# Patient Record
Sex: FEMALE | Race: WHITE | NOT HISPANIC OR LATINO | Employment: PART TIME | URBAN - METROPOLITAN AREA
[De-identification: names, ages, dates, MRNs, and addresses within clinical notes are randomized per-mention and may not be internally consistent; named-entity substitution may affect disease eponyms.]

---

## 2017-07-24 ENCOUNTER — APPOINTMENT (OUTPATIENT)
Dept: PREADMISSION TESTING | Facility: HOSPITAL | Age: 27
End: 2017-07-24

## 2017-07-24 VITALS
RESPIRATION RATE: 20 BRPM | OXYGEN SATURATION: 100 % | HEIGHT: 63 IN | WEIGHT: 148 LBS | TEMPERATURE: 98.1 F | SYSTOLIC BLOOD PRESSURE: 109 MMHG | DIASTOLIC BLOOD PRESSURE: 71 MMHG | HEART RATE: 79 BPM | BODY MASS INDEX: 26.22 KG/M2

## 2017-07-24 LAB
DEPRECATED RDW RBC AUTO: 44.8 FL (ref 37–54)
ERYTHROCYTE [DISTWIDTH] IN BLOOD BY AUTOMATED COUNT: 13.4 % (ref 11.7–13)
HCT VFR BLD AUTO: 37.5 % (ref 35.6–45.5)
HGB BLD-MCNC: 12.3 G/DL (ref 11.9–15.5)
MCH RBC QN AUTO: 29.8 PG (ref 26.9–32)
MCHC RBC AUTO-ENTMCNC: 32.8 G/DL (ref 32.4–36.3)
MCV RBC AUTO: 90.8 FL (ref 80.5–98.2)
PLATELET # BLD AUTO: 204 10*3/MM3 (ref 140–500)
PMV BLD AUTO: 9.3 FL (ref 6–12)
RBC # BLD AUTO: 4.13 10*6/MM3 (ref 3.9–5.2)
WBC NRBC COR # BLD: 7.84 10*3/MM3 (ref 4.5–10.7)

## 2017-07-24 PROCEDURE — 36415 COLL VENOUS BLD VENIPUNCTURE: CPT

## 2017-07-24 PROCEDURE — 85027 COMPLETE CBC AUTOMATED: CPT | Performed by: PLASTIC SURGERY

## 2017-07-24 NOTE — DISCHARGE INSTRUCTIONS
Take the following medications the morning of surgery with a small sip of water:    NONE    General Instructions:  • Do not eat or drink anything after midnight the night before surgery.  • Patients who avoid smoking, chewing tobacco and alcohol for 4 weeks prior to surgery have a reduced risk of post-operative complications.  Quit smoking as many days before surgery as you can.  • Do not smoke, use chewing tobacco or drink alcohol the day of surgery.   • Bring any papers given to you in the doctor’s office.  • Wear clean comfortable clothes and socks.  • Do not wear contact lenses or make-up.  Bring a case for your glasses.   • Leave all other valuables and jewelry at home.  • The Pre-Admission Testing nurse will instruct you to bring medications if unable to obtain an accurate list in Pre-Admission Testing.   • REPORT TO MAIN SURGERY ON 7-   AT 0530       Preventing a Surgical Site Infection:  • For 2 to 3 days before surgery, avoid shaving with a razor because the razor can irritate skin and make it easier to develop an infection.  • The night prior to surgery sleep in a clean bed with clean clothing.  Do not allow pets to sleep with you.  • Shower on the morning of surgery using a fresh bar of anti-bacterial soap (such as Dial) and clean washcloth.  Dry with a clean towel and dress in clean clothing.  • Ask your surgeon if you will be receiving antibiotics prior to surgery.  • Make sure you, your family, and all healthcare providers clean their hands with soap and water or an alcohol based hand  before caring for you or your wound.    Day of surgery:  Upon arrival, a Pre-op nurse and Anesthesiologist will review your health history, obtain vital signs, and answer questions you may have.  The only belongings needed at this time will be your home medications and if applicable your C-PAP/BI-PAP machine.  If you are staying overnight your family can leave the rest of your belongings in the car and  bring them to your room later.  A Pre-op nurse will start an IV and you may receive medication in preparation for surgery, including something to help you relax.  Your family will be able to see you in the Pre-op area.  While you are in surgery your family should notify the waiting room  if they leave the waiting room area and provide a contact phone number.    Please be aware that surgery does come with discomfort.  We want to make every effort to control your discomfort so please discuss any uncontrolled symptoms with your nurse.   Your doctor will most likely have prescribed pain medications.      If you are going home after surgery you will receive individualized written care instructions before being discharged.  A responsible adult must drive you to and from the hospital on the day of your surgery and stay with you for 24 hours.    If you have any questions please call Pre-Admission Testing at [phone].    Deductibles and co-payments are collected on the day of service. Please be prepared to pay the required co-pay, deductible or deposit on the day of service as defined by your plan.

## 2017-07-31 ENCOUNTER — ANESTHESIA (OUTPATIENT)
Dept: PERIOP | Facility: HOSPITAL | Age: 27
End: 2017-07-31

## 2017-07-31 ENCOUNTER — ANESTHESIA EVENT (OUTPATIENT)
Dept: PERIOP | Facility: HOSPITAL | Age: 27
End: 2017-07-31

## 2017-07-31 ENCOUNTER — HOSPITAL ENCOUNTER (OUTPATIENT)
Facility: HOSPITAL | Age: 27
Setting detail: HOSPITAL OUTPATIENT SURGERY
Discharge: HOME | End: 2017-07-31
Attending: PLASTIC SURGERY | Admitting: PLASTIC SURGERY

## 2017-07-31 VITALS
BODY MASS INDEX: 26.05 KG/M2 | HEART RATE: 90 BPM | DIASTOLIC BLOOD PRESSURE: 77 MMHG | SYSTOLIC BLOOD PRESSURE: 130 MMHG | HEIGHT: 63 IN | OXYGEN SATURATION: 98 % | WEIGHT: 147 LBS | RESPIRATION RATE: 16 BRPM | TEMPERATURE: 97.6 F

## 2017-07-31 LAB
B-HCG UR QL: NEGATIVE
INTERNAL NEGATIVE CONTROL: NEGATIVE
INTERNAL POSITIVE CONTROL: POSITIVE
Lab: NORMAL

## 2017-07-31 PROCEDURE — 25010000002 ONDANSETRON PER 1 MG: Performed by: NURSE ANESTHETIST, CERTIFIED REGISTERED

## 2017-07-31 PROCEDURE — 25010000002 MIDAZOLAM PER 1 MG: Performed by: NURSE ANESTHETIST, CERTIFIED REGISTERED

## 2017-07-31 PROCEDURE — 25010000002 FENTANYL CITRATE (PF) 100 MCG/2ML SOLUTION: Performed by: NURSE ANESTHETIST, CERTIFIED REGISTERED

## 2017-07-31 PROCEDURE — 25010000002 PROPOFOL 10 MG/ML EMULSION: Performed by: NURSE ANESTHETIST, CERTIFIED REGISTERED

## 2017-07-31 PROCEDURE — 63710000001 PROMETHAZINE PER 25 MG: Performed by: NURSE ANESTHETIST, CERTIFIED REGISTERED

## 2017-07-31 PROCEDURE — 25010000002 NEOSTIGMINE PER 0.5 MG: Performed by: NURSE ANESTHETIST, CERTIFIED REGISTERED

## 2017-07-31 PROCEDURE — 25010000002 DEXAMETHASONE PER 1 MG: Performed by: NURSE ANESTHETIST, CERTIFIED REGISTERED

## 2017-07-31 PROCEDURE — 25010000003 CEFAZOLIN IN D5W 1 GM/50ML SOLUTION: Performed by: NURSE ANESTHETIST, CERTIFIED REGISTERED

## 2017-07-31 PROCEDURE — 25010000002 MIDAZOLAM PER 1 MG: Performed by: ANESTHESIOLOGY

## 2017-07-31 RX ORDER — HYDROMORPHONE HYDROCHLORIDE 1 MG/ML
0.5 INJECTION, SOLUTION INTRAMUSCULAR; INTRAVENOUS; SUBCUTANEOUS
Status: DISCONTINUED | OUTPATIENT
Start: 2017-07-31 | End: 2017-07-31 | Stop reason: HOSPADM

## 2017-07-31 RX ORDER — NALOXONE HCL 0.4 MG/ML
0.2 VIAL (ML) INJECTION AS NEEDED
Status: DISCONTINUED | OUTPATIENT
Start: 2017-07-31 | End: 2017-07-31 | Stop reason: HOSPADM

## 2017-07-31 RX ORDER — SODIUM CHLORIDE, SODIUM LACTATE, POTASSIUM CHLORIDE, CALCIUM CHLORIDE 600; 310; 30; 20 MG/100ML; MG/100ML; MG/100ML; MG/100ML
9 INJECTION, SOLUTION INTRAVENOUS CONTINUOUS
Status: DISCONTINUED | OUTPATIENT
Start: 2017-07-31 | End: 2017-07-31 | Stop reason: HOSPADM

## 2017-07-31 RX ORDER — DIPHENHYDRAMINE HYDROCHLORIDE 50 MG/ML
12.5 INJECTION INTRAMUSCULAR; INTRAVENOUS
Status: DISCONTINUED | OUTPATIENT
Start: 2017-07-31 | End: 2017-07-31 | Stop reason: HOSPADM

## 2017-07-31 RX ORDER — MIDAZOLAM HYDROCHLORIDE 1 MG/ML
1 INJECTION INTRAMUSCULAR; INTRAVENOUS
Status: DISCONTINUED | OUTPATIENT
Start: 2017-07-31 | End: 2017-07-31 | Stop reason: HOSPADM

## 2017-07-31 RX ORDER — OXYCODONE AND ACETAMINOPHEN 7.5; 325 MG/1; MG/1
1 TABLET ORAL ONCE AS NEEDED
Status: DISCONTINUED | OUTPATIENT
Start: 2017-07-31 | End: 2017-07-31 | Stop reason: HOSPADM

## 2017-07-31 RX ORDER — HYDRALAZINE HYDROCHLORIDE 20 MG/ML
5 INJECTION INTRAMUSCULAR; INTRAVENOUS
Status: DISCONTINUED | OUTPATIENT
Start: 2017-07-31 | End: 2017-07-31 | Stop reason: HOSPADM

## 2017-07-31 RX ORDER — HYDROCODONE BITARTRATE AND ACETAMINOPHEN 7.5; 325 MG/1; MG/1
1 TABLET ORAL ONCE AS NEEDED
Status: COMPLETED | OUTPATIENT
Start: 2017-07-31 | End: 2017-07-31

## 2017-07-31 RX ORDER — SODIUM CHLORIDE 0.9 % (FLUSH) 0.9 %
1-10 SYRINGE (ML) INJECTION AS NEEDED
Status: DISCONTINUED | OUTPATIENT
Start: 2017-07-31 | End: 2017-07-31 | Stop reason: HOSPADM

## 2017-07-31 RX ORDER — ONDANSETRON 2 MG/ML
4 INJECTION INTRAMUSCULAR; INTRAVENOUS ONCE AS NEEDED
Status: COMPLETED | OUTPATIENT
Start: 2017-07-31 | End: 2017-07-31

## 2017-07-31 RX ORDER — MIDAZOLAM HYDROCHLORIDE 1 MG/ML
2 INJECTION INTRAMUSCULAR; INTRAVENOUS
Status: DISCONTINUED | OUTPATIENT
Start: 2017-07-31 | End: 2017-07-31 | Stop reason: HOSPADM

## 2017-07-31 RX ORDER — MIDAZOLAM HYDROCHLORIDE 1 MG/ML
INJECTION INTRAMUSCULAR; INTRAVENOUS AS NEEDED
Status: DISCONTINUED | OUTPATIENT
Start: 2017-07-31 | End: 2017-07-31 | Stop reason: SURG

## 2017-07-31 RX ORDER — PROMETHAZINE HYDROCHLORIDE 25 MG/1
12.5 TABLET ORAL ONCE AS NEEDED
Status: DISCONTINUED | OUTPATIENT
Start: 2017-07-31 | End: 2017-07-31 | Stop reason: HOSPADM

## 2017-07-31 RX ORDER — ROCURONIUM BROMIDE 10 MG/ML
INJECTION, SOLUTION INTRAVENOUS AS NEEDED
Status: DISCONTINUED | OUTPATIENT
Start: 2017-07-31 | End: 2017-07-31 | Stop reason: SURG

## 2017-07-31 RX ORDER — FENTANYL CITRATE 50 UG/ML
50 INJECTION, SOLUTION INTRAMUSCULAR; INTRAVENOUS
Status: DISCONTINUED | OUTPATIENT
Start: 2017-07-31 | End: 2017-07-31 | Stop reason: HOSPADM

## 2017-07-31 RX ORDER — ONDANSETRON 2 MG/ML
INJECTION INTRAMUSCULAR; INTRAVENOUS AS NEEDED
Status: DISCONTINUED | OUTPATIENT
Start: 2017-07-31 | End: 2017-07-31 | Stop reason: SURG

## 2017-07-31 RX ORDER — PROMETHAZINE HYDROCHLORIDE 25 MG/1
25 SUPPOSITORY RECTAL ONCE AS NEEDED
Status: COMPLETED | OUTPATIENT
Start: 2017-07-31 | End: 2017-07-31

## 2017-07-31 RX ORDER — LABETALOL HYDROCHLORIDE 5 MG/ML
5 INJECTION, SOLUTION INTRAVENOUS
Status: DISCONTINUED | OUTPATIENT
Start: 2017-07-31 | End: 2017-07-31 | Stop reason: HOSPADM

## 2017-07-31 RX ORDER — GLYCOPYRROLATE 0.2 MG/ML
INJECTION INTRAMUSCULAR; INTRAVENOUS AS NEEDED
Status: DISCONTINUED | OUTPATIENT
Start: 2017-07-31 | End: 2017-07-31 | Stop reason: SURG

## 2017-07-31 RX ORDER — SCOLOPAMINE TRANSDERMAL SYSTEM 1 MG/1
PATCH, EXTENDED RELEASE TRANSDERMAL AS NEEDED
Status: DISCONTINUED | OUTPATIENT
Start: 2017-07-31 | End: 2017-07-31 | Stop reason: SURG

## 2017-07-31 RX ORDER — EPHEDRINE SULFATE 50 MG/ML
5 INJECTION, SOLUTION INTRAVENOUS ONCE AS NEEDED
Status: DISCONTINUED | OUTPATIENT
Start: 2017-07-31 | End: 2017-07-31 | Stop reason: HOSPADM

## 2017-07-31 RX ORDER — SCOLOPAMINE TRANSDERMAL SYSTEM 1 MG/1
PATCH, EXTENDED RELEASE TRANSDERMAL
Status: DISCONTINUED
Start: 2017-07-31 | End: 2017-07-31 | Stop reason: HOSPADM

## 2017-07-31 RX ORDER — PROMETHAZINE HYDROCHLORIDE 25 MG/ML
12.5 INJECTION, SOLUTION INTRAMUSCULAR; INTRAVENOUS ONCE AS NEEDED
Status: COMPLETED | OUTPATIENT
Start: 2017-07-31 | End: 2017-07-31

## 2017-07-31 RX ORDER — LIDOCAINE HYDROCHLORIDE 20 MG/ML
INJECTION, SOLUTION INFILTRATION; PERINEURAL AS NEEDED
Status: DISCONTINUED | OUTPATIENT
Start: 2017-07-31 | End: 2017-07-31 | Stop reason: SURG

## 2017-07-31 RX ORDER — SODIUM CHLORIDE 9 MG/ML
INJECTION, SOLUTION INTRAVENOUS AS NEEDED
Status: DISCONTINUED | OUTPATIENT
Start: 2017-07-31 | End: 2017-07-31 | Stop reason: HOSPADM

## 2017-07-31 RX ORDER — ACETAMINOPHEN 325 MG/1
650 TABLET ORAL EVERY 6 HOURS PRN
COMMUNITY

## 2017-07-31 RX ORDER — CEFAZOLIN SODIUM 1 G/3ML
1 INJECTION, POWDER, FOR SOLUTION INTRAMUSCULAR; INTRAVENOUS ONCE
Status: CANCELLED | OUTPATIENT
Start: 2017-07-31

## 2017-07-31 RX ORDER — FAMOTIDINE 10 MG/ML
20 INJECTION, SOLUTION INTRAVENOUS ONCE
Status: COMPLETED | OUTPATIENT
Start: 2017-07-31 | End: 2017-07-31

## 2017-07-31 RX ORDER — MAGNESIUM HYDROXIDE 1200 MG/15ML
LIQUID ORAL AS NEEDED
Status: DISCONTINUED | OUTPATIENT
Start: 2017-07-31 | End: 2017-07-31 | Stop reason: HOSPADM

## 2017-07-31 RX ORDER — PROPOFOL 10 MG/ML
VIAL (ML) INTRAVENOUS AS NEEDED
Status: DISCONTINUED | OUTPATIENT
Start: 2017-07-31 | End: 2017-07-31 | Stop reason: SURG

## 2017-07-31 RX ORDER — DEXAMETHASONE SODIUM PHOSPHATE 10 MG/ML
INJECTION INTRAMUSCULAR; INTRAVENOUS AS NEEDED
Status: DISCONTINUED | OUTPATIENT
Start: 2017-07-31 | End: 2017-07-31 | Stop reason: SURG

## 2017-07-31 RX ORDER — FLUMAZENIL 0.1 MG/ML
0.2 INJECTION INTRAVENOUS AS NEEDED
Status: DISCONTINUED | OUTPATIENT
Start: 2017-07-31 | End: 2017-07-31 | Stop reason: HOSPADM

## 2017-07-31 RX ORDER — PROMETHAZINE HYDROCHLORIDE 25 MG/1
25 TABLET ORAL ONCE AS NEEDED
Status: COMPLETED | OUTPATIENT
Start: 2017-07-31 | End: 2017-07-31

## 2017-07-31 RX ORDER — FENTANYL CITRATE 50 UG/ML
INJECTION, SOLUTION INTRAMUSCULAR; INTRAVENOUS AS NEEDED
Status: DISCONTINUED | OUTPATIENT
Start: 2017-07-31 | End: 2017-07-31 | Stop reason: SURG

## 2017-07-31 RX ADMIN — SODIUM CHLORIDE, POTASSIUM CHLORIDE, SODIUM LACTATE AND CALCIUM CHLORIDE: 600; 310; 30; 20 INJECTION, SOLUTION INTRAVENOUS at 08:28

## 2017-07-31 RX ADMIN — MIDAZOLAM HYDROCHLORIDE 2 MG: 1 INJECTION, SOLUTION INTRAMUSCULAR; INTRAVENOUS at 10:00

## 2017-07-31 RX ADMIN — FENTANYL CITRATE 100 MCG: 50 INJECTION INTRAMUSCULAR; INTRAVENOUS at 07:32

## 2017-07-31 RX ADMIN — SCOPALAMINE 1 PATCH: 1 PATCH, EXTENDED RELEASE TRANSDERMAL at 07:15

## 2017-07-31 RX ADMIN — ONDANSETRON 4 MG: 2 INJECTION INTRAMUSCULAR; INTRAVENOUS at 09:56

## 2017-07-31 RX ADMIN — FENTANYL CITRATE 50 MCG: 50 INJECTION INTRAMUSCULAR; INTRAVENOUS at 11:27

## 2017-07-31 RX ADMIN — PROPOFOL 150 MG: 10 INJECTION, EMULSION INTRAVENOUS at 07:37

## 2017-07-31 RX ADMIN — PROMETHAZINE HYDROCHLORIDE 25 MG: 25 TABLET ORAL at 12:04

## 2017-07-31 RX ADMIN — FENTANYL CITRATE 50 MCG: 50 INJECTION INTRAMUSCULAR; INTRAVENOUS at 07:57

## 2017-07-31 RX ADMIN — LIDOCAINE HYDROCHLORIDE 60 MG: 20 INJECTION, SOLUTION INFILTRATION; PERINEURAL at 07:37

## 2017-07-31 RX ADMIN — GLYCOPYRROLATE 0.4 MG: 0.2 INJECTION INTRAMUSCULAR; INTRAVENOUS at 09:58

## 2017-07-31 RX ADMIN — MIDAZOLAM 2 MG: 1 INJECTION INTRAMUSCULAR; INTRAVENOUS at 07:16

## 2017-07-31 RX ADMIN — ONDANSETRON 4 MG: 2 INJECTION INTRAMUSCULAR; INTRAVENOUS at 12:06

## 2017-07-31 RX ADMIN — CEFAZOLIN SODIUM 1 G: 1 INJECTION, SOLUTION INTRAVENOUS at 07:26

## 2017-07-31 RX ADMIN — FAMOTIDINE 20 MG: 10 INJECTION INTRAVENOUS at 06:24

## 2017-07-31 RX ADMIN — SODIUM CHLORIDE, POTASSIUM CHLORIDE, SODIUM LACTATE AND CALCIUM CHLORIDE 9 ML/HR: 600; 310; 30; 20 INJECTION, SOLUTION INTRAVENOUS at 05:46

## 2017-07-31 RX ADMIN — FENTANYL CITRATE 50 MCG: 50 INJECTION INTRAMUSCULAR; INTRAVENOUS at 12:08

## 2017-07-31 RX ADMIN — ROCURONIUM BROMIDE 35 MG: 10 INJECTION INTRAVENOUS at 07:37

## 2017-07-31 RX ADMIN — NEOSTIGMINE METHYLSULFATE 2 MG: 1 INJECTION INTRAMUSCULAR; INTRAVENOUS; SUBCUTANEOUS at 09:58

## 2017-07-31 RX ADMIN — DEXAMETHASONE SODIUM PHOSPHATE 8 MG: 10 INJECTION INTRAMUSCULAR; INTRAVENOUS at 09:42

## 2017-07-31 RX ADMIN — HYDROCODONE BITARTRATE AND ACETAMINOPHEN 1 TABLET: 7.5; 325 TABLET ORAL at 12:02

## 2017-07-31 NOTE — ANESTHESIA POSTPROCEDURE EVALUATION
"Patient: Yuki Arnett    Procedure Summary     Date Anesthesia Start Anesthesia Stop Room / Location    07/31/17 0726 1027  [facility] / [facility]       Procedure Diagnosis Surgeon Provider    BREAST AUGMENTATION, MASTOPEXY W/ IDEAL IMPLANTS  NEW IMAGE (Bilateral Breast) No diagnosis on file. MD Gaby Jaquez MD          Anesthesia Type: general  Last vitals  BP   120/80 (07/31/17 1345)    Temp        Pulse   86 (07/31/17 1345)   Resp   16 (07/31/17 1345)    SpO2   98 % (07/31/17 1345)      Post Anesthesia Care and Evaluation    Patient location during evaluation: PACU  Patient participation: complete - patient participated  Level of consciousness: sleepy but conscious  Pain score: 0  Pain management: adequate  Airway patency: patent  Anesthetic complications: No anesthetic complications    Cardiovascular status: acceptable  Respiratory status: acceptable  Hydration status: acceptable    Comments: /80  Pulse 86  Temp 36.7 °C (98 °F) (Oral)   Resp 16  Ht 62.5\" (158.8 cm)  Wt 147 lb (66.7 kg)  [facility] 07/10/2017  SpO2 98%  BMI 26.46 kg/m2        "

## 2017-07-31 NOTE — ANESTHESIA PROCEDURE NOTES
Airway  Urgency: elective    Date/Time: 7/31/2017 7:39 AM  Airway not difficult    General Information and Staff    Patient location during procedure: OR  Anesthesiologist: CAMRYN LUIS  CRNA: KEVIN CHANEL    Indications and Patient Condition  Indications for airway management: airway protection    Preoxygenated: yes  Mask difficulty assessment: 1 - vent by mask    Final Airway Details  Final airway type: endotracheal airway      Successful airway: ETT  Cuffed: yes   Successful intubation technique: direct laryngoscopy  Facilitating devices/methods: cricoid pressure  Endotracheal tube insertion site: oral  Blade: Mota  Blade size: #2  ETT size: 6.5 mm  Cormack-Lehane Classification: grade I - full view of glottis  Placement verified by: chest auscultation and capnometry   Cuff volume (mL): 2  Measured from: lips  ETT to lips (cm): 20  Number of attempts at approach: 1    Additional Comments  EBBS: ETCO2+: Atraumatic intubation: Lips and teeth same as pre op

## 2017-07-31 NOTE — ANESTHESIA PREPROCEDURE EVALUATION
Anesthesia Evaluation     history of anesthetic complications (combative):         Airway   Mallampati: II  TM distance: >3 FB  Neck ROM: full  no difficulty expected  Dental - normal exam     Pulmonary    (+) asthma (as a child),   Cardiovascular     (-) hypertension, valvular problems/murmurs, hyperlipidemia      Neuro/Psych  GI/Hepatic/Renal/Endo      Musculoskeletal     Abdominal    Substance History      OB/GYN          Other                                        Anesthesia Plan    ASA 1     general     intravenous induction   Anesthetic plan and risks discussed with patient.

## 2023-03-23 ENCOUNTER — HOSPITAL ENCOUNTER (EMERGENCY)
Facility: HOSPITAL | Age: 33
Discharge: HOME | End: 2023-03-23
Attending: EMERGENCY MEDICINE | Admitting: EMERGENCY MEDICINE
Payer: MEDICAID

## 2023-03-23 ENCOUNTER — APPOINTMENT (OUTPATIENT)
Dept: ULTRASOUND IMAGING | Facility: HOSPITAL | Age: 33
End: 2023-03-23
Payer: MEDICAID

## 2023-03-23 VITALS
WEIGHT: 145 LBS | BODY MASS INDEX: 26.68 KG/M2 | TEMPERATURE: 99.5 F | RESPIRATION RATE: 16 BRPM | SYSTOLIC BLOOD PRESSURE: 121 MMHG | HEIGHT: 62 IN | DIASTOLIC BLOOD PRESSURE: 78 MMHG | OXYGEN SATURATION: 95 % | HEART RATE: 94 BPM

## 2023-03-23 DIAGNOSIS — O20.9 BLEEDING IN EARLY PREGNANCY: Primary | ICD-10-CM

## 2023-03-23 LAB
ALBUMIN SERPL-MCNC: 4.5 G/DL (ref 3.5–5.2)
ALBUMIN/GLOB SERPL: 1.5 G/DL
ALP SERPL-CCNC: 97 U/L (ref 39–117)
ALT SERPL W P-5'-P-CCNC: 11 U/L (ref 1–33)
ANION GAP SERPL CALCULATED.3IONS-SCNC: 11 MMOL/L (ref 5–15)
AST SERPL-CCNC: 16 U/L (ref 1–32)
BASOPHILS # BLD AUTO: 0.07 10*3/MM3 (ref 0–0.2)
BASOPHILS NFR BLD AUTO: 0.6 % (ref 0–1.5)
BILIRUB SERPL-MCNC: 0.3 MG/DL (ref 0–1.2)
BILIRUB UR QL STRIP: NEGATIVE
BUN SERPL-MCNC: 8 MG/DL (ref 6–20)
BUN/CREAT SERPL: 11.4 (ref 7–25)
CALCIUM SPEC-SCNC: 9.5 MG/DL (ref 8.6–10.5)
CHLORIDE SERPL-SCNC: 105 MMOL/L (ref 98–107)
CLARITY UR: CLEAR
CO2 SERPL-SCNC: 24 MMOL/L (ref 22–29)
COLOR UR: YELLOW
CREAT SERPL-MCNC: 0.7 MG/DL (ref 0.57–1)
DEPRECATED RDW RBC AUTO: 40.3 FL (ref 37–54)
EGFRCR SERPLBLD CKD-EPI 2021: 117.3 ML/MIN/1.73
EOSINOPHIL # BLD AUTO: 0.06 10*3/MM3 (ref 0–0.4)
EOSINOPHIL NFR BLD AUTO: 0.5 % (ref 0.3–6.2)
ERYTHROCYTE [DISTWIDTH] IN BLOOD BY AUTOMATED COUNT: 12.6 % (ref 12.3–15.4)
GLOBULIN UR ELPH-MCNC: 3.1 GM/DL
GLUCOSE SERPL-MCNC: 96 MG/DL (ref 65–99)
GLUCOSE UR STRIP-MCNC: NEGATIVE MG/DL
HCG INTACT+B SERPL-ACNC: 680 MIU/ML
HCT VFR BLD AUTO: 39.5 % (ref 34–46.6)
HGB BLD-MCNC: 13.2 G/DL (ref 12–15.9)
HGB UR QL STRIP.AUTO: NEGATIVE
HOLD SPECIMEN: NORMAL
HOLD SPECIMEN: NORMAL
IMM GRANULOCYTES # BLD AUTO: 0.06 10*3/MM3 (ref 0–0.05)
IMM GRANULOCYTES NFR BLD AUTO: 0.5 % (ref 0–0.5)
KETONES UR QL STRIP: ABNORMAL
LEUKOCYTE ESTERASE UR QL STRIP.AUTO: NEGATIVE
LIPASE SERPL-CCNC: 22 U/L (ref 13–60)
LYMPHOCYTES # BLD AUTO: 2.52 10*3/MM3 (ref 0.7–3.1)
LYMPHOCYTES NFR BLD AUTO: 22.7 % (ref 19.6–45.3)
MCH RBC QN AUTO: 29.3 PG (ref 26.6–33)
MCHC RBC AUTO-ENTMCNC: 33.4 G/DL (ref 31.5–35.7)
MCV RBC AUTO: 87.8 FL (ref 79–97)
MONOCYTES # BLD AUTO: 0.5 10*3/MM3 (ref 0.1–0.9)
MONOCYTES NFR BLD AUTO: 4.5 % (ref 5–12)
NEUTROPHILS NFR BLD AUTO: 7.87 10*3/MM3 (ref 1.7–7)
NEUTROPHILS NFR BLD AUTO: 71.2 % (ref 42.7–76)
NITRITE UR QL STRIP: NEGATIVE
NRBC BLD AUTO-RTO: 0.1 /100 WBC (ref 0–0.2)
PH UR STRIP.AUTO: 6.5 [PH] (ref 5–8)
PLATELET # BLD AUTO: 345 10*3/MM3 (ref 140–450)
PMV BLD AUTO: 8.9 FL (ref 6–12)
POTASSIUM SERPL-SCNC: 4.2 MMOL/L (ref 3.5–5.2)
PROT SERPL-MCNC: 7.6 G/DL (ref 6–8.5)
PROT UR QL STRIP: NEGATIVE
RBC # BLD AUTO: 4.5 10*6/MM3 (ref 3.77–5.28)
SODIUM SERPL-SCNC: 140 MMOL/L (ref 136–145)
SP GR UR STRIP: 1.01 (ref 1–1.03)
UROBILINOGEN UR QL STRIP: ABNORMAL
WBC NRBC COR # BLD: 11.08 10*3/MM3 (ref 3.4–10.8)
WHOLE BLOOD HOLD COAG: NORMAL
WHOLE BLOOD HOLD SPECIMEN: NORMAL

## 2023-03-23 PROCEDURE — 84702 CHORIONIC GONADOTROPIN TEST: CPT | Performed by: EMERGENCY MEDICINE

## 2023-03-23 PROCEDURE — 36415 COLL VENOUS BLD VENIPUNCTURE: CPT | Performed by: EMERGENCY MEDICINE

## 2023-03-23 PROCEDURE — 76815 OB US LIMITED FETUS(S): CPT

## 2023-03-23 PROCEDURE — 93976 VASCULAR STUDY: CPT

## 2023-03-23 PROCEDURE — 83690 ASSAY OF LIPASE: CPT | Performed by: EMERGENCY MEDICINE

## 2023-03-23 PROCEDURE — 80053 COMPREHEN METABOLIC PANEL: CPT | Performed by: EMERGENCY MEDICINE

## 2023-03-23 PROCEDURE — 99283 EMERGENCY DEPT VISIT LOW MDM: CPT

## 2023-03-23 PROCEDURE — 76817 TRANSVAGINAL US OBSTETRIC: CPT

## 2023-03-23 PROCEDURE — 81003 URINALYSIS AUTO W/O SCOPE: CPT | Performed by: EMERGENCY MEDICINE

## 2023-03-23 PROCEDURE — 85025 COMPLETE CBC W/AUTO DIFF WBC: CPT | Performed by: EMERGENCY MEDICINE

## 2023-03-23 RX ORDER — SODIUM CHLORIDE 0.9 % (FLUSH) 0.9 %
10 SYRINGE (ML) INJECTION AS NEEDED
Status: DISCONTINUED | OUTPATIENT
Start: 2023-03-23 | End: 2023-03-24 | Stop reason: HOSPADM

## 2023-03-23 NOTE — ED TRIAGE NOTES
C/O Abd cramping and back pain. Approx 5-6 weeks pregnant     Mask placed on patient in triage. Triage staff wore appropriate PPE during interaction with patient.

## 2023-03-24 ENCOUNTER — TELEPHONE (OUTPATIENT)
Dept: OBSTETRICS AND GYNECOLOGY | Facility: CLINIC | Age: 33
End: 2023-03-24
Payer: MEDICAID

## 2023-03-24 NOTE — ED PROVIDER NOTES
MD ATTESTATION NOTE    The GIANLUCA and I have discussed this patient's history, physical exam, and treatment plan.    I provided a substantive portion of the care of this patient. I personally performed the physical exam, in its entirety. The attached note describes my personal findings.      Yuki Shields is a 32 y.o. female who presents to the ED c/o lower abdominal cramping that is more prominent than the left.  She states that she is about 5 to 6 weeks pregnant.  She is .  She has had a small amount of vaginal spotting and 1 clot passed.  She has historically been on Lovenox for her pregnancies.  She has not yet seen her OB who is normally at [facility].      On exam:  GENERAL: not distressed  HENT: nares patent  EYES: no scleral icterus  CV: regular rhythm, regular rate  RESPIRATORY: normal effort  ABDOMEN: soft, minimal lower abdominal tenderness without rebound or guarding  MUSCULOSKELETAL: no deformity  NEURO: alert, moves all extremities, follows commands  SKIN: warm, dry    Labs  Recent Results (from the past 24 hour(s))   Comprehensive Metabolic Panel    Collection Time: 23  6:13 PM    Specimen: Blood   Result Value Ref Range    Glucose 96 65 - 99 mg/dL    BUN 8 6 - 20 mg/dL    Creatinine 0.70 0.57 - 1.00 mg/dL    Sodium 140 136 - 145 mmol/L    Potassium 4.2 3.5 - 5.2 mmol/L    Chloride 105 98 - 107 mmol/L    CO2 24.0 22.0 - 29.0 mmol/L    Calcium 9.5 8.6 - 10.5 mg/dL    Total Protein 7.6 6.0 - 8.5 g/dL    Albumin 4.5 3.5 - 5.2 g/dL    ALT (SGPT) 11 1 - 33 U/L    AST (SGOT) 16 1 - 32 U/L    Alkaline Phosphatase 97 39 - 117 U/L    Total Bilirubin 0.3 0.0 - 1.2 mg/dL    Globulin 3.1 gm/dL    A/G Ratio 1.5 g/dL    BUN/Creatinine Ratio 11.4 7.0 - 25.0    Anion Gap 11.0 5.0 - 15.0 mmol/L    eGFR 117.3 >60.0 mL/min/1.73   Lipase    Collection Time: 23  6:13 PM    Specimen: Blood   Result Value Ref Range    Lipase 22 13 - 60 U/L   Urinalysis With Microscopic If Indicated (No  Culture) - Urine, Clean Catch    Collection Time: 03/23/23  6:13 PM    Specimen: Urine, Clean Catch   Result Value Ref Range    Color, UA Yellow Yellow, Straw    Appearance, UA Clear Clear    pH, UA 6.5 5.0 - 8.0    Specific Gravity, UA 1.013 1.005 - 1.030    Glucose, UA Negative Negative    Ketones, UA Trace (A) Negative    Bilirubin, UA Negative Negative    Blood, UA Negative Negative    Protein, UA Negative Negative    Leuk Esterase, UA Negative Negative    Nitrite, UA Negative Negative    Urobilinogen, UA 0.2 E.U./dL 0.2 - 1.0 E.U./dL   hCG, Quantitative, Pregnancy    Collection Time: 03/23/23  6:13 PM    Specimen: Blood   Result Value Ref Range    HCG Quantitative 680.00 mIU/mL   Green Top (Gel)    Collection Time: 03/23/23  6:13 PM   Result Value Ref Range    Extra Tube Hold for add-ons.    Lavender Top    Collection Time: 03/23/23  6:13 PM   Result Value Ref Range    Extra Tube hold for add-on    Gold Top - SST    Collection Time: 03/23/23  6:13 PM   Result Value Ref Range    Extra Tube Hold for add-ons.    Light Blue Top    Collection Time: 03/23/23  6:13 PM   Result Value Ref Range    Extra Tube Hold for add-ons.    CBC Auto Differential    Collection Time: 03/23/23  6:13 PM    Specimen: Blood   Result Value Ref Range    WBC 11.08 (H) 3.40 - 10.80 10*3/mm3    RBC 4.50 3.77 - 5.28 10*6/mm3    Hemoglobin 13.2 12.0 - 15.9 g/dL    Hematocrit 39.5 34.0 - 46.6 %    MCV 87.8 79.0 - 97.0 fL    MCH 29.3 26.6 - 33.0 pg    MCHC 33.4 31.5 - 35.7 g/dL    RDW 12.6 12.3 - 15.4 %    RDW-SD 40.3 37.0 - 54.0 fl    MPV 8.9 6.0 - 12.0 fL    Platelets 345 140 - 450 10*3/mm3    Neutrophil % 71.2 42.7 - 76.0 %    Lymphocyte % 22.7 19.6 - 45.3 %    Monocyte % 4.5 (L) 5.0 - 12.0 %    Eosinophil % 0.5 0.3 - 6.2 %    Basophil % 0.6 0.0 - 1.5 %    Immature Grans % 0.5 0.0 - 0.5 %    Neutrophils, Absolute 7.87 (H) 1.70 - 7.00 10*3/mm3    Lymphocytes, Absolute 2.52 0.70 - 3.10 10*3/mm3    Monocytes, Absolute 0.50 0.10 - 0.90 10*3/mm3     Eosinophils, Absolute 0.06 0.00 - 0.40 10*3/mm3    Basophils, Absolute 0.07 0.00 - 0.20 10*3/mm3    Immature Grans, Absolute 0.06 (H) 0.00 - 0.05 10*3/mm3    nRBC 0.1 0.0 - 0.2 /100 WBC       Radiology  No Radiology Exams Resulted Within Past 24 Hours    Medications given in the ED:  Medications   sodium chloride 0.9 % flush 10 mL (has no administration in time range)       Orders placed during this visit:  Orders Placed This Encounter   Procedures   • US Ob Limited 1 + Fetuses   • US Ob Transvaginal   • US Testicular or Ovarian Vascular Limited   • [place] Draw   • Comprehensive Metabolic Panel   • Lipase   • Urinalysis With Microscopic If Indicated (No Culture) - Urine, Clean Catch   • hCG, Quantitative, Pregnancy   • CBC Auto Differential   • NPO Diet NPO Type: Strict NPO   • Undress & Gown   • Insert Peripheral IV   • CBC & Differential   • Green Top (Gel)   • Lavender Top   • Gold Top - SST   • Light Blue Top       Medical Decision Making:  ED Course as of 23 0248   Thu Mar 23, 2023   190 O+ blood  type from labs in 2019. [DC]   190 WBC(!): 11.08 [DC]   1904 Hemoglobin: 13.2 [DC]   1904 Platelets: 345 [DC]   1904 Glucose: 96 [DC]   1904 Creatinine: 0.70 [DC]   1904 HCG Quantitative: 680.00 [DC]    Attempted to call OB hospitalist but no answer.  Contacted L&D floor who states OB hospitalist currently assisting in a . [DC]    Discussed case with Dr. Rodriguez, OB hospitalist, who states she would not initiate Lovenox at this time and patient is appropriate to follow-up with her primary OB regarding this.  She does recommend repeat hCG levels in 48 hours and close follow-up for repeat ultrasound since there is no identifiable IUP on ultrasound today. [DC]    Discussed lab and imaging results with patient.  She will follow-up with her OB for repeat hCG levels in 48 hours and close monitoring of symptoms.  Discussed strict ER return precautions with patient and plan to hold off on  initiating Lovenox until she follows up with her OB.  Patient verbalizes understanding and is agreeable. [DC]      ED Course User Index  [DC] Julianna Arteaga PA             PPE: Both the patient and I wore a surgical mask throughout the entire patient encounter.     Diagnosis  Final diagnoses:   Bleeding in early pregnancy        Narciso Smith II, MD  03/25/23 0248

## 2023-03-24 NOTE — ED PROVIDER NOTES
EMERGENCY DEPARTMENT ENCOUNTER    Room Number:  26/26  Date of encounter:  3/23/2023  PCP: Provider, No Known  Patient Care Team:  Provider, No Known as PCP - Sol Plascencia MD (Family Medicine)   Independent Historians: Patient    HPI:  Chief Complaint: Nominal pain, pregnancy  A complete HPI/ROS/PMH/PSH/SH/FH are unobtainable due to: None    Chronic or social conditions impacting patient care (social determinants of health): History of clotting disorder    Context: Yuki Shields is a 32 y.o. female who presents to the ED c/o some lower abdominal cramping that began over the past several days.  She estimates she is about 5 to 6 weeks pregnant based on LMP on 2/15/2023.  She has had 3 previous pregnancies with no complications.  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  She reports small amount of vaginal spotting and 1 small clot she passed.  Denies current active bleeding.  She has previously been seen by OB at [facility] but has an upcoming appointment with a Dr. Taylor on 3/30/2023.    Review of prior external notes (non-ED): Office visit with orthopedic surgery on 5/3/2016 with Dr. Giraldo for right hip pain.    Review of prior external test results outside of this encounter: Blood type from 12/17/thousand 19 labs show positive.    PAST MEDICAL HISTORY  Active Ambulatory Problems     Diagnosis Date Noted   • No Active Ambulatory Problems     Resolved Ambulatory Problems     Diagnosis Date Noted   • No Resolved Ambulatory Problems     Past Medical History:   Diagnosis Date   • Asthma    • DDD (degenerative disc disease), lumbar    • Factor II deficiency (HCC)    • Family hx-blood disorder    • Labral tear of hip, degenerative    • Lumbar herniated disc 06/2017       The patient qualifies to receive the vaccine, but they have not yet received it.    PAST SURGICAL HISTORY  Past Surgical History:   Procedure Laterality Date   • BLADDER REPAIR   "2016    WITH MESH   • BREAST AUGMENTATION BILATERAL MASTOPEXY Bilateral 7/31/2017    Procedure: BREAST AUGMENTATION, MASTOPEXY W/ IDEAL IMPLANTS  NEW IMAGE;  Surgeon: Nirmal Mehta MD;  Location: [facility];  Service:    • CHOLECYSTECTOMY  2012   • TONSILLECTOMY AND ADENOIDECTOMY           FAMILY HISTORY  Family History   Problem Relation Age of Onset   • Malig Hyperthermia Neg Hx          SOCIAL HISTORY  Social History     Socioeconomic History   • Marital status:    Tobacco Use   • Smoking status: Never   • Smokeless tobacco: Never   Substance and Sexual Activity   • Alcohol use: Yes     Comment: OCCAS   • Drug use: No   • Sexual activity: Defer         ALLERGIES  Dilaudid [hydromorphone hcl]        REVIEW OF SYSTEMS  Review of Systems   Constitutional: Negative for fever.   Respiratory: Negative for shortness of breath.    Cardiovascular: Negative for chest pain.   Gastrointestinal: Negative for nausea and vomiting.   Genitourinary: Positive for pelvic pain (\"Discomfort\") and vaginal bleeding (Small, none currently). Negative for dysuria.        All systems reviewed and negative except for those discussed in HPI.       PHYSICAL EXAM    I have reviewed the triage vital signs and nursing notes.    ED Triage Vitals   Temp Heart Rate Resp BP SpO2   03/23/23 1724 03/23/23 1724 03/23/23 1724 03/23/23 1759 03/23/23 1724   99.5 °F (37.5 °C) (!) 123 16 133/93 99 %      Temp src Heart Rate Source Patient Position BP Location FiO2 (%)   03/23/23 1724 -- -- -- --   Tympanic           Physical Exam  GENERAL: alert, no acute distress  SKIN: Warm, dry  HENT: Normocephalic, atraumatic  EYES: no scleral icterus  CV: regular rhythm, regular rate  RESPIRATORY: normal effort, lungs clear  ABDOMEN: soft, no significant tenderness, very mild discomfort on the left, nondistended  MUSCULOSKELETAL: no deformity  NEURO: alert, moves all extremities, follows commands          LAB RESULTS  No results found for this or any " previous visit (from the past 24 hour(s)).    Ordered the above labs and independently reviewed and interpreted the results.        RADIOLOGY  US Ob Limited 1 + Fetuses    Result Date: 3/23/2023  PELVIC ULTRASOUND  HISTORY: Early pregnancy pain  COMPARISON: None available.  TECHNIQUE: Grayscale, color Doppler, spectral Doppler waveform analysis was performed through the pelvis transabdominally and transvaginally.  FINDINGS: Uterus measures 7.2 x 3.8 x 4.3 cm. No intrauterine pregnancy seen. Endometrium measures 6 mm. The right ovary measures 2.4 x 2.2 x 1.9 cm. Left ovary measures 2.1 x 1.8 x 2.0 cm. A hypoechoic area within the right ovary measures 8 x 7 x 6 mm, and may represent a corpus luteum. The patient's left ovary cannot be seen on transvaginal imaging, but no obvious adnexal masses are seen on either side. No free fluid is identified. Color Doppler flow is noted within both ovaries.      No intrauterine pregnancy is identified. This may be related to very early pregnancy. No suspicious adnexal masses are seen at this time to suggest ectopic pregnancy, but short-term obstetric and sonographic follow-up is recommended.  This report was finalized on 3/23/2023 9:16 PM by Dr. Ariella See M.D.        I ordered the above noted radiological studies. Independently reviewed and interpreted by me.  See dictation for official radiology interpretation.      PROCEDURES    Procedures      MEDICATIONS GIVEN IN ER    Medications - No data to display      PROGRESS, DATA ANALYSIS, CONSULTS, AND MEDICAL DECISION MAKING    All labs have been independently reviewed and interpreted by me.  All radiology studies have been independently reviewed and interpreted by me and discussed with radiologist dictating the report.   EKG's independently reviewed and interpreted by me.  Discussion below represents my analysis of pertinent findings related to patient's condition, differential diagnosis, treatment plan and final  disposition.    Differential diagnosis: Early pregnancy, implantation bleeding, ectopic pregnancy, spontaneous     ED Course as of 23 1841   Thu Mar 23, 2023   190 O+ blood  type from labs in 2019. [DC]   1904 WBC(!): 11.08 [DC]   1904 Hemoglobin: 13.2 [DC]   1904 Platelets: 345 [DC]   1904 Glucose: 96 [DC]   1904 Creatinine: 0.70 [DC]   1904 HCG Quantitative: 680.00 [DC]    Attempted to call OB hospitalist but no answer.  Contacted L&D floor who states OB hospitalist currently assisting in a . [DC]    Discussed case with Dr. Rodriguez, OB hospitalist, who states she would not initiate Lovenox at this time and patient is appropriate to follow-up with her primary OB regarding this.  She does recommend repeat hCG levels in 48 hours and close follow-up for repeat ultrasound since there is no identifiable IUP on ultrasound today. [DC]    Discussed lab and imaging results with patient.  She will follow-up with her OB for repeat hCG levels in 48 hours and close monitoring of symptoms.  Discussed strict ER return precautions with patient and plan to hold off on initiating Lovenox until she follows up with her OB.  Patient verbalizes understanding and is agreeable. [DC]      ED Course User Index  [DC] Julianna Arteaga PA           PPE: Patient was placed in face mask in first look. Patient was wearing facemask when I entered the room and throughout our encounter. I wore full protective equipment throughout this patient encounter including a face mask, and gloves. Hand hygiene was performed before donning protective equipment and after removal when leaving the room.        AS OF 18:41 EDT VITALS:    BP - 121/78  HR - 94  TEMP - 99.5 °F (37.5 °C) (Tympanic)  O2 SATS - 95%        DIAGNOSIS  Final diagnoses:   Bleeding in early pregnancy         DISPOSITION  ED Disposition     ED Disposition   Discharge    Condition   Stable    Comment   --                Note Disclaimer: At [facility], we  believe that sharing information builds trust and better relationships. You are receiving this note because you recently visited [facility]. It is possible you will see health information before a provider has talked with you about it. This kind of information can be easy to misunderstand. To help you fully understand what it means for your health, we urge you to discuss this note with your provider.       Julianna Arteaga PA  03/24/23 8588

## 2023-03-24 NOTE — TELEPHONE ENCOUNTER
Pt says she thought she miscarried and went to the Er and the Er wants her to come in to repeat an US and repeat her HCG blood work for a f/u, please advise.

## 2023-03-28 ENCOUNTER — OFFICE VISIT (OUTPATIENT)
Dept: OBSTETRICS AND GYNECOLOGY | Facility: CLINIC | Age: 33
End: 2023-03-28
Payer: MEDICAID

## 2023-03-28 VITALS
SYSTOLIC BLOOD PRESSURE: 128 MMHG | WEIGHT: 144 LBS | HEIGHT: 62 IN | DIASTOLIC BLOOD PRESSURE: 76 MMHG | BODY MASS INDEX: 26.5 KG/M2

## 2023-03-28 DIAGNOSIS — O99.111 THROMBOPHILIA AFFECTING PREGNANCY IN FIRST TRIMESTER, ANTEPARTUM: ICD-10-CM

## 2023-03-28 DIAGNOSIS — D68.59 THROMBOPHILIA AFFECTING PREGNANCY IN FIRST TRIMESTER, ANTEPARTUM: ICD-10-CM

## 2023-03-28 DIAGNOSIS — Z34.90 PREGNANCY, UNSPECIFIED GESTATIONAL AGE: ICD-10-CM

## 2023-03-28 DIAGNOSIS — O20.9 BLEEDING IN EARLY PREGNANCY: Primary | ICD-10-CM

## 2023-03-28 DIAGNOSIS — D68.52 PROTHROMBIN GENE MUTATION: ICD-10-CM

## 2023-03-28 LAB
B-HCG UR QL: POSITIVE
EXPIRATION DATE: ABNORMAL
INTERNAL NEGATIVE CONTROL: NEGATIVE
INTERNAL POSITIVE CONTROL: POSITIVE
Lab: ABNORMAL

## 2023-03-28 PROCEDURE — 1159F MED LIST DOCD IN RCRD: CPT | Performed by: STUDENT IN AN ORGANIZED HEALTH CARE EDUCATION/TRAINING PROGRAM

## 2023-03-28 PROCEDURE — 1160F RVW MEDS BY RX/DR IN RCRD: CPT | Performed by: STUDENT IN AN ORGANIZED HEALTH CARE EDUCATION/TRAINING PROGRAM

## 2023-03-28 PROCEDURE — 81025 URINE PREGNANCY TEST: CPT | Performed by: STUDENT IN AN ORGANIZED HEALTH CARE EDUCATION/TRAINING PROGRAM

## 2023-03-28 PROCEDURE — 99203 OFFICE O/P NEW LOW 30 MIN: CPT | Performed by: STUDENT IN AN ORGANIZED HEALTH CARE EDUCATION/TRAINING PROGRAM

## 2023-03-28 RX ORDER — BUSPIRONE HYDROCHLORIDE 7.5 MG/1
TABLET ORAL EVERY 12 HOURS SCHEDULED
COMMUNITY
End: 2023-06-12 | Stop reason: SDDI

## 2023-03-28 RX ORDER — ENOXAPARIN SODIUM 100 MG/ML
40 INJECTION SUBCUTANEOUS
Qty: 12 ML | Refills: 3 | Status: SHIPPED | OUTPATIENT
Start: 2023-03-28 | End: 2023-04-27

## 2023-03-28 NOTE — PROGRESS NOTES
Chief Complaint   Patient presents with   • ngyn     Spotting in early pregnancy        SUBJECTIVE:     Yuki Shields is a 32 y.o.  who presents with spotting in early pregnancy. She reports that her last menstrual period started on 2/15/23 and she bleed for one day and then started bleeding again on 22 with heavy flow. She was seen in the ED on 3/23/23 for vaginal spotting and passage of blood clot and evaluation with ultrasound in the ED noted no intrauterine pregnancy and no suspicious adnexal mass. Her HCG level that day was 680. She reports that she has had intermittent vaginal spotting since that time but no heavy vaginal bleeding. She is having abdominal cramping that is mild currently. She has nausea but no vomiting.      She has 3 previous pregnancy and this is her fourth pregnancy.   She has Factor II deficiency - prothrombin deficiency and has been on Lovenox with all her pregnancies. She has never experienced VTE disease.     OB Hx:   G1- - TSVD - low apgar scores. Postpartum hemorrhage.    G2- - TSVD   G3- 2015- TSVD  G4- Current    She has history of abdominoplasty that was complicated by significant blood loss requiring blood transfusion and history of bladder repair with mesh     Denies rashes or fevers since last menstrual period.   There is a family history of multiple thrombophilia including Factor 2, Factor 5, Factor 8.   Asthma- albuterol in pregnancy   She had the chicken pox as a child.   Denies abnormal pap smears. Last pap- in last 2-3 years.   Denies history of STD.       Past Medical History:   Diagnosis Date   • Asthma     As child   • DDD (degenerative disc disease), lumbar    • 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 GARZA AWARE OF THIS   • Family hx-blood disorder     MOTHER HAS FACTOR 2 AND FACTOR 5   • Labral tear of hip, degenerative     RIGHT   • Lumbar herniated disc 06/2017    X 2  DISC   HISTORY OF EPIDURALS      Past Surgical History:  "  Procedure Laterality Date   • BLADDER REPAIR      WITH MESH   • BREAST AUGMENTATION BILATERAL MASTOPEXY Bilateral 2017    Procedure: BREAST AUGMENTATION, MASTOPEXY W/ IDEAL IMPLANTS  NEW IMAGE;  Surgeon: Nirmal Mehta MD;  Location: [facility] OR;  Service:    • CHOLECYSTECTOMY     • TONSILLECTOMY AND ADENOIDECTOMY        Social History     Tobacco Use   • Smoking status: Never   • Smokeless tobacco: Never   Vaping Use   • Vaping Use: Never used   Substance Use Topics   • Alcohol use: Not Currently     Comment: OCCAS   • Drug use: No     OB History    Para Term  AB Living   4 3 3     3   SAB IAB Ectopic Molar Multiple Live Births             3      # Outcome Date GA Lbr Miguel/2nd Weight Sex Delivery Anes PTL Lv   4 Current            3 Term 04/29/15   3317 g (7 lb 5 oz) M Vag-Spont   MELANIA   2 Term 11   3147 g (6 lb 15 oz) F Vag-Spont      1 Term 07   3204 g (7 lb 1 oz) M Vag-Spont           Review of Systems   Gastrointestinal: Positive for abdominal pain and nausea. Negative for vomiting.   Genitourinary: Positive for vaginal bleeding.       OBJECTIVE:   Vitals:    23 0924   BP: 128/76   Weight: 65.3 kg (144 lb)   Height: 157.5 cm (62.01\")        Physical Exam  Vitals reviewed.   Constitutional:       General: She is not in acute distress.  HENT:      Head: Normocephalic and atraumatic.      Right Ear: External ear normal.      Left Ear: External ear normal.   Eyes:      Extraocular Movements: Extraocular movements intact.      Pupils: Pupils are equal, round, and reactive to light.   Pulmonary:      Effort: Pulmonary effort is normal. No respiratory distress.   Abdominal:      General: There is no distension.      Palpations: Abdomen is soft.      Tenderness: There is no abdominal tenderness. There is no guarding or rebound.   Musculoskeletal:         General: No deformity. Normal range of motion.      Cervical back: Normal range of motion and neck supple.   Skin:     " General: Skin is warm and dry.   Neurological:      General: No focal deficit present.      Mental Status: She is alert and oriented to person, place, and time.   Psychiatric:         Mood and Affect: Mood normal.         Behavior: Behavior normal.         ASSESSMENT:     ICD-10-CM ICD-9-CM   1. Bleeding in early pregnancy  O20.9 640.90   2. Pregnancy, unspecified gestational age  Z34.90 V22.2   3. Prothrombin gene mutation  D68.52 289.81   4. Thrombophilia affecting pregnancy in first trimester, antepartum  O99.111 649.33    D68.59 289.81       PLAN:   - Ultrasound performed today following visit to evaluate fetal viability and vaginal bleeding in early pregnancy. Ultrasound results demonstrated intrauterine pregnancy with gestational sac and yolk sac visualized. No fetal pole visualized at this this time. No subchorionic hematoma noted. Will have the patient return in 2 weeks for fetal viability ultrasound and new OB visit.  - Ordered initial prenatal labs including HCG level, OB Panel with HIV, Varicella Zoster Antibody, urine drug screen and urine culture. Will obtain pap smear and GC/CT/Trich probe at new OB visit.   - Reviewed [facility] and found where the patient previously has seen hematology and is heterozygous for prothrombin gene mutation. This means she has a low risk for VTE disease during pregnancy. Reviewed guidelines per ACOG and the patient has option of surveillance without anticoagulation therapy vs low dose prophylactic anticoagulation therapy. Patient has previously used Lovenox with all her pregnancies and will prescribe Lovenox 40 mg SQ daily. Will refer to hematology for further recommendations and follow up.   - Patient is already taking prenatal vitamin.   - Reviewed miscarriage and bleeding precautions.    See below for orders    Orders Placed This Encounter   Procedures   • Urine Culture - Urine, Urine, Clean Catch   • US Ob Transvaginal     Order Specific Question:   Reason for  Exam:     Answer:   vaginal bleeding in early pregnancy     Order Specific Question:   Release to patient     Answer:   Routine Release   • HCG, B-subunit, Quantitative     Order Specific Question:   Release to patient     Answer:   Routine Release   • OB Panel With HIV     Order Specific Question:   Release to patient     Answer:   Routine Release   • Varicella Zoster Antibody, IgG     Order Specific Question:   Release to patient     Answer:   Routine Release   • Urine Drug Screen - Urine, Clean Catch     Order Specific Question:   Release to patient     Answer:   Routine Release   • Ambulatory Referral to Hematology     Referral Priority:   Routine     Referral Type:   Consultation     Referral Reason:   Specialty Services Required     Requested Specialty:   Hematology     Number of Visits Requested:   1   • POC Pregnancy, Urine     Order Specific Question:   Release to patient     Answer:   Routine Release      Return in about 13 days (around 4/10/2023) for new OB visit .    Valerie Taylor MD

## 2023-03-29 ENCOUNTER — TELEPHONE (OUTPATIENT)
Dept: OBSTETRICS AND GYNECOLOGY | Facility: CLINIC | Age: 33
End: 2023-03-29

## 2023-03-29 LAB
ABO GROUP BLD: NORMAL
BASOPHILS # BLD AUTO: 0.1 X10E3/UL (ref 0–0.2)
BASOPHILS NFR BLD AUTO: 1 %
BLD GP AB SCN SERPL QL: NEGATIVE
EOSINOPHIL # BLD AUTO: 0.1 X10E3/UL (ref 0–0.4)
EOSINOPHIL NFR BLD AUTO: 1 %
ERYTHROCYTE [DISTWIDTH] IN BLOOD BY AUTOMATED COUNT: 12.5 % (ref 11.7–15.4)
HBV SURFACE AG SERPL QL IA: NEGATIVE
HCG INTACT+B SERPL-ACNC: 4977 MIU/ML
HCT VFR BLD AUTO: 37.8 % (ref 34–46.6)
HCV IGG SERPL QL IA: NON REACTIVE
HGB BLD-MCNC: 12.8 G/DL (ref 11.1–15.9)
HIV 1+2 AB+HIV1 P24 AG SERPL QL IA: NON REACTIVE
IMM GRANULOCYTES # BLD AUTO: 0 X10E3/UL (ref 0–0.1)
IMM GRANULOCYTES NFR BLD AUTO: 0 %
LYMPHOCYTES # BLD AUTO: 2 X10E3/UL (ref 0.7–3.1)
LYMPHOCYTES NFR BLD AUTO: 22 %
MCH RBC QN AUTO: 29.4 PG (ref 26.6–33)
MCHC RBC AUTO-ENTMCNC: 33.9 G/DL (ref 31.5–35.7)
MCV RBC AUTO: 87 FL (ref 79–97)
MONOCYTES # BLD AUTO: 0.6 X10E3/UL (ref 0.1–0.9)
MONOCYTES NFR BLD AUTO: 6 %
NEUTROPHILS # BLD AUTO: 6.6 X10E3/UL (ref 1.4–7)
NEUTROPHILS NFR BLD AUTO: 70 %
PLATELET # BLD AUTO: 354 X10E3/UL (ref 150–450)
RBC # BLD AUTO: 4.36 X10E6/UL (ref 3.77–5.28)
RH BLD: POSITIVE
RPR SER QL: NON REACTIVE
RUBV IGG SERPL IA-ACNC: 1.46 INDEX
VZV IGG SER IA-ACNC: 887 INDEX
WBC # BLD AUTO: 9.4 X10E3/UL (ref 3.4–10.8)

## 2023-03-29 NOTE — TELEPHONE ENCOUNTER
Pt called to check on prescription because the pharmacy wouldn't fill it. I called pharmacy and they state they need a prior auth done on the medication (Lovenox). Please advise.    Thanks,  Negro

## 2023-03-30 LAB
AMPHETAMINES UR QL SCN: NEGATIVE NG/ML
BACTERIA UR CULT: NO GROWTH
BACTERIA UR CULT: NORMAL
BARBITURATES UR QL SCN: NEGATIVE NG/ML
BENZODIAZ UR QL SCN: NEGATIVE NG/ML
BZE UR QL SCN: NEGATIVE NG/ML
CANNABINOIDS UR QL SCN: NEGATIVE NG/ML
CREAT UR-MCNC: 142.2 MG/DL (ref 20–300)
LABORATORY COMMENT REPORT: NORMAL
METHADONE UR QL SCN: NEGATIVE NG/ML
OPIATES UR QL SCN: NEGATIVE NG/ML
OXYCODONE+OXYMORPHONE UR QL SCN: NEGATIVE NG/ML
PCP UR QL: NEGATIVE NG/ML
PH UR: 6.7 [PH] (ref 4.5–8.9)
PROPOXYPH UR QL SCN: NEGATIVE NG/ML

## 2023-03-30 NOTE — TELEPHONE ENCOUNTER
PA for lovenox was submitted as urgent on 03/30/2023 at 10:17am. [place] will have a response within 24 hours.

## 2023-04-04 RX ORDER — ONDANSETRON 4 MG/1
4 TABLET, FILM COATED ORAL DAILY PRN
Qty: 30 TABLET | Refills: 1 | Status: SHIPPED | OUTPATIENT
Start: 2023-04-04 | End: 2023-04-10

## 2023-04-10 ENCOUNTER — INITIAL PRENATAL (OUTPATIENT)
Dept: OBSTETRICS AND GYNECOLOGY | Facility: CLINIC | Age: 33
End: 2023-04-10
Payer: MEDICAID

## 2023-04-10 VITALS — SYSTOLIC BLOOD PRESSURE: 108 MMHG | BODY MASS INDEX: 26.33 KG/M2 | DIASTOLIC BLOOD PRESSURE: 72 MMHG | WEIGHT: 144 LBS

## 2023-04-10 DIAGNOSIS — Z87.59 HISTORY OF POSTPARTUM HEMORRHAGE: ICD-10-CM

## 2023-04-10 DIAGNOSIS — D68.59 THROMBOPHILIA AFFECTING PREGNANCY, ANTEPARTUM: ICD-10-CM

## 2023-04-10 DIAGNOSIS — J45.909 ASTHMA AFFECTING PREGNANCY, ANTEPARTUM: ICD-10-CM

## 2023-04-10 DIAGNOSIS — O21.9 NAUSEA AND VOMITING DURING PREGNANCY: ICD-10-CM

## 2023-04-10 DIAGNOSIS — O99.519 ASTHMA AFFECTING PREGNANCY, ANTEPARTUM: ICD-10-CM

## 2023-04-10 DIAGNOSIS — Z34.91 PRENATAL CARE IN FIRST TRIMESTER: ICD-10-CM

## 2023-04-10 DIAGNOSIS — D68.52 PROTHROMBIN GENE MUTATION: ICD-10-CM

## 2023-04-10 DIAGNOSIS — Z11.3 SCREENING EXAMINATION FOR STD (SEXUALLY TRANSMITTED DISEASE): ICD-10-CM

## 2023-04-10 DIAGNOSIS — Z12.4 CERVICAL CANCER SCREENING: ICD-10-CM

## 2023-04-10 DIAGNOSIS — Z3A.01 7 WEEKS GESTATION OF PREGNANCY: Primary | ICD-10-CM

## 2023-04-10 DIAGNOSIS — O99.119 THROMBOPHILIA AFFECTING PREGNANCY, ANTEPARTUM: ICD-10-CM

## 2023-04-10 LAB
GLUCOSE UR STRIP-MCNC: NEGATIVE MG/DL
PROT UR STRIP-MCNC: ABNORMAL MG/DL

## 2023-04-10 PROCEDURE — 99214 OFFICE O/P EST MOD 30 MIN: CPT | Performed by: STUDENT IN AN ORGANIZED HEALTH CARE EDUCATION/TRAINING PROGRAM

## 2023-04-10 RX ORDER — ONDANSETRON 4 MG/1
4 TABLET, ORALLY DISINTEGRATING ORAL EVERY 8 HOURS PRN
Qty: 30 TABLET | Refills: 1 | Status: SHIPPED | OUTPATIENT
Start: 2023-04-10

## 2023-04-10 NOTE — PROGRESS NOTES
Initial OB Visit    Chief Complaint   Patient presents with   • Initial Prenatal Visit        Yuki Shields is being seen today for her first obstetrical visit.  She is a 32 y.o.  at 7w0d gestation by LMP 23 consistent with 7 week ultrasound.      This is not a planned pregnancy.   OB History    Para Term  AB Living   4 3 3     3   SAB IAB Ectopic Molar Multiple Live Births             3      # Outcome Date GA Lbr Miguel/2nd Weight Sex Delivery Anes PTL Lv   4 Current            3 Term 04/29/15   3317 g (7 lb 5 oz) M Vag-Spont   MELANIA   2 Term 11   3147 g (6 lb 15 oz) F Vag-Spont      1 Term 07   3204 g (7 lb 1 oz) M Vag-Spont          Current obstetric complaints: She reports that she is having nausea throughout the day and some episodes of vomiting. Denies further vaginal bleeding. She has mild intermittent abdominal cramping.     Prior obstetric issues, potential pregnancy concerns: She has Factor II deficiency (Prothrombin heterozygote) and has been on Lovenox with all her pregnancies. She has never experienced VTE disease.   G1- - TSVD - low apgar scores. Postpartum hemorrhage.    G2- - TSVD   G3- - TSVD- complicated by cholestasis of pregnancy  G4- Current  Family history of genetic issues (includes FOB): There is a maternal family history of multiple thrombophilia including Factor 2, Factor 5, Factor 8.   Prior infections concerning in pregnancy (Rash, fever since LMP): denies   Varicella Hx: She has had the chicken pox.   Prior genetic testing: denies   History of abnormal pap smears: denies; last pap smear:   History of STIs: denies History of HSV in self or partner? Denies   Prepregnancy weight: 144 lb     Past Medical History:   Diagnosis Date   • Asthma     As child   • DDD (degenerative disc disease), lumbar    • 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 GARZA AWARE OF THIS   • Family hx-blood disorder     MOTHER HAS FACTOR 2 AND FACTOR 5   •  Labral tear of hip, degenerative     RIGHT   • Lumbar herniated disc 06/2017    X 2  DISC   HISTORY OF EPIDURALS       Past Surgical History:   Procedure Laterality Date   • BLADDER REPAIR  2016    WITH MESH   • BREAST AUGMENTATION BILATERAL MASTOPEXY Bilateral 7/31/2017    Procedure: BREAST AUGMENTATION, MASTOPEXY W/ IDEAL IMPLANTS  NEW IMAGE;  Surgeon: Nirmal Mehta MD;  Location: [facility] OR;  Service:    • CHOLECYSTECTOMY  2012   • TONSILLECTOMY AND ADENOIDECTOMY           Current Outpatient Medications:   •  acetaminophen (TYLENOL) 325 MG tablet, Take 2 tablets by mouth Every 6 (Six) Hours As Needed for Mild Pain., Disp: , Rfl:   •  busPIRone (BUSPAR) 7.5 MG tablet, Every 12 (Twelve) Hours., Disp: , Rfl:   •  Enoxaparin Sodium (LOVENOX) 40 MG/0.4ML solution prefilled syringe syringe, Inject 0.4 mL under the skin into the appropriate area as directed Daily for 30 days., Disp: 12 mL, Rfl: 3  •  albuterol sulfate  (90 Base) MCG/ACT inhaler, Inhale 2 puffs Every 4 (Four) Hours As Needed for Wheezing., Disp: 6.7 g, Rfl: 1  •  ondansetron ODT (ZOFRAN-ODT) 4 MG disintegrating tablet, Place 1 tablet on the tongue Every 8 (Eight) Hours As Needed for Nausea or Vomiting., Disp: 30 tablet, Rfl: 1    Allergies   Allergen Reactions   • Dilaudid [Hydromorphone Hcl] Nausea And Vomiting       Social History     Socioeconomic History   • Marital status: Significant Other   Tobacco Use   • Smoking status: Never   • Smokeless tobacco: Never   Vaping Use   • Vaping Use: Never used   Substance and Sexual Activity   • Alcohol use: Not Currently     Comment: OCCAS   • Drug use: No   • Sexual activity: Yes       Family History   Problem Relation Age of Onset   • Malig Hyperthermia Neg Hx        Review of systems     Constitutional: negative for chills, fevers and for fatigue  Eyes: negative  Ears, nose, mouth, throat, and face: negative for hearing loss and nasal congestion  Respiratory: negative for asthma and  wheezing  Cardiovascular: negative for chest pain and dyspnea  Gastrointestinal: negative for dyspepsia, dysphagia abdominal pain  Genitourinary:negative for urinary incontinence  Integument/breast: negative for breast lump  Hematologic/lymphatic: negative for bleeding  Musculoskeletal:negative for aches  Neurological: negative for numbness/tingling  Behavioral/Psych: negative for anhedonia  Allergic/Immunologic: negative for rash, allergy    Objective    /72   Wt 65.3 kg (144 lb)   LMP 02/20/2023   BMI 26.33 kg/m²       General Appearance:    Alert, cooperative, in no acute distress   Head:    Normocephalic, without obvious abnormality, atraumatic   Eyes:            Lids and lashes normal, conjunctivae and sclerae normal, no   icterus, no pallor, corneas clear   Ears:    Ears appear intact with no abnormalities noted   Neck:   No adenopathy, supple, trachea midline, no thyromegaly   Back:     No kyphosis present, no scoliosis present,                       Lungs:     No increased work of breathing, regular respirations     Heart:    Normal rate    Breast Exam:    No masses, No nipple discharge, bilateral breast implants    Abdomen:     No masses, no organomegaly, soft, non-tender, non-distended, no guarding, no rebound tenderness   Genitalia:    Vulva - BUS-WNL, NEFG    Vagina - No discharge, No bleeding    Cervix - No Lesions, closed     Uterus - Consistent with 8 weeks    Adnexa - No masses, NT    Pelvimetry - clinically adequate, gynecoid pelvis     Extremities:   Moves all extremities well, no edema, no cyanosis, no              redness   Pulses:   Pulses palpable and equal bilaterally   Skin:   No bleeding, bruising or rash   Lymph nodes:   No palpable adenopathy   Neurologic:   Sensation intact, A&O times 3      Assessment  1. Pregnancy at 7w0d by LMP consistent with 7 week ultrasound   2. Prenatal care in first trimester  3. Thrombophilia affecting pregnancy- Prothrombin gene mutation heterozygous -  on Lovenox 40 mg daily   4. History of postpartum hemorrhage   5. Asthma affecting pregnancy   6. Nausea and vomiting of pregnancy   7. Cervical cancer screening   8. History of vaginal bleeding in early pregnancy      Plan  - Initial labs previously drawn and patient has no questions regarding results. Collected pap smear with HPV testing and GC/CT/Trich probe today to complete prenatal labs.   - Discussed ultrasound results for fetal viability today that demonstrated fetus measuring 7w3d by CRL with fetal cardiac activity which is consistent with LMP dating of 7w0d with AWILDA 11/27/23.   - Patient on prophylactic Lovenox 40 mg SQ daily given prothrombin gene mutation. Will refer to Hematology for any further recommendations.    - Patient request refill for albuterol inhaler for management of asthma during pregnancy which is what she had ot use in previous pregnancies.   - Prescribed Zofran ODT 4 mg Q 8 hours PRN nausea and vomiting. Reviewed that there is insufficient data on fetal safety with ondansetron use and further studies are warranted. A possible association of ondansetron use in the first trimester and cleft palate was reported, but the data are limited. Another study with presumed first-trimester exposure found an association between ondansetron use in early pregnancy and cardiac anomalies. Conversely, two studies - a prospective cohort study and a retrospective study of pregnant women with presumed first-trimester ondansetron exposure found no increase in congenital abnormalities. Thus, although some studies have shown an increased risk of birth defects with early ondansetron use, other studies have not and the absolute risk to any fetus is low. As with all medications, the potential risks and benefits should be weighed in each case. Patient wishes to proceed and prescription sent.  - I discussed that I don't feel that her previous abdominoplasty or bladder mesh will interfere with delivery but I do feel  that it will ruin the cosmetic nature of the abdominoplasty and may make the bladder mesh less effective. Patient indicated understanding.   - Patient is on Prenatal vitamins  - Problem list reviewed and updated.  - Reviewed routine prenatal care with the office to include but not limited to:  not to changing cat litter, food restrictions, avoidance of alcohol, tobacco and drugs and saunas/hot tubs, anticipated weight gain/nutrition requirements.  Reviewed nature of practice and hospital.  - Reviewed recommended follow up, importance of compliance with care. We reviewed testing in pregnancy including HIV testing and urine drug screen.    - Reviewed aneuploidy screening and CF/SMA screening.  We reviewed limitations of testing, possibility of false positive/negative results, possible need for other tests as indicated.  Will plan to offer cell free DNA testing for aneuploidy screening after 10 weeks.   - Counseled on limitations of ultrasound in pregnancy in detecting aneuploidy/fetal anomalies.   - We reviewed that at this time, her BMI is classified as BMI 25.0-29.9        Classification: overweight.  We reviewed that in pregnancy, her recommended weight gain is 15-25 lb.  In the first trimester, caloric demand is typically not increased.  In the second and third trimester, the increased demand is approximately 350 and 450 calories respectively.    All questions answered.   Return in about 4 weeks (around 5/8/2023) for prenatal visit.      Valerie Taylor MD

## 2023-04-17 LAB
C TRACH RRNA CVX QL NAA+PROBE: NEGATIVE
CYTOLOGIST CVX/VAG CYTO: NORMAL
CYTOLOGY CVX/VAG DOC CYTO: NORMAL
CYTOLOGY CVX/VAG DOC THIN PREP: NORMAL
DX ICD CODE: NORMAL
HIV 1 & 2 AB SER-IMP: NORMAL
HPV GENOTYPE REFLEX: NORMAL
HPV I/H RISK 4 DNA CVX QL PROBE+SIG AMP: NEGATIVE
N GONORRHOEA RRNA CVX QL NAA+PROBE: NEGATIVE
OTHER STN SPEC: NORMAL
STAT OF ADQ CVX/VAG CYTO-IMP: NORMAL
T VAGINALIS RRNA SPEC QL NAA+PROBE: NEGATIVE

## 2023-04-19 ENCOUNTER — TELEPHONE (OUTPATIENT)
Dept: OBSTETRICS AND GYNECOLOGY | Facility: CLINIC | Age: 33
End: 2023-04-19
Payer: MEDICAID

## 2023-04-19 RX ORDER — ALBUTEROL SULFATE 90 UG/1
2 AEROSOL, METERED RESPIRATORY (INHALATION) EVERY 4 HOURS PRN
Qty: 6.7 G | Refills: 1 | Status: SHIPPED | OUTPATIENT
Start: 2023-04-19

## 2023-04-19 NOTE — TELEPHONE ENCOUNTER
Last OB visit it was mentioned that an inhaler for allergies would be sent to pharmacy. They have not received it.

## 2023-04-28 ENCOUNTER — HOSPITAL ENCOUNTER (EMERGENCY)
Facility: HOSPITAL | Age: 33
Discharge: HOME | End: 2023-04-28
Attending: EMERGENCY MEDICINE
Payer: MEDICAID

## 2023-04-28 ENCOUNTER — TELEPHONE (OUTPATIENT)
Dept: OBSTETRICS AND GYNECOLOGY | Facility: CLINIC | Age: 33
End: 2023-04-28
Payer: MEDICAID

## 2023-04-28 ENCOUNTER — APPOINTMENT (OUTPATIENT)
Dept: ULTRASOUND IMAGING | Facility: HOSPITAL | Age: 33
End: 2023-04-28
Payer: MEDICAID

## 2023-04-28 VITALS
DIASTOLIC BLOOD PRESSURE: 74 MMHG | TEMPERATURE: 98.1 F | OXYGEN SATURATION: 100 % | SYSTOLIC BLOOD PRESSURE: 118 MMHG | RESPIRATION RATE: 16 BRPM | HEART RATE: 74 BPM

## 2023-04-28 DIAGNOSIS — O46.90 VAGINAL BLEEDING DURING PREGNANCY: Primary | ICD-10-CM

## 2023-04-28 LAB
BASOPHILS # BLD AUTO: 0.08 10*3/MM3 (ref 0–0.2)
BASOPHILS NFR BLD AUTO: 0.7 % (ref 0–1.5)
DEPRECATED RDW RBC AUTO: 43.2 FL (ref 37–54)
EOSINOPHIL # BLD AUTO: 0.12 10*3/MM3 (ref 0–0.4)
EOSINOPHIL NFR BLD AUTO: 1 % (ref 0.3–6.2)
ERYTHROCYTE [DISTWIDTH] IN BLOOD BY AUTOMATED COUNT: 13.3 % (ref 12.3–15.4)
HCG INTACT+B SERPL-ACNC: NORMAL MIU/ML
HCT VFR BLD AUTO: 38.3 % (ref 34–46.6)
HGB BLD-MCNC: 12.8 G/DL (ref 12–15.9)
HOLD SPECIMEN: NORMAL
HOLD SPECIMEN: NORMAL
IMM GRANULOCYTES # BLD AUTO: 0.05 10*3/MM3 (ref 0–0.05)
IMM GRANULOCYTES NFR BLD AUTO: 0.4 % (ref 0–0.5)
LYMPHOCYTES # BLD AUTO: 2.05 10*3/MM3 (ref 0.7–3.1)
LYMPHOCYTES NFR BLD AUTO: 17.5 % (ref 19.6–45.3)
MCH RBC QN AUTO: 29.6 PG (ref 26.6–33)
MCHC RBC AUTO-ENTMCNC: 33.4 G/DL (ref 31.5–35.7)
MCV RBC AUTO: 88.7 FL (ref 79–97)
MONOCYTES # BLD AUTO: 0.63 10*3/MM3 (ref 0.1–0.9)
MONOCYTES NFR BLD AUTO: 5.4 % (ref 5–12)
NEUTROPHILS NFR BLD AUTO: 75 % (ref 42.7–76)
NEUTROPHILS NFR BLD AUTO: 8.78 10*3/MM3 (ref 1.7–7)
NRBC BLD AUTO-RTO: 0 /100 WBC (ref 0–0.2)
PLATELET # BLD AUTO: 279 10*3/MM3 (ref 140–450)
PMV BLD AUTO: 9.3 FL (ref 6–12)
RBC # BLD AUTO: 4.32 10*6/MM3 (ref 3.77–5.28)
WBC NRBC COR # BLD: 11.71 10*3/MM3 (ref 3.4–10.8)
WHOLE BLOOD HOLD COAG: NORMAL
WHOLE BLOOD HOLD SPECIMEN: NORMAL

## 2023-04-28 PROCEDURE — 76815 OB US LIMITED FETUS(S): CPT

## 2023-04-28 PROCEDURE — 99283 EMERGENCY DEPT VISIT LOW MDM: CPT

## 2023-04-28 PROCEDURE — 93976 VASCULAR STUDY: CPT

## 2023-04-28 PROCEDURE — 99284 EMERGENCY DEPT VISIT MOD MDM: CPT

## 2023-04-28 PROCEDURE — 85025 COMPLETE CBC W/AUTO DIFF WBC: CPT

## 2023-04-28 PROCEDURE — 36415 COLL VENOUS BLD VENIPUNCTURE: CPT

## 2023-04-28 PROCEDURE — 84702 CHORIONIC GONADOTROPIN TEST: CPT

## 2023-04-28 PROCEDURE — 76817 TRANSVAGINAL US OBSTETRIC: CPT

## 2023-04-28 RX ORDER — SODIUM CHLORIDE 0.9 % (FLUSH) 0.9 %
10 SYRINGE (ML) INJECTION AS NEEDED
Status: DISCONTINUED | OUTPATIENT
Start: 2023-04-28 | End: 2023-04-28 | Stop reason: HOSPADM

## 2023-04-28 NOTE — ED PROVIDER NOTES
EMERGENCY DEPARTMENT ENCOUNTER    Room Number:  03/03  Date of encounter:  4/28/2023  PCP: Provider, No Known  Historian: Patient  Chronic or social conditions impacting care (social determinants of health): Nothing      I used full protective equipment while examining this patient.  This includes face mask, gloves and protective eyewear.  I washed my hands before entering the room and immediately upon leaving the room      HPI:  Chief Complaint: Pregnant, vaginal bleeding  A complete HPI/ROS/PMH/PSH/SH/FH are unobtainable due to: Nothing    Context: Yuki Shields is a 32 y.o. female who presents to the ED c/o 1 day history of vaginal bleeding, lower abdominal cramping.  Patient states this has been an issue 2 times throughout her pregnancy.  She is 9.5 weeks pregnant.  She follows Dr. Taylor in obstetrics.  As of late the bleeding seems to have improved.  She states she still lower abdominal cramping is mild and seems to radiate through to the back.  She denies any lightheadedness or dizziness.  Denies any chest pain or shortness of breath.  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  She is on Lovenox.    Patient's blood type is O+.    Review of prior external notes (non-ED):   Reviewed prenatal visit from 4/10/2023.  Patient being followed for a 7-week pregnancy at that time.    Review of prior external test results outside of this encounter:  I reviewed his CMP from 3/23/2023.  Creatinine 0.70.    PAST MEDICAL HISTORY  Active Ambulatory Problems     Diagnosis Date Noted   • No Active Ambulatory Problems     Resolved Ambulatory Problems     Diagnosis Date Noted   • No Resolved Ambulatory Problems     Past Medical History:   Diagnosis Date   • Asthma    • DDD (degenerative disc disease), lumbar    • Factor II deficiency    • Family hx-blood disorder    • Labral tear of hip, degenerative    • Lumbar herniated disc 06/2017         PAST SURGICAL HISTORY  Past Surgical History:   Procedure Laterality Date    • BLADDER REPAIR  2016    WITH MESH   • BREAST AUGMENTATION BILATERAL MASTOPEXY Bilateral 7/31/2017    Procedure: BREAST AUGMENTATION, MASTOPEXY W/ IDEAL IMPLANTS  NEW IMAGE;  Surgeon: Nirmal Mehta MD;  Location: [facility] OR;  Service:    • CHOLECYSTECTOMY  2012   • TONSILLECTOMY AND ADENOIDECTOMY           FAMILY HISTORY  Family History   Problem Relation Age of Onset   • Malig Hyperthermia Neg Hx          SOCIAL HISTORY  Social History     Socioeconomic History   • Marital status: Significant Other   Tobacco Use   • Smoking status: Never   • Smokeless tobacco: Never   Vaping Use   • Vaping Use: Never used   Substance and Sexual Activity   • Alcohol use: Not Currently     Comment: OCCAS   • Drug use: No   • Sexual activity: Yes         ALLERGIES  Dilaudid [hydromorphone hcl]        REVIEW OF SYSTEMS  All systems reviewed and negative except for those discussed in HPI.       PHYSICAL EXAM    I have reviewed the triage vital signs and nursing notes.    ED Triage Vitals   Temp Heart Rate Resp BP SpO2   04/28/23 1416 04/28/23 1416 04/28/23 1416 04/28/23 1502 04/28/23 1416   98.1 °F (36.7 °C) 91 16 125/74 98 %      Temp src Heart Rate Source Patient Position BP Location FiO2 (%)   -- -- -- -- --              Physical Exam  GENERAL: Alert, oriented, nontoxic-appearing, not distressed  HENT: head atraumatic, no nuchal rigidity  EYES: no scleral icterus, EOMI  CV: regular rhythm, regular rate, no murmur  RESPIRATORY: normal effort, CTA  ABDOMEN: soft, nontender  MUSCULOSKELETAL: no deformity, FROM, no calf swelling or tenderness  NEURO: alert, moves all extremities, follows commands  SKIN: warm, dry        LAB RESULTS  Recent Results (from the past 24 hour(s))   hCG, Quantitative, Pregnancy    Collection Time: 04/28/23  3:08 PM    Specimen: Blood   Result Value Ref Range    HCG Quantitative 88,172.00 mIU/mL   Green Top (Gel)    Collection Time: 04/28/23  3:08 PM   Result Value Ref Range    Extra Tube Hold for  add-ons.    Lavender Top    Collection Time: 04/28/23  3:08 PM   Result Value Ref Range    Extra Tube hold for add-on    Gold Top - SST    Collection Time: 04/28/23  3:08 PM   Result Value Ref Range    Extra Tube Hold for add-ons.    Light Blue Top    Collection Time: 04/28/23  3:08 PM   Result Value Ref Range    Extra Tube Hold for add-ons.    CBC Auto Differential    Collection Time: 04/28/23  3:08 PM    Specimen: Blood   Result Value Ref Range    WBC 11.71 (H) 3.40 - 10.80 10*3/mm3    RBC 4.32 3.77 - 5.28 10*6/mm3    Hemoglobin 12.8 12.0 - 15.9 g/dL    Hematocrit 38.3 34.0 - 46.6 %    MCV 88.7 79.0 - 97.0 fL    MCH 29.6 26.6 - 33.0 pg    MCHC 33.4 31.5 - 35.7 g/dL    RDW 13.3 12.3 - 15.4 %    RDW-SD 43.2 37.0 - 54.0 fl    MPV 9.3 6.0 - 12.0 fL    Platelets 279 140 - 450 10*3/mm3    Neutrophil % 75.0 42.7 - 76.0 %    Lymphocyte % 17.5 (L) 19.6 - 45.3 %    Monocyte % 5.4 5.0 - 12.0 %    Eosinophil % 1.0 0.3 - 6.2 %    Basophil % 0.7 0.0 - 1.5 %    Immature Grans % 0.4 0.0 - 0.5 %    Neutrophils, Absolute 8.78 (H) 1.70 - 7.00 10*3/mm3    Lymphocytes, Absolute 2.05 0.70 - 3.10 10*3/mm3    Monocytes, Absolute 0.63 0.10 - 0.90 10*3/mm3    Eosinophils, Absolute 0.12 0.00 - 0.40 10*3/mm3    Basophils, Absolute 0.08 0.00 - 0.20 10*3/mm3    Immature Grans, Absolute 0.05 0.00 - 0.05 10*3/mm3    nRBC 0.0 0.0 - 0.2 /100 WBC       Ordered the above labs and independently reviewed the results.        RADIOLOGY  US Ob Limited 1 + Fetuses    Result Date: 4/28/2023  US OB LIMITED 1 + FETUSES-  Clinical: Early pregnancy, vaginal bleeding  COMPARISON examination 04/10/2023  FINDINGS: The uterus measures 11 cm in length, 6 cm AP and 8.3 cm transverse. There is a single live intrauterine gestation with an estimated fetal age by ultrasound of 9 weeks 6 days. Yolk sac demonstrated. Active cardiac motion 154 bpm. Satisfactory decidual reaction. Amniotic fluid volume is within normal limits. No subchorionic hemorrhage.  The right ovary  measures 2.4 x 2.3 x 2.0 cm and is normal in appearance. The left ovary measures 2.1 x 1.6 x 1.7 cm and is normal in appearance. No free fluid is demonstrated within the pelvis.  CONCLUSION: Single live intrauterine gestation with an estimated fetal age by ultrasound of 9 weeks 6 days. Active cardiac motion 1 54 bpm. No subchorionic hemorrhage identified.  FINDINGS called to emergency room at time of completion 6:05 PM.  This report was finalized on 4/28/2023 6:05 PM by Dr. Rashad Kraft M.D.        I ordered the above noted radiological studies. Reviewed by me and discussed with radiologist.  See dictation for official radiology interpretation.      MEDICATIONS GIVEN IN ER    Medications   sodium chloride 0.9 % flush 10 mL (has no administration in time range)         ADDITIONAL ORDERS CONSIDERED BUT NOT ORDERED:  Nothing      PROGRESS, DATA ANALYSIS, CONSULTS, AND MEDICAL DECISION MAKING    All labs have been independently interpreted by myself.  All radiology studies have been independently interpreted by myself and discussed with radiologist dictating the report.   EKG's independently interpreted by myself.  Discussion below represents my analysis of pertinent findings related to patient's condition, differential diagnosis, treatment plan and final disposition.    I have discussed case with Dr. Hutchinson, emergency room physician.  He has performed his own bedside examination and agrees with treatment plan.    ED Course as of 04/28/23 1828 Fri Apr 28, 2023   1558 Patient presents with 1 day history of moderate vaginal bleeding.  Patient is 9.5 weeks pregnant.  She does complain of some mild cramping lower abdominal pain.  Plan for basic labs, ultrasound.  Patient is hemodynamically stable. [EE]   1600 Hemoglobin: 12.8  Stable versus 1 month ago. [EE]   1601 HCG Quantitative: 88,172.00 [EE]   1803 I discussed ultrasound findings with Janette.  Patient measured at 9 weeks, 6 days.  Heart rate 154. [EE]   1826 Updated  patient on work-up.  She is hemodynamically stable.  We will discharge. [EE]      ED Course User Index  [EE] Miki Rodriguez PA       AS OF 18:28 EDT VITALS:    BP - 113/74  HR - 86  TEMP - 98.1 °F (36.7 °C)  O2 SATS - 98%        DIAGNOSIS  Final diagnoses:   Vaginal bleeding during pregnancy         DISPOSITION  Discharged      Dictated utilizing Dragon dictation     Miki Rodriguez PA  04/28/23 2631

## 2023-04-28 NOTE — TELEPHONE ENCOUNTER
I am thinking she needs to go to [facility] ED. What is your advice? Thank you.

## 2023-04-28 NOTE — TELEPHONE ENCOUNTER
Spoke to Yuki to let her know that Dr Taylor is no longer in the office today and recommends you go to [facility] ED if you are still bleeding and cramping. Thank you.

## 2023-04-28 NOTE — TELEPHONE ENCOUNTER
"Hub staff attempted to follow warm transfer process and was unsuccessful     Caller: Yuki Shields \"Ali\"    Relationship to patient: Self    Best call back number: [phone]    Patient is needing: PATIENT IS 9 1/2 WEEKS PREGNANT AND THIS MORNING BEGAN HAVING BACK CRAMPS AND LIGHT BLEEDING - PATIENT IS ALSO ON BLOOD THINNERS - PLEASE ADVISE PATIENT      "

## 2023-04-28 NOTE — ED TRIAGE NOTES
Pt to ED with c/o vaginal bleeding today, has gone through 2 pads, sees dr salinas, + abd cramping, + nausea.     9.5 weeks. States she has factor 2, on lovenox.

## 2023-05-02 ENCOUNTER — LAB (OUTPATIENT)
Dept: OBSTETRICS AND GYNECOLOGY | Facility: CLINIC | Age: 33
End: 2023-05-02
Payer: MEDICAID

## 2023-05-02 ENCOUNTER — TELEPHONE (OUTPATIENT)
Dept: OBSTETRICS AND GYNECOLOGY | Facility: CLINIC | Age: 33
End: 2023-05-02
Payer: MEDICAID

## 2023-05-02 DIAGNOSIS — Z34.91 PRENATAL CARE IN FIRST TRIMESTER: Primary | ICD-10-CM

## 2023-05-02 DIAGNOSIS — Z34.91 PRENATAL CARE IN FIRST TRIMESTER: ICD-10-CM

## 2023-05-02 NOTE — TELEPHONE ENCOUNTER
Pt wanting to do [id number] test today. Can you put in orders for this? She's coming at 1.      Thanks,  Negro

## 2023-05-06 LAB
CFDNA.FET/CFDNA.TOTAL SFR FETUS: NORMAL %
CITATION REF LAB TEST: NORMAL
FET 13+18+21+X+Y ANEUP PLAS.CFDNA: NEGATIVE
FET CHR 21 TS PLAS.CFDNA QL: NEGATIVE
FET SEX PLAS.CFDNA DOSAGE CFDNA: NORMAL
FET TS 13 RISK PLAS.CFDNA QL: NEGATIVE
FET TS 18 RISK WBC.DNA+CFDNA QL: NEGATIVE
GA EST FROM CONCEPTION DATE: NORMAL D
GESTATIONAL AGE > 9:: YES
LAB DIRECTOR NAME PROVIDER: NORMAL
LAB DIRECTOR NAME PROVIDER: NORMAL
LABORATORY COMMENT REPORT: NORMAL
LIMITATIONS OF THE TEST: NORMAL
NEGATIVE PREDICTIVE VALUE: NORMAL
NOTE: NORMAL
PERFORMANCE CHARACTERISTICS: NORMAL
POSITIVE PREDICTIVE VALUE: NORMAL
REF LAB TEST METHOD: NORMAL
TEST PERFORMANCE INFO SPEC: NORMAL

## 2023-05-07 ENCOUNTER — APPOINTMENT (OUTPATIENT)
Dept: GENERAL RADIOLOGY | Facility: HOSPITAL | Age: 33
End: 2023-05-07
Payer: MEDICAID

## 2023-05-07 ENCOUNTER — HOSPITAL ENCOUNTER (EMERGENCY)
Facility: HOSPITAL | Age: 33
Discharge: HOME | End: 2023-05-07
Attending: EMERGENCY MEDICINE | Admitting: EMERGENCY MEDICINE
Payer: MEDICAID

## 2023-05-07 VITALS
SYSTOLIC BLOOD PRESSURE: 106 MMHG | RESPIRATION RATE: 22 BRPM | BODY MASS INDEX: 26.68 KG/M2 | TEMPERATURE: 98.2 F | HEIGHT: 62 IN | WEIGHT: 145 LBS | HEART RATE: 84 BPM | OXYGEN SATURATION: 98 % | DIASTOLIC BLOOD PRESSURE: 70 MMHG

## 2023-05-07 DIAGNOSIS — R07.81 PLEURITIC CHEST PAIN: Primary | ICD-10-CM

## 2023-05-07 DIAGNOSIS — J06.9 UPPER RESPIRATORY TRACT INFECTION, UNSPECIFIED TYPE: ICD-10-CM

## 2023-05-07 LAB
ALBUMIN SERPL-MCNC: 3.9 G/DL (ref 3.5–5.2)
ALBUMIN/GLOB SERPL: 1.4 G/DL
ALP SERPL-CCNC: 118 U/L (ref 39–117)
ALT SERPL W P-5'-P-CCNC: 31 U/L (ref 1–33)
ANION GAP SERPL CALCULATED.3IONS-SCNC: 8.5 MMOL/L (ref 5–15)
APTT PPP: 29.6 SECONDS (ref 22.7–35.4)
AST SERPL-CCNC: 21 U/L (ref 1–32)
B PARAPERT DNA SPEC QL NAA+PROBE: NOT DETECTED
B PERT DNA SPEC QL NAA+PROBE: NOT DETECTED
BASOPHILS # BLD AUTO: 0.05 10*3/MM3 (ref 0–0.2)
BASOPHILS NFR BLD AUTO: 0.5 % (ref 0–1.5)
BILIRUB SERPL-MCNC: <0.2 MG/DL (ref 0–1.2)
BUN SERPL-MCNC: 7 MG/DL (ref 6–20)
BUN/CREAT SERPL: 11.9 (ref 7–25)
C PNEUM DNA NPH QL NAA+NON-PROBE: NOT DETECTED
CALCIUM SPEC-SCNC: 9.7 MG/DL (ref 8.6–10.5)
CHLORIDE SERPL-SCNC: 106 MMOL/L (ref 98–107)
CO2 SERPL-SCNC: 22.5 MMOL/L (ref 22–29)
CREAT SERPL-MCNC: 0.59 MG/DL (ref 0.57–1)
D DIMER PPP FEU-MCNC: 0.43 MCGFEU/ML (ref 0–0.5)
DEPRECATED RDW RBC AUTO: 43.3 FL (ref 37–54)
EGFRCR SERPLBLD CKD-EPI 2021: 122.2 ML/MIN/1.73
EOSINOPHIL # BLD AUTO: 0.16 10*3/MM3 (ref 0–0.4)
EOSINOPHIL NFR BLD AUTO: 1.6 % (ref 0.3–6.2)
ERYTHROCYTE [DISTWIDTH] IN BLOOD BY AUTOMATED COUNT: 13.4 % (ref 12.3–15.4)
FLUAV SUBTYP SPEC NAA+PROBE: NOT DETECTED
FLUBV RNA ISLT QL NAA+PROBE: NOT DETECTED
GLOBULIN UR ELPH-MCNC: 2.7 GM/DL
GLUCOSE SERPL-MCNC: 104 MG/DL (ref 65–99)
HADV DNA SPEC NAA+PROBE: NOT DETECTED
HCOV 229E RNA SPEC QL NAA+PROBE: NOT DETECTED
HCOV HKU1 RNA SPEC QL NAA+PROBE: NOT DETECTED
HCOV NL63 RNA SPEC QL NAA+PROBE: NOT DETECTED
HCOV OC43 RNA SPEC QL NAA+PROBE: NOT DETECTED
HCT VFR BLD AUTO: 36.8 % (ref 34–46.6)
HGB BLD-MCNC: 12.6 G/DL (ref 12–15.9)
HMPV RNA NPH QL NAA+NON-PROBE: NOT DETECTED
HOLD SPECIMEN: NORMAL
HOLD SPECIMEN: NORMAL
HPIV1 RNA ISLT QL NAA+PROBE: NOT DETECTED
HPIV2 RNA SPEC QL NAA+PROBE: NOT DETECTED
HPIV3 RNA NPH QL NAA+PROBE: NOT DETECTED
HPIV4 P GENE NPH QL NAA+PROBE: NOT DETECTED
IMM GRANULOCYTES # BLD AUTO: 0.06 10*3/MM3 (ref 0–0.05)
IMM GRANULOCYTES NFR BLD AUTO: 0.6 % (ref 0–0.5)
INR PPP: 0.98 (ref 0.9–1.1)
LYMPHOCYTES # BLD AUTO: 1.9 10*3/MM3 (ref 0.7–3.1)
LYMPHOCYTES NFR BLD AUTO: 19.2 % (ref 19.6–45.3)
M PNEUMO IGG SER IA-ACNC: NOT DETECTED
MCH RBC QN AUTO: 30.1 PG (ref 26.6–33)
MCHC RBC AUTO-ENTMCNC: 34.2 G/DL (ref 31.5–35.7)
MCV RBC AUTO: 88 FL (ref 79–97)
MONOCYTES # BLD AUTO: 0.53 10*3/MM3 (ref 0.1–0.9)
MONOCYTES NFR BLD AUTO: 5.4 % (ref 5–12)
NEUTROPHILS NFR BLD AUTO: 7.19 10*3/MM3 (ref 1.7–7)
NEUTROPHILS NFR BLD AUTO: 72.7 % (ref 42.7–76)
NRBC BLD AUTO-RTO: 0 /100 WBC (ref 0–0.2)
NT-PROBNP SERPL-MCNC: 156 PG/ML (ref 0–450)
PLATELET # BLD AUTO: 272 10*3/MM3 (ref 140–450)
PMV BLD AUTO: 9.2 FL (ref 6–12)
POTASSIUM SERPL-SCNC: 4.4 MMOL/L (ref 3.5–5.2)
PROT SERPL-MCNC: 6.6 G/DL (ref 6–8.5)
PROTHROMBIN TIME: 13 SECONDS (ref 11.7–14.2)
QT INTERVAL: 344 MS
RBC # BLD AUTO: 4.18 10*6/MM3 (ref 3.77–5.28)
RHINOVIRUS RNA SPEC NAA+PROBE: NOT DETECTED
RSV RNA NPH QL NAA+NON-PROBE: NOT DETECTED
SARS-COV-2 RNA NPH QL NAA+NON-PROBE: NOT DETECTED
SODIUM SERPL-SCNC: 137 MMOL/L (ref 136–145)
TROPONIN T SERPL HS-MCNC: <6 NG/L
WBC NRBC COR # BLD: 9.89 10*3/MM3 (ref 3.4–10.8)
WHOLE BLOOD HOLD COAG: NORMAL
WHOLE BLOOD HOLD SPECIMEN: NORMAL

## 2023-05-07 PROCEDURE — 80053 COMPREHEN METABOLIC PANEL: CPT | Performed by: EMERGENCY MEDICINE

## 2023-05-07 PROCEDURE — 85730 THROMBOPLASTIN TIME PARTIAL: CPT | Performed by: EMERGENCY MEDICINE

## 2023-05-07 PROCEDURE — 85025 COMPLETE CBC W/AUTO DIFF WBC: CPT | Performed by: EMERGENCY MEDICINE

## 2023-05-07 PROCEDURE — 93005 ELECTROCARDIOGRAM TRACING: CPT

## 2023-05-07 PROCEDURE — 85379 FIBRIN DEGRADATION QUANT: CPT | Performed by: EMERGENCY MEDICINE

## 2023-05-07 PROCEDURE — 84484 ASSAY OF TROPONIN QUANT: CPT | Performed by: EMERGENCY MEDICINE

## 2023-05-07 PROCEDURE — 99284 EMERGENCY DEPT VISIT MOD MDM: CPT

## 2023-05-07 PROCEDURE — 93005 ELECTROCARDIOGRAM TRACING: CPT | Performed by: EMERGENCY MEDICINE

## 2023-05-07 PROCEDURE — 85610 PROTHROMBIN TIME: CPT | Performed by: EMERGENCY MEDICINE

## 2023-05-07 PROCEDURE — 83880 ASSAY OF NATRIURETIC PEPTIDE: CPT | Performed by: EMERGENCY MEDICINE

## 2023-05-07 PROCEDURE — 0202U NFCT DS 22 TRGT SARS-COV-2: CPT | Performed by: EMERGENCY MEDICINE

## 2023-05-07 RX ORDER — SODIUM CHLORIDE 0.9 % (FLUSH) 0.9 %
10 SYRINGE (ML) INJECTION AS NEEDED
Status: DISCONTINUED | OUTPATIENT
Start: 2023-05-07 | End: 2023-05-07 | Stop reason: HOSPADM

## 2023-05-07 RX ORDER — AMOXICILLIN 500 MG/1
500 CAPSULE ORAL 3 TIMES DAILY
Qty: 21 CAPSULE | Refills: 0 | Status: SHIPPED | OUTPATIENT
Start: 2023-05-08 | End: 2023-05-15

## 2023-05-07 RX ORDER — FOLIC ACID 1 MG/1
TABLET ORAL EVERY 24 HOURS
COMMUNITY

## 2023-05-07 RX ORDER — ENOXAPARIN SODIUM 100 MG/ML
INJECTION SUBCUTANEOUS DAILY
COMMUNITY

## 2023-05-07 NOTE — DISCHARGE INSTRUCTIONS
May take Tylenol every 8-12 hours as needed for pain.  Rest when needed.  May start taking antibiotic tomorrow if your symptoms have not improved as we discussed.  Must follow-up with your OB doctor tomorrow as scheduled.  Please return to the emergency room for any worsening pain, fevers, weakness, dizziness, difficulties breathing or any other concerns.

## 2023-05-07 NOTE — ED NOTES
Patient to ER via car from home for SOA and chest pain x Friday  Patient reports hx of blood clots is on blood thinners  Patient had 4 hours car ride one friday

## 2023-05-07 NOTE — ED PROVIDER NOTES
EMERGENCY DEPARTMENT ENCOUNTER    Room Number:  35/35  Date seen:  5/7/2023  PCP: Provider, No Known  Historian(s): Patient and her mother      HPI:  Chief Complaint: Cough, chest pain, dizziness, fatigue, poor appetite  A complete HPI/ROS/PMH/PSH/SH/FH are unobtainable / limited due to: N/A  Context: Yuki Shields is a 32 y.o. female who presents to the ED for evaluation of multiple symptoms including cough with chest pain and shortness of breath as well as fatigue and poor appetite and dizziness.  Patient is currently 11 weeks pregnant.  She is taking Lovenox injections once a day for history of factor II deficiency.  She apparently went to the [facility] a few days ago and noticed that whenever she was walking around she became very fatigued and short of breath.  Her mother says that she does not have a good appetite recent days.  The patient says she has had a persistent nonproductive cough today as well.        PAST MEDICAL HISTORY  Active Ambulatory Problems     Diagnosis Date Noted   • No Active Ambulatory Problems     Resolved Ambulatory Problems     Diagnosis Date Noted   • No Resolved Ambulatory Problems     Past Medical History:   Diagnosis Date   • Asthma    • DDD (degenerative disc disease), lumbar    • Factor II deficiency    • Family hx-blood disorder    • Labral tear of hip, degenerative    • Lumbar herniated disc 06/2017         PAST SURGICAL HISTORY  Past Surgical History:   Procedure Laterality Date   • BLADDER REPAIR  2016    WITH MESH   • BREAST AUGMENTATION BILATERAL MASTOPEXY Bilateral 7/31/2017    Procedure: BREAST AUGMENTATION, MASTOPEXY W/ IDEAL IMPLANTS  NEW IMAGE;  Surgeon: Nirmal Mehta MD;  Location: [facility] OR;  Service:    • CHOLECYSTECTOMY  2012   • TONSILLECTOMY AND ADENOIDECTOMY           FAMILY HISTORY  Family History   Problem Relation Age of Onset   • Malig Hyperthermia Neg Hx          SOCIAL HISTORY  Social History     Socioeconomic History   • Marital status:  Significant Other   Tobacco Use   • Smoking status: Never   • Smokeless tobacco: Never   Vaping Use   • Vaping Use: Never used   Substance and Sexual Activity   • Alcohol use: Not Currently     Comment: OCCAS   • Drug use: No   • Sexual activity: Yes         ALLERGIES  Dilaudid [hydromorphone hcl]      REVIEW OF SYSTEMS  Review of Systems   Constitutional: Positive for fatigue. Negative for activity change and fever.   HENT: Negative.    Eyes: Negative for pain and visual disturbance.   Respiratory: Positive for cough and shortness of breath.    Cardiovascular: Positive for chest pain.   Gastrointestinal: Negative for abdominal pain and vomiting.   Genitourinary: Negative for dysuria and vaginal bleeding.   Skin: Negative for color change.   Neurological: Positive for weakness and light-headedness. Negative for syncope and headaches.   All other systems reviewed and are negative.         PHYSICAL EXAM  ED Triage Vitals   Temp Heart Rate Resp BP SpO2   05/07/23 1344 05/07/23 1344 05/07/23 1344 05/07/23 1346 05/07/23 1344   98.2 °F (36.8 °C) (!) 134 22 112/82 99 %      Temp src Heart Rate Source Patient Position BP Location FiO2 (%)   -- 05/07/23 1352 -- -- --    Monitor          Physical Exam      GENERAL: Pleasant lady, calm, no acute distress  HENT: nares patent, normocephalic and atraumatic  EYES: no scleral icterus, EOMI, normal conjunctiva  CV: regular rhythm, normal rate, normal distal pulses heart rate is normal range during my evaluation, no murmurs  RESPIRATORY: normal effort, no stridor, lungs are clear to auscultation bilaterally.  Patient does have several mild, nonproductive cough during examination  ABDOMEN: soft, nontender in all quadrants  MUSCULOSKELETAL: no deformity, no asymmetry, no edema to the bilateral lower extremities  NEURO: alert, moves all extremities, follows commands  PSYCH:  calm, cooperative  SKIN: warm, dry    Vital signs and nursing notes reviewed.        LAB RESULTS  Recent Results  (from the past 24 hour(s))   ECG 12 Lead ED Triage Standing Order; SOA    Collection Time: 05/07/23  1:49 PM   Result Value Ref Range    QT Interval 344 ms   BNP    Collection Time: 05/07/23  2:00 PM    Specimen: Blood   Result Value Ref Range    proBNP 156.0 0.0 - 450.0 pg/mL   Green Top (Gel)    Collection Time: 05/07/23  2:00 PM   Result Value Ref Range    Extra Tube Hold for add-ons.    Lavender Top    Collection Time: 05/07/23  2:00 PM   Result Value Ref Range    Extra Tube hold for add-on    Gold Top - SST    Collection Time: 05/07/23  2:00 PM   Result Value Ref Range    Extra Tube Hold for add-ons.    Light Blue Top    Collection Time: 05/07/23  2:00 PM   Result Value Ref Range    Extra Tube Hold for add-ons.    Comprehensive Metabolic Panel    Collection Time: 05/07/23  2:00 PM    Specimen: Blood   Result Value Ref Range    Glucose 104 (H) 65 - 99 mg/dL    BUN 7 6 - 20 mg/dL    Creatinine 0.59 0.57 - 1.00 mg/dL    Sodium 137 136 - 145 mmol/L    Potassium 4.4 3.5 - 5.2 mmol/L    Chloride 106 98 - 107 mmol/L    CO2 22.5 22.0 - 29.0 mmol/L    Calcium 9.7 8.6 - 10.5 mg/dL    Total Protein 6.6 6.0 - 8.5 g/dL    Albumin 3.9 3.5 - 5.2 g/dL    ALT (SGPT) 31 1 - 33 U/L    AST (SGOT) 21 1 - 32 U/L    Alkaline Phosphatase 118 (H) 39 - 117 U/L    Total Bilirubin <0.2 0.0 - 1.2 mg/dL    Globulin 2.7 gm/dL    A/G Ratio 1.4 g/dL    BUN/Creatinine Ratio 11.9 7.0 - 25.0    Anion Gap 8.5 5.0 - 15.0 mmol/L    eGFR 122.2 >60.0 mL/min/1.73   Protime-INR    Collection Time: 05/07/23  2:00 PM    Specimen: Blood   Result Value Ref Range    Protime 13.0 11.7 - 14.2 Seconds    INR 0.98 0.90 - 1.10   aPTT    Collection Time: 05/07/23  2:00 PM    Specimen: Blood   Result Value Ref Range    PTT 29.6 22.7 - 35.4 seconds   Single High Sensitivity Troponin T    Collection Time: 05/07/23  2:00 PM    Specimen: Blood   Result Value Ref Range    HS Troponin T <6 <10 ng/L   CBC Auto Differential    Collection Time: 05/07/23  2:00 PM     Specimen: Blood   Result Value Ref Range    WBC 9.89 3.40 - 10.80 10*3/mm3    RBC 4.18 3.77 - 5.28 10*6/mm3    Hemoglobin 12.6 12.0 - 15.9 g/dL    Hematocrit 36.8 34.0 - 46.6 %    MCV 88.0 79.0 - 97.0 fL    MCH 30.1 26.6 - 33.0 pg    MCHC 34.2 31.5 - 35.7 g/dL    RDW 13.4 12.3 - 15.4 %    RDW-SD 43.3 37.0 - 54.0 fl    MPV 9.2 6.0 - 12.0 fL    Platelets 272 140 - 450 10*3/mm3    Neutrophil % 72.7 42.7 - 76.0 %    Lymphocyte % 19.2 (L) 19.6 - 45.3 %    Monocyte % 5.4 5.0 - 12.0 %    Eosinophil % 1.6 0.3 - 6.2 %    Basophil % 0.5 0.0 - 1.5 %    Immature Grans % 0.6 (H) 0.0 - 0.5 %    Neutrophils, Absolute 7.19 (H) 1.70 - 7.00 10*3/mm3    Lymphocytes, Absolute 1.90 0.70 - 3.10 10*3/mm3    Monocytes, Absolute 0.53 0.10 - 0.90 10*3/mm3    Eosinophils, Absolute 0.16 0.00 - 0.40 10*3/mm3    Basophils, Absolute 0.05 0.00 - 0.20 10*3/mm3    Immature Grans, Absolute 0.06 (H) 0.00 - 0.05 10*3/mm3    nRBC 0.0 0.0 - 0.2 /100 WBC   D-dimer, Quantitative    Collection Time: 05/07/23  2:00 PM    Specimen: Blood   Result Value Ref Range    D-Dimer, Quantitative 0.43 0.00 - 0.50 MCGFEU/mL   Respiratory Panel PCR w/COVID-19(SARS-CoV-2) JUAN/FRANCO/YEYO/PAD/COR/MAD/VADIM In-House, NP Swab in [facility], 3-4 HR TAT - Swab, Nasopharynx    Collection Time: 05/07/23  2:21 PM    Specimen: Nasopharynx; Swab   Result Value Ref Range    ADENOVIRUS, PCR Not Detected Not Detected    Coronavirus 229E Not Detected Not Detected    Coronavirus HKU1 Not Detected Not Detected    Coronavirus NL63 Not Detected Not Detected    Coronavirus OC43 Not Detected Not Detected    COVID19 Not Detected Not Detected - Ref. Range    Human Metapneumovirus Not Detected Not Detected    Human Rhinovirus/Enterovirus Not Detected Not Detected    Influenza A PCR Not Detected Not Detected    Influenza B PCR Not Detected Not Detected    Parainfluenza Virus 1 Not Detected Not Detected    Parainfluenza Virus 2 Not Detected Not Detected    Parainfluenza Virus 3 Not Detected Not Detected     Parainfluenza Virus 4 Not Detected Not Detected    RSV, PCR Not Detected Not Detected    Bordetella pertussis pcr Not Detected Not Detected    Bordetella parapertussis PCR Not Detected Not Detected    Chlamydophila pneumoniae PCR Not Detected Not Detected    Mycoplasma pneumo by PCR Not Detected Not Detected       Ordered the above labs and reviewed the results.      RADIOLOGY  No Radiology Exams Resulted Within Past 24 Hours    Ordered the above noted radiological studies. Reviewed by me in PACS.        PROCEDURES  Procedures    EKG           EKG time/Interp time: 1349/1351  Rhythm/Rate: Sinus rhythm, 86 bpm  P waves and GA: Present, 119 ms  QRS, axis: 86 ms, normal axis  ST and T waves: No ST segment elevations are notable.    Independently interpreted by me contemporaneously with treatment      MEDICATIONS GIVEN IN ER  Medications   sodium chloride 0.9 % flush 10 mL (has no administration in time range)   sodium chloride 0.9 % flush 10 mL (has no administration in time range)         MEDICAL DECISION MAKING, PROGRESS, and CONSULTS    All labs have been independently reviewed by me.  All radiology studies have been reviewed by me and I have also reviewed the radiology report.   EKG's independently viewed and interpreted by me.  Discussion below represents my analysis of pertinent findings related to patient's condition, differential diagnosis, treatment plan and final disposition.      Additional sources:  - Discussed/ obtained information from independent historians: I discussed with patient's mother who also provided some of the history at the bedside.    - External (non-ED) record review: I reviewed the previous hospital admission from December 16, 2019 when patient was admitted for abdominal hematoma with coagulopathy and factor II deficiency at that time.    - Chronic or social conditions impacting care: Currently pregnant.  History of coagulopathy.  Awaiting follow-up consultation with hematology which is  scheduled in the month of June.    Additional orders considered but not ordered:  I considered ordering a chest x-ray for further evaluation of her shortness of breath and chest pain complaints.  However because she is currently pregnant, I opted not to do that test right now so as to limit ionizing radiation exposure.    Orders placed during this visit:  Orders Placed This Encounter   Procedures   • Respiratory Panel PCR w/COVID-19(SARS-CoV-2) JUAN/FRANCO/YEYO/PAD/COR/MAD/VADIM In-House, NP Swab in UTM/VTM, 3-4 HR TAT - Swab, Nasopharynx   • [place] Draw   • BNP   • Comprehensive Metabolic Panel   • Protime-INR   • aPTT   • Single High Sensitivity Troponin T   • CBC Auto Differential   • D-dimer, Quantitative   • NPO Diet NPO Type: Strict NPO   • Undress & Gown   • Vital Signs   • Cardiac Monitoring   • Monitor Blood Pressure   • Pulse Oximetry, Continuous   • Oxygen Therapy- Nasal Cannula; 2 LPM; Titrate for SPO2: equal to or greater than, 92%   • ECG 12 Lead ED Triage Standing Order; SOA   • Insert Peripheral IV   • Insert Peripheral IV   • Green Top (Gel)   • Lavender Top   • Gold Top - SST   • Light Blue Top   • CBC & Differential       Differential diagnosis includes but is not limited to:    Viral illness, pneumonia, pleuritic chest pain, GERD, acute MI, pulmonary bolus      Independent interpretation of labs, radiology studies, and discussions with consultants:  ED Course as of 05/07/23 1631   Sun May 07, 2023   1440 proBNP: 156.0 [JERE]   1440 HS Troponin T: <6 [JERE]   1629 D-Dimer, Quant: 0.43 [JERE]   1629 The respiratory viral panel is negative. [JERE]   1629 I have now reviewed the EKG and all the labs from today's visit.  We have observed the patient on telemetry for quite some time.  She has remained in a normal sinus rhythm with normal oxygenation on room air and completely normal work of breathing.  She does have an occasional cough but it is nonproductive and not resulting in any kind of respiratory distress  "problems. [JERE]   1629 Since the D-dimer is normal range, I feel pretty confident that there is no concern for pulmonary embolus.  I really do not have any clinical suspicion for acute coronary syndrome or dissection or any other acute emergent cardiac or pulmonary vascular condition right now.  I think this is more likely an upper respiratory infection causing some pleuritic chest pain for her.  I explained that she can discharge home safely at this point in time and continue to take Tylenol every 12 hours as needed for pain.  I agreed to write a \"wait-and-see prescription\" for amoxicillin to treat her URI if her symptoms are not improving in the next 24 to 48 hours.  Fortunately, she also has an appointment with her GYN tomorrow where she can be seen for follow-up evaluation.  I discussed with the patient and her mother the usual \"return to ER\" instructions prior to discharge. [JERE]      ED Course User Index  [JERE] Guerrero Lara MD         DIAGNOSIS  Final diagnoses:   Pleuritic chest pain   Upper respiratory tract infection, unspecified type         DISPOSITION  Discharge home        Latest Documented Vital Signs:  As of 16:31 EDT  BP- 106/70 HR- 84 Temp- 98.2 °F (36.8 °C) O2 sat- 98%              --    Please note that portions of this were completed with a voice recognition program.       Note Disclaimer: At [facility], we believe that sharing information builds trust and better relationships. You are receiving this note because you are receiving care at [facility] or recently visited. It is possible you will see health information before a provider has talked with you about it. This kind of information can be easy to misunderstand. To help you fully understand what it means for your health, we urge you to discuss this note with your provider.           Guerrero Lara MD  05/07/23 1631    "

## 2023-05-08 ENCOUNTER — ROUTINE PRENATAL (OUTPATIENT)
Dept: OBSTETRICS AND GYNECOLOGY | Facility: CLINIC | Age: 33
End: 2023-05-08
Payer: MEDICAID

## 2023-05-08 VITALS — SYSTOLIC BLOOD PRESSURE: 117 MMHG | BODY MASS INDEX: 26.7 KG/M2 | DIASTOLIC BLOOD PRESSURE: 71 MMHG | WEIGHT: 146 LBS

## 2023-05-08 DIAGNOSIS — O26.891 SHORTNESS OF BREATH DUE TO PREGNANCY IN FIRST TRIMESTER: ICD-10-CM

## 2023-05-08 DIAGNOSIS — Z3A.11 11 WEEKS GESTATION OF PREGNANCY: Primary | ICD-10-CM

## 2023-05-08 DIAGNOSIS — D68.59 THROMBOPHILIA AFFECTING PREGNANCY, ANTEPARTUM: ICD-10-CM

## 2023-05-08 DIAGNOSIS — O09.299 HISTORY OF POSTPARTUM HEMORRHAGE, CURRENTLY PREGNANT: ICD-10-CM

## 2023-05-08 DIAGNOSIS — Z34.91 PRENATAL CARE IN FIRST TRIMESTER: ICD-10-CM

## 2023-05-08 DIAGNOSIS — O99.511 ASTHMA AFFECTING PREGNANCY IN FIRST TRIMESTER: ICD-10-CM

## 2023-05-08 DIAGNOSIS — J45.909 ASTHMA AFFECTING PREGNANCY IN FIRST TRIMESTER: ICD-10-CM

## 2023-05-08 DIAGNOSIS — R06.02 SHORTNESS OF BREATH DUE TO PREGNANCY IN FIRST TRIMESTER: ICD-10-CM

## 2023-05-08 DIAGNOSIS — O99.119 THROMBOPHILIA AFFECTING PREGNANCY, ANTEPARTUM: ICD-10-CM

## 2023-05-08 NOTE — PROGRESS NOTES
Chief Complaint   Patient presents with   • Routine Prenatal Visit      Yuki Shields is a 32 y.o.  at 11w0d who presents for routine prenatal visit. She reports that she been experiencing increased fatigue and shortness of breath and is concerned regarding a PE. She was evaluated in the ED yesterday and discharged home following unremarkable work up. She reports that she initially noted her shortness of breath while at the [facility] and that she became short of breath walking around. She states that it is also difficult for her to breath at night. She has history of asthma and has had to increase use of albuterol recently. She has also started Claritin yesterday. She did use an inhaled steroid in one of her pregnancies and reports that her asthma worsens in pregnancy. Denies chest pain or cough currently. Denies vaginal bleeding or abdominal cramping. Too early for fetal movement.  Planning on travelling to [place] next week.     /71   Wt 66.2 kg (146 lb)   LMP 2023   BMI 26.70 kg/m²    Gen: well appearing, NAD    Abd: gravid, nontender  See OB Flowsheet    ASSESSMENT:   1. IUP at 11w0d   2. Prenatal care in first trimester  3. Thrombophilia affecting pregnancy- Prothrombin gene mutation heterozygous- on Lovenox 40 mg daily   4. History of postpartum hemorrhage  5. Asthma affecting pregnancy   6. Dyspnea of pregnancy     PLAN:  See updated Problem List   Reviewed expectations of this stage of pregnancy.   First trimester bleeding/pain precautions reviewed.  Discussed [id number] results that returned low risk and placed gender results in envelope today for the patient.   I reviewed ED visit yesterday and discussed labs results that returned reassuring that she does not have a pulmonary embolism. I do suspect that her symptoms are likely a combination of worsening asthma and dyspnea of pregnancy. Advised the patient to continue Claritin 10 mg daily and prescribed Pulmicort 90 mcg/act  inhaler to be used 1-2 puffs twice a day for management of asthma. Also prescribed a peak flow meter to evaluate asthmatic symptoms. She is to continue to use albuterol inhaler as needed. She is to notify the clinic of worsening symptoms.   We reviewed travel guidelines and that she is okay to travel by plane at this time. Encouraged increased hydration and ambulation during flight.   All questions and concerns answered today.   Return in about 4 weeks (around 6/5/2023) for prenatal visit .    Valerie Taylor MD

## 2023-05-09 DIAGNOSIS — J45.909 ASTHMA AFFECTING PREGNANCY IN FIRST TRIMESTER: Primary | ICD-10-CM

## 2023-05-09 DIAGNOSIS — O99.511 ASTHMA AFFECTING PREGNANCY IN FIRST TRIMESTER: Primary | ICD-10-CM

## 2023-05-09 RX ORDER — MOMETASONE FUROATE 220 UG/1
1 INHALANT RESPIRATORY (INHALATION) NIGHTLY
Qty: 1 EACH | Refills: 11 | Status: SHIPPED | OUTPATIENT
Start: 2023-05-09

## 2023-05-30 ENCOUNTER — TELEPHONE (OUTPATIENT)
Dept: OBSTETRICS AND GYNECOLOGY | Facility: CLINIC | Age: 33
End: 2023-05-30

## 2023-05-30 NOTE — TELEPHONE ENCOUNTER
Pt cannot make 06/07 appointment. She is wondering if she can get in sooner than your next available of 06/12. Please advise.    ThanksNegro

## 2023-06-02 ENCOUNTER — ROUTINE PRENATAL (OUTPATIENT)
Dept: OBSTETRICS AND GYNECOLOGY | Facility: CLINIC | Age: 33
End: 2023-06-02
Payer: COMMERCIAL

## 2023-06-02 VITALS — BODY MASS INDEX: 26.52 KG/M2 | DIASTOLIC BLOOD PRESSURE: 73 MMHG | SYSTOLIC BLOOD PRESSURE: 117 MMHG | WEIGHT: 145 LBS

## 2023-06-02 DIAGNOSIS — D68.59 THROMBOPHILIA AFFECTING PREGNANCY, ANTEPARTUM: ICD-10-CM

## 2023-06-02 DIAGNOSIS — O09.299 HISTORY OF POSTPARTUM HEMORRHAGE, CURRENTLY PREGNANT: ICD-10-CM

## 2023-06-02 DIAGNOSIS — O99.519 ASTHMA AFFECTING PREGNANCY, ANTEPARTUM: ICD-10-CM

## 2023-06-02 DIAGNOSIS — J45.909 ASTHMA AFFECTING PREGNANCY, ANTEPARTUM: ICD-10-CM

## 2023-06-02 DIAGNOSIS — R45.86 MOOD CHANGES: ICD-10-CM

## 2023-06-02 DIAGNOSIS — Z3A.14 14 WEEKS GESTATION OF PREGNANCY: Primary | ICD-10-CM

## 2023-06-02 DIAGNOSIS — Z34.92 PRENATAL CARE, SECOND TRIMESTER: ICD-10-CM

## 2023-06-02 DIAGNOSIS — Z36.89 ENCOUNTER FOR FETAL ANATOMIC SURVEY: ICD-10-CM

## 2023-06-02 DIAGNOSIS — Z36.86 ENCOUNTER FOR ANTENATAL SCREENING FOR CERVICAL LENGTH: ICD-10-CM

## 2023-06-02 DIAGNOSIS — O99.119 THROMBOPHILIA AFFECTING PREGNANCY, ANTEPARTUM: ICD-10-CM

## 2023-06-02 LAB
GLUCOSE UR STRIP-MCNC: NEGATIVE MG/DL
PROT UR STRIP-MCNC: ABNORMAL MG/DL

## 2023-06-02 NOTE — PROGRESS NOTES
Chief Complaint   Patient presents with    Routine Prenatal Visit      Yuki Shields is a 32 y.o.  at 14w4d who presents for routine prenatal visit. She reports that she has been having difficult sleeping- both falling and staying asleep. She has been having headaches that improve with Tylenol and Claritin. She states that the Asmanex has significantly helped her breathing at night. She is complaint with her Lovenox. She reports that she has been struggling with mood changes and was previously taking Bupropion and Buspar.   Denies vaginal bleeding, cramping, contractions, LOF. She has not yet felt fetal movement.   She is going on a [place] vacation on July 10-.     /73   Wt 65.8 kg (145 lb)   LMP 2023   BMI 26.52 kg/m²    Gen: well appearing, NAD   Abd: gravid, nontender  See OB Flowsheet    ASSESSMENT:   IUP at 14w4d  Prenatal care in second trimester   Thrombophilia affecting pregnancy- Prothrombin gene mutation heterozygous- on Lovenox 40 mg daily   History of postpartum hemorrhage  Asthma affecting pregnancy- on Asmanex and albuterol inhaler PRN  Mood changes- History of taking Buspar and Bupropion      PLAN:  Problem list reviewed and updated.   Reviewed expectations of this stage of pregnancy.   Second trimester precautions reviewed including  labor precautions, anticipated fetal movements.   We discussed her mood changes and discussed that pharmacotherapy and psychotherapy are options. I recommended the patient start by seeing a psychiatrist or counselor and provided her to numbers for the [facility] and [facility]. Following evaluation, we can then discuss possibly restarting her medications or awaiting to she delivers as long as her mood stabilizes. All questions and concerns answered.   Will obtain anatomy survey and cervical length screening at next visit.   Return in about 31 days (around 7/3/2023) for prenatal visit and anatomy survey  .    There are no problems to display for this patient.      Orders Placed This Encounter   Procedures    US Ob 14 + Weeks Single or First Gestation     Standing Status:   Future     Standing Expiration Date:   6/18/2024     Order Specific Question:   Reason for Exam:     Answer:   fetal anatomy survey     Order Specific Question:   Release to patient     Answer:   Routine Release    US Ob Transvaginal     Standing Status:   Future     Standing Expiration Date:   6/18/2024     Order Specific Question:   Reason for Exam:     Answer:   cervical length screening    POC Urinalysis Dipstick     Order Specific Question:   Release to patient     Answer:   Routine Release     Valerie Taylor MD

## 2023-06-12 ENCOUNTER — APPOINTMENT (OUTPATIENT)
Dept: LAB | Facility: HOSPITAL | Age: 33
End: 2023-06-12
Payer: MEDICAID

## 2023-06-12 ENCOUNTER — CONSULT (OUTPATIENT)
Dept: ONCOLOGY | Facility: CLINIC | Age: 33
End: 2023-06-12
Payer: COMMERCIAL

## 2023-06-12 VITALS
TEMPERATURE: 98 F | RESPIRATION RATE: 16 BRPM | HEIGHT: 64 IN | BODY MASS INDEX: 25.2 KG/M2 | OXYGEN SATURATION: 98 % | WEIGHT: 147.6 LBS | SYSTOLIC BLOOD PRESSURE: 113 MMHG | DIASTOLIC BLOOD PRESSURE: 77 MMHG | HEART RATE: 87 BPM

## 2023-06-12 DIAGNOSIS — D50.0 IRON DEFICIENCY ANEMIA DUE TO CHRONIC BLOOD LOSS: ICD-10-CM

## 2023-06-12 DIAGNOSIS — D68.52 PROTHROMBIN GENE MUTATION: Primary | ICD-10-CM

## 2023-06-12 LAB
ALBUMIN SERPL-MCNC: 3.6 G/DL (ref 3.5–5.2)
ALBUMIN/GLOB SERPL: 1.2 G/DL (ref 1.1–2.4)
ALP SERPL-CCNC: 164 U/L (ref 38–116)
ALT SERPL W P-5'-P-CCNC: 19 U/L (ref 0–33)
ANION GAP SERPL CALCULATED.3IONS-SCNC: 9.7 MMOL/L (ref 5–15)
AST SERPL-CCNC: 19 U/L (ref 0–32)
BASOPHILS # BLD AUTO: 0.06 10*3/MM3 (ref 0–0.2)
BASOPHILS NFR BLD AUTO: 0.6 % (ref 0–1.5)
BILIRUB SERPL-MCNC: 0.3 MG/DL (ref 0.2–1.2)
BUN SERPL-MCNC: 7 MG/DL (ref 6–20)
BUN/CREAT SERPL: 11.5 (ref 7.3–30)
CALCIUM SPEC-SCNC: 9.7 MG/DL (ref 8.5–10.2)
CHLORIDE SERPL-SCNC: 102 MMOL/L (ref 98–107)
CO2 SERPL-SCNC: 24.3 MMOL/L (ref 22–29)
CREAT SERPL-MCNC: 0.61 MG/DL (ref 0.6–1.1)
DEPRECATED RDW RBC AUTO: 43.9 FL (ref 37–54)
EGFRCR SERPLBLD CKD-EPI 2021: 121.2 ML/MIN/1.73
EOSINOPHIL # BLD AUTO: 0.05 10*3/MM3 (ref 0–0.4)
EOSINOPHIL NFR BLD AUTO: 0.5 % (ref 0.3–6.2)
ERYTHROCYTE [DISTWIDTH] IN BLOOD BY AUTOMATED COUNT: 13.2 % (ref 12.3–15.4)
FERRITIN SERPL-MCNC: 59.6 NG/ML (ref 11–207)
GLOBULIN UR ELPH-MCNC: 3.1 GM/DL (ref 1.8–3.5)
GLUCOSE SERPL-MCNC: 81 MG/DL (ref 74–124)
HCT VFR BLD AUTO: 36.1 % (ref 34–46.6)
HGB BLD-MCNC: 12.1 G/DL (ref 12–15.9)
IMM GRANULOCYTES # BLD AUTO: 0.08 10*3/MM3 (ref 0–0.05)
IMM GRANULOCYTES NFR BLD AUTO: 0.7 % (ref 0–0.5)
IRON 24H UR-MRATE: 156 MCG/DL (ref 37–145)
IRON SATN MFR SERPL: 37 % (ref 14–48)
LYMPHOCYTES # BLD AUTO: 1.73 10*3/MM3 (ref 0.7–3.1)
LYMPHOCYTES NFR BLD AUTO: 16.1 % (ref 19.6–45.3)
MCH RBC QN AUTO: 30.3 PG (ref 26.6–33)
MCHC RBC AUTO-ENTMCNC: 33.5 G/DL (ref 31.5–35.7)
MCV RBC AUTO: 90.5 FL (ref 79–97)
MONOCYTES # BLD AUTO: 0.55 10*3/MM3 (ref 0.1–0.9)
MONOCYTES NFR BLD AUTO: 5.1 % (ref 5–12)
NEUTROPHILS NFR BLD AUTO: 77 % (ref 42.7–76)
NEUTROPHILS NFR BLD AUTO: 8.25 10*3/MM3 (ref 1.7–7)
NRBC BLD AUTO-RTO: 0 /100 WBC (ref 0–0.2)
PLATELET # BLD AUTO: 287 10*3/MM3 (ref 140–450)
PMV BLD AUTO: 9 FL (ref 6–12)
POTASSIUM SERPL-SCNC: 4.9 MMOL/L (ref 3.5–4.7)
PROT SERPL-MCNC: 6.7 G/DL (ref 6.3–8)
RBC # BLD AUTO: 3.99 10*6/MM3 (ref 3.77–5.28)
SODIUM SERPL-SCNC: 136 MMOL/L (ref 134–145)
TIBC SERPL-MCNC: 419 MCG/DL (ref 249–505)
TRANSFERRIN SERPL-MCNC: 299 MG/DL (ref 200–360)
WBC NRBC COR # BLD: 10.72 10*3/MM3 (ref 3.4–10.8)

## 2023-06-12 PROCEDURE — 80053 COMPREHEN METABOLIC PANEL: CPT | Performed by: INTERNAL MEDICINE

## 2023-06-12 PROCEDURE — 83540 ASSAY OF IRON: CPT | Performed by: INTERNAL MEDICINE

## 2023-06-12 PROCEDURE — 82728 ASSAY OF FERRITIN: CPT | Performed by: INTERNAL MEDICINE

## 2023-06-12 PROCEDURE — 36415 COLL VENOUS BLD VENIPUNCTURE: CPT | Performed by: INTERNAL MEDICINE

## 2023-06-12 PROCEDURE — 84466 ASSAY OF TRANSFERRIN: CPT | Performed by: INTERNAL MEDICINE

## 2023-06-12 PROCEDURE — 85025 COMPLETE CBC W/AUTO DIFF WBC: CPT | Performed by: INTERNAL MEDICINE

## 2023-06-12 PROCEDURE — 99204 OFFICE O/P NEW MOD 45 MIN: CPT | Performed by: INTERNAL MEDICINE

## 2023-06-12 NOTE — PROGRESS NOTES
Subjective     REASON FOR CONSULTATION:  Provide an opinion on any further workup or treatment on:    Prothrombin gene mutation                       REQUESTING PHYSICIAN: Valerie Taylor MD    RECORDS OBTAINED: Records of the patients history including those obtained from the referring provider were reviewed and summarized in detail.    HISTORY OF PRESENT ILLNESS:    Yuki Shields is a 32 y.o. patient who was referred for evaluation of prothrombin gene mutation.  She was diagnosed with the mutation when she was a teenager.  Her aunt developed blood clots.  Patient's mother was found to have prothrombin gene and factor V Leiden mutation.  Her mother developed pulmonary embolism and she is on chronic coagulation, most likely Coumadin.  Patient herself has no prior history of venous thromboembolism.  She received prophylactic anticoagulation with Lovenox during her previous pregnancies dating back to 2007.  She tolerated Lovenox well.    Patient received Lovenox at 80 mg SQ daily in 2019 when she had abdominoplasty.  She had bleeding and was hospitalized.  She stayed in the ICU for about a week.  She reports that her hemoglobin decreased to 3 and she was transfused.    Patient is currently pregnant. AWILDA 11/27/2023.  Her GA 16 weeks +0 days.  Her OB started her on Lovenox 40 mg SQ daily when she was 6 weeks pregnant.  She is tolerating it without problem with bruising or bleeding.    Patient reports feeling of internal heat in the left leg.  She did not notice any redness or external warmth.    REVIEW OF SYSTEMS:  Review of Systems   Constitutional:  Positive for fatigue. Negative for fever and unexpected weight change.   HENT:  Negative for nosebleeds and voice change.    Eyes:  Negative for visual disturbance.   Respiratory:  Positive for shortness of breath. Negative for cough.    Cardiovascular:  Positive for chest pain. Negative for leg swelling.   Gastrointestinal:  Negative for abdominal pain, blood in stool,  constipation, diarrhea, nausea and vomiting.   Genitourinary:  Negative for frequency and hematuria.   Musculoskeletal:  Positive for arthralgias. Negative for back pain and joint swelling.   Skin:  Negative for rash.   Neurological:  Negative for dizziness and headaches.   Hematological:  Negative for adenopathy. Bruises/bleeds easily.   Psychiatric/Behavioral:  Positive for dysphoric mood. The patient is nervous/anxious.       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 MEHTA AWARE OF THIS    Family hx-blood disorder     MOTHER HAS FACTOR 2 AND FACTOR 5    Labral tear of hip, degenerative     RIGHT    Lumbar herniated disc 06/2017    X 2  DISC   HISTORY OF EPIDURALS     Past Surgical History:   Procedure Laterality Date    BLADDER REPAIR  2016    WITH MESH    BREAST AUGMENTATION BILATERAL MASTOPEXY Bilateral 7/31/2017    Procedure: BREAST AUGMENTATION, MASTOPEXY W/ IDEAL IMPLANTS  NEW IMAGE;  Surgeon: Nirmal Mehta MD;  Location: [facility];  Service:     CHOLECYSTECTOMY  2012    TONSILLECTOMY AND ADENOIDECTOMY       Social History     Socioeconomic History    Marital status: Significant Other   Tobacco Use    Smoking status: Never    Smokeless tobacco: Never   Vaping Use    Vaping Use: Never used   Substance and Sexual Activity    Alcohol use: Not Currently     Comment: OCCAS    Drug use: No    Sexual activity: Yes     Family History   Problem Relation Age of Onset    Malig Hyperthermia Neg Hx       MEDICATIONS:    Current Outpatient Medications:     acetaminophen (TYLENOL) 325 MG tablet, Take 2 tablets by mouth Every 6 (Six) Hours As Needed for Mild Pain., Disp: , Rfl:     albuterol sulfate  (90 Base) MCG/ACT inhaler, Inhale 2 puffs Every 4 (Four) Hours As Needed for Wheezing., Disp: 6.7 g, Rfl: 1    BUPROPION HCL PO, Take 150 mg by mouth., Disp: , Rfl:     BUSPIRONE HCL PO, Take 5 mg  "by mouth., Disp: , Rfl:     Enoxaparin Sodium (LOVENOX) 40 MG/0.4ML solution prefilled syringe syringe, Inject  under the skin into the appropriate area as directed Daily., Disp: , Rfl:     Mometasone Furoate (Asmanex, 30 Metered Doses,) 220 MCG/ACT inhaler, Inhale 1 puff Every Night., Disp: 1 each, Rfl: 11    Peak Flow Meter device, Daily., Disp: 1 each, Rfl: 0    cholecalciferol (VITAMIN D3) 1.25 MG (91210 UT) capsule, cholecalciferol (vitamin D3) 1,250 mcg (50,000 unit) capsule  Take 1 capsule every week by oral route for 30 days. (Patient not taking: Reported on 6/12/2023), Disp: , Rfl:     ondansetron ODT (ZOFRAN-ODT) 4 MG disintegrating tablet, Place 1 tablet on the tongue Every 8 (Eight) Hours As Needed for Nausea or Vomiting. (Patient not taking: Reported on 6/12/2023), Disp: 30 tablet, Rfl: 1     ALLERGIES:  Allergies   Allergen Reactions    Dilaudid [Hydromorphone Hcl] Nausea And Vomiting      Objective   VITAL SIGNS:  Vitals:    06/12/23 1356   BP: 113/77   Pulse: 87   Resp: 16   Temp: 98 °F (36.7 °C)   TempSrc: Temporal   SpO2: 98%   Weight: 67 kg (147 lb 9.6 oz)   Height: 162 cm (63.78\")  Comment: new ht   PainSc: 0-No pain     Wt Readings from Last 3 Encounters:   06/12/23 67 kg (147 lb 9.6 oz)   06/02/23 65.8 kg (145 lb)   05/08/23 66.2 kg (146 lb)     PHYSICAL EXAMINATION  GENERAL:  The patient appears in good general condition, not in acute distress.  SKIN: No skin rashes. No ecchymosis.  HEAD:  Normocephalic.  EYES:  No Jaundice. No Pallor. Pupils equal. EOMI.  NECK:  Supple with Good ROM.No Masses.  CHEST: Normal respiratory effort. Lungs clear to auscultation.   CARDIAC:  Normal S1 & S2. No murmur.   ABDOMEN: Nondistended..  EXTREMITIES: No edema.  No erythema or warmth.  No Calf tenderness.  NEUROLOGICAL:  No Focal neurological deficits.     RESULT REVIEW:   Results from last 7 days   Lab Units 06/12/23  1341   WBC 10*3/mm3 10.72   NEUTROS ABS 10*3/mm3 8.25*   HEMOGLOBIN g/dL 12.1   HEMATOCRIT % " 36.1   PLATELETS 10*3/mm3 287     Results from last 7 days   Lab Units 06/12/23  1341   SODIUM mmol/L 136   POTASSIUM mmol/L 4.9*   CHLORIDE mmol/L 102   CO2 mmol/L 24.3   BUN mg/dL 7   CREATININE mg/dL 0.61   CALCIUM mg/dL 9.7   ALBUMIN g/dL 3.6   BILIRUBIN mg/dL 0.3   ALK PHOS U/L 164*   ALT (SGPT) U/L 19   AST (SGOT) U/L 19         Lab 06/12/23  1341   IRON 156*   IRON SATURATION (TSAT) 37   TIBC 419   TRANSFERRIN 299   FERRITIN 59.60      Assessment & Plan   *Prothrombin gene mutation.  Patient was diagnosed when she was a teenager after her aunt and mother were both diagnosed with VTE.  The patient's mother has prothrombin gene and factor V Leiden mutations.  Patient's sister has factor V Leiden mutation and had miscarriages.  The patient herself has no prior history of venous thromboembolism.  She was previously placed on prophylactic anticoagulation with Lovenox 40 mg during her previous pregnancies.  She previously tolerated Lovenox well.  She is currently pregnant with gestational age of 16 weeks.  Expected due date is 11/27/2023.  Patient was started on Lovenox 40 mg SQ daily at 6 weeks.  She is tolerating it well.  She reports discomfort in the left leg distal aspect-feeling of internal heat.  However, she did not notice redness or swelling of the left leg.    *History of iron deficiency anemia.  Patient developed severe anemia after she had bleeding after abdominoplasty surgery in 2019.  Reports that her hemoglobin decreased to 3.0 and required PRBC transfusions.  She was in the ICU at that time.  Patient reports weakness in the legs.  We obtained iron studies today and she has adequate iron stores.  She is taking her prenatal multivitamin regularly.      PLAN:    1.  Continue Lovenox 40 mg SQ daily.  2.  Obtain vitamin B12 levels today.  3.  Repeat CBC CMP ferritin iron panel in 6 weeks with RN review.  4.  Follow-up in 3 months with repeat labs.   5.  Plan to switch to heparin as she approaches 36  weeks of gestation and plan to give Lovenox for 6 weeks after delivery.        Magan Guido MD  06/12/23

## 2023-07-25 ENCOUNTER — APPOINTMENT (OUTPATIENT)
Dept: ONCOLOGY | Facility: HOSPITAL | Age: 33
End: 2023-07-25
Payer: COMMERCIAL

## 2023-07-25 ENCOUNTER — LAB (OUTPATIENT)
Dept: LAB | Facility: HOSPITAL | Age: 33
End: 2023-07-25
Payer: COMMERCIAL

## 2023-07-25 DIAGNOSIS — D50.0 IRON DEFICIENCY ANEMIA DUE TO CHRONIC BLOOD LOSS: ICD-10-CM

## 2023-07-25 DIAGNOSIS — D68.52 PROTHROMBIN GENE MUTATION: ICD-10-CM

## 2023-07-25 LAB
ALBUMIN SERPL-MCNC: 3.3 G/DL (ref 3.5–5.2)
ALBUMIN/GLOB SERPL: 1.2 G/DL
ALP SERPL-CCNC: 211 U/L (ref 39–117)
ALT SERPL W P-5'-P-CCNC: 15 U/L (ref 1–33)
ANION GAP SERPL CALCULATED.3IONS-SCNC: 17 MMOL/L (ref 5–15)
AST SERPL-CCNC: 17 U/L (ref 1–32)
BASOPHILS # BLD AUTO: 0.07 10*3/MM3 (ref 0–0.2)
BASOPHILS NFR BLD AUTO: 0.6 % (ref 0–1.5)
BILIRUB SERPL-MCNC: 0.2 MG/DL (ref 0–1.2)
BUN SERPL-MCNC: 7 MG/DL (ref 6–20)
BUN/CREAT SERPL: 10 (ref 7–25)
CALCIUM SPEC-SCNC: 8.6 MG/DL (ref 8.6–10.5)
CHLORIDE SERPL-SCNC: 105 MMOL/L (ref 98–107)
CO2 SERPL-SCNC: 17 MMOL/L (ref 22–29)
CREAT SERPL-MCNC: 0.7 MG/DL (ref 0.6–1.1)
DEPRECATED RDW RBC AUTO: 43.7 FL (ref 37–54)
EGFRCR SERPLBLD CKD-EPI 2021: 116.6 ML/MIN/1.73
EOSINOPHIL # BLD AUTO: 0.09 10*3/MM3 (ref 0–0.4)
EOSINOPHIL NFR BLD AUTO: 0.8 % (ref 0.3–6.2)
ERYTHROCYTE [DISTWIDTH] IN BLOOD BY AUTOMATED COUNT: 12.9 % (ref 12.3–15.4)
FERRITIN SERPL-MCNC: 25.7 NG/ML (ref 13–150)
GLOBULIN UR ELPH-MCNC: 2.7 GM/DL
GLUCOSE SERPL-MCNC: 82 MG/DL (ref 65–99)
HCT VFR BLD AUTO: 34.8 % (ref 34–46.6)
HGB BLD-MCNC: 11.8 G/DL (ref 12–15.9)
IMM GRANULOCYTES # BLD AUTO: 0.18 10*3/MM3 (ref 0–0.05)
IMM GRANULOCYTES NFR BLD AUTO: 1.5 % (ref 0–0.5)
IRON 24H UR-MRATE: 123 MCG/DL (ref 37–145)
IRON SATN MFR SERPL: 26 % (ref 20–50)
LYMPHOCYTES # BLD AUTO: 1.8 10*3/MM3 (ref 0.7–3.1)
LYMPHOCYTES NFR BLD AUTO: 15.5 % (ref 19.6–45.3)
MCH RBC QN AUTO: 31.3 PG (ref 26.6–33)
MCHC RBC AUTO-ENTMCNC: 33.9 G/DL (ref 31.5–35.7)
MCV RBC AUTO: 92.3 FL (ref 79–97)
MONOCYTES # BLD AUTO: 0.72 10*3/MM3 (ref 0.1–0.9)
MONOCYTES NFR BLD AUTO: 6.2 % (ref 5–12)
NEUTROPHILS NFR BLD AUTO: 75.4 % (ref 42.7–76)
NEUTROPHILS NFR BLD AUTO: 8.77 10*3/MM3 (ref 1.7–7)
NRBC BLD AUTO-RTO: 0 /100 WBC (ref 0–0.2)
PLATELET # BLD AUTO: 297 10*3/MM3 (ref 140–450)
PMV BLD AUTO: 9.3 FL (ref 6–12)
POTASSIUM SERPL-SCNC: 4 MMOL/L (ref 3.5–5.2)
PROT SERPL-MCNC: 6 G/DL (ref 6–8.5)
RBC # BLD AUTO: 3.77 10*6/MM3 (ref 3.77–5.28)
SODIUM SERPL-SCNC: 139 MMOL/L (ref 136–145)
TIBC SERPL-MCNC: 475 MCG/DL (ref 298–536)
TRANSFERRIN SERPL-MCNC: 339 MG/DL (ref 200–360)
WBC NRBC COR # BLD: 11.63 10*3/MM3 (ref 3.4–10.8)

## 2023-07-25 PROCEDURE — 83540 ASSAY OF IRON: CPT

## 2023-07-25 PROCEDURE — 80053 COMPREHEN METABOLIC PANEL: CPT

## 2023-07-25 PROCEDURE — 85025 COMPLETE CBC W/AUTO DIFF WBC: CPT

## 2023-07-25 PROCEDURE — 84466 ASSAY OF TRANSFERRIN: CPT

## 2023-07-25 PROCEDURE — 36415 COLL VENOUS BLD VENIPUNCTURE: CPT

## 2023-07-25 PROCEDURE — 82728 ASSAY OF FERRITIN: CPT

## 2023-08-03 ENCOUNTER — TELEPHONE (OUTPATIENT)
Dept: ONCOLOGY | Facility: CLINIC | Age: 33
End: 2023-08-03
Payer: MEDICAID

## 2023-08-03 RX ORDER — FERROUS SULFATE 325(65) MG
325 TABLET ORAL
COMMUNITY

## 2023-08-07 ENCOUNTER — ROUTINE PRENATAL (OUTPATIENT)
Dept: OBSTETRICS AND GYNECOLOGY | Facility: CLINIC | Age: 33
End: 2023-08-07
Payer: COMMERCIAL

## 2023-08-07 VITALS — SYSTOLIC BLOOD PRESSURE: 116 MMHG | WEIGHT: 157 LBS | BODY MASS INDEX: 27.14 KG/M2 | DIASTOLIC BLOOD PRESSURE: 78 MMHG

## 2023-08-07 DIAGNOSIS — Z36.89 ENCOUNTER FOR ULTRASOUND TO ASSESS FETAL GROWTH: ICD-10-CM

## 2023-08-07 DIAGNOSIS — O99.019 MATERNAL ANEMIA IN PREGNANCY, ANTEPARTUM: ICD-10-CM

## 2023-08-07 DIAGNOSIS — Q27.0 SINGLE UMBILICAL ARTERY: ICD-10-CM

## 2023-08-07 DIAGNOSIS — Z34.92 PRENATAL CARE, SECOND TRIMESTER: ICD-10-CM

## 2023-08-07 DIAGNOSIS — Z30.09 UNWANTED FERTILITY: ICD-10-CM

## 2023-08-07 DIAGNOSIS — Z3A.24 24 WEEKS GESTATION OF PREGNANCY: Primary | ICD-10-CM

## 2023-08-07 DIAGNOSIS — O47.00 PRETERM UTERINE CONTRACTIONS, ANTEPARTUM: ICD-10-CM

## 2023-08-07 DIAGNOSIS — O09.299 HISTORY OF POSTPARTUM HEMORRHAGE, CURRENTLY PREGNANT: ICD-10-CM

## 2023-08-07 DIAGNOSIS — D68.59 THROMBOPHILIA AFFECTING PREGNANCY, ANTEPARTUM: ICD-10-CM

## 2023-08-07 DIAGNOSIS — O99.119 THROMBOPHILIA AFFECTING PREGNANCY, ANTEPARTUM: ICD-10-CM

## 2023-08-07 PROBLEM — J45.909 ASTHMA AFFECTING PREGNANCY, ANTEPARTUM: Status: ACTIVE | Noted: 2023-08-07

## 2023-08-07 PROBLEM — O99.519 ASTHMA AFFECTING PREGNANCY, ANTEPARTUM: Status: ACTIVE | Noted: 2023-08-07

## 2023-08-07 PROBLEM — Z34.90 PREGNANCY: Status: ACTIVE | Noted: 2023-08-07

## 2023-08-07 LAB
GLUCOSE UR STRIP-MCNC: NEGATIVE MG/DL
PROT UR STRIP-MCNC: ABNORMAL MG/DL

## 2023-08-07 PROCEDURE — 0502F SUBSEQUENT PRENATAL CARE: CPT | Performed by: STUDENT IN AN ORGANIZED HEALTH CARE EDUCATION/TRAINING PROGRAM

## 2023-08-07 NOTE — PROGRESS NOTES
Irregular contractions.   Friday night -bleeding after intercourse for days.   Throughout the day.   Heartburn protonix workign.   Hematologist started iron.

## 2023-08-07 NOTE — PROGRESS NOTES
Chief Complaint   Patient presents with    Routine Prenatal Visit      Yuki Shields is a 33 y.o.  at 24w0d who presents for routine prenatal visit. She reports feeling very tired as she is having to work more to make ends meet now that her and her partner are not currently together. She reports that she had intercourse last Friday with bright red vaginal bleeding afterwards that became brown discharge and improved after 2 days. She has been having irregular contractions all day yesterday. Denies leakage of fluid. She reports active fetal movement.     /78   Wt 71.2 kg (157 lb)   LMP 2023   BMI 27.14 kg/mý    Gen: well appearing, NAD   Abd: gravid, nontender  SVE: 0/0/-3   See OB Flowsheet    ASSESSMENT:   IUP at 24w0d   Prenatal care in second trimester   Thrombophilia affecting pregnancy- Prothrombin gene mutation heterozygous- on Lovenox 40 mg daily   History of postpartum hemorrhage  Asthma affecting pregnancy- on Asmanex and albuterol inhaler PRN  Single umbilical artery affecting pregnancy   Heartburn- significantly improved with Protonix   Maternal anemia- Hematology has started pt on iron supplementation    uterine contractions- no evidence of  labor   Unwanted fertility     PLAN:  Problem list reviewed and updated.   Reviewed expectations of this stage of pregnancy.   Second trimester precautions reviewed including  labor precautions, anticipated fetal movements  Reviewed ultrasound results from 23 that confirmed single umbilical artery. Will plan for serial growth scans every 4 weeks and ordered for next visit.   Recommended increasing hydration to help decrease contractions.   Will plan for interval tubal ligation in the postpartum period as the patient only has 4 weeks of paid maternity leave.   Return in about 4 weeks (around 2023) for Prenatal visit, growth ultrasound.    There are no problems to display for this patient.      Orders Placed This  Encounter   Procedures    US OB Follow Up Transabdominal Approach     Standing Status:   Future     Standing Expiration Date:   8/7/2024     Order Specific Question:   Reason for Exam:     Answer:   fetal growth assessment, single umbilical artery     Order Specific Question:   Release to patient     Answer:   Routine Release    POC Urinalysis Dipstick     Order Specific Question:   Release to patient     Answer:   Routine Release     Valerie Taylor MD

## 2023-08-14 RX ORDER — BUPROPION HYDROCHLORIDE 150 MG/1
150 TABLET ORAL EVERY MORNING
Qty: 90 TABLET | Refills: 1 | Status: SHIPPED | OUTPATIENT
Start: 2023-08-14 | End: 2024-08-13

## 2023-08-14 RX ORDER — ENOXAPARIN SODIUM 100 MG/ML
40 INJECTION SUBCUTANEOUS DAILY
Qty: 12 ML | Refills: 0 | Status: SHIPPED | OUTPATIENT
Start: 2023-08-14 | End: 2023-09-13

## 2023-09-02 DIAGNOSIS — R12 HEART BURN: ICD-10-CM

## 2023-09-03 ENCOUNTER — HOSPITAL ENCOUNTER (EMERGENCY)
Facility: HOSPITAL | Age: 33
Discharge: HOME | End: 2023-09-03
Attending: OBSTETRICS & GYNECOLOGY | Admitting: OBSTETRICS & GYNECOLOGY

## 2023-09-03 VITALS
TEMPERATURE: 98.5 F | HEIGHT: 62 IN | BODY MASS INDEX: 28.72 KG/M2 | HEART RATE: 92 BPM | RESPIRATION RATE: 16 BRPM | SYSTOLIC BLOOD PRESSURE: 112 MMHG | OXYGEN SATURATION: 98 % | DIASTOLIC BLOOD PRESSURE: 79 MMHG

## 2023-09-03 LAB
A1 MICROGLOB PLACENTAL VAG QL: NEGATIVE
BACTERIA UR QL AUTO: NORMAL /HPF
BILIRUB UR QL STRIP: NEGATIVE
CLARITY UR: CLEAR
COLOR UR: YELLOW
GLUCOSE UR STRIP-MCNC: NEGATIVE MG/DL
HGB UR QL STRIP.AUTO: NEGATIVE
HYALINE CASTS UR QL AUTO: NORMAL /LPF
KETONES UR QL STRIP: NEGATIVE
LEUKOCYTE ESTERASE UR QL STRIP.AUTO: ABNORMAL
NITRITE UR QL STRIP: NEGATIVE
PH UR STRIP.AUTO: 7.5 [PH] (ref 5–8)
PROT UR QL STRIP: NEGATIVE
RBC # UR STRIP: NORMAL /HPF
REF LAB TEST METHOD: NORMAL
SP GR UR STRIP: 1.01 (ref 1–1.03)
SQUAMOUS #/AREA URNS HPF: NORMAL /HPF
UROBILINOGEN UR QL STRIP: ABNORMAL
WBC # UR STRIP: NORMAL /HPF

## 2023-09-03 PROCEDURE — 81001 URINALYSIS AUTO W/SCOPE: CPT | Performed by: OBSTETRICS & GYNECOLOGY

## 2023-09-03 PROCEDURE — 87086 URINE CULTURE/COLONY COUNT: CPT | Performed by: OBSTETRICS & GYNECOLOGY

## 2023-09-03 PROCEDURE — 99284 EMERGENCY DEPT VISIT MOD MDM: CPT | Performed by: OBSTETRICS & GYNECOLOGY

## 2023-09-03 PROCEDURE — 84112 EVAL AMNIOTIC FLUID PROTEIN: CPT | Performed by: OBSTETRICS & GYNECOLOGY

## 2023-09-03 RX ORDER — PRENATAL VIT NO.126/IRON/FOLIC 28MG-0.8MG
TABLET ORAL DAILY
COMMUNITY

## 2023-09-04 LAB — BACTERIA SPEC AEROBE CULT: NO GROWTH

## 2023-09-04 NOTE — OBED NOTES
DAO Note OB    Patient Name: Yuki Shields  YOB: 1990  MRN: [id number]  Admission Date: 9/3/2023  9:10 PM  Date of Service: 9/3/2023    Chief Complaint: Rupture of Membranes (Patient came in through [facility] with complaints of a gush of clear fluid at 1900 followed by mild contractions. Patient denies vaginal bleeding and endorses +FM.)        Subjective     Yuki Shields is a 33 y.o. female  at 27w6d with Estimated Date of Delivery: 23 who presents with the chief complaint listed above. Pt reports not much since then     She sees Theresa Jauregui MD for her prenatal care. Her pregnancy has been complicated by:  pp hemorrhage, asthma, depression, thrombophilia     She describes fetal movement as normal.  She not sure about rupture of membranes.  She denies vaginal bleeding. She is feeling contractions.        Objective   Patient Active Problem List    Diagnosis     Pregnancy [Z34.90]     Maternal anemia in pregnancy, antepartum [O99.019]     Single umbilical artery [Q27.0]     Asthma affecting pregnancy, antepartum [O99.519, J45.909]     Thrombophilia affecting pregnancy, antepartum [O99.119, D68.59]         OB History    Para Term  AB Living   4 3 3 0 0 3   SAB IAB Ectopic Molar Multiple Live Births   0 0 0 0 0 3      # Outcome Date GA Lbr Miguel/2nd Weight Sex Delivery Anes PTL Lv   4 Current            3 Term 04/29/15   3317 g (7 lb 5 oz) M Vag-Spont   MELANIA   2 Term 11   3147 g (6 lb 15 oz) F Vag-Spont      1 Term 07   3204 g (7 lb 1 oz) M Vag-Spont           Past Medical History:   Diagnosis Date    Anxiety and depression     Asthma     As child    DDD (degenerative disc disease), lumbar     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 GARZA AWARE OF THIS    Family hx-blood disorder     MOTHER HAS FACTOR 2 AND FACTOR 5    History of blood transfusion     Labral tear of hip, degenerative     RIGHT    Lumbar herniated disc 06/2017    X 2  DISC   HISTORY  OF EPIDURALS       Past Surgical History:   Procedure Laterality Date    ABDOMINOPLASTY  2019    BLADDER REPAIR  2016    WITH MESH    BREAST AUGMENTATION BILATERAL MASTOPEXY Bilateral 07/31/2017    Procedure: BREAST AUGMENTATION, MASTOPEXY W/ IDEAL IMPLANTS  NEW IMAGE;  Surgeon: Nirmal Mehta MD;  Location: [facility] MAIN OR;  Service:     CHOLECYSTECTOMY  2012    TONSILLECTOMY AND ADENOIDECTOMY  1990       No current facility-administered medications on file prior to encounter.     Current Outpatient Medications on File Prior to Encounter   Medication Sig Dispense Refill    BUSPIRONE HCL PO Take 7.5 mg by mouth.      Mometasone Furoate (Asmanex, 30 Metered Doses,) 220 MCG/ACT inhaler Inhale 1 puff Every Night. 1 each 11    acetaminophen (TYLENOL) 325 MG tablet Take 2 tablets by mouth Every 6 (Six) Hours As Needed for Mild Pain.      albuterol sulfate  (90 Base) MCG/ACT inhaler Inhale 2 puffs Every 4 (Four) Hours As Needed for Wheezing. 6.7 g 1    ondansetron ODT (ZOFRAN-ODT) 4 MG disintegrating tablet Place 1 tablet on the tongue Every 8 (Eight) Hours As Needed for Nausea or Vomiting. 30 tablet 1    Peak Flow Meter device Daily. 1 each 0    prenatal vitamin (prenatal, CLASSIC, vitamin) tablet Take  by mouth Daily.      [DISCONTINUED] BUPROPION HCL PO Take 150 mg by mouth.      [DISCONTINUED] cholecalciferol (VITAMIN D3) 1.25 MG (10637 UT) capsule          Allergies   Allergen Reactions    Dilaudid [Hydromorphone Hcl] Nausea And Vomiting       Family History   Problem Relation Age of Onset    Malig Hyperthermia Neg Hx        Social History     Socioeconomic History    Marital status: Significant Other    Number of children: 3   Tobacco Use    Smoking status: Never    Smokeless tobacco: Never   Vaping Use    Vaping Use: Never used   Substance and Sexual Activity    Alcohol use: Not Currently     Comment: OCCAS    Drug use: No    Sexual activity: Yes           Review of Systems   Constitutional:  Negative for  "chills and fever.   HENT: Negative.     Eyes:  Negative for photophobia and visual disturbance.   Respiratory:  Negative for shortness of breath.    Cardiovascular:  Negative for chest pain.   Gastrointestinal:  Negative for nausea.   Genitourinary:  Positive for vaginal discharge.   Psychiatric/Behavioral:  The patient is not nervous/anxious.         PHYSICAL EXAM:      VITAL SIGNS:  Vitals:    09/03/23 2135 09/03/23 2140 09/03/23 2145 09/03/23 2146   BP:    127/74   Pulse: 97 91 91 92   Resp:       Temp:       TempSrc:       SpO2: 97% 96% 96%    Height: 157.5 cm (62\")               FHT'S:    140 with moderate variability and accels                                     PHYSICAL EXAM:      General: well developed; well nourished  no acute distress   Heart: Not performed.   Lungs   breathing is unlabored   Abdomen: Gravid and non tender     Extremities: trace edema, DTRs 1 plus, no clonus       Cervix: Per RN, no gross rupture  Cervical Dilation (cm): 0  Cervical Effacement: 0%  Fetal Station: -4  Cervical Consistency: firm  Cervical Position: posterior     Contractions:   Uterine irritability                    LABS AND TESTING ORDERED:  Uterine and fetal monitoring  Urinalysis  ROM plus    LAB RESULTS:    Recent Results (from the past 24 hour(s))   Urinalysis With Culture If Indicated - Urine, Clean Catch    Collection Time: 09/03/23  9:43 PM    Specimen: Urine, Clean Catch   Result Value Ref Range    Color, UA Yellow Yellow, Straw    Appearance, UA Clear Clear    pH, UA 7.5 5.0 - 8.0    Specific Gravity, UA 1.010 1.005 - 1.030    Glucose, UA Negative Negative    Ketones, UA Negative Negative    Bilirubin, UA Negative Negative    Blood, UA Negative Negative    Protein, UA Negative Negative    Leuk Esterase, UA Trace (A) Negative    Nitrite, UA Negative Negative    Urobilinogen, UA 0.2 E.U./dL 0.2 - 1.0 E.U./dL   Rapid Assay For ROM - Amniotic Fluid, Amniotic Sac    Collection Time: 09/03/23  9:43 PM    Specimen: " Amniotic Sac; Amniotic Fluid   Result Value Ref Range    Rupture of Membranes Negative Negative   Urinalysis, Microscopic Only - Urine, Clean Catch    Collection Time: 09/03/23  9:43 PM    Specimen: Urine, Clean Catch   Result Value Ref Range    RBC, UA 0-2 None Seen, 0-2 /HPF    WBC, UA 0-2 None Seen, 0-2 /HPF    Bacteria, UA None Seen None Seen /HPF    Squamous Epithelial Cells, UA 0-2 None Seen, 0-2 /HPF    Hyaline Casts, UA None Seen None Seen /LPF    Methodology Automated Microscopy        Lab Results   Component Value Date    ABO O 03/28/2023    RH Positive 03/28/2023       No results found for: STREPGPB              External Prenatal Results       Pregnancy Outside Results - Transcribed From Office Records - See Scanned Records For Details       Test Value Date Time    ABO  O  03/28/23 1011    Rh  Positive  03/28/23 1011    Antibody Screen  Negative  03/28/23 1011    Varicella IgG  887 index 03/28/23 1011    Rubella  1.46 index 03/28/23 1011    Hgb  11.6 g/dL 07/31/23 1201       11.8 g/dL 07/25/23 1432       12.1 g/dL 06/12/23 1341       12.6 g/dL 05/07/23 1400       12.8 g/dL 04/28/23 1508       12.8 g/dL 03/28/23 1011       13.2 g/dL 03/23/23 1813    Hct  34.8 % 07/31/23 1201       34.8 % 07/25/23 1432       36.1 % 06/12/23 1341       36.8 % 05/07/23 1400       38.3 % 04/28/23 1508       37.8 % 03/28/23 1011       39.5 % 03/23/23 1813    Glucose Fasting GTT       Glucose Tolerance Test 1 hour       Glucose Tolerance Test 3 hour       Gonorrhea (discrete)  Negative  04/10/23 1704    Chlamydia (discrete)  Negative  04/10/23 1704    RPR  Non Reactive  03/28/23 1011    VDRL       Syphilis Antibody       HBsAg  Negative  03/28/23 1011    Herpes Simplex Virus PCR       Herpes Simplex VIrus Culture       HIV  Non Reactive  03/28/23 1011    Hep C RNA Quant PCR       Hep C Antibody  Non Reactive  03/28/23 1011    AFP       Group B Strep       GBS Susceptibility to Clindamycin       GBS Susceptibility to Erythromycin        Fetal Fibronectin       Genetic Testing, Maternal Blood                 Drug Screening       Test Value Date Time    Urine Drug Screen       Amphetamine Screen  Negative ng/mL 23 1513    Barbiturate Screen  Negative ng/mL 23 1513    Benzodiazepine Screen  Negative ng/mL 23 1513    Methadone Screen  Negative ng/mL 23 1513    Phencyclidine Screen  Negative ng/mL 23 1513    Opiates Screen       THC Screen       Cocaine Screen       Propoxyphene Screen  Negative ng/mL 23 1513    Buprenorphine Screen       Methamphetamine Screen       Oxycodone Screen       Tricyclic Antidepressants Screen                 Legend    ^: Historical                              Impression:   @ 27w6d .  Final Diagnosis: RO ROM    Plan:  1. Discharge to home.    2. Plan of care has been reviewed with patient along with risks, benefits of treatment.   All questions have been answered. Call health care provider for any further concerns and keep office appointments.  3. Comfort measures, PTL precautions      I discussed the patients findings and my recommendations with patient, family, and nursing staff      Theresa Jauregui MD  9/3/2023  22:12 EDT

## 2023-09-05 ENCOUNTER — OFFICE VISIT (OUTPATIENT)
Dept: ONCOLOGY | Facility: CLINIC | Age: 33
End: 2023-09-05
Payer: MEDICAID

## 2023-09-05 ENCOUNTER — ROUTINE PRENATAL (OUTPATIENT)
Dept: OBSTETRICS AND GYNECOLOGY | Facility: CLINIC | Age: 33
End: 2023-09-05

## 2023-09-05 ENCOUNTER — LAB (OUTPATIENT)
Dept: LAB | Facility: HOSPITAL | Age: 33
End: 2023-09-05
Payer: MEDICAID

## 2023-09-05 VITALS
DIASTOLIC BLOOD PRESSURE: 80 MMHG | HEIGHT: 62 IN | RESPIRATION RATE: 16 BRPM | OXYGEN SATURATION: 99 % | HEART RATE: 102 BPM | BODY MASS INDEX: 28.8 KG/M2 | SYSTOLIC BLOOD PRESSURE: 119 MMHG | TEMPERATURE: 96.4 F | WEIGHT: 156.5 LBS

## 2023-09-05 VITALS — WEIGHT: 155 LBS | BODY MASS INDEX: 28.35 KG/M2 | SYSTOLIC BLOOD PRESSURE: 115 MMHG | DIASTOLIC BLOOD PRESSURE: 83 MMHG

## 2023-09-05 DIAGNOSIS — Z87.59 HISTORY OF POSTPARTUM HEMORRHAGE: ICD-10-CM

## 2023-09-05 DIAGNOSIS — Z34.83 PRENATAL CARE, SUBSEQUENT PREGNANCY, THIRD TRIMESTER: ICD-10-CM

## 2023-09-05 DIAGNOSIS — J45.909 ASTHMA DURING PREGNANCY: ICD-10-CM

## 2023-09-05 DIAGNOSIS — D50.0 IRON DEFICIENCY ANEMIA DUE TO CHRONIC BLOOD LOSS: ICD-10-CM

## 2023-09-05 DIAGNOSIS — D68.59 THROMBOPHILIA AFFECTING PREGNANCY, ANTEPARTUM: ICD-10-CM

## 2023-09-05 DIAGNOSIS — O99.019 MATERNAL ANEMIA IN PREGNANCY, ANTEPARTUM: ICD-10-CM

## 2023-09-05 DIAGNOSIS — O99.119 THROMBOPHILIA AFFECTING PREGNANCY, ANTEPARTUM: ICD-10-CM

## 2023-09-05 DIAGNOSIS — E53.8 VITAMIN B12 DEFICIENCY: ICD-10-CM

## 2023-09-05 DIAGNOSIS — O99.519 ASTHMA DURING PREGNANCY: ICD-10-CM

## 2023-09-05 DIAGNOSIS — D68.52 PROTHROMBIN GENE MUTATION: ICD-10-CM

## 2023-09-05 DIAGNOSIS — D68.52 PROTHROMBIN GENE MUTATION: Primary | ICD-10-CM

## 2023-09-05 DIAGNOSIS — Z3A.28 28 WEEKS GESTATION OF PREGNANCY: Primary | ICD-10-CM

## 2023-09-05 DIAGNOSIS — O09.899 SINGLE UMBILICAL ARTERY AFFECTING MANAGEMENT OF MOTHER IN SINGLETON PREGNANCY, ANTEPARTUM: ICD-10-CM

## 2023-09-05 DIAGNOSIS — Z30.09 UNWANTED FERTILITY: ICD-10-CM

## 2023-09-05 DIAGNOSIS — Z79.01 CHRONIC ANTICOAGULATION: ICD-10-CM

## 2023-09-05 LAB
ALBUMIN SERPL-MCNC: 3.6 G/DL (ref 3.5–5.2)
ALBUMIN/GLOB SERPL: 1.2 G/DL
ALP SERPL-CCNC: 274 U/L (ref 39–117)
ALT SERPL W P-5'-P-CCNC: 17 U/L (ref 1–33)
ANION GAP SERPL CALCULATED.3IONS-SCNC: 14 MMOL/L (ref 5–15)
AST SERPL-CCNC: 22 U/L (ref 1–32)
BASOPHILS # BLD AUTO: 0.06 10*3/MM3 (ref 0–0.2)
BASOPHILS NFR BLD AUTO: 0.5 % (ref 0–1.5)
BILIRUB SERPL-MCNC: 0.3 MG/DL (ref 0–1.2)
BUN SERPL-MCNC: 9 MG/DL (ref 6–20)
BUN/CREAT SERPL: 13 (ref 7–25)
CALCIUM SPEC-SCNC: 9.5 MG/DL (ref 8.6–10.5)
CHLORIDE SERPL-SCNC: 102 MMOL/L (ref 98–107)
CO2 SERPL-SCNC: 20 MMOL/L (ref 22–29)
CREAT SERPL-MCNC: 0.69 MG/DL (ref 0.6–1.1)
DEPRECATED RDW RBC AUTO: 43.8 FL (ref 37–54)
EGFRCR SERPLBLD CKD-EPI 2021: 117 ML/MIN/1.73
EOSINOPHIL # BLD AUTO: 0.05 10*3/MM3 (ref 0–0.4)
EOSINOPHIL NFR BLD AUTO: 0.4 % (ref 0.3–6.2)
ERYTHROCYTE [DISTWIDTH] IN BLOOD BY AUTOMATED COUNT: 12.7 % (ref 12.3–15.4)
FERRITIN SERPL-MCNC: 37.2 NG/ML (ref 13–150)
GLOBULIN UR ELPH-MCNC: 3.1 GM/DL
GLUCOSE SERPL-MCNC: 103 MG/DL (ref 65–99)
GLUCOSE UR STRIP-MCNC: NEGATIVE MG/DL
HCT VFR BLD AUTO: 36.5 % (ref 34–46.6)
HGB BLD-MCNC: 12.3 G/DL (ref 12–15.9)
IMM GRANULOCYTES # BLD AUTO: 0.3 10*3/MM3 (ref 0–0.05)
IMM GRANULOCYTES NFR BLD AUTO: 2.3 % (ref 0–0.5)
IRON 24H UR-MRATE: 67 MCG/DL (ref 37–145)
IRON SATN MFR SERPL: 13 % (ref 20–50)
LYMPHOCYTES # BLD AUTO: 1.71 10*3/MM3 (ref 0.7–3.1)
LYMPHOCYTES NFR BLD AUTO: 12.9 % (ref 19.6–45.3)
MCH RBC QN AUTO: 31.7 PG (ref 26.6–33)
MCHC RBC AUTO-ENTMCNC: 33.7 G/DL (ref 31.5–35.7)
MCV RBC AUTO: 94.1 FL (ref 79–97)
MONOCYTES # BLD AUTO: 0.7 10*3/MM3 (ref 0.1–0.9)
MONOCYTES NFR BLD AUTO: 5.3 % (ref 5–12)
NEUTROPHILS NFR BLD AUTO: 10.39 10*3/MM3 (ref 1.7–7)
NEUTROPHILS NFR BLD AUTO: 78.6 % (ref 42.7–76)
NRBC BLD AUTO-RTO: 0 /100 WBC (ref 0–0.2)
PLATELET # BLD AUTO: 267 10*3/MM3 (ref 140–450)
PMV BLD AUTO: 9.3 FL (ref 6–12)
POTASSIUM SERPL-SCNC: 4.9 MMOL/L (ref 3.5–5.2)
PROT SERPL-MCNC: 6.7 G/DL (ref 6–8.5)
PROT UR STRIP-MCNC: ABNORMAL MG/DL
RBC # BLD AUTO: 3.88 10*6/MM3 (ref 3.77–5.28)
SODIUM SERPL-SCNC: 136 MMOL/L (ref 136–145)
TIBC SERPL-MCNC: 512 MCG/DL (ref 298–536)
TRANSFERRIN SERPL-MCNC: 366 MG/DL (ref 200–360)
VIT B12 BLD-MCNC: 304 PG/ML (ref 211–946)
WBC NRBC COR # BLD: 13.21 10*3/MM3 (ref 3.4–10.8)

## 2023-09-05 PROCEDURE — 85025 COMPLETE CBC W/AUTO DIFF WBC: CPT

## 2023-09-05 PROCEDURE — 80053 COMPREHEN METABOLIC PANEL: CPT

## 2023-09-05 PROCEDURE — 82728 ASSAY OF FERRITIN: CPT

## 2023-09-05 PROCEDURE — 82607 VITAMIN B-12: CPT | Performed by: INTERNAL MEDICINE

## 2023-09-05 PROCEDURE — 83540 ASSAY OF IRON: CPT

## 2023-09-05 PROCEDURE — 84466 ASSAY OF TRANSFERRIN: CPT

## 2023-09-05 PROCEDURE — 36415 COLL VENOUS BLD VENIPUNCTURE: CPT

## 2023-09-05 RX ORDER — PANTOPRAZOLE SODIUM 20 MG/1
20 TABLET, DELAYED RELEASE ORAL DAILY
Qty: 30 TABLET | Refills: 1 | Status: SHIPPED | OUTPATIENT
Start: 2023-09-05 | End: 2024-09-04

## 2023-09-05 NOTE — PROGRESS NOTES
Chief Complaint   Patient presents with    Routine Prenatal Visit      Yuki Shields is a 33 y.o.  at 28w1d who presents for routine prenatal visit. She reports having vaginal spotting about a week ago that resolved spontaneously. Denies cramping, contractions, LOF. She reports active fetal movement. She has been compliant with Lovenox daily.     /83   Wt 70.3 kg (155 lb)   LMP 2023   BMI 28.35 kg/m²    Gen: well appearing, NAD   Abd: gravid, nontender  See OB Flowsheet    ASSESSMENT:   IUP at 28w1d   Prenatal care in third trimester    Thrombophilia affecting pregnancy- Prothrombin gene mutation heterozygous- on Lovenox 40 mg daily   History of postpartum hemorrhage  Asthma affecting pregnancy- on Asmanex and albuterol inhaler PRN  Single umbilical artery affecting pregnancy   Heartburn- significantly improved with Protonix   Maternal anemia- Hematology has started pt on iron supplementation   Unwanted fertility     PLAN:  Problem list reviewed and updated.   Reviewed expectations of this stage of pregnancy.   Third trimester precautions reviewed including labor signs, monitoring fetal movements.   Discussed ultrasound results that demonstrated normal fetal growth with fetus measuring 1212 g (55%ile, AC 35%ile), posterior placenta, cephalic presentation, CHAITANYA 11.1 cm. Will continue to monitor serial growth with ultrasounds every 4 weeks given single umbilical artery.   Following up with hematology today and appreciate further recommendations regarding anemia and prothrombin gene mutation.   Provided the patient with information on Tdap today via AVS.   Return in about 2 weeks (around 2023) for Prenatal visit.    Patient Active Problem List    Diagnosis Date Noted    Anemia 09/15/2023    Asthma 09/15/2023    Pregnancy 2023    Maternal anemia in pregnancy, antepartum 2023    Single umbilical artery 2023    Asthma affecting pregnancy, antepartum 2023    Thrombophilia  affecting pregnancy, antepartum 08/07/2023    Stress 05/17/2021    Folic acid deficiency 12/29/2020    Memory impairment 12/29/2020    Obesity 12/29/2020    Coagulopathy 12/16/2019    Factor II deficiency 07/25/2017    DDD (degenerative disc disease), lumbar 06/21/2017    Pain 11/01/2012       Orders Placed This Encounter   Procedures    POC Urinalysis Dipstick     Order Specific Question:   Release to patient     Answer:   Routine Release [[id number]]     Valerie Taylor MD

## 2023-09-06 ENCOUNTER — TELEPHONE (OUTPATIENT)
Dept: ONCOLOGY | Facility: CLINIC | Age: 33
End: 2023-09-06
Payer: MEDICAID

## 2023-09-06 RX ORDER — BUSPIRONE HYDROCHLORIDE 7.5 MG/1
7.5 TABLET ORAL DAILY PRN
Qty: 30 TABLET | Refills: 0 | Status: SHIPPED | OUTPATIENT
Start: 2023-09-06

## 2023-09-06 NOTE — TELEPHONE ENCOUNTER
Informed the patient that vitamin B12 is low normal.  I recommend starting vitamin B12 1000 mcg daily. Patient v/u.

## 2023-09-15 ENCOUNTER — OFFICE VISIT (OUTPATIENT)
Dept: FAMILY MEDICINE CLINIC | Facility: CLINIC | Age: 33
End: 2023-09-15
Payer: MEDICAID

## 2023-09-15 VITALS
DIASTOLIC BLOOD PRESSURE: 64 MMHG | HEART RATE: 115 BPM | HEIGHT: 62 IN | SYSTOLIC BLOOD PRESSURE: 102 MMHG | BODY MASS INDEX: 29.44 KG/M2 | OXYGEN SATURATION: 97 % | WEIGHT: 160 LBS

## 2023-09-15 DIAGNOSIS — Z00.00 ANNUAL PHYSICAL EXAM: Primary | ICD-10-CM

## 2023-09-15 DIAGNOSIS — O99.519 ASTHMA AFFECTING PREGNANCY, ANTEPARTUM: ICD-10-CM

## 2023-09-15 DIAGNOSIS — J45.909 ASTHMA AFFECTING PREGNANCY, ANTEPARTUM: ICD-10-CM

## 2023-09-15 DIAGNOSIS — F41.9 ANXIETY: ICD-10-CM

## 2023-09-15 PROBLEM — E66.9 OBESITY: Status: ACTIVE | Noted: 2020-12-29

## 2023-09-15 PROBLEM — F43.9 STRESS: Status: ACTIVE | Noted: 2021-05-17

## 2023-09-15 PROBLEM — M51.369 DDD (DEGENERATIVE DISC DISEASE), LUMBAR: Status: ACTIVE | Noted: 2017-06-21

## 2023-09-15 PROBLEM — M51.36 DDD (DEGENERATIVE DISC DISEASE), LUMBAR: Status: ACTIVE | Noted: 2017-06-21

## 2023-09-15 PROBLEM — D68.2 FACTOR II DEFICIENCY: Status: ACTIVE | Noted: 2017-07-25

## 2023-09-15 PROBLEM — D68.9 COAGULOPATHY: Status: ACTIVE | Noted: 2019-12-16

## 2023-09-15 PROBLEM — E53.8 FOLIC ACID DEFICIENCY: Status: ACTIVE | Noted: 2020-12-29

## 2023-09-15 PROBLEM — R41.3 MEMORY IMPAIRMENT: Status: ACTIVE | Noted: 2020-12-29

## 2023-09-15 PROBLEM — D64.9 ANEMIA: Status: ACTIVE | Noted: 2023-09-15

## 2023-09-15 PROCEDURE — 99395 PREV VISIT EST AGE 18-39: CPT

## 2023-09-15 PROCEDURE — 2014F MENTAL STATUS ASSESS: CPT

## 2023-09-15 PROCEDURE — 3008F BODY MASS INDEX DOCD: CPT

## 2023-09-15 PROCEDURE — 1159F MED LIST DOCD IN RCRD: CPT

## 2023-09-15 PROCEDURE — 1160F RVW MEDS BY RX/DR IN RCRD: CPT

## 2023-09-15 RX ORDER — ENOXAPARIN SODIUM 100 MG/ML
40 INJECTION SUBCUTANEOUS
COMMUNITY

## 2023-09-15 RX ORDER — BUSPIRONE HYDROCHLORIDE 7.5 MG/1
7.5 TABLET ORAL DAILY PRN
Qty: 30 TABLET | Refills: 0 | Status: SHIPPED | OUTPATIENT
Start: 2023-09-15

## 2023-09-15 RX ORDER — BUPROPION HYDROCHLORIDE 150 MG/1
150 TABLET ORAL EVERY MORNING
Qty: 90 TABLET | Refills: 1 | Status: SHIPPED | OUTPATIENT
Start: 2023-09-15 | End: 2024-09-14

## 2023-09-15 NOTE — PROGRESS NOTES
Subjective   Yuki Shields is a 33 y.o. female. Presents today for her annual physical. She is also needing to establish care with a new PCP to get her anxiety medications refilled. Pt is currently 30wks pregnant.  Chief Complaint   Patient presents with    Establish Care    Annual Exam    Med Refill    Anxiety         HPI     Past Medical History:   Diagnosis Date    Anemia     Anxiety and depression     Asthma     As child    DDD (degenerative disc disease), lumbar     Deep vein thrombosis     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 MEHTA AWARE OF THIS    Family hx-blood disorder     MOTHER HAS FACTOR 2 AND FACTOR 5    History of blood transfusion     Labral tear of hip, degenerative     RIGHT    Lumbar herniated disc 06/2017    X 2  DISC   HISTORY OF EPIDURALS    Urinary tract infection        Past Surgical History:   Procedure Laterality Date    ABDOMINOPLASTY  2019    BLADDER REPAIR  2016    WITH MESH    BREAST AUGMENTATION BILATERAL MASTOPEXY Bilateral 07/31/2017    Procedure: BREAST AUGMENTATION, MASTOPEXY W/ IDEAL IMPLANTS  NEW IMAGE;  Surgeon: Nirmal Mehta MD;  Location: [facility];  Service:     BREAST SURGERY      CHOLECYSTECTOMY  2012    COSMETIC SURGERY      TONSILLECTOMY AND ADENOIDECTOMY  1990        Allergies   Allergen Reactions    Dilaudid [Hydromorphone Hcl] Nausea And Vomiting    Hydromorphone Dizziness     Category: Allergy;       Current Outpatient Medications on File Prior to Visit   Medication Sig Dispense Refill    acetaminophen (TYLENOL) 325 MG tablet Take 2 tablets by mouth Every 6 (Six) Hours As Needed for Mild Pain.      albuterol sulfate  (90 Base) MCG/ACT inhaler Inhale 2 puffs Every 4 (Four) Hours As Needed for Wheezing. 6.7 g 1    ferrous sulfate 325 (65 FE) MG tablet Take 1 tablet by mouth Daily With Breakfast.      Loratadine (CLARITIN PO) Take  by mouth Daily.      Mometasone Furoate (Asmanex, 30 Metered Doses,) 220 MCG/ACT inhaler Inhale 1 puff Every  "Night. 1 each 11    ondansetron ODT (ZOFRAN-ODT) 4 MG disintegrating tablet Place 1 tablet on the tongue Every 8 (Eight) Hours As Needed for Nausea or Vomiting. 30 tablet 1    pantoprazole (PROTONIX) 20 MG EC tablet TAKE 1 TABLET BY MOUTH DAILY 30 tablet 1    Peak Flow Meter device Daily. 1 each 0    prenatal vitamin (prenatal, CLASSIC, vitamin) tablet Take  by mouth Daily.      [DISCONTINUED] buPROPion XL (Wellbutrin XL) 150 MG 24 hr tablet Take 1 tablet by mouth Every Morning. 90 tablet 1    [DISCONTINUED] busPIRone (BUSPAR) 7.5 MG tablet Take 1 tablet by mouth Daily As Needed (anxiety). 30 tablet 0    Enoxaparin Sodium (LOVENOX) 40 MG/0.4ML solution prefilled syringe syringe Inject 0.4 mL under the skin into the appropriate area as directed Daily.       No current facility-administered medications on file prior to visit.       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 Lives with her 3 kids.    Sleep: Gets 6-8 hours of sleep/night    Exercise: Pt walks often    Nutrtion: States she eats fairly healthy.     Caffeine: Drinks 1 12-oz can of Dr. Pepper.    Tobb/Vaping/Illicit Drugs/ETOH: Denies tobb, vaping, illicit drugs, alcohol.     Sexual hx (#partners, m/f, bc): Sexually active, male partners, no condom use.     Mood: Feeling okay, she is in 3rd trimester of pregnancy and hormones have \"doubled\"    Safety: Feels safe at home with the people he/she is around.    Health Maintenance/Labs: N/A. Labs done by hematology, OB    Specialists: Sees OB d/t pregnancy, hem/onc for Factor II " "deficiency  _____________________________________    Objective   Vitals:    09/15/23 1443   BP: 102/64   Pulse: 115   SpO2: 97%   Weight: 72.6 kg (160 lb)   Height: 157.5 cm (62.01\")     Body mass index is 29.25 kg/m².    Physical Exam  Constitutional:       General: She is not in acute distress.     Appearance: Normal appearance. She is not ill-appearing or toxic-appearing.   HENT:      Right Ear: External ear normal.      Left Ear: External ear normal.      Nose: No congestion or rhinorrhea.      Mouth/Throat:      Mouth: Mucous membranes are moist.      Pharynx: Oropharynx is clear.   Eyes:      Conjunctiva/sclera: Conjunctivae normal.   Cardiovascular:      Rate and Rhythm: Normal rate.      Pulses: Normal pulses.      Heart sounds: Normal heart sounds.   Pulmonary:      Effort: Pulmonary effort is normal. No respiratory distress.      Breath sounds: Normal breath sounds.   Abdominal:      General: Bowel sounds are normal.      Palpations: Abdomen is soft.      Tenderness: There is no abdominal tenderness.   Skin:     General: Skin is warm and dry.      Capillary Refill: Capillary refill takes less than 2 seconds.   Neurological:      General: No focal deficit present.      Mental Status: She is alert and oriented to person, place, and time.      Motor: No weakness.   Psychiatric:         Behavior: Behavior normal.       Procedures     Assessment & Plan   Diagnoses and all orders for this visit:    1. Annual physical exam (Primary)    2. Asthma affecting pregnancy, antepartum    3. Anxiety  -     buPROPion XL (Wellbutrin XL) 150 MG 24 hr tablet; Take 1 tablet by mouth Every Morning.  Dispense: 90 tablet; Refill: 1  -     busPIRone (BUSPAR) 7.5 MG tablet; Take 1 tablet by mouth Daily As Needed (anxiety).  Dispense: 30 tablet; Refill: 0       Plan:     Return in 1 year for annual, or sooner as needed    BMI is >= 25 and <30. (Overweight) The following options were offered after discussion;: Pt is currently " pregnant, weight loss not appropriate.      Return in about 1 year (around 9/15/2024) for Annual physical.     Counseled on a healthy diet. Use condoms 100% of time with sex as IUD does not protect against STIs. Eat a healthy diet and exercise routinely. Avoid smoking/alcohol and drugs. Use seatbelt 100% of time.     Discussed Care Gaps, ordered referrals and encouraged vaccination updates.       - Pt agrees with plan of care and denies further questions/concerns today  - This document is intended for medical expert use only. Persons  reading this document without medical staff guidance may result in misinterpretation and unintended morbidity     Go to the ER for any possible life-threatening symptoms such as chest pain or shortness of air.      Please allow 3-5 business days for recommendations based on new results      I personally spent time with this patient, preparing for the visit, reviewing tests, obtaining and/or reviewing a separately obtained history, performing a medically appropriate examination and/or evaluation, counseling and educating the patient/family/caregiver, ordering medications,  documenting information in the medical record and indepentently interpreting results.

## 2023-09-15 NOTE — PATIENT INSTRUCTIONS
"Healthy Eating    Following a healthy eating pattern may help you to achieve and maintain a healthy body weight, reduce the risk of chronic disease, and live a long and productive life. It is important to follow a healthy eating pattern at an appropriate calorie level for your body. Your nutritional needs should be met primarily through food by choosing a variety of nutrient-rich foods.  What are tips for following this plan?  Reading food labels  Read labels and choose the following:  Reduced or low sodium.  Juices with 100% fruit juice.  Foods with low saturated fats and high polyunsaturated and monounsaturated fats.  Foods with whole grains, such as whole wheat, cracked wheat, brown rice, and wild rice.  Whole grains that are fortified with folic acid. This is recommended for women who are pregnant or who want to become pregnant.  Read labels and avoid the following:  Foods with a lot of added sugars. These include foods that contain brown sugar, corn sweetener, corn syrup, dextrose, fructose, glucose, high-fructose corn syrup, honey, invert sugar, lactose, malt syrup, maltose, molasses, raw sugar, sucrose, trehalose, or turbinado sugar.  Do not eat more than the following amounts of added sugar per day:  6 teaspoons (25 g) for women.  9 teaspoons (38 g) for men.  Foods that contain processed or refined starches and grains.  Refined grain products, such as white flour, degermed cornmeal, white bread, and white rice.  Shopping  Choose nutrient-rich snacks, such as vegetables, whole fruits, and nuts. Avoid high-calorie and high-sugar snacks, such as potato chips, fruit snacks, and candy.  Use oil-based dressings and spreads on foods instead of solid fats such as butter, stick margarine, or cream cheese.  Limit pre-made sauces, mixes, and \"instant\" products such as flavored rice, instant noodles, and ready-made pasta.  Try more plant-protein sources, such as tofu, tempeh, black beans, edamame, lentils, nuts, and " seeds.  Explore eating plans such as the Mediterranean diet or vegetarian diet.  Cooking  Use oil to sauté or stir-larry foods instead of solid fats such as butter, stick margarine, or lard.  Try baking, boiling, grilling, or broiling instead of frying.  Remove the fatty part of meats before cooking.  Steam vegetables in water or broth.  Meal planning    At meals, imagine dividing your plate into fourths:  One-half of your plate is fruits and vegetables.  One-fourth of your plate is whole grains.  One-fourth of your plate is protein, especially lean meats, poultry, eggs, tofu, beans, or nuts.  Include low-fat dairy as part of your daily diet.  Lifestyle  Choose healthy options in all settings, including home, work, school, restaurants, or stores.  Prepare your food safely:  Wash your hands after handling raw meats.  Keep food preparation surfaces clean by regularly washing with hot, soapy water.  Keep raw meats separate from ready-to-eat foods, such as fruits and vegetables.  , meat, poultry, and eggs to the recommended internal temperature.  Store foods at safe temperatures. In general:  Keep cold foods at 40°F (4.4°C) or below.  Keep hot foods at 140°F (60°C) or above.  Keep your freezer at 0°F (-17.8°C) or below.  Foods are no longer safe to eat when they have been between the temperatures of 40°-140°F (4.4-60°C) for more than 2 hours.  What foods should I eat?  Fruits  Aim to eat 2 cup-equivalents of fresh, canned (in natural juice), or frozen fruits each day. Examples of 1 cup-equivalent of fruit include 1 small apple, 8 large strawberries, 1 cup canned fruit, ½ cup dried fruit, or 1 cup 100% juice.  Vegetables  Aim to eat 2½-3 cup-equivalents of fresh and frozen vegetables each day, including different varieties and colors. Examples of 1 cup-equivalent of vegetables include 2 medium carrots, 2 cups raw, leafy greens, 1 cup chopped vegetable (raw or cooked), or 1 medium baked potato.  Grains  Aim to  eat 6 ounce-equivalents of whole grains each day. Examples of 1 ounce-equivalent of grains include 1 slice of bread, 1 cup ready-to-eat cereal, 3 cups popcorn, or ½ cup cooked rice, pasta, or cereal.  Meats and other proteins  Aim to eat 5-6 ounce-equivalents of protein each day. Examples of 1 ounce-equivalent of protein include 1 egg, 1/2 cup nuts or seeds, or 1 tablespoon (16 g) peanut butter. A cut of meat or fish that is the size of a deck of cards is about 3-4 ounce-equivalents.  Of the protein you eat each week, try to have at least 8 ounces come from seafood. This includes salmon, trout, herring, and anchovies.  Dairy  Aim to eat 3 cup-equivalents of fat-free or low-fat dairy each day. Examples of 1 cup-equivalent of dairy include 1 cup (240 mL) milk, 8 ounces (250 g) yogurt, 1½ ounces (44 g) natural cheese, or 1 cup (240 mL) fortified soy milk.  Fats and oils  Aim for about 5 teaspoons (21 g) per day. Choose monounsaturated fats, such as canola and olive oils, avocados, peanut butter, and most nuts, or polyunsaturated fats, such as sunflower, corn, and soybean oils, walnuts, pine nuts, sesame seeds, sunflower seeds, and flaxseed.  Beverages  Aim for six 8-oz glasses of water per day. Limit coffee to three to five 8-oz cups per day.  Limit caffeinated beverages that have added calories, such as soda and energy drinks.  Limit alcohol intake to no more than 1 drink a day for nonpregnant women and 2 drinks a day for men. One drink equals 12 oz of beer (355 mL), 5 oz of wine (148 mL), or 1½ oz of hard liquor (44 mL).  Seasoning and other foods  Avoid adding excess amounts of salt to your foods. Try flavoring foods with herbs and spices instead of salt.  Avoid adding sugar to foods.  Try using oil-based dressings, sauces, and spreads instead of solid fats.  This information is based on general [place] nutrition guidelines. For more information, visit choosemyplate.gov. Exact amounts may vary based on your nutrition  needs.  Summary  A healthy eating plan may help you to maintain a healthy weight, reduce the risk of chronic diseases, and stay active throughout your life.  Plan your meals. Make sure you eat the right portions of a variety of nutrient-rich foods.  Try baking, boiling, grilling, or broiling instead of frying.  Choose healthy options in all settings, including home, work, school, restaurants, or stores.  This information is not intended to replace advice given to you by your health care provider. Make sure you discuss any questions you have with your health care provider.  Document Revised: 08/16/2022 Document Reviewed: 08/16/2022  [employer] Patient Education © 2023 [employer]      Exercising to Stay Healthy    To become healthy and stay healthy, it is recommended that you do moderate-intensity and vigorous-intensity exercise. You can tell that you are exercising at a moderate intensity if your heart starts beating faster and you start breathing faster but can still hold a conversation. You can tell that you are exercising at a vigorous intensity if you are breathing much harder and faster and cannot hold a conversation while exercising.  How can exercise benefit me?  Exercising regularly is important. It has many health benefits, such as:  Improving overall fitness, flexibility, and endurance.  Increasing bone density.  Helping with weight control.  Decreasing body fat.  Increasing muscle strength and endurance.  Reducing stress and tension, anxiety, depression, or anger.  Improving overall health.  What guidelines should I follow while exercising?  Before you start a new exercise program, talk with your health care provider.  Do not exercise so much that you hurt yourself, feel dizzy, or get very short of breath.  Wear comfortable clothes and wear shoes with good support.  Drink plenty of water while you exercise to prevent dehydration or heat stroke.  Work out until your breathing and your heartbeat get faster  (moderate intensity).  How often should I exercise?  Choose an activity that you enjoy, and set realistic goals. Your health care provider can help you make an activity plan that is individually designed and works best for you.  Exercise regularly as told by your health care provider. This may include:  Doing strength training two times a week, such as:  Lifting weights.  Using resistance bands.  Push-ups.  Sit-ups.  Yoga.  Doing a certain intensity of exercise for a given amount of time. Choose from these options:  A total of 150 minutes of moderate-intensity exercise every week.  A total of 75 minutes of vigorous-intensity exercise every week.  A mix of moderate-intensity and vigorous-intensity exercise every week.  Children, pregnant women, people who have not exercised regularly, people who are overweight, and older adults may need to talk with a health care provider about what activities are safe to perform. If you have a medical condition, be sure to talk with your health care provider before you start a new exercise program.  What are some exercise ideas?  Moderate-intensity exercise ideas include:  Walking 1 mile (1.6 km) in about 15 minutes.  Biking.  Hiking.  Golfing.  Dancing.  Water aerobics.  Vigorous-intensity exercise ideas include:  Walking 4.5 miles (7.2 km) or more in about 1 hour.  Jogging or running 5 miles (8 km) in about 1 hour.  Biking 10 miles (16.1 km) or more in about 1 hour.  Lap swimming.  Roller-skating or in-line skating.  Cross-country skiing.  Vigorous competitive sports, such as football, basketball, and soccer.  Jumping rope.  Aerobic dancing.  What are some everyday activities that can help me get exercise?  Yard work, such as:  Pushing a .  Raking and bagging leaves.  Washing your car.  Pushing a stroller.  Shoveling snow.  Gardening.  Washing windows or floors.  How can I be more active in my day-to-day activities?  Use stairs instead of an elevator.  Take a walk during  your lunch break.  If you drive, park your car farther away from your work or school.  If you take public transportation, get off one stop early and walk the rest of the way.  Stand up or walk around during all of your indoor phone calls.  Get up, stretch, and walk around every 30 minutes throughout the day.  Enjoy exercise with a friend. Support to continue exercising will help you keep a regular routine of activity.  Where to find more information  You can find more information about exercising to stay healthy from:  [place] Department of Health and Human Services: www.hhs.gov  Centers for Disease Control and Prevention (CDC): www.cdc.gov  Summary  Exercising regularly is important. It will improve your overall fitness, flexibility, and endurance.  Regular exercise will also improve your overall health. It can help you control your weight, reduce stress, and improve your bone density.  Do not exercise so much that you hurt yourself, feel dizzy, or get very short of breath.  Before you start a new exercise program, talk with your health care provider.  This information is not intended to replace advice given to you by your health care provider. Make sure you discuss any questions you have with your health care provider.  Document Revised: 04/15/2022 Document Reviewed: 04/15/2022  [employer] Patient Education © 2023 [employer]

## 2023-09-20 ENCOUNTER — ROUTINE PRENATAL (OUTPATIENT)
Dept: OBSTETRICS AND GYNECOLOGY | Facility: CLINIC | Age: 33
End: 2023-09-20
Payer: MEDICAID

## 2023-09-20 VITALS — DIASTOLIC BLOOD PRESSURE: 74 MMHG | WEIGHT: 159 LBS | SYSTOLIC BLOOD PRESSURE: 122 MMHG | BODY MASS INDEX: 29.07 KG/M2

## 2023-09-20 DIAGNOSIS — Z87.19 HISTORY OF CHOLESTASIS DURING PREGNANCY: ICD-10-CM

## 2023-09-20 DIAGNOSIS — J45.909 ASTHMA AFFECTING PREGNANCY, ANTEPARTUM: ICD-10-CM

## 2023-09-20 DIAGNOSIS — L29.9 PRURITUS OF PREGNANCY IN THIRD TRIMESTER: ICD-10-CM

## 2023-09-20 DIAGNOSIS — Z34.83 PRENATAL CARE, SUBSEQUENT PREGNANCY IN THIRD TRIMESTER: ICD-10-CM

## 2023-09-20 DIAGNOSIS — Z87.59 HISTORY OF CHOLESTASIS DURING PREGNANCY: ICD-10-CM

## 2023-09-20 DIAGNOSIS — O99.019 MATERNAL ANEMIA IN PREGNANCY, ANTEPARTUM: ICD-10-CM

## 2023-09-20 DIAGNOSIS — D68.59 THROMBOPHILIA AFFECTING PREGNANCY, ANTEPARTUM: ICD-10-CM

## 2023-09-20 DIAGNOSIS — O99.119 THROMBOPHILIA AFFECTING PREGNANCY, ANTEPARTUM: ICD-10-CM

## 2023-09-20 DIAGNOSIS — Z87.59 HISTORY OF POSTPARTUM HEMORRHAGE: ICD-10-CM

## 2023-09-20 DIAGNOSIS — O99.713 PRURITUS OF PREGNANCY IN THIRD TRIMESTER: ICD-10-CM

## 2023-09-20 DIAGNOSIS — O09.899 SINGLE UMBILICAL ARTERY AFFECTING MANAGEMENT OF MOTHER IN SINGLETON PREGNANCY, ANTEPARTUM: ICD-10-CM

## 2023-09-20 DIAGNOSIS — Z30.09 UNWANTED FERTILITY: ICD-10-CM

## 2023-09-20 DIAGNOSIS — O99.519 ASTHMA AFFECTING PREGNANCY, ANTEPARTUM: ICD-10-CM

## 2023-09-20 DIAGNOSIS — Z3A.30 30 WEEKS GESTATION OF PREGNANCY: Primary | ICD-10-CM

## 2023-09-20 LAB
GLUCOSE UR STRIP-MCNC: NEGATIVE MG/DL
PROT UR STRIP-MCNC: NEGATIVE MG/DL

## 2023-09-20 NOTE — PROGRESS NOTES
Chief Complaint   Patient presents with    Routine Prenatal Visit     Patient is here today for her routine prenatal visit. Patients heart rate is 82      Yuki Shields is a 33 y.o.  at 30w2d who presents for routine prenatal visit. She reports concern for developing cholestasis. She has had it in 2 of her previous pregnancies and is experiencing itching of her palms and feet, only at night. Denies vaginal bleeding, cramping, contractions, LOF. She reports active fetal movement.     /74   Wt 72.1 kg (159 lb)   LMP 2023   BMI 29.07 kg/m²    Gen: well appearing, NAD   Abd: gravid, nontender  See OB Flowsheet    ASSESSMENT:   IUP at 30w2d   Prenatal care in third trimester    Thrombophilia affecting pregnancy- Prothrombin gene mutation heterozygous- on Lovenox 40 mg daily   History of postpartum hemorrhage  Asthma affecting pregnancy- on Asmanex and albuterol inhaler PRN  Single umbilical artery affecting pregnancy   Heartburn- significantly improved with Protonix   Maternal anemia- Hematology has started pt on iron supplementation   Unwanted fertility   Pruritus of pregnancy   History of cholestasis x 2 previous pregnancies     PLAN:  Problem list reviewed and updated.   Reviewed expectations of this stage of pregnancy.   Third trimester precautions reviewed including labor signs, monitoring fetal movements.   Will obtain growth ultrasound at next visit given single umbilical artery and thrombophilia affecting pregnancy.   Will obtain CMP and bile acids to rule out cholestasis of pregnancy. Will notify the patient if she needs to start on Ursodiol if testing returns abnormal.   Return in about 2 weeks (around 10/4/2023) for Prenatal visit, growth ultrasound.    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

## 2023-09-21 LAB
ALBUMIN SERPL-MCNC: 3.8 G/DL (ref 3.9–4.9)
ALBUMIN/GLOB SERPL: 1.4 {RATIO} (ref 1.2–2.2)
ALP SERPL-CCNC: 304 IU/L (ref 44–121)
ALT SERPL-CCNC: 25 IU/L (ref 0–32)
AST SERPL-CCNC: 30 IU/L (ref 0–40)
BILIRUB SERPL-MCNC: 0.3 MG/DL (ref 0–1.2)
BUN SERPL-MCNC: 9 MG/DL (ref 6–20)
BUN/CREAT SERPL: 14 (ref 9–23)
CALCIUM SERPL-MCNC: 9.7 MG/DL (ref 8.7–10.2)
CHLORIDE SERPL-SCNC: 101 MMOL/L (ref 96–106)
CO2 SERPL-SCNC: 21 MMOL/L (ref 20–29)
CREAT SERPL-MCNC: 0.66 MG/DL (ref 0.57–1)
EGFRCR SERPLBLD CKD-EPI 2021: 118 ML/MIN/1.73
GLOBULIN SER CALC-MCNC: 2.7 G/DL (ref 1.5–4.5)
GLUCOSE SERPL-MCNC: 92 MG/DL (ref 70–99)
POTASSIUM SERPL-SCNC: 4.4 MMOL/L (ref 3.5–5.2)
PROT SERPL-MCNC: 6.5 G/DL (ref 6–8.5)
SODIUM SERPL-SCNC: 136 MMOL/L (ref 134–144)

## 2023-09-22 LAB — BILE AC SERPL-SCNC: 9.4 UMOL/L (ref 0–10)

## 2023-09-25 ENCOUNTER — PATIENT MESSAGE (OUTPATIENT)
Dept: ONCOLOGY | Facility: CLINIC | Age: 33
End: 2023-09-25

## 2023-09-25 DIAGNOSIS — L29.9 ITCHING: Primary | ICD-10-CM

## 2023-09-26 ENCOUNTER — LAB (OUTPATIENT)
Dept: LAB | Facility: HOSPITAL | Age: 33
End: 2023-09-26
Payer: MEDICAID

## 2023-09-26 DIAGNOSIS — D68.52 PROTHROMBIN GENE MUTATION: ICD-10-CM

## 2023-09-26 DIAGNOSIS — L29.9 ITCHING: ICD-10-CM

## 2023-09-26 DIAGNOSIS — E53.8 VITAMIN B12 DEFICIENCY: ICD-10-CM

## 2023-09-26 DIAGNOSIS — D50.0 IRON DEFICIENCY ANEMIA DUE TO CHRONIC BLOOD LOSS: ICD-10-CM

## 2023-09-26 DIAGNOSIS — Z79.01 CHRONIC ANTICOAGULATION: ICD-10-CM

## 2023-09-26 LAB
ALBUMIN SERPL-MCNC: 3.4 G/DL (ref 3.5–5.2)
ALBUMIN/GLOB SERPL: 1 G/DL
ALP SERPL-CCNC: 291 U/L (ref 39–117)
ALT SERPL W P-5'-P-CCNC: 29 U/L (ref 1–33)
ANION GAP SERPL CALCULATED.3IONS-SCNC: 13.8 MMOL/L (ref 5–15)
AST SERPL-CCNC: 28 U/L (ref 1–32)
BILIRUB SERPL-MCNC: 0.3 MG/DL (ref 0–1.2)
BUN SERPL-MCNC: 9 MG/DL (ref 6–20)
BUN/CREAT SERPL: 13.6 (ref 7–25)
CALCIUM SPEC-SCNC: 9.1 MG/DL (ref 8.6–10.5)
CHLORIDE SERPL-SCNC: 102 MMOL/L (ref 98–107)
CO2 SERPL-SCNC: 19.2 MMOL/L (ref 22–29)
CREAT SERPL-MCNC: 0.66 MG/DL (ref 0.6–1.1)
EGFRCR SERPLBLD CKD-EPI 2021: 118.2 ML/MIN/1.73
GLOBULIN UR ELPH-MCNC: 3.3 GM/DL
GLUCOSE SERPL-MCNC: 104 MG/DL (ref 65–99)
POTASSIUM SERPL-SCNC: 4.2 MMOL/L (ref 3.5–5.2)
PROT SERPL-MCNC: 6.7 G/DL (ref 6–8.5)
SODIUM SERPL-SCNC: 135 MMOL/L (ref 136–145)

## 2023-09-26 PROCEDURE — 80053 COMPREHEN METABOLIC PANEL: CPT

## 2023-09-26 PROCEDURE — 36415 COLL VENOUS BLD VENIPUNCTURE: CPT

## 2023-10-02 ENCOUNTER — TELEPHONE (OUTPATIENT)
Dept: ONCOLOGY | Facility: CLINIC | Age: 33
End: 2023-10-02
Payer: MEDICAID

## 2023-10-02 LAB
FERRITIN SERPL-MCNC: 43 NG/ML (ref 13–150)
IRON 24H UR-MRATE: 78 MCG/DL (ref 37–145)
IRON SATN MFR SERPL: 15 % (ref 20–50)
TIBC SERPL-MCNC: 512 MCG/DL (ref 298–536)
TRANSFERRIN SERPL-MCNC: 366 MG/DL (ref 200–360)

## 2023-10-02 NOTE — TELEPHONE ENCOUNTER
"  Caller: Yuki Shields \"Ali\"    Relationship: Self    Best call back number: [phone]    What is the best time to reach you: ASAP    Who are you requesting to speak with (clinical staff, provider,  specific staff member): CLINICAL    What was the call regarding: REQUESTING A CALL BACK TO GO OVER LABS FROM 9-26    Is it okay if the provider responds through MyChart:YES        "

## 2023-10-02 NOTE — TELEPHONE ENCOUNTER
Called the patient and she was seeing why the cbc, ferritin, and iron were not resulted. I let her know these were not drawn. I called lab to see if they could try to run these since she was in last Tuesday 6 days ago. Apple in lab stated she would check maria guadalupe this. Patient v/u.

## 2023-10-03 ENCOUNTER — TELEPHONE (OUTPATIENT)
Dept: ONCOLOGY | Facility: CLINIC | Age: 33
End: 2023-10-03
Payer: MEDICAID

## 2023-10-03 ENCOUNTER — ROUTINE PRENATAL (OUTPATIENT)
Dept: OBSTETRICS AND GYNECOLOGY | Facility: CLINIC | Age: 33
End: 2023-10-03
Payer: MEDICAID

## 2023-10-03 VITALS — SYSTOLIC BLOOD PRESSURE: 122 MMHG | DIASTOLIC BLOOD PRESSURE: 83 MMHG | BODY MASS INDEX: 28.89 KG/M2 | WEIGHT: 158 LBS

## 2023-10-03 DIAGNOSIS — D68.59 THROMBOPHILIA AFFECTING PREGNANCY, ANTEPARTUM: ICD-10-CM

## 2023-10-03 DIAGNOSIS — Z87.19 HISTORY OF CHOLESTASIS DURING PREGNANCY: ICD-10-CM

## 2023-10-03 DIAGNOSIS — Z87.59 HISTORY OF POSTPARTUM HEMORRHAGE: ICD-10-CM

## 2023-10-03 DIAGNOSIS — Z87.59 HISTORY OF CHOLESTASIS DURING PREGNANCY: ICD-10-CM

## 2023-10-03 DIAGNOSIS — O99.119 THROMBOPHILIA AFFECTING PREGNANCY, ANTEPARTUM: ICD-10-CM

## 2023-10-03 DIAGNOSIS — O99.019 MATERNAL ANEMIA IN PREGNANCY, ANTEPARTUM: ICD-10-CM

## 2023-10-03 DIAGNOSIS — Z30.09 UNWANTED FERTILITY: ICD-10-CM

## 2023-10-03 DIAGNOSIS — Z3A.32 32 WEEKS GESTATION OF PREGNANCY: Primary | ICD-10-CM

## 2023-10-03 DIAGNOSIS — O36.5930 IUGR (INTRAUTERINE GROWTH RETARDATION) AFFECTING MOTHER, THIRD TRIMESTER, NOT APPLICABLE OR UNSPECIFIED FETUS: ICD-10-CM

## 2023-10-03 DIAGNOSIS — Z34.83 PRENATAL CARE, SUBSEQUENT PREGNANCY IN THIRD TRIMESTER: ICD-10-CM

## 2023-10-03 DIAGNOSIS — L29.9 PRURITUS OF PREGNANCY IN THIRD TRIMESTER: ICD-10-CM

## 2023-10-03 DIAGNOSIS — Q27.0 SINGLE UMBILICAL ARTERY: ICD-10-CM

## 2023-10-03 DIAGNOSIS — O99.713 PRURITUS OF PREGNANCY IN THIRD TRIMESTER: ICD-10-CM

## 2023-10-03 LAB
GLUCOSE UR STRIP-MCNC: NEGATIVE MG/DL
PROT UR STRIP-MCNC: NEGATIVE MG/DL

## 2023-10-03 RX ORDER — URSODIOL 300 MG/1
300 CAPSULE ORAL 2 TIMES DAILY
Qty: 60 CAPSULE | Refills: 0 | Status: SHIPPED | OUTPATIENT
Start: 2023-10-03 | End: 2023-10-23 | Stop reason: SDUPTHER

## 2023-10-03 NOTE — TELEPHONE ENCOUNTER
Reviewed Dr. Guido's note and instructions with pt, she v/u.      ----- Message from Magan Guido MD sent at 10/2/2023  5:44 PM EDT -----  Please inform the patient that her iron levels are improving.  Bilirubin was normal.     Continue Lovenox daily.    Continue ferrous sulfate and vitamin B12.    Thank you

## 2023-10-09 ENCOUNTER — ROUTINE PRENATAL (OUTPATIENT)
Dept: OBSTETRICS AND GYNECOLOGY | Facility: CLINIC | Age: 33
End: 2023-10-09
Payer: MEDICAID

## 2023-10-09 VITALS — WEIGHT: 162 LBS | SYSTOLIC BLOOD PRESSURE: 120 MMHG | BODY MASS INDEX: 29.62 KG/M2 | DIASTOLIC BLOOD PRESSURE: 83 MMHG

## 2023-10-09 DIAGNOSIS — Z87.59 HISTORY OF POSTPARTUM HEMORRHAGE: ICD-10-CM

## 2023-10-09 DIAGNOSIS — Z87.19 HISTORY OF CHOLESTASIS DURING PREGNANCY: ICD-10-CM

## 2023-10-09 DIAGNOSIS — O99.119 THROMBOPHILIA AFFECTING PREGNANCY, ANTEPARTUM: ICD-10-CM

## 2023-10-09 DIAGNOSIS — Z34.83 PRENATAL CARE, SUBSEQUENT PREGNANCY IN THIRD TRIMESTER: ICD-10-CM

## 2023-10-09 DIAGNOSIS — O99.713 PRURITUS OF PREGNANCY IN THIRD TRIMESTER: ICD-10-CM

## 2023-10-09 DIAGNOSIS — O09.899 SINGLE UMBILICAL ARTERY AFFECTING MANAGEMENT OF MOTHER IN SINGLETON PREGNANCY, ANTEPARTUM: ICD-10-CM

## 2023-10-09 DIAGNOSIS — Z87.59 HISTORY OF CHOLESTASIS DURING PREGNANCY: ICD-10-CM

## 2023-10-09 DIAGNOSIS — Z3A.33 33 WEEKS GESTATION OF PREGNANCY: Primary | ICD-10-CM

## 2023-10-09 DIAGNOSIS — O99.519 ASTHMA AFFECTING PREGNANCY, ANTEPARTUM: ICD-10-CM

## 2023-10-09 DIAGNOSIS — J45.909 ASTHMA AFFECTING PREGNANCY, ANTEPARTUM: ICD-10-CM

## 2023-10-09 DIAGNOSIS — O36.5931 IUGR (INTRAUTERINE GROWTH RESTRICTION) AFFECTING CARE OF MOTHER, THIRD TRIMESTER, FETUS 1: ICD-10-CM

## 2023-10-09 DIAGNOSIS — L29.9 PRURITUS OF PREGNANCY IN THIRD TRIMESTER: ICD-10-CM

## 2023-10-09 DIAGNOSIS — Z30.09 UNWANTED FERTILITY: ICD-10-CM

## 2023-10-09 DIAGNOSIS — D68.59 THROMBOPHILIA AFFECTING PREGNANCY, ANTEPARTUM: ICD-10-CM

## 2023-10-09 DIAGNOSIS — O99.019 MATERNAL ANEMIA IN PREGNANCY, ANTEPARTUM: ICD-10-CM

## 2023-10-09 LAB
GLUCOSE UR STRIP-MCNC: NEGATIVE MG/DL
PROT UR STRIP-MCNC: ABNORMAL MG/DL

## 2023-10-09 RX ORDER — HYDROXYZINE HYDROCHLORIDE 10 MG/1
10 TABLET, FILM COATED ORAL 3 TIMES DAILY PRN
Qty: 60 TABLET | Refills: 1 | Status: ON HOLD | OUTPATIENT
Start: 2023-10-09

## 2023-10-09 NOTE — PROGRESS NOTES
Chief Complaint   Patient presents with    Routine Prenatal Visit      Yuki Shields is a 33 y.o.  at 33w0d who presents for routine prenatal visit. She reports ongoing itching of her hands and feet and strongly feels that she has cholestasis of pregnancy. Denies vaginal bleeding, cramping, contractions, LOF. She reports active fetal movement.     /83   Wt 73.5 kg (162 lb)   LMP 2023   BMI 29.62 kg/m²    Gen: well appearing, NAD   Abd: gravid, nontender  See OB Flowsheet    ASSESSMENT:   IUP at 33w0d   Prenatal care in third trimester    Thrombophilia affecting pregnancy- Prothrombin gene mutation heterozygous- on Lovenox 40 mg daily   History of postpartum hemorrhage  Asthma affecting pregnancy- on Asmanex and albuterol inhaler PRN  Single umbilical artery affecting pregnancy   Heartburn- significantly improved with Protonix   Maternal anemia- Hematology has started pt on iron supplementation   Unwanted fertility - signed tubal consents today   Pruritus of pregnancy - suspected cholestasis of pregnancy.  History of cholestasis x 2 previous pregnancies   IUGR with AC 2.7%ile with normal UA dopplers and BPP 8/8    PLAN:  Problem list reviewed and updated.   Reviewed expectations of this stage of pregnancy.   Third trimester precautions reviewed including labor signs, monitoring fetal movements.   Discussed ultrasound results that demonstrated BPP 8/8, mild polyhydramnios with CHAITANYA 22.9 cm and MVP 8.1 cm, cephalic presentation, fundal placenta, and normal UA dopplers with S/D ratio 2.17. Will continue weekly UA dopplers and  screening given IUGR with normal UA dopplers and reassuring  testing. Repeat growth every 3-4 weeks.   Patient had bile acids and CMP drawn today that were ordered at her last visit to rule out cholestasis of pregnancy. She has been started on Ursodiol 300 mg BID and will continue until bile acids have returned. Prescribed hydroxyzine 10 mg TID Prn pruritus.  If she does have cholestasis of pregnancy, will plan for delivery at 36-37 weeks based on bile acids and continue  testing with weekly BPPs.   Will plan to change over the heparin soon for anticoagulation from Lovenox since she does not need to delivery early.   Follow up in 1 week for prenatal visit, BPP ultrasound, and UA dopplers.     Patient Active Problem List    Diagnosis Date Noted    Anemia 09/15/2023    Asthma 09/15/2023    Pregnancy 2023    Maternal anemia in pregnancy, antepartum 2023    Single umbilical artery 2023    Asthma affecting pregnancy, antepartum 2023    Thrombophilia affecting pregnancy, antepartum 2023    Stress 2021    Folic acid deficiency 2020    Memory impairment 2020    Obesity 2020    Coagulopathy 2019    Factor II deficiency 2017    DDD (degenerative disc disease), lumbar 2017    Pain 2012       Orders Placed This Encounter   Procedures    POC Urinalysis Dipstick     Order Specific Question:   Release to patient     Answer:   Routine Release [[id number]]     Valerie Taylor MD

## 2023-10-11 ENCOUNTER — HOSPITAL ENCOUNTER (EMERGENCY)
Facility: HOSPITAL | Age: 33
Discharge: HOME | End: 2023-10-11
Attending: OBSTETRICS & GYNECOLOGY | Admitting: OBSTETRICS & GYNECOLOGY
Payer: MEDICAID

## 2023-10-11 VITALS
HEIGHT: 62 IN | OXYGEN SATURATION: 98 % | SYSTOLIC BLOOD PRESSURE: 117 MMHG | DIASTOLIC BLOOD PRESSURE: 82 MMHG | WEIGHT: 162 LBS | HEART RATE: 86 BPM | TEMPERATURE: 97.7 F | RESPIRATION RATE: 18 BRPM | BODY MASS INDEX: 29.81 KG/M2

## 2023-10-11 PROCEDURE — 59025 FETAL NON-STRESS TEST: CPT

## 2023-10-11 PROCEDURE — 99283 EMERGENCY DEPT VISIT LOW MDM: CPT | Performed by: OBSTETRICS & GYNECOLOGY

## 2023-10-11 NOTE — OBED NOTES
[facility] Note OB        Patient Name: Yuki Shields  YOB: 1990  MRN: [id number]  Admission Date: 10/11/2023  8:02 AM  Date of Service: 10/11/2023    Chief Complaint: Decreased Fetal Movement (Patient presents to [facility] at 33 weeks gestation with complaints of decreased fetal movement this morning )      Subjective     Yuki Shields is a 33 y.o. female  at 33w2d with Estimated Date of Delivery: 23 who presents due to decreased fetal movement.  She sees Rere Jose MD for her prenatal care. Her pregnancy has been complicated by:  Anemia, Asthma, thrombophilia, Factor II deficiency, and single umbilical artery.      She describes fetal movement as decreased this morning.  She denies rupture of membranes.  She denies vaginal bleeding  She is not feeling contractions.          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]     DDD (degenerative disc disease), lumbar [M51.36]     Pain [R52]         OB History    Para Term  AB Living   4 3 3 0 0 3   SAB IAB Ectopic Molar Multiple Live Births   0 0 0 0 0 3      # Outcome Date GA Lbr Miguel/2nd Weight Sex Delivery Anes PTL Lv   4 Current            3 Term 04/29/15   3317 g (7 lb 5 oz) M Vag-Spont   MELANIA   2 Term 11   3147 g (6 lb 15 oz) F Vag-Spont      1 Term 07   3204 g (7 lb 1 oz) M Vag-Spont           Past Medical History:   Diagnosis Date    Anemia     Anxiety and depression     Asthma     As child    DDD (degenerative disc disease), lumbar     Deep vein thrombosis      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 MEHTA AWARE OF THIS    Family hx-blood disorder     MOTHER HAS FACTOR 2 AND FACTOR 5    History of blood transfusion     Labral tear of hip, degenerative     RIGHT    Lumbar herniated disc 06/2017    X 2  DISC   HISTORY OF EPIDURALS    Urinary tract infection        Past Surgical History:   Procedure Laterality Date    ABDOMINOPLASTY  2019    BLADDER REPAIR  2016    WITH MESH    BREAST AUGMENTATION BILATERAL MASTOPEXY Bilateral 07/31/2017    Procedure: BREAST AUGMENTATION, MASTOPEXY W/ IDEAL IMPLANTS  NEW IMAGE;  Surgeon: Nirmal Mehta MD;  Location: [facility] OR;  Service:     BREAST SURGERY      CHOLECYSTECTOMY  2012    COSMETIC SURGERY      TONSILLECTOMY AND ADENOIDECTOMY  1990       No current facility-administered medications on file prior to encounter.     Current Outpatient Medications on File Prior to Encounter   Medication Sig Dispense Refill    acetaminophen (TYLENOL) 325 MG tablet Take 2 tablets by mouth Every 6 (Six) Hours As Needed for Mild Pain.      ferrous sulfate 325 (65 FE) MG tablet Take 1 tablet by mouth Daily With Breakfast.      Mometasone Furoate (Asmanex, 30 Metered Doses,) 220 MCG/ACT inhaler Inhale 1 puff Every Night. 1 each 11    pantoprazole (PROTONIX) 20 MG EC tablet TAKE 1 TABLET BY MOUTH DAILY 30 tablet 1    prenatal vitamin (prenatal, CLASSIC, vitamin) tablet Take  by mouth Daily.      albuterol sulfate  (90 Base) MCG/ACT inhaler Inhale 2 puffs Every 4 (Four) Hours As Needed for Wheezing. 6.7 g 1    ondansetron ODT (ZOFRAN-ODT) 4 MG disintegrating tablet Place 1 tablet on the tongue Every 8 (Eight) Hours As Needed for Nausea or Vomiting. (Patient taking differently: Place 1 tablet on the tongue Every 8 (Eight) Hours As Needed for Nausea or Vomiting. prn) 30 tablet 1    Peak Flow Meter device Daily. 1 each 0       Allergies   Allergen Reactions    Dilaudid [Hydromorphone Hcl] Nausea And Vomiting    Hydromorphone Dizziness     " Category: Allergy;       Family History   Problem Relation Age of Onset    Malig Hyperthermia Neg Hx        Social History     Socioeconomic History    Marital status: Single    Number of children: 3   Tobacco Use    Smoking status: Never    Smokeless tobacco: Never   Vaping Use    Vaping Use: Never used   Substance and Sexual Activity    Alcohol use: Not Currently     Comment: OCCAS    Drug use: No    Sexual activity: Yes     Partners: Male     Birth control/protection: None           Review of Systems   Constitutional:  Negative for chills, fatigue and fever.   HENT: Negative.     Eyes:  Negative for photophobia and visual disturbance.   Respiratory:  Negative for cough, chest tightness and shortness of breath.    Cardiovascular:  Negative for chest pain and leg swelling.   Gastrointestinal:  Negative for abdominal pain, diarrhea, nausea and vomiting.   Genitourinary:  Negative for dysuria, flank pain, hematuria, pelvic pain, vaginal bleeding and vaginal discharge.   Musculoskeletal:  Negative for back pain.   Neurological:  Negative for dizziness, seizures, weakness and headaches.      Decreased fetal movement    PHYSICAL EXAM:      VITAL SIGNS:  Vitals:    10/11/23 0822   Resp: 18   Temp: 97.7 øF (36.5 øC)   TempSrc: Oral   SpO2: 99%   Weight: 73.5 kg (162 lb)   Height: 157.5 cm (62\")        FHT'S:                   Baseline:  135 BPM  Variability:  Moderate = 6 - 25 BPM  Accelerations:  15 x 15 accelerations present     Decelerations:  absent  Contractions:   absent     Interpretation:   Reactive NST, CAT 1 tracing        PHYSICAL EXAM:    General: well developed; well nourished  no acute distress   Heart: Not performed.   Lungs   breathing is unlabored     Abdomen: soft, non-tender; no masses       Cervix: was not checked.      Contractions: none        Extremities: peripheral pulses normal, no pedal edema, no clubbing or cyanosis      LABS AND TESTING ORDERED:  Uterine and fetal " "monitoring  Urinalysis  NST    LAB RESULTS:    No results found for this or any previous visit (from the past 24 hour(s)).    Lab Results   Component Value Date    ABO O 2023    RH Positive 2023       No results found for: \"STREPGPB\"            Assessment & Plan   ASSESSMENT/PLAN:  Yuki Shields is a 33 y.o. female  at 33w2d who presented due to decreased fetal movement.  She was placed on external fetal monitoring and was noted to have a Reactive NST, CAT 1 tracing demonstrating reassuring fetal status.  Movement was felt after she was placed on the monitor.  She had no evidence of active labor and in view of reassuring fetal status she will be discharged to home.      Final Impression:  Pregnancy at 33w2d presenting due to decreased fetal movement    Pregnancy complicated by single umbilical artery, reported IUGR, see problem list for complete review of problems  Reactive NST, CAT   1 tracing, Reassuring fetal status, movement felt prior to discharge    Maternal vital signs were reviewed and were unremarkable                   Vitals:    10/11/23 0817 10/11/23 0820 10/11/23 0822   BP: 117/82     Pulse: 82 88    Resp:   18   Temp:   97.7 øF (36.5 øC)   TempSrc:   Oral   SpO2:  100% 99%   Weight:   73.5 kg (162 lb)   Height:   157.5 cm (62\")     She will be discharged to home     PLAN:  Discharge to home  She will continue her home meds       Your medication list        CONTINUE taking these medications        Instructions Last Dose Given Next Dose Due   Peak Flow Meter device      Daily.              ASK your doctor about these medications        Instructions Last Dose Given Next Dose Due   acetaminophen 325 MG tablet  Commonly known as: TYLENOL      Take 2 tablets by mouth Every 6 (Six) Hours As Needed for Mild Pain.       albuterol sulfate  (90 Base) MCG/ACT inhaler  Commonly known as: PROVENTIL HFA;VENTOLIN HFA;PROAIR HFA      Inhale 2 puffs Every 4 (Four) Hours As Needed for " Wheezing.       Asmanex (30 Metered Doses) 220 MCG/ACT inhaler  Generic drug: Mometasone Furoate      Inhale 1 puff Every Night.       buPROPion  MG 24 hr tablet  Commonly known as: Wellbutrin XL      Take 1 tablet by mouth Every Morning.       busPIRone 7.5 MG tablet  Commonly known as: BUSPAR      Take 1 tablet by mouth Daily As Needed (anxiety).       CLARITIN PO      Take  by mouth Daily.       Enoxaparin Sodium 40 MG/0.4ML solution prefilled syringe syringe  Commonly known as: LOVENOX      Inject 0.4 mL under the skin into the appropriate area as directed Daily.       ferrous sulfate 325 (65 FE) MG tablet      Take 1 tablet by mouth Daily With Breakfast.       hydrOXYzine 10 MG tablet  Commonly known as: ATARAX      Take 1 tablet by mouth 3 (Three) Times a Day As Needed for Itching.       ondansetron ODT 4 MG disintegrating tablet  Commonly known as: ZOFRAN-ODT      Place 1 tablet on the tongue Every 8 (Eight) Hours As Needed for Nausea or Vomiting.       pantoprazole 20 MG EC tablet  Commonly known as: PROTONIX      TAKE 1 TABLET BY MOUTH DAILY       prenatal (CLASSIC) vitamin 28-0.8 MG tablet tablet  Generic drug: prenatal vitamin      Take  by mouth Daily.       ursodiol 300 MG capsule  Commonly known as: ACTIGALL      Take 1 capsule by mouth 2 (Two) Times a Day for 30 days.                  She will follow up with Rere Jose MD as scheduled and as needed  She has been instructed to return for contractions, vaginal bleeding, Rupture of membranes, decreased fetal movement or other concern  She may call the office or [facility] with questions.           I have spent 30 minutes including face to face time with the patient, greater than 50% in discussion of the diagnosis (counseling) and/or coordination of care.         Kirill Seaman MD  10/11/2023  08:44 EDT  OB Hospitalist  Phone:  [phone]

## 2023-10-17 ENCOUNTER — ROUTINE PRENATAL (OUTPATIENT)
Dept: OBSTETRICS AND GYNECOLOGY | Facility: CLINIC | Age: 33
End: 2023-10-17
Payer: MEDICAID

## 2023-10-17 VITALS — BODY MASS INDEX: 29.45 KG/M2 | SYSTOLIC BLOOD PRESSURE: 124 MMHG | DIASTOLIC BLOOD PRESSURE: 83 MMHG | WEIGHT: 161 LBS

## 2023-10-17 DIAGNOSIS — Z3A.34 34 WEEKS GESTATION OF PREGNANCY: Primary | ICD-10-CM

## 2023-10-17 DIAGNOSIS — Z34.83 PRENATAL CARE, SUBSEQUENT PREGNANCY IN THIRD TRIMESTER: ICD-10-CM

## 2023-10-17 DIAGNOSIS — Z87.59 HISTORY OF POSTPARTUM HEMORRHAGE: ICD-10-CM

## 2023-10-17 DIAGNOSIS — O26.613 CHOLESTASIS OF PREGNANCY IN THIRD TRIMESTER: ICD-10-CM

## 2023-10-17 DIAGNOSIS — O99.519 ASTHMA AFFECTING PREGNANCY, ANTEPARTUM: ICD-10-CM

## 2023-10-17 DIAGNOSIS — Z30.09 UNWANTED FERTILITY: ICD-10-CM

## 2023-10-17 DIAGNOSIS — O99.019 MATERNAL ANEMIA IN PREGNANCY, ANTEPARTUM: ICD-10-CM

## 2023-10-17 DIAGNOSIS — J45.909 ASTHMA AFFECTING PREGNANCY, ANTEPARTUM: ICD-10-CM

## 2023-10-17 DIAGNOSIS — O36.5931 IUGR (INTRAUTERINE GROWTH RESTRICTION) AFFECTING CARE OF MOTHER, THIRD TRIMESTER, FETUS 1: ICD-10-CM

## 2023-10-17 DIAGNOSIS — Z87.19 HISTORY OF CHOLESTASIS DURING PREGNANCY: ICD-10-CM

## 2023-10-17 DIAGNOSIS — Z87.59 HISTORY OF CHOLESTASIS DURING PREGNANCY: ICD-10-CM

## 2023-10-17 DIAGNOSIS — O99.119 THROMBOPHILIA AFFECTING PREGNANCY, ANTEPARTUM: ICD-10-CM

## 2023-10-17 DIAGNOSIS — D68.59 THROMBOPHILIA AFFECTING PREGNANCY, ANTEPARTUM: ICD-10-CM

## 2023-10-17 DIAGNOSIS — K83.1 CHOLESTASIS OF PREGNANCY IN THIRD TRIMESTER: ICD-10-CM

## 2023-10-17 DIAGNOSIS — O09.899 SINGLE UMBILICAL ARTERY AFFECTING MANAGEMENT OF MOTHER IN SINGLETON PREGNANCY, ANTEPARTUM: ICD-10-CM

## 2023-10-17 NOTE — PROGRESS NOTES
Chief Complaint   Patient presents with    Routine Prenatal Visit      Yuki Shields is a 33 y.o.  at 34w1d who presents for routine prenatal visit. She reports that she is having ongoing itching of her hands and feet but hydroxyzine is helping. Denies vaginal bleeding, cramping, contractions, LOF. She reports active fetal movement.     /83   Wt 73 kg (161 lb)   LMP 2023   BMI 29.45 kg/m²    Gen: well appearing, NAD   Abd: gravid, nontender  See OB Flowsheet    ASSESSMENT:   IUP at 34w1d   Prenatal care in third trimester   Thrombophilia affecting pregnancy- Prothrombin gene mutation heterozygous- on Lovenox 40 mg daily   History of postpartum hemorrhage  Asthma affecting pregnancy- on Asmanex and albuterol inhaler PRN  Single umbilical artery affecting pregnancy   Heartburn- significantly improved with Protonix   Maternal anemia- Hematology has started pt on iron supplementation   Unwanted fertility - signed tubal consents today   Cholestasis of pregnancy- bile acids 15.9 on 10/9/23, LFTS wnl - started on Ursodiol 300 mg TID and Hydroxyzine PRN    History of cholestasis x 2 previous pregnancies   IUGR with AC 2.7%ile with normal UA dopplers and BPP 8/8    PLAN:  Problem list reviewed and updated.   Reviewed expectations of this stage of pregnancy.   Third trimester precautions reviewed including labor signs, monitoring fetal movements.   Discussed ultrasound results that demonstrated BPP 8/8, normal UA dopplers with S/D ratio 3.04, normal CHAITANYA 14.6 cm, posterior placenta, and cephalic presentation.   Will change Lovenox to Heparin since she is needing refill for Lovenox and we are approaching delivery.   Delivery timing is 37 weeks given cholestasis of pregnancy and IUGR and will plan to schedule at next visit.   Continue weekly UA dopplers and  screening given IUGR and cholestasis of pregnancy. Will plan to repeat growth at next visit too.   Follow up in 1 week for prenatal visit,  growth ultrasound, UA dopplers, BPP.    Patient Active Problem List    Diagnosis Date Noted    Anemia 09/15/2023    Asthma 09/15/2023    Pregnancy 08/07/2023    Maternal anemia in pregnancy, antepartum 08/07/2023    Single umbilical artery 08/07/2023    Asthma affecting pregnancy, antepartum 08/07/2023    Thrombophilia affecting pregnancy, antepartum 08/07/2023    Stress 05/17/2021    Folic acid deficiency 12/29/2020    Memory impairment 12/29/2020    Obesity 12/29/2020    Coagulopathy 12/16/2019    Factor II deficiency 07/25/2017    DDD (degenerative disc disease), lumbar 06/21/2017    Pain 11/01/2012       Orders Placed This Encounter   Procedures    POC Urinalysis Dipstick     Order Specific Question:   Release to patient     Answer:   Routine Release [[id number]]     Valerie Taylor MD

## 2023-10-19 ENCOUNTER — TELEPHONE (OUTPATIENT)
Dept: OBSTETRICS AND GYNECOLOGY | Facility: CLINIC | Age: 33
End: 2023-10-19
Payer: MEDICAID

## 2023-10-19 RX ORDER — HEPARIN SODIUM 5000 [USP'U]/.5ML
5000 INJECTION, SOLUTION INTRAVENOUS; SUBCUTANEOUS EVERY 12 HOURS SCHEDULED
Qty: 30 ML | Refills: 1 | Status: SHIPPED | OUTPATIENT
Start: 2023-10-19

## 2023-10-19 NOTE — TELEPHONE ENCOUNTER
Brandon pt - calling states Dr. Taylor was suppose to send a Rx in for (heparin) on Monday and it hasn't been sent in. Please Advise.

## 2023-10-21 ENCOUNTER — TELEPHONE (OUTPATIENT)
Dept: OBSTETRICS AND GYNECOLOGY | Facility: CLINIC | Age: 33
End: 2023-10-21
Payer: MEDICAID

## 2023-10-21 NOTE — TELEPHONE ENCOUNTER
Pt called as she has heparin syringes but no needles. She is out of town. Sent Rx for needs to [facility] near her current location.

## 2023-10-22 NOTE — PROGRESS NOTES
Chief Complaint   Patient presents with    Routine Prenatal Visit      Yuki Shields is a 33 y.o.  at 32w1d who presents for routine prenatal visit. She reports that she is still experiencing itching in her palms and feet and thinks she has developed cholestasis of pregnancy which she has experiencing in 2 previous pregnancy. Denies vaginal bleeding, cramping, contractions, LOF. She reports active fetal movement.     /83   Wt 71.7 kg (158 lb)   LMP 2023   BMI 28.89 kg/m²    Gen: well appearing, NAD   Abd: gravid, nontender  See OB Flowsheet    ASSESSMENT:   IUP at 32w1d   Prenatal care in third trimester    Thrombophilia affecting pregnancy- Prothrombin gene mutation heterozygous- on Lovenox 40 mg daily   History of postpartum hemorrhage  Asthma affecting pregnancy- on Asmanex and albuterol inhaler PRN  Single umbilical artery affecting pregnancy   Heartburn- significantly improved with Protonix   Maternal anemia- Hematology has started pt on iron supplementation   Unwanted fertility - signed tubal consents today   Pruritus of pregnancy   History of cholestasis x 2 previous pregnancies   IUGR with AC 2.7%ile with normal UA dopplers and BPP 8/8     PLAN:  Problem list reviewed and updated.   Reviewed expectations of this stage of pregnancy.   Third trimester precautions reviewed including labor signs, monitoring fetal movements.   Discussed ultrasound results that demonstrated IUGR based on AC < 10%ile - EFW 1662 g (28%ile, AC 2.7%ile), normal CHAITANYA 20.4 cm, BPP 8/8, normal UA dopplers with SD ratio 3.38, cephalic presentation and posterior placenta. I discussed this diagnosis with the patient and that she will receive weekly ultrasounds to evaluate umbilical artery blood flow and biophysical profile of the fetus. Will plan to repeat in growth in 3-4 weeks. Based on current measurements, will plan for delivery at 37 weeks given AC <3%ile to reduce her risk  of stillbirth.   Will again obtain CMP  and bile acids today given ongoing pruritus and history of cholestasis x 2 in previous pregnancies. Will have the patient start on Ursodiol 300 mg BID until bile acids return since I am more suspicious for the patient having cholestasis. If she does have cholestasis, delivery timing and  testing would be the same as monitoring for IUGR. All questions answered.  Counseled on risks/benefits of bilateral tubal ligation.  She was counseled that this should be considered a permanent procedure.  Although there are surgeries to reverse this, there not 100% successful.  She is adamant that she desires no further childbearing.  We discussed that bilateral tubal ligations usually do not change menstrual cycles, so some women are on hormonal therapy to control bleeding or regularly periods.  We discussed that it also does not prevent sexually transmitted infections and so condoms are recommended to prevent transmission of these diseases.  We discussed other options for contraception including LARCs.  Patient was counseled that there is a risk of failure and if the tubal ligation were to fail, there is an increased risk of ectopic pregnancy.  She was counseled that she missed a period she should take a pregnancy test and if positive be seen by a physician immediately.  We reviewed the risk of regret and desire for future childbearing.  We discussed that this risk is higher in women who have fewer children or or at a younger age.  Patient had all questions answered at the end of this discussion.  She will sign tubal papers at the .  Return in about 1 week (around 10/10/2023) for Prenatal visit, BPP, UA dopplers.    Patient Active Problem List    Diagnosis Date Noted    Anemia 09/15/2023    Asthma 09/15/2023    Pregnancy 2023    Maternal anemia in pregnancy, antepartum 2023    Single umbilical artery 2023    Asthma affecting pregnancy, antepartum 2023    Thrombophilia affecting  pregnancy, antepartum 08/07/2023    Stress 05/17/2021    Folic acid deficiency 12/29/2020    Memory impairment 12/29/2020    Obesity 12/29/2020    Coagulopathy 12/16/2019    Factor II deficiency 07/25/2017    DDD (degenerative disc disease), lumbar 06/21/2017    Pain 11/01/2012       Orders Placed This Encounter   Procedures    Comprehensive Metabolic Panel     Standing Status:   Future     Number of Occurrences:   1     Standing Expiration Date:   10/9/2024     Order Specific Question:   Release to patient     Answer:   Routine Release [[id number]]    Bile Acids, Total     Standing Status:   Future     Number of Occurrences:   1     Standing Expiration Date:   10/9/2024     Order Specific Question:   Release to patient     Answer:   Routine Release [[id number]]    POC Urinalysis Dipstick     Order Specific Question:   Release to patient     Answer:   Routine Release [[id number]]     Valerie Taylor MD

## 2023-10-23 ENCOUNTER — ROUTINE PRENATAL (OUTPATIENT)
Dept: OBSTETRICS AND GYNECOLOGY | Facility: CLINIC | Age: 33
End: 2023-10-23
Payer: MEDICAID

## 2023-10-23 VITALS — SYSTOLIC BLOOD PRESSURE: 122 MMHG | DIASTOLIC BLOOD PRESSURE: 80 MMHG | WEIGHT: 162 LBS | BODY MASS INDEX: 29.63 KG/M2

## 2023-10-23 DIAGNOSIS — O99.119 THROMBOPHILIA AFFECTING PREGNANCY, ANTEPARTUM: ICD-10-CM

## 2023-10-23 DIAGNOSIS — Z3A.35 35 WEEKS GESTATION OF PREGNANCY: Primary | ICD-10-CM

## 2023-10-23 DIAGNOSIS — D68.59 THROMBOPHILIA AFFECTING PREGNANCY, ANTEPARTUM: ICD-10-CM

## 2023-10-23 DIAGNOSIS — O09.899 SINGLE UMBILICAL ARTERY AFFECTING MANAGEMENT OF MOTHER IN SINGLETON PREGNANCY, ANTEPARTUM: ICD-10-CM

## 2023-10-23 DIAGNOSIS — K83.1 CHOLESTASIS OF PREGNANCY IN THIRD TRIMESTER: ICD-10-CM

## 2023-10-23 DIAGNOSIS — Z87.19 HISTORY OF CHOLESTASIS DURING PREGNANCY: ICD-10-CM

## 2023-10-23 DIAGNOSIS — L29.9 PRURITUS OF PREGNANCY IN THIRD TRIMESTER: ICD-10-CM

## 2023-10-23 DIAGNOSIS — O99.019 MATERNAL ANEMIA IN PREGNANCY, ANTEPARTUM: ICD-10-CM

## 2023-10-23 DIAGNOSIS — Z30.09 UNWANTED FERTILITY: ICD-10-CM

## 2023-10-23 DIAGNOSIS — Z87.59 HISTORY OF POSTPARTUM HEMORRHAGE: ICD-10-CM

## 2023-10-23 DIAGNOSIS — O26.613 CHOLESTASIS OF PREGNANCY IN THIRD TRIMESTER: ICD-10-CM

## 2023-10-23 DIAGNOSIS — O99.713 PRURITUS OF PREGNANCY IN THIRD TRIMESTER: ICD-10-CM

## 2023-10-23 DIAGNOSIS — Z34.83 PRENATAL CARE, SUBSEQUENT PREGNANCY IN THIRD TRIMESTER: ICD-10-CM

## 2023-10-23 DIAGNOSIS — Z87.59 HISTORY OF CHOLESTASIS DURING PREGNANCY: ICD-10-CM

## 2023-10-23 DIAGNOSIS — J45.909 ASTHMA AFFECTING PREGNANCY, ANTEPARTUM: ICD-10-CM

## 2023-10-23 DIAGNOSIS — O99.519 ASTHMA AFFECTING PREGNANCY, ANTEPARTUM: ICD-10-CM

## 2023-10-23 LAB
GLUCOSE UR STRIP-MCNC: NEGATIVE MG/DL
LEUKOCYTE EST, POC: ABNORMAL
PROT UR STRIP-MCNC: ABNORMAL MG/DL

## 2023-10-23 RX ORDER — URSODIOL 300 MG/1
300 CAPSULE ORAL 3 TIMES DAILY
Qty: 90 CAPSULE | Refills: 0 | Status: SHIPPED | OUTPATIENT
Start: 2023-10-23 | End: 2023-11-02

## 2023-10-23 NOTE — LETTER
10/23/23    SCHEDULING FORM  C-SECTIONS/INDUCTIONS    Patient:  Yuki Shields  :  1990    Phone: [phone] (home)     OB provider:  Valerie Taylor MD    Summary:  33 y.o. female     Type of Delivery:  Induction   Priority:  Medical    Desired Date: 23      Time: 0000    Dating   Estimated Date of Delivery: 23    Gestational age at Desired date of Induction or CS: 37 weeks 0 days  ----------------------------------------------------------------------------  By ACOG Guidelines, women should be 39 weeks or greater before initiating an elective induction (non-medically indicated) delivery     Maternal Indications:   Liver disease or Cholestasis of pregnancy    Fetal/Placental Conditions: IUGR    ----------------------------------------------------------------------------    For patients less than 39 weeks with indications not included in the above list, [facility] consult is required prior to scheduling.    [facility] Approved indication:  no    Date of consult:      I attest that the above entries are accurate to the best of my knowledge:    Valerie Taylor MD  10/23/2023  17:04 EDT

## 2023-10-30 ENCOUNTER — HOSPITAL ENCOUNTER (OUTPATIENT)
Facility: HOSPITAL | Age: 33
Discharge: HOME | End: 2023-10-30
Attending: STUDENT IN AN ORGANIZED HEALTH CARE EDUCATION/TRAINING PROGRAM | Admitting: STUDENT IN AN ORGANIZED HEALTH CARE EDUCATION/TRAINING PROGRAM
Payer: MEDICAID

## 2023-10-30 ENCOUNTER — ROUTINE PRENATAL (OUTPATIENT)
Dept: OBSTETRICS AND GYNECOLOGY | Facility: CLINIC | Age: 33
End: 2023-10-30
Payer: MEDICAID

## 2023-10-30 VITALS
HEART RATE: 92 BPM | SYSTOLIC BLOOD PRESSURE: 121 MMHG | DIASTOLIC BLOOD PRESSURE: 79 MMHG | RESPIRATION RATE: 16 BRPM | TEMPERATURE: 98 F

## 2023-10-30 VITALS — WEIGHT: 162 LBS | SYSTOLIC BLOOD PRESSURE: 117 MMHG | BODY MASS INDEX: 29.63 KG/M2 | DIASTOLIC BLOOD PRESSURE: 80 MMHG

## 2023-10-30 DIAGNOSIS — Z3A.36 36 WEEKS GESTATION OF PREGNANCY: Primary | ICD-10-CM

## 2023-10-30 DIAGNOSIS — O99.519 ASTHMA AFFECTING PREGNANCY, ANTEPARTUM: ICD-10-CM

## 2023-10-30 DIAGNOSIS — D68.59 THROMBOPHILIA AFFECTING PREGNANCY, ANTEPARTUM: ICD-10-CM

## 2023-10-30 DIAGNOSIS — O99.019 MATERNAL ANEMIA IN PREGNANCY, ANTEPARTUM: ICD-10-CM

## 2023-10-30 DIAGNOSIS — Z30.09 UNWANTED FERTILITY: ICD-10-CM

## 2023-10-30 DIAGNOSIS — Z87.19 HISTORY OF CHOLESTASIS DURING PREGNANCY: ICD-10-CM

## 2023-10-30 DIAGNOSIS — Z34.83 PRENATAL CARE, SUBSEQUENT PREGNANCY, THIRD TRIMESTER: ICD-10-CM

## 2023-10-30 DIAGNOSIS — Z87.59 HISTORY OF POSTPARTUM HEMORRHAGE: ICD-10-CM

## 2023-10-30 DIAGNOSIS — Z87.59 HISTORY OF CHOLESTASIS DURING PREGNANCY: ICD-10-CM

## 2023-10-30 DIAGNOSIS — O09.899 SINGLE UMBILICAL ARTERY AFFECTING MANAGEMENT OF MOTHER IN SINGLETON PREGNANCY, ANTEPARTUM: ICD-10-CM

## 2023-10-30 DIAGNOSIS — K83.1 CHOLESTASIS OF PREGNANCY IN THIRD TRIMESTER: ICD-10-CM

## 2023-10-30 DIAGNOSIS — J45.909 ASTHMA AFFECTING PREGNANCY, ANTEPARTUM: ICD-10-CM

## 2023-10-30 DIAGNOSIS — O26.613 CHOLESTASIS OF PREGNANCY IN THIRD TRIMESTER: ICD-10-CM

## 2023-10-30 DIAGNOSIS — O99.119 THROMBOPHILIA AFFECTING PREGNANCY, ANTEPARTUM: ICD-10-CM

## 2023-10-30 PROCEDURE — 25010000002 BETAMETHASONE ACET & SOD PHOS PER 4 MG: Performed by: STUDENT IN AN ORGANIZED HEALTH CARE EDUCATION/TRAINING PROGRAM

## 2023-10-30 PROCEDURE — 96372 THER/PROPH/DIAG INJ SC/IM: CPT

## 2023-10-30 RX ORDER — BETAMETHASONE SODIUM PHOSPHATE AND BETAMETHASONE ACETATE 3; 3 MG/ML; MG/ML
12 INJECTION, SUSPENSION INTRA-ARTICULAR; INTRALESIONAL; INTRAMUSCULAR; SOFT TISSUE ONCE
Status: COMPLETED | OUTPATIENT
Start: 2023-10-30 | End: 2023-10-30

## 2023-10-30 RX ADMIN — BETAMETHASONE SODIUM PHOSPHATE AND BETAMETHASONE ACETATE 12 MG: 3; 3 INJECTION, SUSPENSION INTRA-ARTICULAR; INTRALESIONAL; INTRAMUSCULAR at 17:01

## 2023-10-30 NOTE — NURSING NOTE
Pt given discharge teaching and told to return tomorrow at 5pm for 2nd BMZ dose. Pt verbalized understanding. Pt ambulated off unit with belongings.

## 2023-10-30 NOTE — PROGRESS NOTES
Chief Complaint   Patient presents with    Routine Prenatal Visit      Yuki Shields is a 33 y.o.  at 36w0d who presents for routine prenatal visit. She reports doing well. She has been compliant with Ursodiol and Heparin. Denies vaginal bleeding, cramping, contractions, LOF. She reports active fetal movement.     /80   Wt 73.5 kg (162 lb)   LMP 2023   BMI 29.63 kg/m²    Gen: well appearing, NAD   Abd: gravid, nontender  SVE: /-3   See OB Flowsheet    ASSESSMENT:   IUP at 36w0d   Prenatal care in third trimester    Thrombophilia affecting pregnancy- Prothrombin gene mutation heterozygous- on Heparin 5000 U BID   History of postpartum hemorrhage  Asthma affecting pregnancy- on Asmanex and albuterol inhaler PRN  Single umbilical artery affecting pregnancy   Heartburn- significantly improved with Protonix   Maternal anemia- Hematology has started pt on iron supplementation   Unwanted fertility - signed tubal consents today   Cholestasis of pregnancy- bile acids 15.9 on 10/9/23, LFTS wnl - started on Ursodiol 300 mg TID and Hydroxyzine PRN    History of cholestasis x 2 previous pregnancies   History of IUGR this pregnancy- repeat growth today showed normal fetal growth with EFW 47%ile, AC 54%ile     PLAN:  Problem list reviewed and updated.   Reviewed expectations of this stage of pregnancy.   Third trimester precautions reviewed including labor signs, monitoring fetal movements.   Collected GBS swab today.   Again sent new needle to her pharmacy so she can easily administer her heparin.   Discussed ultrasound results that demonstrated BPP 8/8, normal CHAITANYA 20.0 cm, normal UA dopplers with S/D ratio 2.47, cephalic presentation ,and posterior placenta.   Patient is scheduled for IOL on 23 at 0000 for cholestasis of pregnancy and will place orders for pitocin augmentation. All questions and concerns answered regarding IOL. She is to take her last dose of heparin on morning of 23.       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

## 2023-10-31 ENCOUNTER — HOSPITAL ENCOUNTER (OUTPATIENT)
Facility: HOSPITAL | Age: 33
Discharge: HOME | End: 2023-10-31
Attending: STUDENT IN AN ORGANIZED HEALTH CARE EDUCATION/TRAINING PROGRAM | Admitting: OBSTETRICS & GYNECOLOGY
Payer: MEDICAID

## 2023-10-31 VITALS
DIASTOLIC BLOOD PRESSURE: 82 MMHG | HEART RATE: 92 BPM | OXYGEN SATURATION: 99 % | SYSTOLIC BLOOD PRESSURE: 128 MMHG | RESPIRATION RATE: 16 BRPM | TEMPERATURE: 98 F

## 2023-10-31 PROCEDURE — G0378 HOSPITAL OBSERVATION PER HR: HCPCS

## 2023-10-31 PROCEDURE — 25010000002 BETAMETHASONE ACET & SOD PHOS PER 4 MG: Performed by: OBSTETRICS & GYNECOLOGY

## 2023-10-31 PROCEDURE — 96372 THER/PROPH/DIAG INJ SC/IM: CPT

## 2023-10-31 PROCEDURE — 59025 FETAL NON-STRESS TEST: CPT

## 2023-10-31 RX ORDER — BETAMETHASONE SODIUM PHOSPHATE AND BETAMETHASONE ACETATE 3; 3 MG/ML; MG/ML
12 INJECTION, SUSPENSION INTRA-ARTICULAR; INTRALESIONAL; INTRAMUSCULAR; SOFT TISSUE ONCE
Status: COMPLETED | OUTPATIENT
Start: 2023-10-31 | End: 2023-10-31

## 2023-10-31 RX ADMIN — BETAMETHASONE SODIUM PHOSPHATE AND BETAMETHASONE ACETATE 12 MG: 3; 3 INJECTION, SUSPENSION INTRA-ARTICULAR; INTRALESIONAL; INTRAMUSCULAR at 17:46

## 2023-10-31 NOTE — NURSING NOTE
Discharge instructions reviewed with patient. Discussed fetal kick counts, vaginal bleeding, and leaking of fluid. Patient to keep all scheduled appointments, to call MD or return to L&D if any additional problems.  Patient ambulated off unit

## 2023-10-31 NOTE — NON STRESS TEST
Yuki Shields, a  at 36w1d with an AWILDA of 2023, by Last Menstrual Period, was seen at [facility] for a nonstress test.    Chief Complaint   Patient presents with    Non-stress Test     Obs, NST and second BMZ       Patient Active Problem List   Diagnosis    Pregnancy    Maternal anemia in pregnancy, antepartum    Single umbilical artery    Asthma affecting pregnancy, antepartum    Thrombophilia affecting pregnancy, antepartum    Anemia    Coagulopathy    DDD (degenerative disc disease), lumbar    Factor II deficiency    Folic acid deficiency    Memory impairment    Obesity    Pain    Stress    Asthma       Start Time: 1718  Stop Time: 1749    Interpretation A  Nonstress Test Interpretation A: Reactive

## 2023-11-01 ENCOUNTER — LAB (OUTPATIENT)
Dept: OTHER | Facility: HOSPITAL | Age: 33
End: 2023-11-01
Payer: MEDICAID

## 2023-11-01 ENCOUNTER — OFFICE VISIT (OUTPATIENT)
Dept: ONCOLOGY | Facility: CLINIC | Age: 33
End: 2023-11-01
Payer: MEDICAID

## 2023-11-01 VITALS
SYSTOLIC BLOOD PRESSURE: 120 MMHG | HEART RATE: 85 BPM | TEMPERATURE: 98 F | BODY MASS INDEX: 30.29 KG/M2 | WEIGHT: 164.6 LBS | DIASTOLIC BLOOD PRESSURE: 82 MMHG | OXYGEN SATURATION: 98 % | HEIGHT: 62 IN | RESPIRATION RATE: 16 BRPM

## 2023-11-01 DIAGNOSIS — E53.8 VITAMIN B12 DEFICIENCY: ICD-10-CM

## 2023-11-01 DIAGNOSIS — D50.0 IRON DEFICIENCY ANEMIA DUE TO CHRONIC BLOOD LOSS: ICD-10-CM

## 2023-11-01 DIAGNOSIS — Z79.01 CHRONIC ANTICOAGULATION: ICD-10-CM

## 2023-11-01 DIAGNOSIS — D68.52 PROTHROMBIN GENE MUTATION: Primary | ICD-10-CM

## 2023-11-01 DIAGNOSIS — R74.8 ELEVATED LIVER ENZYMES: ICD-10-CM

## 2023-11-01 DIAGNOSIS — D68.52 PROTHROMBIN GENE MUTATION: ICD-10-CM

## 2023-11-01 LAB
ALBUMIN SERPL-MCNC: 3.7 G/DL (ref 3.5–5.2)
ALBUMIN/GLOB SERPL: 1.1 G/DL
ALP SERPL-CCNC: 297 U/L (ref 39–117)
ALT SERPL W P-5'-P-CCNC: 86 U/L (ref 1–33)
ANION GAP SERPL CALCULATED.3IONS-SCNC: 13.6 MMOL/L (ref 5–15)
AST SERPL-CCNC: 87 U/L (ref 1–32)
BASOPHILS # BLD AUTO: 0.08 10*3/MM3 (ref 0–0.2)
BASOPHILS NFR BLD AUTO: 0.4 % (ref 0–1.5)
BILIRUB SERPL-MCNC: 0.4 MG/DL (ref 0–1.2)
BUN SERPL-MCNC: 10 MG/DL (ref 6–20)
BUN/CREAT SERPL: 11.2 (ref 7–25)
CALCIUM SPEC-SCNC: 10.2 MG/DL (ref 8.6–10.5)
CHLORIDE SERPL-SCNC: 103 MMOL/L (ref 98–107)
CO2 SERPL-SCNC: 20.4 MMOL/L (ref 22–29)
CREAT SERPL-MCNC: 0.89 MG/DL (ref 0.57–1)
DEPRECATED RDW RBC AUTO: 43.8 FL (ref 37–54)
EGFRCR SERPLBLD CKD-EPI 2021: 87.4 ML/MIN/1.73
EOSINOPHIL # BLD AUTO: 0 10*3/MM3 (ref 0–0.4)
EOSINOPHIL NFR BLD AUTO: 0 % (ref 0.3–6.2)
ERYTHROCYTE [DISTWIDTH] IN BLOOD BY AUTOMATED COUNT: 12.8 % (ref 12.3–15.4)
FERRITIN SERPL-MCNC: 122.2 NG/ML (ref 13–150)
GLOBULIN UR ELPH-MCNC: 3.4 GM/DL
GLUCOSE SERPL-MCNC: 119 MG/DL (ref 65–99)
HCT VFR BLD AUTO: 37 % (ref 34–46.6)
HGB BLD-MCNC: 12.6 G/DL (ref 12–15.9)
IMM GRANULOCYTES # BLD AUTO: 0.88 10*3/MM3 (ref 0–0.05)
IMM GRANULOCYTES NFR BLD AUTO: 4.7 % (ref 0–0.5)
IRON 24H UR-MRATE: 42 MCG/DL (ref 37–145)
IRON SATN MFR SERPL: 6 % (ref 20–50)
LYMPHOCYTES # BLD AUTO: 1.73 10*3/MM3 (ref 0.7–3.1)
LYMPHOCYTES NFR BLD AUTO: 9.2 % (ref 19.6–45.3)
MCH RBC QN AUTO: 31.7 PG (ref 26.6–33)
MCHC RBC AUTO-ENTMCNC: 34.1 G/DL (ref 31.5–35.7)
MCV RBC AUTO: 93 FL (ref 79–97)
MONOCYTES # BLD AUTO: 0.95 10*3/MM3 (ref 0.1–0.9)
MONOCYTES NFR BLD AUTO: 5.1 % (ref 5–12)
NEUTROPHILS NFR BLD AUTO: 15.12 10*3/MM3 (ref 1.7–7)
NEUTROPHILS NFR BLD AUTO: 80.6 % (ref 42.7–76)
NRBC BLD AUTO-RTO: 0.1 /100 WBC (ref 0–0.2)
PLATELET # BLD AUTO: 308 10*3/MM3 (ref 140–450)
PMV BLD AUTO: 10.7 FL (ref 6–12)
POTASSIUM SERPL-SCNC: 4.3 MMOL/L (ref 3.5–5.2)
PROT SERPL-MCNC: 7.1 G/DL (ref 6–8.5)
RBC # BLD AUTO: 3.98 10*6/MM3 (ref 3.77–5.28)
SODIUM SERPL-SCNC: 137 MMOL/L (ref 136–145)
TIBC SERPL-MCNC: 662 MCG/DL (ref 298–536)
TRANSFERRIN SERPL-MCNC: 444 MG/DL (ref 200–360)
VIT B12 BLD-MCNC: 920 PG/ML (ref 211–946)
WBC NRBC COR # BLD: 18.76 10*3/MM3 (ref 3.4–10.8)

## 2023-11-01 PROCEDURE — 84466 ASSAY OF TRANSFERRIN: CPT | Performed by: INTERNAL MEDICINE

## 2023-11-01 PROCEDURE — 82728 ASSAY OF FERRITIN: CPT | Performed by: INTERNAL MEDICINE

## 2023-11-01 PROCEDURE — 85025 COMPLETE CBC W/AUTO DIFF WBC: CPT | Performed by: INTERNAL MEDICINE

## 2023-11-01 PROCEDURE — 80053 COMPREHEN METABOLIC PANEL: CPT | Performed by: INTERNAL MEDICINE

## 2023-11-01 PROCEDURE — 83540 ASSAY OF IRON: CPT | Performed by: INTERNAL MEDICINE

## 2023-11-01 PROCEDURE — 82607 VITAMIN B-12: CPT | Performed by: INTERNAL MEDICINE

## 2023-11-01 NOTE — PROGRESS NOTES
"Subjective     CHIEF COMPLAINT:      Chief Complaint   Patient presents with    Follow-up     No concerns      HISTORY OF PRESENT ILLNESS:     Yuki Shields is a 33 y.o. female patient who returns today for follow up on her prothrombin gene mutation. She is on anticoagulation with Heparin. She is tolerating it. No problem with bleeding. However, she is having some bruising at the injection site. She is not having chest pain, shortness of breath, leg pain or swelling.    ROS:  Pertinent ROS is in the HPI.     Past medical, surgical, social and family history were reviewed.     MEDICATIONS:    Current Outpatient Medications:     acetaminophen (TYLENOL) 325 MG tablet, Take 2 tablets by mouth Every 6 (Six) Hours As Needed for Mild Pain., Disp: , Rfl:     albuterol sulfate  (90 Base) MCG/ACT inhaler, Inhale 2 puffs Every 4 (Four) Hours As Needed for Wheezing., Disp: 6.7 g, Rfl: 1    buPROPion XL (Wellbutrin XL) 150 MG 24 hr tablet, Take 1 tablet by mouth Every Morning., Disp: 90 tablet, Rfl: 1    busPIRone (BUSPAR) 7.5 MG tablet, Take 1 tablet by mouth Daily As Needed (anxiety)., Disp: 30 tablet, Rfl: 0    ferrous sulfate 325 (65 FE) MG tablet, Take 1 tablet by mouth Daily With Breakfast., Disp: , Rfl:     Heparin Sodium, Porcine, (heparin, porcine,) 5000 UNIT/0.5ML injection, Inject 0.5 mL under the skin into the appropriate area as directed Every 12 (Twelve) Hours., Disp: 30 mL, Rfl: 1    hydrOXYzine (ATARAX) 10 MG tablet, Take 1 tablet by mouth 3 (Three) Times a Day As Needed for Itching., Disp: 60 tablet, Rfl: 1    Insulin Syringe-Needle U-100 32G X 5/16\" 0.5 ML misc, Use 1 each 2 (Two) Times a Day., Disp: 100 each, Rfl: 1    Loratadine (CLARITIN PO), Take  by mouth Daily., Disp: , Rfl:     Mometasone Furoate (Asmanex, 30 Metered Doses,) 220 MCG/ACT inhaler, Inhale 1 puff Every Night., Disp: 1 each, Rfl: 11    Needle, Disp, 25G X 5/8\" misc, Use 1 Device Daily., Disp: 100 each, Rfl: 1    Needle, Disp, 32G X " "5/16\" misc, Use 1 each 2 (Two) Times a Day., Disp: 30 each, Rfl: 0    ondansetron ODT (ZOFRAN-ODT) 4 MG disintegrating tablet, Place 1 tablet on the tongue Every 8 (Eight) Hours As Needed for Nausea or Vomiting., Disp: 30 tablet, Rfl: 1    pantoprazole (PROTONIX) 20 MG EC tablet, TAKE 1 TABLET BY MOUTH DAILY, Disp: 30 tablet, Rfl: 1    Peak Flow Meter device, Daily., Disp: 1 each, Rfl: 0    prenatal vitamin (prenatal, CLASSIC, vitamin) tablet, Take  by mouth Daily., Disp: , Rfl:     ursodiol (ACTIGALL) 300 MG capsule, Take 1 capsule by mouth 3 times a day for 30 days., Disp: 90 capsule, Rfl: 0  No current facility-administered medications for this visit.  Objective     VITAL SIGNS:     Vitals:    11/01/23 1630   BP: 120/82   Pulse: 85   Resp: 16   Temp: 98 °F (36.7 °C)   TempSrc: Temporal   SpO2: 98%   Weight: 74.7 kg (164 lb 9.6 oz)   Height: 157.5 cm (62.01\")   PainSc: 0-No pain     Body mass index is 30.1 kg/m².     Wt Readings from Last 5 Encounters:   11/01/23 74.7 kg (164 lb 9.6 oz)   10/30/23 73.5 kg (162 lb)   10/23/23 73.5 kg (162 lb)   10/17/23 73 kg (161 lb)   10/11/23 73.5 kg (162 lb)     PHYSICAL EXAMINATION:   GENERAL: The patient appears in good general condition, not in acute distress.   SKIN: Ecchymosis over the left side of the abdomen.   EYES: No jaundice. No Pallor.  CHEST: Normal respiratory effort. Lungs clear.   CVS: Normal S1 and S2. No murmurs.  ABDOMEN: Gravid. Tenderness at the RUQ.  EXTREMITIES: No edema. No calf tenderness.    DIAGNOSTIC DATA:     Results from last 7 days   Lab Units 11/01/23  1624   WBC 10*3/mm3 18.76*   NEUTROS ABS 10*3/mm3 15.12*   HEMOGLOBIN g/dL 12.6   HEMATOCRIT % 37.0   PLATELETS 10*3/mm3 308     Results from last 7 days   Lab Units 11/01/23  1624   SODIUM mmol/L 137   POTASSIUM mmol/L 4.3   CHLORIDE mmol/L 103   CO2 mmol/L 20.4*   BUN mg/dL 10   CREATININE mg/dL 0.89   CALCIUM mg/dL 10.2   ALBUMIN g/dL 3.7   BILIRUBIN mg/dL 0.4   ALK PHOS U/L 297*   ALT (SGPT) " U/L 86*   AST (SGOT) U/L 87*   GLUCOSE mg/dL 119*         Lab 11/01/23  1624   IRON 42   IRON SATURATION (TSAT) 6*   TIBC 662*   TRANSFERRIN 444*   FERRITIN 122.20   VITAMIN B 12 920      Assessment & Plan    *Prothrombin gene mutation.  Patient was diagnosed when she was a teenager after her aunt and mother were both diagnosed with VTE.  The patient's mother has prothrombin gene and factor V Leiden mutations.  Patient's sister has factor V Leiden mutation and had miscarriages.  The patient herself has no prior history of venous thromboembolism.  She was previously placed on prophylactic anticoagulation with Lovenox 40 mg during her previous pregnancies.  She is currently pregnant with expected due date is 11/27/2023.  Patient was started on Lovenox 40 mg SQ daily at 6 weeks.  She tolerated the Lovenox well.  On 10/19/2023, she was switched to heparin 5000 SQ every 12 hours.  11/1/2023: Gestational age 36 weeks +2 days.  She is tolerating Heparin. She has some bruising at the injection site. No bleeding.   No symptoms or signs of thrombosis.    *Iron deficiency anemia.  Patient developed severe anemia after she had bleeding after abdominoplasty surgery in 2019.  Reports that her hemoglobin decreased to 3.0 and required PRBC transfusions.  She was in the ICU at that time.  Patient reports weakness in the legs.  6/12/2023: Hemoglobin 12.1.  Hemoglobin decreased to 11.8 on 7/25/2023.  She was started on ferrous sulfate 325 mg once daily.  9/5/2023: Hemoglobin 12.3.  Transferrin saturation decreased to 13%.  Ferritin is 37.2.   She was continued on the ferrous sulfate daily along with the prenatal vitamin.  11/1/2023: Hemoglobin 12.6.  Iron stores remain low with a transferrin saturation at 6%.    *Vitamin B12 deficiency   Vitamin B12 is low normal at 304.   She was started on vitamin B12 1000 mcg daily.  11/1/2023: Hemoglobin improved to 12.6.  Vitamin B12 improved to 920.    *Cholestasis of pregnancy.  Patient reports  developing itching.  Her OB started her on Ursodiol and Atarax..  Liver enzymes are elevated today with ALT at 86 and AST at 87.   Bilirubin 0.4. ALK PHOS 297.    PLAN:    1.  Continue Heparin 5000 units every 12 hours.  2.  She is scheduled for admission on 11/6/2023 at midnight.  3.  The last dose of Heparin will be on 11/5/2023 in the morning.  4.  I recommended starting back on anticoagulation with Lovenox 40 mg SQ daily 24 hours after giving birth, if ok with her OB.  5.  Continue Ferrous Sulfate 325 mg along with the multivitamin daily.  6.  Continue Vitamin B12 1000 mcg daily.  7.  Follow up in 7 weeks. CBC CMP Ferritin iron panel.  8.  I will update Dr. Taylor with the liver enzyme abnormality.     I spent 42 minutes caring for Yuki on this date of service. This time includes time spent by me in the following activities: preparing for the visit, reviewing tests, obtaining and/or reviewing a separately obtained history, performing a medically appropriate examination and/or evaluation, counseling and educating the patient/family/caregiver, ordering medications, tests, or procedures, documenting information in the medical record, independently interpreting results and communicating that information with the patient/family/caregiver, and care coordination     Magan Guido MD  11/01/23

## 2023-11-02 ENCOUNTER — TELEPHONE (OUTPATIENT)
Dept: OBSTETRICS AND GYNECOLOGY | Facility: CLINIC | Age: 33
End: 2023-11-02
Payer: MEDICAID

## 2023-11-02 DIAGNOSIS — L29.9 PRURITUS OF PREGNANCY IN THIRD TRIMESTER: ICD-10-CM

## 2023-11-02 DIAGNOSIS — O99.713 PRURITUS OF PREGNANCY IN THIRD TRIMESTER: ICD-10-CM

## 2023-11-02 DIAGNOSIS — F41.9 ANXIETY: ICD-10-CM

## 2023-11-02 DIAGNOSIS — R12 HEART BURN: ICD-10-CM

## 2023-11-02 RX ORDER — BUSPIRONE HYDROCHLORIDE 7.5 MG/1
7.5 TABLET ORAL DAILY
Qty: 90 TABLET | Refills: 2 | Status: ON HOLD | OUTPATIENT
Start: 2023-11-02

## 2023-11-02 RX ORDER — PANTOPRAZOLE SODIUM 20 MG/1
20 TABLET, DELAYED RELEASE ORAL DAILY
Qty: 30 TABLET | Refills: 1 | Status: SHIPPED | OUTPATIENT
Start: 2023-11-02 | End: 2023-11-03 | Stop reason: SDUPTHER

## 2023-11-02 RX ORDER — URSODIOL 300 MG/1
300 CAPSULE ORAL 2 TIMES DAILY
Qty: 60 CAPSULE | Refills: 0 | Status: ON HOLD | OUTPATIENT
Start: 2023-11-02

## 2023-11-02 NOTE — TELEPHONE ENCOUNTER
Answering service call.     33 y.o.  @ 36w3d   Multiple somatic complaints. Headache not going away, rib pain.  Complicated by multiple other issues with the pregnancy. Does not thinks she is pre-eclamptic.     - Discussed how to take tylenol   - Discussed that I can not tell her the cause of the headache over the phone and if she was concerned she should be evaluated in the OB ED.     Joey Soriano MD  2023  18:59 EDT

## 2023-11-03 ENCOUNTER — TELEPHONE (OUTPATIENT)
Dept: OBSTETRICS AND GYNECOLOGY | Facility: CLINIC | Age: 33
End: 2023-11-03
Payer: MEDICAID

## 2023-11-03 DIAGNOSIS — R12 HEART BURN: ICD-10-CM

## 2023-11-03 LAB — B-HEM STREP SPEC QL CULT: NEGATIVE

## 2023-11-03 RX ORDER — PANTOPRAZOLE SODIUM 20 MG/1
20 TABLET, DELAYED RELEASE ORAL DAILY
Qty: 30 TABLET | Refills: 1 | Status: ON HOLD | OUTPATIENT
Start: 2023-11-03 | End: 2024-11-02

## 2023-11-03 RX ORDER — PANTOPRAZOLE SODIUM 20 MG/1
20 TABLET, DELAYED RELEASE ORAL DAILY
Qty: 30 TABLET | Refills: 1 | OUTPATIENT
Start: 2023-11-03 | End: 2024-11-02

## 2023-11-03 NOTE — TELEPHONE ENCOUNTER
Pt says she gets induced on Monday and her mom tested positive for covid today she says she doesn't feel well however she tested negative today. She wants to know if something needs to be changed or if she can still have baby on Monday. Please Advise.

## 2023-11-05 RX ORDER — ONDANSETRON 4 MG/1
4 TABLET, FILM COATED ORAL EVERY 6 HOURS PRN
Status: CANCELLED | OUTPATIENT
Start: 2023-11-05

## 2023-11-05 RX ORDER — FAMOTIDINE 10 MG
20 TABLET ORAL 2 TIMES DAILY PRN
Status: CANCELLED | OUTPATIENT
Start: 2023-11-05

## 2023-11-05 RX ORDER — HYDROCODONE BITARTRATE AND ACETAMINOPHEN 5; 325 MG/1; MG/1
1 TABLET ORAL EVERY 4 HOURS PRN
Status: CANCELLED | OUTPATIENT
Start: 2023-11-05 | End: 2023-11-12

## 2023-11-05 RX ORDER — SODIUM CHLORIDE 0.9 % (FLUSH) 0.9 %
10 SYRINGE (ML) INJECTION EVERY 12 HOURS SCHEDULED
Status: CANCELLED | OUTPATIENT
Start: 2023-11-05

## 2023-11-05 RX ORDER — ONDANSETRON 2 MG/ML
4 INJECTION INTRAMUSCULAR; INTRAVENOUS EVERY 6 HOURS PRN
Status: CANCELLED | OUTPATIENT
Start: 2023-11-05

## 2023-11-05 RX ORDER — LIDOCAINE HYDROCHLORIDE 10 MG/ML
0.5 INJECTION, SOLUTION INFILTRATION; PERINEURAL ONCE AS NEEDED
Status: CANCELLED | OUTPATIENT
Start: 2023-11-05

## 2023-11-05 RX ORDER — SODIUM CHLORIDE 0.9 % (FLUSH) 0.9 %
10 SYRINGE (ML) INJECTION AS NEEDED
Status: CANCELLED | OUTPATIENT
Start: 2023-11-05

## 2023-11-05 RX ORDER — OXYTOCIN/0.9 % SODIUM CHLORIDE 30/500 ML
250 PLASTIC BAG, INJECTION (ML) INTRAVENOUS CONTINUOUS
Status: CANCELLED | OUTPATIENT
Start: 2023-11-05 | End: 2023-11-06

## 2023-11-05 RX ORDER — OXYTOCIN/0.9 % SODIUM CHLORIDE 30/500 ML
999 PLASTIC BAG, INJECTION (ML) INTRAVENOUS ONCE
Status: CANCELLED | OUTPATIENT
Start: 2023-11-05 | End: 2023-11-05

## 2023-11-05 RX ORDER — MISOPROSTOL 100 UG/1
800 TABLET ORAL ONCE AS NEEDED
Status: CANCELLED | OUTPATIENT
Start: 2023-11-05

## 2023-11-05 RX ORDER — IBUPROFEN 200 MG
600 TABLET ORAL EVERY 6 HOURS PRN
Status: CANCELLED | OUTPATIENT
Start: 2023-11-05

## 2023-11-05 RX ORDER — FAMOTIDINE 10 MG/ML
20 INJECTION, SOLUTION INTRAVENOUS 2 TIMES DAILY PRN
Status: CANCELLED | OUTPATIENT
Start: 2023-11-05

## 2023-11-05 RX ORDER — OXYTOCIN/0.9 % SODIUM CHLORIDE 30/500 ML
2-20 PLASTIC BAG, INJECTION (ML) INTRAVENOUS
Status: CANCELLED | OUTPATIENT
Start: 2023-11-05

## 2023-11-05 RX ORDER — BUTORPHANOL TARTRATE 1 MG/ML
2 INJECTION, SOLUTION INTRAMUSCULAR; INTRAVENOUS
Status: CANCELLED | OUTPATIENT
Start: 2023-11-05

## 2023-11-05 RX ORDER — SODIUM CHLORIDE 9 MG/ML
40 INJECTION, SOLUTION INTRAVENOUS AS NEEDED
Status: CANCELLED | OUTPATIENT
Start: 2023-11-05

## 2023-11-05 RX ORDER — SODIUM CHLORIDE, SODIUM LACTATE, POTASSIUM CHLORIDE, CALCIUM CHLORIDE 600; 310; 30; 20 MG/100ML; MG/100ML; MG/100ML; MG/100ML
125 INJECTION, SOLUTION INTRAVENOUS CONTINUOUS
Status: CANCELLED | OUTPATIENT
Start: 2023-11-05

## 2023-11-05 RX ORDER — METHYLERGONOVINE MALEATE 0.2 MG/ML
200 INJECTION INTRAVENOUS ONCE AS NEEDED
Status: CANCELLED | OUTPATIENT
Start: 2023-11-05

## 2023-11-05 RX ORDER — ACETAMINOPHEN 325 MG/1
650 TABLET ORAL EVERY 4 HOURS PRN
Status: CANCELLED | OUTPATIENT
Start: 2023-11-05

## 2023-11-05 RX ORDER — TERBUTALINE SULFATE 1 MG/ML
0.25 INJECTION, SOLUTION SUBCUTANEOUS AS NEEDED
Status: CANCELLED | OUTPATIENT
Start: 2023-11-05

## 2023-11-05 RX ORDER — CARBOPROST TROMETHAMINE 250 UG/ML
250 INJECTION, SOLUTION INTRAMUSCULAR
Status: CANCELLED | OUTPATIENT
Start: 2023-11-05

## 2023-11-05 RX ORDER — MAGNESIUM CARB/ALUMINUM HYDROX 105-160MG
30 TABLET,CHEWABLE ORAL ONCE AS NEEDED
Status: CANCELLED | OUTPATIENT
Start: 2023-11-05

## 2023-11-06 ENCOUNTER — ANESTHESIA (OUTPATIENT)
Dept: LABOR AND DELIVERY | Facility: HOSPITAL | Age: 33
End: 2023-11-06
Payer: MEDICAID

## 2023-11-06 ENCOUNTER — HOSPITAL ENCOUNTER (INPATIENT)
Facility: HOSPITAL | Age: 33
LOS: 2 days | Discharge: HOME | End: 2023-11-08
Attending: STUDENT IN AN ORGANIZED HEALTH CARE EDUCATION/TRAINING PROGRAM | Admitting: STUDENT IN AN ORGANIZED HEALTH CARE EDUCATION/TRAINING PROGRAM
Payer: MEDICAID

## 2023-11-06 ENCOUNTER — ANESTHESIA EVENT (OUTPATIENT)
Dept: LABOR AND DELIVERY | Facility: HOSPITAL | Age: 33
End: 2023-11-06
Payer: MEDICAID

## 2023-11-06 ENCOUNTER — HOSPITAL ENCOUNTER (INPATIENT)
Dept: LABOR AND DELIVERY | Facility: HOSPITAL | Age: 33
Discharge: HOME | End: 2023-11-06
Payer: MEDICAID

## 2023-11-06 DIAGNOSIS — O26.613 CHOLESTASIS OF PREGNANCY IN THIRD TRIMESTER: ICD-10-CM

## 2023-11-06 DIAGNOSIS — K83.1 CHOLESTASIS OF PREGNANCY IN THIRD TRIMESTER: ICD-10-CM

## 2023-11-06 PROBLEM — O99.019 MATERNAL ANEMIA IN PREGNANCY, ANTEPARTUM: Status: RESOLVED | Noted: 2023-08-07 | Resolved: 2023-11-06

## 2023-11-06 PROBLEM — Q27.0 SINGLE UMBILICAL ARTERY: Status: RESOLVED | Noted: 2023-08-07 | Resolved: 2023-11-06

## 2023-11-06 PROBLEM — Z34.90 PREGNANCY: Status: RESOLVED | Noted: 2023-08-07 | Resolved: 2023-11-06

## 2023-11-06 PROBLEM — O26.643 CHOLESTASIS DURING PREGNANCY IN THIRD TRIMESTER: Status: ACTIVE | Noted: 2023-11-06

## 2023-11-06 PROBLEM — O99.519 ASTHMA AFFECTING PREGNANCY, ANTEPARTUM: Status: RESOLVED | Noted: 2023-08-07 | Resolved: 2023-11-06

## 2023-11-06 PROBLEM — O26.643 CHOLESTASIS DURING PREGNANCY IN THIRD TRIMESTER: Status: RESOLVED | Noted: 2023-11-06 | Resolved: 2023-11-06

## 2023-11-06 PROBLEM — D68.59 THROMBOPHILIA AFFECTING PREGNANCY, ANTEPARTUM: Status: RESOLVED | Noted: 2023-08-07 | Resolved: 2023-11-06

## 2023-11-06 PROBLEM — J45.909 ASTHMA AFFECTING PREGNANCY, ANTEPARTUM: Status: RESOLVED | Noted: 2023-08-07 | Resolved: 2023-11-06

## 2023-11-06 PROBLEM — O99.119 THROMBOPHILIA AFFECTING PREGNANCY, ANTEPARTUM: Status: RESOLVED | Noted: 2023-08-07 | Resolved: 2023-11-06

## 2023-11-06 LAB
ABO GROUP BLD: NORMAL
ALBUMIN SERPL-MCNC: 3.5 G/DL (ref 3.5–5.2)
ALBUMIN/GLOB SERPL: 1 G/DL
ALP SERPL-CCNC: 311 U/L (ref 39–117)
ALT SERPL W P-5'-P-CCNC: 47 U/L (ref 1–33)
ANION GAP SERPL CALCULATED.3IONS-SCNC: 13.6 MMOL/L (ref 5–15)
AST SERPL-CCNC: 28 U/L (ref 1–32)
B PARAPERT DNA SPEC QL NAA+PROBE: NOT DETECTED
B PERT DNA SPEC QL NAA+PROBE: NOT DETECTED
BASOPHILS # BLD AUTO: 0.12 10*3/MM3 (ref 0–0.2)
BASOPHILS NFR BLD AUTO: 0.9 % (ref 0–1.5)
BILIRUB SERPL-MCNC: 0.3 MG/DL (ref 0–1.2)
BLD GP AB SCN SERPL QL: NEGATIVE
BUN SERPL-MCNC: 13 MG/DL (ref 6–20)
BUN/CREAT SERPL: 15.5 (ref 7–25)
C PNEUM DNA NPH QL NAA+NON-PROBE: NOT DETECTED
CALCIUM SPEC-SCNC: 10.1 MG/DL (ref 8.6–10.5)
CHLORIDE SERPL-SCNC: 103 MMOL/L (ref 98–107)
CO2 SERPL-SCNC: 18.4 MMOL/L (ref 22–29)
CREAT SERPL-MCNC: 0.84 MG/DL (ref 0.57–1)
DEPRECATED RDW RBC AUTO: 40.2 FL (ref 37–54)
EGFRCR SERPLBLD CKD-EPI 2021: 93.7 ML/MIN/1.73
EOSINOPHIL # BLD AUTO: 0.2 10*3/MM3 (ref 0–0.4)
EOSINOPHIL NFR BLD AUTO: 1.5 % (ref 0.3–6.2)
ERYTHROCYTE [DISTWIDTH] IN BLOOD BY AUTOMATED COUNT: 12.4 % (ref 12.3–15.4)
FLUAV SUBTYP SPEC NAA+PROBE: NOT DETECTED
FLUBV RNA ISLT QL NAA+PROBE: NOT DETECTED
GLOBULIN UR ELPH-MCNC: 3.5 GM/DL
GLUCOSE SERPL-MCNC: 102 MG/DL (ref 65–99)
HADV DNA SPEC NAA+PROBE: NOT DETECTED
HCOV 229E RNA SPEC QL NAA+PROBE: NOT DETECTED
HCOV HKU1 RNA SPEC QL NAA+PROBE: NOT DETECTED
HCOV NL63 RNA SPEC QL NAA+PROBE: NOT DETECTED
HCOV OC43 RNA SPEC QL NAA+PROBE: NOT DETECTED
HCT VFR BLD AUTO: 38 % (ref 34–46.6)
HGB BLD-MCNC: 13.5 G/DL (ref 12–15.9)
HMPV RNA NPH QL NAA+NON-PROBE: NOT DETECTED
HPIV1 RNA ISLT QL NAA+PROBE: NOT DETECTED
HPIV2 RNA SPEC QL NAA+PROBE: NOT DETECTED
HPIV3 RNA NPH QL NAA+PROBE: NOT DETECTED
HPIV4 P GENE NPH QL NAA+PROBE: NOT DETECTED
IMM GRANULOCYTES # BLD AUTO: 0.52 10*3/MM3 (ref 0–0.05)
IMM GRANULOCYTES NFR BLD AUTO: 3.9 % (ref 0–0.5)
LYMPHOCYTES # BLD AUTO: 2.03 10*3/MM3 (ref 0.7–3.1)
LYMPHOCYTES NFR BLD AUTO: 15.1 % (ref 19.6–45.3)
M PNEUMO IGG SER IA-ACNC: NOT DETECTED
MCH RBC QN AUTO: 31.7 PG (ref 26.6–33)
MCHC RBC AUTO-ENTMCNC: 35.5 G/DL (ref 31.5–35.7)
MCV RBC AUTO: 89.2 FL (ref 79–97)
MONOCYTES # BLD AUTO: 0.71 10*3/MM3 (ref 0.1–0.9)
MONOCYTES NFR BLD AUTO: 5.3 % (ref 5–12)
NEUTROPHILS NFR BLD AUTO: 73.3 % (ref 42.7–76)
NEUTROPHILS NFR BLD AUTO: 9.82 10*3/MM3 (ref 1.7–7)
NRBC BLD AUTO-RTO: 0.1 /100 WBC (ref 0–0.2)
PLATELET # BLD AUTO: 291 10*3/MM3 (ref 140–450)
PMV BLD AUTO: 10.7 FL (ref 6–12)
POTASSIUM SERPL-SCNC: 3.9 MMOL/L (ref 3.5–5.2)
PROT SERPL-MCNC: 7 G/DL (ref 6–8.5)
RBC # BLD AUTO: 4.26 10*6/MM3 (ref 3.77–5.28)
RH BLD: POSITIVE
RHINOVIRUS RNA SPEC NAA+PROBE: DETECTED
RSV RNA NPH QL NAA+NON-PROBE: NOT DETECTED
SARS-COV-2 RNA NPH QL NAA+NON-PROBE: NOT DETECTED
SODIUM SERPL-SCNC: 135 MMOL/L (ref 136–145)
T&S EXPIRATION DATE: NORMAL
WBC NRBC COR # BLD: 13.4 10*3/MM3 (ref 3.4–10.8)

## 2023-11-06 PROCEDURE — 86901 BLOOD TYPING SEROLOGIC RH(D): CPT | Performed by: STUDENT IN AN ORGANIZED HEALTH CARE EDUCATION/TRAINING PROGRAM

## 2023-11-06 PROCEDURE — 88307 TISSUE EXAM BY PATHOLOGIST: CPT

## 2023-11-06 PROCEDURE — 10907ZC DRAINAGE OF AMNIOTIC FLUID, THERAPEUTIC FROM PRODUCTS OF CONCEPTION, VIA NATURAL OR ARTIFICIAL OPENING: ICD-10-PCS | Performed by: STUDENT IN AN ORGANIZED HEALTH CARE EDUCATION/TRAINING PROGRAM

## 2023-11-06 PROCEDURE — 25010000002 ONDANSETRON PER 1 MG: Performed by: STUDENT IN AN ORGANIZED HEALTH CARE EDUCATION/TRAINING PROGRAM

## 2023-11-06 PROCEDURE — 85025 COMPLETE CBC W/AUTO DIFF WBC: CPT | Performed by: STUDENT IN AN ORGANIZED HEALTH CARE EDUCATION/TRAINING PROGRAM

## 2023-11-06 PROCEDURE — 25010000002 ROPIVACAINE PER 1 MG: Performed by: STUDENT IN AN ORGANIZED HEALTH CARE EDUCATION/TRAINING PROGRAM

## 2023-11-06 PROCEDURE — 25810000003 LACTATED RINGERS PER 1000 ML: Performed by: STUDENT IN AN ORGANIZED HEALTH CARE EDUCATION/TRAINING PROGRAM

## 2023-11-06 PROCEDURE — 80053 COMPREHEN METABOLIC PANEL: CPT | Performed by: STUDENT IN AN ORGANIZED HEALTH CARE EDUCATION/TRAINING PROGRAM

## 2023-11-06 PROCEDURE — 59409 OBSTETRICAL CARE: CPT | Performed by: STUDENT IN AN ORGANIZED HEALTH CARE EDUCATION/TRAINING PROGRAM

## 2023-11-06 PROCEDURE — 3E033VJ INTRODUCTION OF OTHER HORMONE INTO PERIPHERAL VEIN, PERCUTANEOUS APPROACH: ICD-10-PCS | Performed by: STUDENT IN AN ORGANIZED HEALTH CARE EDUCATION/TRAINING PROGRAM

## 2023-11-06 PROCEDURE — 0202U NFCT DS 22 TRGT SARS-COV-2: CPT | Performed by: STUDENT IN AN ORGANIZED HEALTH CARE EDUCATION/TRAINING PROGRAM

## 2023-11-06 PROCEDURE — 86900 BLOOD TYPING SEROLOGIC ABO: CPT | Performed by: STUDENT IN AN ORGANIZED HEALTH CARE EDUCATION/TRAINING PROGRAM

## 2023-11-06 PROCEDURE — C1755 CATHETER, INTRASPINAL: HCPCS | Performed by: STUDENT IN AN ORGANIZED HEALTH CARE EDUCATION/TRAINING PROGRAM

## 2023-11-06 PROCEDURE — 86850 RBC ANTIBODY SCREEN: CPT | Performed by: STUDENT IN AN ORGANIZED HEALTH CARE EDUCATION/TRAINING PROGRAM

## 2023-11-06 RX ORDER — TRAMADOL HYDROCHLORIDE 50 MG/1
50 TABLET ORAL EVERY 6 HOURS PRN
Status: DISCONTINUED | OUTPATIENT
Start: 2023-11-06 | End: 2023-11-08 | Stop reason: HOSPADM

## 2023-11-06 RX ORDER — ONDANSETRON 4 MG/1
4 TABLET, FILM COATED ORAL EVERY 6 HOURS PRN
Status: DISCONTINUED | OUTPATIENT
Start: 2023-11-06 | End: 2023-11-06 | Stop reason: HOSPADM

## 2023-11-06 RX ORDER — DIPHENHYDRAMINE HYDROCHLORIDE 50 MG/ML
12.5 INJECTION INTRAMUSCULAR; INTRAVENOUS EVERY 8 HOURS PRN
Status: DISCONTINUED | OUTPATIENT
Start: 2023-11-06 | End: 2023-11-06 | Stop reason: HOSPADM

## 2023-11-06 RX ORDER — MAGNESIUM CARB/ALUMINUM HYDROX 105-160MG
30 TABLET,CHEWABLE ORAL ONCE AS NEEDED
Status: DISCONTINUED | OUTPATIENT
Start: 2023-11-06 | End: 2023-11-06 | Stop reason: HOSPADM

## 2023-11-06 RX ORDER — FAMOTIDINE 20 MG/1
20 TABLET, FILM COATED ORAL 2 TIMES DAILY PRN
Status: DISCONTINUED | OUTPATIENT
Start: 2023-11-06 | End: 2023-11-06 | Stop reason: HOSPADM

## 2023-11-06 RX ORDER — FENTANYL CIT 0.2 MG/100ML-ROPIV 0.2%-NACL 0.9% EPIDURAL INJ 2/0.2 MCG/ML-%
10 SOLUTION INJECTION CONTINUOUS
Status: DISCONTINUED | OUTPATIENT
Start: 2023-11-06 | End: 2023-11-08 | Stop reason: HOSPADM

## 2023-11-06 RX ORDER — DOCUSATE SODIUM 100 MG/1
100 CAPSULE, LIQUID FILLED ORAL 2 TIMES DAILY
Status: DISCONTINUED | OUTPATIENT
Start: 2023-11-06 | End: 2023-11-08 | Stop reason: HOSPADM

## 2023-11-06 RX ORDER — SODIUM CHLORIDE 0.9 % (FLUSH) 0.9 %
10 SYRINGE (ML) INJECTION EVERY 12 HOURS SCHEDULED
Status: DISCONTINUED | OUTPATIENT
Start: 2023-11-06 | End: 2023-11-06 | Stop reason: HOSPADM

## 2023-11-06 RX ORDER — OXYTOCIN/0.9 % SODIUM CHLORIDE 30/500 ML
999 PLASTIC BAG, INJECTION (ML) INTRAVENOUS ONCE
Status: DISCONTINUED | OUTPATIENT
Start: 2023-11-06 | End: 2023-11-06 | Stop reason: HOSPADM

## 2023-11-06 RX ORDER — IBUPROFEN 600 MG/1
600 TABLET ORAL EVERY 6 HOURS PRN
Status: DISCONTINUED | OUTPATIENT
Start: 2023-11-06 | End: 2023-11-06 | Stop reason: HOSPADM

## 2023-11-06 RX ORDER — OXYTOCIN/0.9 % SODIUM CHLORIDE 30/500 ML
250 PLASTIC BAG, INJECTION (ML) INTRAVENOUS CONTINUOUS
Status: ACTIVE | OUTPATIENT
Start: 2023-11-06 | End: 2023-11-06

## 2023-11-06 RX ORDER — HYDROXYZINE 50 MG/1
50 TABLET, FILM COATED ORAL NIGHTLY PRN
Status: DISCONTINUED | OUTPATIENT
Start: 2023-11-06 | End: 2023-11-08 | Stop reason: HOSPADM

## 2023-11-06 RX ORDER — MISOPROSTOL 200 UG/1
800 TABLET ORAL ONCE AS NEEDED
Status: DISCONTINUED | OUTPATIENT
Start: 2023-11-06 | End: 2023-11-06 | Stop reason: HOSPADM

## 2023-11-06 RX ORDER — TRANEXAMIC ACID 10 MG/ML
1000 INJECTION, SOLUTION INTRAVENOUS ONCE AS NEEDED
Status: DISCONTINUED | OUTPATIENT
Start: 2023-11-06 | End: 2023-11-08 | Stop reason: HOSPADM

## 2023-11-06 RX ORDER — METHYLERGONOVINE MALEATE 0.2 MG/ML
200 INJECTION INTRAVENOUS ONCE AS NEEDED
Status: DISCONTINUED | OUTPATIENT
Start: 2023-11-06 | End: 2023-11-06 | Stop reason: HOSPADM

## 2023-11-06 RX ORDER — ENOXAPARIN SODIUM 100 MG/ML
40 INJECTION SUBCUTANEOUS NIGHTLY
Status: DISCONTINUED | OUTPATIENT
Start: 2023-11-07 | End: 2023-11-08 | Stop reason: HOSPADM

## 2023-11-06 RX ORDER — FAMOTIDINE 10 MG/ML
20 INJECTION, SOLUTION INTRAVENOUS ONCE AS NEEDED
Status: DISCONTINUED | OUTPATIENT
Start: 2023-11-06 | End: 2023-11-06 | Stop reason: HOSPADM

## 2023-11-06 RX ORDER — HYDROCODONE BITARTRATE AND ACETAMINOPHEN 5; 325 MG/1; MG/1
1 TABLET ORAL EVERY 4 HOURS PRN
Status: DISCONTINUED | OUTPATIENT
Start: 2023-11-06 | End: 2023-11-06 | Stop reason: HOSPADM

## 2023-11-06 RX ORDER — SODIUM CHLORIDE 9 MG/ML
40 INJECTION, SOLUTION INTRAVENOUS AS NEEDED
Status: DISCONTINUED | OUTPATIENT
Start: 2023-11-06 | End: 2023-11-06 | Stop reason: HOSPADM

## 2023-11-06 RX ORDER — ACETAMINOPHEN 325 MG/1
650 TABLET ORAL EVERY 4 HOURS PRN
Status: DISCONTINUED | OUTPATIENT
Start: 2023-11-06 | End: 2023-11-06 | Stop reason: HOSPADM

## 2023-11-06 RX ORDER — BISACODYL 10 MG
10 SUPPOSITORY, RECTAL RECTAL DAILY PRN
Status: DISCONTINUED | OUTPATIENT
Start: 2023-11-07 | End: 2023-11-08 | Stop reason: HOSPADM

## 2023-11-06 RX ORDER — ONDANSETRON 2 MG/ML
4 INJECTION INTRAMUSCULAR; INTRAVENOUS EVERY 6 HOURS PRN
Status: DISCONTINUED | OUTPATIENT
Start: 2023-11-06 | End: 2023-11-08 | Stop reason: HOSPADM

## 2023-11-06 RX ORDER — ONDANSETRON 2 MG/ML
4 INJECTION INTRAMUSCULAR; INTRAVENOUS ONCE AS NEEDED
Status: COMPLETED | OUTPATIENT
Start: 2023-11-06 | End: 2023-11-06

## 2023-11-06 RX ORDER — HYDROXYZINE HYDROCHLORIDE 10 MG/1
10 TABLET, FILM COATED ORAL ONCE
Status: DISCONTINUED | OUTPATIENT
Start: 2023-11-06 | End: 2023-11-08 | Stop reason: HOSPADM

## 2023-11-06 RX ORDER — SODIUM CHLORIDE 0.9 % (FLUSH) 0.9 %
1-10 SYRINGE (ML) INJECTION AS NEEDED
Status: DISCONTINUED | OUTPATIENT
Start: 2023-11-06 | End: 2023-11-08 | Stop reason: HOSPADM

## 2023-11-06 RX ORDER — OXYTOCIN/0.9 % SODIUM CHLORIDE 30/500 ML
125 PLASTIC BAG, INJECTION (ML) INTRAVENOUS CONTINUOUS PRN
Status: DISCONTINUED | OUTPATIENT
Start: 2023-11-06 | End: 2023-11-08 | Stop reason: HOSPADM

## 2023-11-06 RX ORDER — HYDROCORTISONE 25 MG/G
1 CREAM TOPICAL AS NEEDED
Status: DISCONTINUED | OUTPATIENT
Start: 2023-11-06 | End: 2023-11-08 | Stop reason: HOSPADM

## 2023-11-06 RX ORDER — CARBOPROST TROMETHAMINE 250 UG/ML
250 INJECTION, SOLUTION INTRAMUSCULAR
Status: DISCONTINUED | OUTPATIENT
Start: 2023-11-06 | End: 2023-11-06 | Stop reason: HOSPADM

## 2023-11-06 RX ORDER — LIDOCAINE HYDROCHLORIDE 10 MG/ML
0.5 INJECTION, SOLUTION INFILTRATION; PERINEURAL ONCE AS NEEDED
Status: DISCONTINUED | OUTPATIENT
Start: 2023-11-06 | End: 2023-11-06 | Stop reason: HOSPADM

## 2023-11-06 RX ORDER — SODIUM CHLORIDE 0.9 % (FLUSH) 0.9 %
10 SYRINGE (ML) INJECTION AS NEEDED
Status: DISCONTINUED | OUTPATIENT
Start: 2023-11-06 | End: 2023-11-06 | Stop reason: HOSPADM

## 2023-11-06 RX ORDER — OXYTOCIN/0.9 % SODIUM CHLORIDE 30/500 ML
125 PLASTIC BAG, INJECTION (ML) INTRAVENOUS CONTINUOUS PRN
Status: COMPLETED | OUTPATIENT
Start: 2023-11-06 | End: 2023-11-06

## 2023-11-06 RX ORDER — URSODIOL 300 MG/1
300 CAPSULE ORAL 2 TIMES DAILY
Status: DISCONTINUED | OUTPATIENT
Start: 2023-11-06 | End: 2023-11-08 | Stop reason: HOSPADM

## 2023-11-06 RX ORDER — ROPIVACAINE HYDROCHLORIDE 2 MG/ML
INJECTION, SOLUTION EPIDURAL; INFILTRATION; PERINEURAL AS NEEDED
Status: DISCONTINUED | OUTPATIENT
Start: 2023-11-06 | End: 2023-11-06 | Stop reason: SURG

## 2023-11-06 RX ORDER — PRENATAL VIT/IRON FUM/FOLIC AC 27MG-0.8MG
1 TABLET ORAL DAILY
Status: DISCONTINUED | OUTPATIENT
Start: 2023-11-07 | End: 2023-11-08 | Stop reason: HOSPADM

## 2023-11-06 RX ORDER — EPHEDRINE SULFATE 50 MG/ML
5 INJECTION, SOLUTION INTRAVENOUS
Status: DISCONTINUED | OUTPATIENT
Start: 2023-11-06 | End: 2023-11-06 | Stop reason: HOSPADM

## 2023-11-06 RX ORDER — TERBUTALINE SULFATE 1 MG/ML
0.25 INJECTION, SOLUTION SUBCUTANEOUS AS NEEDED
Status: DISCONTINUED | OUTPATIENT
Start: 2023-11-06 | End: 2023-11-06 | Stop reason: HOSPADM

## 2023-11-06 RX ORDER — FAMOTIDINE 10 MG/ML
20 INJECTION, SOLUTION INTRAVENOUS 2 TIMES DAILY PRN
Status: DISCONTINUED | OUTPATIENT
Start: 2023-11-06 | End: 2023-11-06 | Stop reason: HOSPADM

## 2023-11-06 RX ORDER — IBUPROFEN 600 MG/1
600 TABLET ORAL EVERY 6 HOURS PRN
Status: DISCONTINUED | OUTPATIENT
Start: 2023-11-06 | End: 2023-11-08 | Stop reason: HOSPADM

## 2023-11-06 RX ORDER — SODIUM CHLORIDE, SODIUM LACTATE, POTASSIUM CHLORIDE, CALCIUM CHLORIDE 600; 310; 30; 20 MG/100ML; MG/100ML; MG/100ML; MG/100ML
125 INJECTION, SOLUTION INTRAVENOUS CONTINUOUS
Status: DISCONTINUED | OUTPATIENT
Start: 2023-11-06 | End: 2023-11-08 | Stop reason: HOSPADM

## 2023-11-06 RX ORDER — ONDANSETRON 4 MG/1
4 TABLET, FILM COATED ORAL EVERY 8 HOURS PRN
Status: DISCONTINUED | OUTPATIENT
Start: 2023-11-06 | End: 2023-11-08 | Stop reason: HOSPADM

## 2023-11-06 RX ORDER — ACETAMINOPHEN 325 MG/1
650 TABLET ORAL EVERY 6 HOURS PRN
Status: DISCONTINUED | OUTPATIENT
Start: 2023-11-06 | End: 2023-11-08 | Stop reason: HOSPADM

## 2023-11-06 RX ORDER — BUSPIRONE HYDROCHLORIDE 15 MG/1
7.5 TABLET ORAL DAILY
Status: DISCONTINUED | OUTPATIENT
Start: 2023-11-07 | End: 2023-11-08 | Stop reason: HOSPADM

## 2023-11-06 RX ORDER — OXYTOCIN/0.9 % SODIUM CHLORIDE 30/500 ML
2-20 PLASTIC BAG, INJECTION (ML) INTRAVENOUS
Status: DISCONTINUED | OUTPATIENT
Start: 2023-11-06 | End: 2023-11-06 | Stop reason: HOSPADM

## 2023-11-06 RX ORDER — ONDANSETRON 2 MG/ML
4 INJECTION INTRAMUSCULAR; INTRAVENOUS EVERY 6 HOURS PRN
Status: DISCONTINUED | OUTPATIENT
Start: 2023-11-06 | End: 2023-11-06 | Stop reason: HOSPADM

## 2023-11-06 RX ADMIN — Medication 2 MILLI-UNITS/MIN: at 08:37

## 2023-11-06 RX ADMIN — URSODIOL 300 MG: 300 CAPSULE ORAL at 17:31

## 2023-11-06 RX ADMIN — ONDANSETRON 4 MG: 2 INJECTION INTRAMUSCULAR; INTRAVENOUS at 21:52

## 2023-11-06 RX ADMIN — ROPIVACAINE HYDROCHLORIDE 6 ML: 2 INJECTION, SOLUTION EPIDURAL; INFILTRATION at 12:52

## 2023-11-06 RX ADMIN — Medication 10 ML/HR: at 12:52

## 2023-11-06 RX ADMIN — ACETAMINOPHEN 650 MG: 325 TABLET, FILM COATED ORAL at 21:03

## 2023-11-06 RX ADMIN — SODIUM CHLORIDE, POTASSIUM CHLORIDE, SODIUM LACTATE AND CALCIUM CHLORIDE 125 ML/HR: 600; 310; 30; 20 INJECTION, SOLUTION INTRAVENOUS at 07:40

## 2023-11-06 RX ADMIN — Medication 125 ML/HR: at 20:40

## 2023-11-06 RX ADMIN — SODIUM CHLORIDE, POTASSIUM CHLORIDE, SODIUM LACTATE AND CALCIUM CHLORIDE 125 ML/HR: 600; 310; 30; 20 INJECTION, SOLUTION INTRAVENOUS at 12:39

## 2023-11-06 RX ADMIN — SODIUM CHLORIDE, POTASSIUM CHLORIDE, SODIUM LACTATE AND CALCIUM CHLORIDE 125 ML/HR: 600; 310; 30; 20 INJECTION, SOLUTION INTRAVENOUS at 18:13

## 2023-11-06 NOTE — ANESTHESIA PROCEDURE NOTES
Labor Epidural      Patient reassessed immediately prior to procedure    Patient location during procedure: OB  Start Time: 11/6/2023 12:41 PM  Stop Time: 11/6/2023 12:51 PM  Indication:at surgeon's request  Performed By  Anesthesiologist: Silver Jean Baptiste MD  Preanesthetic Checklist  Completed: patient identified, risks and benefits discussed and timeout performed  Prep:  Pt Position:sitting  Sterile Tech:cap, gloves, mask and sterile barrier  Prep:chlorhexidine gluconate and isopropyl alcohol  Monitoring:blood pressure monitoring, continuous pulse oximetry and EKG  Epidural Block Procedure:  Approach:midline  Guidance:landmark technique and palpation technique  Location:L3-L4  Needle Type:Tuohy  Needle Gauge:17 G  Loss of Resistance Medium: saline  Loss of Resistance: 8cm  Cath Depth at skin:14 cm  Paresthesia: none  Aspiration:negative  Test Dose:negative  Number of Attempts: 1  Post Assessment:  Dressing:occlusive dressing applied and secured with tape  Pt Tolerance:patient tolerated the procedure well with no apparent complications  Complications:no

## 2023-11-06 NOTE — ANESTHESIA PREPROCEDURE EVALUATION
Anesthesia Evaluation     Patient summary reviewed and Nursing notes reviewed   no history of anesthetic complications:                Airway   Mallampati: II  TM distance: >3 FB  Neck ROM: full  Dental      Pulmonary    (+) asthma,  Cardiovascular     PT is on anticoagulation therapy      ROS comment: Prothombin mutation resulting in hypercoagulability. Patient bridged with heparin 5000 BID, last dose 11/5 @ 1900.     Neuro/Psych  GI/Hepatic/Renal/Endo      Musculoskeletal     Abdominal    Substance History      OB/GYN    (+) Pregnant        Other                    Anesthesia Plan    ASA 2     epidural     (37w0d      Instructed nurse to communicate with anesthesia provider regarding epidural removal and restarting anticoagulation. )    Anesthetic plan, risks, benefits, and alternatives have been provided, discussed and informed consent has been obtained with: patient.    CODE STATUS:    Level Of Support Discussed With: Patient  Code Status (Patient has no pulse and is not breathing): CPR (Attempt to Resuscitate)  Medical Interventions (Patient has pulse or is breathing): Full Support

## 2023-11-06 NOTE — H&P
[facility]  Obstetric History and Physical    Chief Complaint   Patient presents with    Scheduled Induction     IOL for Cholestasis, IUGR, 2 VC, positive fetal movement, no vaginal bleeding or LOF       Patient is a 33 y.o. female  currently at 37w0d, who presents for scheduled induction of labor for cholestasis of pregnancy, history of IUGR, and single umbilical artery. She was admitted and started on pitocin for augmentation. She started feeling more uncomfortable with contractions and had epidural placed for pain control. She is resting comfortably now with epidural and has no current complaints.     Her prenatal care was with Dr. Taylor and was complicated by Cholestasis of pregnancy on Ursodiol 300 mg TID, history of IUGR, single umbilical artery, thrombophilia affecting pregnancy (Prothrombin gene mutation heterozygote)- on heparin, asthma affecting pregnancy, history of cholestasis x 2 previous pregnancies, maternal anemia, unwanted fertility.        External Prenatal Results       Pregnancy Outside Results - Transcribed From Office Records - See Scanned Records For Details       Test Value Date Time    ABO  O  23 0810    Rh  Positive  23 0810    Antibody Screen  Negative  23 0810       Negative  23 1011    Varicella IgG  887 index 23 1011    Rubella  1.46 index 23 1011    Hgb  13.5 g/dL 23 0810       12.6 g/dL 23 1624       12.3 g/dL 23 1542       11.6 g/dL 23 1201       11.8 g/dL 23 1432       12.1 g/dL 23 1341       12.6 g/dL 23 1400       12.8 g/dL 23 1508       12.8 g/dL 23 1011       13.2 g/dL 23 1813    Hct  38.0 % 23 0810       37.0 % 23 1624       36.5 % 23 1542       34.8 % 23 1201       34.8 % 23 1432       36.1 % 23 1341       36.8 % 23 1400       38.3 % 23 1508       37.8 % 23 1011       39.5 % 23 1813    Glucose Fasting GTT        Glucose Tolerance Test 1 hour       Glucose Tolerance Test 3 hour       Gonorrhea (discrete)  Negative  04/10/23 1704    Chlamydia (discrete)  Negative  04/10/23 1704    RPR  Non Reactive  23 1011    VDRL       Syphilis Antibody       HBsAg  Negative  23 1011    Herpes Simplex Virus PCR       Herpes Simplex VIrus Culture       HIV  Non Reactive  23 1011    Hep C RNA Quant PCR       Hep C Antibody  Non Reactive  23 1011    AFP       Group B Strep  Negative  10/30/23     GBS Susceptibility to Clindamycin       GBS Susceptibility to Erythromycin       Fetal Fibronectin       Genetic Testing, Maternal Blood                 Drug Screening       Test Value Date Time    Urine Drug Screen       Amphetamine Screen  Negative ng/mL 23 1513    Barbiturate Screen  Negative ng/mL 23 1513    Benzodiazepine Screen  Negative ng/mL 23 1513    Methadone Screen  Negative ng/mL 23 1513    Phencyclidine Screen  Negative ng/mL 23 1513    Opiates Screen       THC Screen       Cocaine Screen       Propoxyphene Screen  Negative ng/mL 23 1513    Buprenorphine Screen       Methamphetamine Screen       Oxycodone Screen       Tricyclic Antidepressants Screen                 Legend    ^: Historical                               OB History    Para Term  AB Living   4 3 3 0 0 3   SAB IAB Ectopic Molar Multiple Live Births   0 0 0 0 0 3      # Outcome Date GA Lbr Miguel/2nd Weight Sex Delivery Anes PTL Lv   4 Current            3 Term 04/29/15   3317 g (7 lb 5 oz) M Vag-Spont   MELANIA   2 Term 11   3147 g (6 lb 15 oz) F Vag-Spont      1 Term 07   3204 g (7 lb 1 oz) M Vag-Spont              Past Medical History:   Diagnosis Date    Anemia     Anxiety and depression     Asthma     As child    DDD (degenerative disc disease), lumbar     Deep vein thrombosis     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 GARZA AWARE OF THIS    Family hx-blood disorder     MOTHER  HAS FACTOR 2 AND FACTOR 5    History of blood transfusion     Labral tear of hip, degenerative     RIGHT    Lumbar herniated disc 06/2017    X 2  DISC   HISTORY OF EPIDURALS    Urinary tract infection           Past Surgical History:   Procedure Laterality Date    ABDOMINOPLASTY  2019    BLADDER REPAIR  2016    WITH MESH    BREAST AUGMENTATION BILATERAL MASTOPEXY Bilateral 07/31/2017    Procedure: BREAST AUGMENTATION, MASTOPEXY W/ IDEAL IMPLANTS  NEW IMAGE;  Surgeon: Nirmal Mehta MD;  Location: [facility] OR;  Service:     BREAST SURGERY      CHOLECYSTECTOMY  2012    COSMETIC SURGERY      TONSILLECTOMY AND ADENOIDECTOMY  1990          No current facility-administered medications on file prior to encounter.     Current Outpatient Medications on File Prior to Encounter   Medication Sig Dispense Refill    acetaminophen (TYLENOL) 325 MG tablet Take 2 tablets by mouth Every 6 (Six) Hours As Needed for Mild Pain.      albuterol sulfate  (90 Base) MCG/ACT inhaler Inhale 2 puffs Every 4 (Four) Hours As Needed for Wheezing. 6.7 g 1    buPROPion XL (Wellbutrin XL) 150 MG 24 hr tablet Take 1 tablet by mouth Every Morning. 90 tablet 1    busPIRone (BUSPAR) 7.5 MG tablet Take 1 tablet by mouth Daily. for anxiety 90 tablet 2    ferrous sulfate 325 (65 FE) MG tablet Take 1 tablet by mouth Daily With Breakfast.      Heparin Sodium, Porcine, (heparin, porcine,) 5000 UNIT/0.5ML injection Inject 0.5 mL under the skin into the appropriate area as directed Every 12 (Twelve) Hours. 30 mL 1    Loratadine (CLARITIN PO) Take  by mouth Daily.      Mometasone Furoate (Asmanex, 30 Metered Doses,) 220 MCG/ACT inhaler Inhale 1 puff Every Night. 1 each 11    ondansetron ODT (ZOFRAN-ODT) 4 MG disintegrating tablet Place 1 tablet on the tongue Every 8 (Eight) Hours As Needed for Nausea or Vomiting. 30 tablet 1    pantoprazole (PROTONIX) 20 MG EC tablet Take 1 tablet by mouth Daily. 30 tablet 1    prenatal vitamin (prenatal, CLASSIC,  "vitamin) tablet Take  by mouth Daily.      ursodiol (ACTIGALL) 300 MG capsule TAKE 1 CAPSULE BY MOUTH TWICE A DAY 60 capsule 0    hydrOXYzine (ATARAX) 10 MG tablet Take 1 tablet by mouth 3 (Three) Times a Day As Needed for Itching. 60 tablet 1    Insulin Syringe-Needle U-100 32G X 5/16\" 0.5 ML misc Use 1 each 2 (Two) Times a Day. 100 each 1    Needle, Disp, 25G X 5/8\" misc Use 1 Device Daily. 100 each 1    Needle, Disp, 26G X 1/2\" misc Use 1 each Every 12 (Twelve) Hours. 10 each 0    Needle, Disp, 32G X 5/16\" misc Use 1 each 2 (Two) Times a Day. 30 each 0    Peak Flow Meter device Daily. 1 each 0    Syringe, Disposable, 3 ML misc Use 1 each Every 12 (Twelve) Hours. 10 each 0          Allergies   Allergen Reactions    Dilaudid [Hydromorphone Hcl] Nausea And Vomiting    Hydromorphone Dizziness     Category: Allergy;          Social History     Socioeconomic History    Marital status: Single    Number of children: 3   Tobacco Use    Smoking status: Never    Smokeless tobacco: Never   Vaping Use    Vaping Use: Never used   Substance and Sexual Activity    Alcohol use: Not Currently     Comment: OCCAS    Drug use: No    Sexual activity: Yes     Partners: Male     Birth control/protection: None          Family History   Problem Relation Age of Onset    [place] Hyperthermia Neg Hx         Review of Systems   Constitutional:  Negative for chills and fever.   Respiratory:  Negative for shortness of breath.    Cardiovascular:  Negative for chest pain.   Gastrointestinal:  Negative for abdominal pain.   Genitourinary:  Negative for vaginal bleeding and vaginal discharge.       /63   Pulse 88   Temp 97.8 °F (36.6 °C) (Oral)   Resp 16   Wt 72.8 kg (160 lb 6.4 oz)   LMP 02/20/2023   SpO2 98%   Breastfeeding Yes   BMI 29.33 kg/m²     Physical Exam  Vitals reviewed.   Constitutional:       General: She is not in acute distress.  HENT:      Head: Normocephalic and atraumatic.      Right Ear: External ear normal.      " Left Ear: External ear normal.   Eyes:      Extraocular Movements: Extraocular movements intact.      Pupils: Pupils are equal, round, and reactive to light.   Cardiovascular:      Rate and Rhythm: Normal rate.   Pulmonary:      Effort: Pulmonary effort is normal. No respiratory distress.   Abdominal:      General: There is no distension.      Palpations: Abdomen is soft.      Tenderness: There is no abdominal tenderness. There is no guarding or rebound.      Comments: Gravid to third trimester   Genitourinary:     Comments: SVE: 2/60/-2. Patient gave verbal consent for amniotomy. Amniotomy performed at 1600 with [place] and moderate amount of clear fluid noted. Fetal head well applied. Patient and fetus tolerated procedure well.   Musculoskeletal:         General: No deformity. Normal range of motion.      Cervical back: Normal range of motion and neck supple.   Skin:     General: Skin is warm and dry.   Neurological:      General: No focal deficit present.      Mental Status: She is alert and oriented to person, place, and time.   Psychiatric:         Mood and Affect: Mood normal.         Behavior: Behavior normal.           FHT - 140s baseline, moderate variability, accels +, occasional variable decelerations. Category 1-2, overall reassuring with occasional variable decelerations   [place] - Q 2-3 mins     Lab Results   Component Value Date    WBC 13.40 (H) 11/06/2023    HGB 13.5 11/06/2023    HCT 38.0 11/06/2023    MCV 89.2 11/06/2023     11/06/2023         Assessment:  1.  Intrauterine pregnancy at 37w0d weeks gestation with reassuring fetal status.    2.  induction of labor  for cholestasis of pregnancy, history of IUGR  with unfavorable cervix  3.  Obstetrical history significant for is remarkable for cholestasis of pregnancy on Ursodiol 300 mg TID, history of IUGR, single umbilical artery, thrombophilia affecting pregnancy (Prothrombin gene mutation heterozygote)- on heparin, asthma affecting  pregnancy, history of cholestasis x 2 previous pregnancies, maternal anemia, unwanted fertility.   4.  GBS status: negative     Plan:  1. Admitted to L&D for scheduled induction of labor for cholestasis of pregnancy and history of IUGR. She has been receiving pitocin for augmentation and had epidural placed for pain control. She is now underwent AROM. Will continue to augment with pitocin and anticipate vaginal delivery.   2. Continue Ursodiol 300 mg TID for management of cholestasis and hydroxyzine 10 mg TID PRN itching.   3. Will restart her anticoagulation with Lovenox on PPD#1 and continue for 6 weeks postpartum.   4. Plan of care has been reviewed with patient.  5.  Risks, benefits of treatment plan have been discussed.  6.  All questions have been answered.        Valerie Taylor MD  11/6/2023  14:44 EST

## 2023-11-06 NOTE — PROGRESS NOTES
Chief Complaint   Patient presents with    Routine Prenatal Visit      Yuki Shields is a 33 y.o.  at 35w0d who presents for routine prenatal visit. She reports doing okay but has felt increased pelvic discomfort and pain. She is taking Ursodiol and is still having some itching at nights. Denies vaginal bleeding, cramping, contractions, LOF. She reports active fetal movement.   She is requesting different needles for her heparin administration.     /80   Wt 73.5 kg (162 lb)   LMP 2023   BMI 29.63 kg/m²    Gen: well appearing, NAD   Abd: gravid, nontender  See OB Flowsheet    ASSESSMENT:   IUP at 35w0d  Prenatal care in third trimester    Thrombophilia affecting pregnancy- Prothrombin gene mutation heterozygous- on Heparin 5000 U BID   History of postpartum hemorrhage  Asthma affecting pregnancy- on Asmanex and albuterol inhaler PRN  Single umbilical artery affecting pregnancy   Heartburn- significantly improved with Protonix   Maternal anemia- Hematology has started pt on iron supplementation   Unwanted fertility - signed tubal consents today   Cholestasis of pregnancy- bile acids 15.9 on 10/9/23, LFTS wnl - started on Ursodiol 300 mg TID and Hydroxyzine PRN    History of cholestasis x 2 previous pregnancies   History of IUGR this pregnancy- repeat growth today showed normal fetal growth with EFW 47%ile, AC 54%ile     PLAN:  Problem list reviewed and updated.   Reviewed expectations of this stage of pregnancy.   Third trimester precautions reviewed including labor signs, monitoring fetal movements.   Prescribed new needles for heparin injections.   Discussed ultrasound results with the patient that demonstrated EFW 2581 g (47%ile, AC 54%ile), normal CHAITANYA 15.4 cm, normal UA dopplers, BPP 8/8, cephalic presentation, and posterior placenta. I discussed that the fetus is no longer growth restricted and this could be that the infant has caught up on its growth curve versus first measurement could  have been incorrectly measured. This will not effect delivery timing though as we will plan for induction of labor at 37 weeks given cholestasis of pregnancy. Will check patient's cervix next week and collect GBS swab. Will schedule for induction of labor on 23 at 0000. Will place orders in next week.   Will continue  testing with weekly BPPs and UA dopplers since the fetus was previously growth restricted.   All questions and concerns answered with the patient and father of baby today.   Return in about 1 week (around 10/30/2023) for BPP, Prenatal visit, UA dopplers.    Patient Active Problem List    Diagnosis Date Noted    Anemia 09/15/2023    Asthma 09/15/2023    Pregnancy 2023    Maternal anemia in pregnancy, antepartum 2023    Single umbilical artery 2023    Asthma affecting pregnancy, antepartum 2023    Thrombophilia affecting pregnancy, antepartum 2023    Stress 2021    Folic acid deficiency 2020    Memory impairment 2020    Obesity 2020    Coagulopathy 2019    Factor II deficiency 2017    DDD (degenerative disc disease), lumbar 2017    Pain 2012       Orders Placed This Encounter   Procedures    POC Urinalysis Dipstick     Order Specific Question:   Release to patient     Answer:   Routine Release [[id number]]     Valerie Taylor MD

## 2023-11-07 LAB
BASOPHILS # BLD AUTO: 0.07 10*3/MM3 (ref 0–0.2)
BASOPHILS NFR BLD AUTO: 0.4 % (ref 0–1.5)
DEPRECATED RDW RBC AUTO: 40.9 FL (ref 37–54)
EOSINOPHIL # BLD AUTO: 0.11 10*3/MM3 (ref 0–0.4)
EOSINOPHIL NFR BLD AUTO: 0.6 % (ref 0.3–6.2)
ERYTHROCYTE [DISTWIDTH] IN BLOOD BY AUTOMATED COUNT: 12.6 % (ref 12.3–15.4)
HCT VFR BLD AUTO: 35.5 % (ref 34–46.6)
HGB BLD-MCNC: 12.2 G/DL (ref 12–15.9)
IMM GRANULOCYTES # BLD AUTO: 0.2 10*3/MM3 (ref 0–0.05)
IMM GRANULOCYTES NFR BLD AUTO: 1.1 % (ref 0–0.5)
LYMPHOCYTES # BLD AUTO: 1.91 10*3/MM3 (ref 0.7–3.1)
LYMPHOCYTES NFR BLD AUTO: 10.4 % (ref 19.6–45.3)
MCH RBC QN AUTO: 31.2 PG (ref 26.6–33)
MCHC RBC AUTO-ENTMCNC: 34.4 G/DL (ref 31.5–35.7)
MCV RBC AUTO: 90.8 FL (ref 79–97)
MONOCYTES # BLD AUTO: 1 10*3/MM3 (ref 0.1–0.9)
MONOCYTES NFR BLD AUTO: 5.4 % (ref 5–12)
NEUTROPHILS NFR BLD AUTO: 15.13 10*3/MM3 (ref 1.7–7)
NEUTROPHILS NFR BLD AUTO: 82.1 % (ref 42.7–76)
NRBC BLD AUTO-RTO: 0 /100 WBC (ref 0–0.2)
PLATELET # BLD AUTO: 220 10*3/MM3 (ref 140–450)
PMV BLD AUTO: 10.6 FL (ref 6–12)
RBC # BLD AUTO: 3.91 10*6/MM3 (ref 3.77–5.28)
WBC NRBC COR # BLD: 18.42 10*3/MM3 (ref 3.4–10.8)

## 2023-11-07 PROCEDURE — 85025 COMPLETE CBC W/AUTO DIFF WBC: CPT | Performed by: STUDENT IN AN ORGANIZED HEALTH CARE EDUCATION/TRAINING PROGRAM

## 2023-11-07 PROCEDURE — 99231 SBSQ HOSP IP/OBS SF/LOW 25: CPT | Performed by: OBSTETRICS & GYNECOLOGY

## 2023-11-07 PROCEDURE — 63710000001 ONDANSETRON PER 8 MG: Performed by: STUDENT IN AN ORGANIZED HEALTH CARE EDUCATION/TRAINING PROGRAM

## 2023-11-07 PROCEDURE — 25010000002 ENOXAPARIN PER 10 MG: Performed by: STUDENT IN AN ORGANIZED HEALTH CARE EDUCATION/TRAINING PROGRAM

## 2023-11-07 RX ADMIN — DOCUSATE SODIUM 100 MG: 100 CAPSULE, LIQUID FILLED ORAL at 08:24

## 2023-11-07 RX ADMIN — ONDANSETRON HYDROCHLORIDE 4 MG: 4 TABLET, FILM COATED ORAL at 15:13

## 2023-11-07 RX ADMIN — DOCUSATE SODIUM 100 MG: 100 CAPSULE, LIQUID FILLED ORAL at 21:08

## 2023-11-07 RX ADMIN — IBUPROFEN 600 MG: 600 TABLET, FILM COATED ORAL at 21:08

## 2023-11-07 RX ADMIN — URSODIOL 300 MG: 300 CAPSULE ORAL at 12:23

## 2023-11-07 RX ADMIN — BUSPIRONE HYDROCHLORIDE 7.5 MG: 15 TABLET ORAL at 08:23

## 2023-11-07 RX ADMIN — Medication: at 01:21

## 2023-11-07 RX ADMIN — Medication 1 TABLET: at 08:23

## 2023-11-07 RX ADMIN — IBUPROFEN 600 MG: 600 TABLET, FILM COATED ORAL at 08:24

## 2023-11-07 RX ADMIN — IBUPROFEN 600 MG: 600 TABLET, FILM COATED ORAL at 14:41

## 2023-11-07 RX ADMIN — ENOXAPARIN SODIUM 40 MG: 100 INJECTION SUBCUTANEOUS at 21:08

## 2023-11-07 NOTE — PLAN OF CARE
Problem: Adult Inpatient Plan of Care  Goal: Plan of Care Review  Outcome: Ongoing, Progressing  Flowsheets (Taken 2023)  Progress: improving  Plan of Care Reviewed With: patient  Outcome Evaluation: Pt delivered baby girl at 1939 via . Pain controlled by epidural.  mL. First degree laceration. Transfer to /B at end of recovery.  Goal: Patient-Specific Goal (Individualized)  Outcome: Ongoing, Progressing  Flowsheets (Taken 2023)  Patient-Specific Goals (Include Timeframe): Pt's pain will be controlled throughout stay.  Individualized Care Needs: pain management  Anxieties, Fears or Concerns: first baby in 8 years  Goal: Absence of Hospital-Acquired Illness or Injury  Outcome: Ongoing, Progressing  Intervention: Identify and Manage Fall Risk  Recent Flowsheet Documentation  Taken 2023 by Addie Butler RN  Safety Promotion/Fall Prevention:   safety round/check completed   fall prevention program maintained  Taken 2023 1915 by Addie Butler RN  Safety Promotion/Fall Prevention:   safety round/check completed   fall prevention program maintained  Intervention: Prevent Skin Injury  Recent Flowsheet Documentation  Taken 2023 by Addie Butler RN  Body Position: position changed independently  Goal: Optimal Comfort and Wellbeing  Outcome: Ongoing, Progressing  Intervention: Provide Person-Centered Care  Recent Flowsheet Documentation  Taken 2023 by Addie Butler RN  Trust Relationship/Rapport:   care explained   emotional support provided   choices provided   questions answered   empathic listening provided   questions encouraged   reassurance provided   thoughts/feelings acknowledged  Goal: Readiness for Transition of Care  Outcome: Ongoing, Progressing     Problem: Bleeding (Labor)  Goal: Hemostasis  Outcome: Ongoing, Progressing     Problem: Change in Fetal Wellbeing (Labor)  Goal: Stable Fetal Wellbeing  Outcome: Ongoing,  Progressing  Intervention: Promote and Monitor Fetal Wellbeing  Recent Flowsheet Documentation  Taken 11/6/2023 2000 by Addie Butler RN  Body Position: position changed independently     Problem: Delayed Labor Progression (Labor)  Goal: Effective Progression to Delivery  Outcome: Ongoing, Progressing     Problem: Infection (Labor)  Goal: Absence of Infection Signs and Symptoms  Outcome: Ongoing, Progressing     Problem: Labor Pain (Labor)  Goal: Acceptable Pain Control  Outcome: Ongoing, Progressing     Problem: Uterine Tachysystole (Labor)  Goal: Normal Uterine Contraction Pattern  Outcome: Ongoing, Progressing     Problem: Skin Injury Risk Increased  Goal: Skin Health and Integrity  Outcome: Ongoing, Progressing     Problem: Device-Related Complication Risk (Anesthesia/Analgesia, Neuraxial)  Goal: Safe Infusion Delivery Completion  Outcome: Ongoing, Progressing     Problem: Infection (Anesthesia/Analgesia, Neuraxial)  Goal: Absence of Infection Signs and Symptoms  Outcome: Ongoing, Progressing     Problem: Nausea and Vomiting (Anesthesia/Analgesia, Neuraxial)  Goal: Nausea and Vomiting Relief  Outcome: Ongoing, Progressing     Problem: Pain (Anesthesia/Analgesia, Neuraxial)  Goal: Effective Pain Control  Outcome: Ongoing, Progressing     Problem: Respiratory Compromise (Anesthesia/Analgesia, Neuraxial)  Goal: Effective Oxygenation and Ventilation  Outcome: Ongoing, Progressing     Problem: Sensorimotor Impairment (Anesthesia/Analgesia, Neuraxial)  Goal: Baseline Motor Function  Outcome: Ongoing, Progressing  Intervention: Optimize Sensorimotor Function  Recent Flowsheet Documentation  Taken 11/6/2023 2000 by Addie Butler RN  Safety Promotion/Fall Prevention:   safety round/check completed   fall prevention program maintained  Taken 11/6/2023 1915 by Addie Butler, RN  Safety Promotion/Fall Prevention:   safety round/check completed   fall prevention program maintained     Problem: Urinary Retention  (Anesthesia/Analgesia, Neuraxial)  Goal: Effective Urinary Elimination  Outcome: Ongoing, Progressing     Problem: Adjustment to Role Transition (Postpartum Vaginal Delivery)  Goal: Successful Maternal Role Transition  Outcome: Ongoing, Progressing     Problem: Bleeding (Postpartum Vaginal Delivery)  Goal: Hemostasis  Outcome: Ongoing, Progressing     Problem: Infection (Postpartum Vaginal Delivery)  Goal: Absence of Infection Signs/Symptoms  Outcome: Ongoing, Progressing     Problem: Pain (Postpartum Vaginal Delivery)  Goal: Acceptable Pain Control  Outcome: Ongoing, Progressing     Problem: Urinary Retention (Postpartum Vaginal Delivery)  Goal: Effective Urinary Elimination  Outcome: Ongoing, Progressing     Problem: Fall Injury Risk  Goal: Absence of Fall and Fall-Related Injury  Outcome: Ongoing, Progressing  Intervention: Promote Injury-Free Environment  Recent Flowsheet Documentation  Taken 2023 by Addie Butler RN  Safety Promotion/Fall Prevention:   safety round/check completed   fall prevention program maintained  Taken 2023 by Addie Butler, RN  Safety Promotion/Fall Prevention:   safety round/check completed   fall prevention program maintained   Goal Outcome Evaluation:  Plan of Care Reviewed With: patient        Progress: improving  Outcome Evaluation: Pt delivered baby girl at 1939 via . Pain controlled by epidural.  mL. First degree laceration. Transfer to M/B at end of recovery.

## 2023-11-07 NOTE — L&D DELIVERY NOTE
[facility]   Vaginal Delivery Note    Patient Name: Yuki Shields  : 1990  MRN: [id number]    Date of Delivery: 2023     Diagnosis     Pre & Post-Delivery:  Intrauterine pregnancy at 37w0d  Labor status: Induced Onset of Labor     Cholestasis during pregnancy in third trimester  History of IUGR   Single umbilical artery  Asthma affecting pregnancy  Thrombophilia affecting pregnancy (Prothrombin gene mutation heterozygote)  History of cholestasis x 2 previous pregnancies   Maternal anemia            Problem List    Transfer to Postpartum     Review the Delivery Report for details.     Delivery     Delivery: Vaginal, Spontaneous     YOB: 2024    Time of Birth:  Gestational Age 7:39 PM   37w0d     Anesthesia: Epidural     Delivering clinician: Vaelrie Taylor    Forceps?   No   Vacuum? No    Shoulder dystocia present: No        Delivery narrative:      Procedure: Spontaneous vaginal delivery    Surgeon: Valerie Taylor MD    Preop diagnosis:  33 y.o.  year old  in active labor at 37w0d with pregnancy complicated by cholestasis, history of IUGR, single umbilical artery, thrombophilia affecting pregnancy (Prothrombin gene mutation heterozygote), asthma affecting pregnancy, history of cholestasis x 2 previous pregnancies, maternal anemia, unwanted fertility.         Postop diagnosis: Same    Indications: Yuki Shields is a 33 y.o.  at 37w0d who presented for scheduled induction of labor with unfavorable cervix for cholestasis of pregnancy. She was admitted and placed on pitocin for augmentation. She continued to progress and received an epidural for pain control. She underwent amniotomy at 1600 with moderate amount of clear fluid and her cervix was noted to be 2/60/-2. She continued to progress along a normal labor curve to complete dilation at 1925. I then presented to bedside for delivery. She pushed for 1 contraction.     Findings: Female infant, Apgar 8/9, Weight 2530  g (5 lb 9.2 oz); small first degree laceration noted and hemostatic    Anesthesia: Epidural     QBL: 273 cc     Placenta: Delivered spontaneously intact and sent to pathology     Cord gases: n/a     Complications: None    Procedure:The cervix was noted to be completely dilated, completely effaced, and +2 station. Under continuous fetal heart rate monitoring, the patient was encouraged to push. With good maternal effort she delivered a viable female infant. The head presented in the OA presentation and restituted to JOVANY. There was no nuchal cord present. The right anterior fetal shoulder was then easily delivered with a gentle downward motion followed by the posterior fetal shoulder, followed by the remainder of the infant without difficulty. The infant was immediately vigorous. The cord was clamped roughly 60 seconds following delivery. The cord was cut and the infant was placed on mother's chest. Cord blood was then collected. The placenta then delivered spontaneously intact, and a three vessel cord was noted. Uterine message and pitocin 20 units IV was given until the fundus was firm. The cervix, vagina, perineum, and rectum were carefully inspected for lacerations and small first degree perineal laceration that was noted to be hemostatic and left unrepaired. Counts for needles, sponges, laps and instruments were correct times two at the end of the delivery. There were no sponges left in the vagina. I was present and scrubbed for the entire delivery. There were no major complications. Mother and baby were bonding well at the end of the delivery.    Infant     Findings: female  infant     Infant observations: Weight: No birth weight on file.   Length:   in  Observations/Comments:  Gurinder ld 3      Apgars: 8  @ 1 minute /    9  @ 5 minutes         Placenta & Cord         Placenta delivered  Spontaneous  at   11/6/2023  7:42 PM     Cord: 3 vessels  present.   Nuchal Cord?  no   Cord blood obtained: Yes    Cord gases  "obtained:  No    Cord gas results: Venous:  No results found for: \"PHCVEN\", \"BECVEN\"    Arterial:  No results found for: \"PHCART\", \"BECART\"     Repair     Episiotomy: None     No    Lacerations: No   Estimated Blood Loss:       Quantitative Blood Loss: Quantitative Blood Loss (mL): 273 mL (11/06/23 2000)        Complications     none    Disposition     Mother to Mother Baby/Postpartum  in stable condition currently.  Baby to remains with mom  in stable condition currently.    Valerie Taylor MD  11/06/23  20:47 EST        "

## 2023-11-07 NOTE — ANESTHESIA POSTPROCEDURE EVALUATION
Patient: Yuki Shields    Procedure Summary       Date: 11/06/23 Room / Location:     Anesthesia Start: 1241 Anesthesia Stop: 1939    Procedure: LABOR ANALGESIA Diagnosis:     Scheduled Providers:  Provider: Beena Carbajal MD    Anesthesia Type: epidural ASA Status: 2            Anesthesia Type: epidural    Vitals  Vitals Value Taken Time   /70 11/06/23 2016   Temp 36.6 °C (97.9 °F) 11/06/23 1632   Pulse 109 11/06/23 2016   Resp     SpO2 100 % 11/06/23 2000           Post Anesthesia Care and Evaluation    No anesthesia care post op

## 2023-11-07 NOTE — LACTATION NOTE
This note was copied from a baby's chart.  P4, T baby-new admission. Mom BF each of her children for about 6 months.Rounded on PT at this time.She is BF at the moment and the latch looks deep. Encouraged her to try BF baby every 2-3 h. or PRN. Educated on the importance of stimulation for adequate milk supply. Instructions on how to wake up infant for feeding also given. Mom already ordered PBP through [employer], but is not sure if she will get a pump.Will call them to cancel the order tomorrow, so she can get a pump from here.. She denies any questions. Encouraged to call if needing help.

## 2023-11-07 NOTE — PLAN OF CARE
Goal Outcome Evaluation:           Progress: improving  Outcome Evaluation: VSS, voiding spontaneously, ambulating independently, pain adequately controlled, breast feeding

## 2023-11-07 NOTE — PROGRESS NOTES
Postpartum Progress Note      Status post Vaginal Delivery: Doing well postoperatively.     1) postpartum care immediately following delivery : Continue routine postpartum care.  Anticipate discharge home tomorrow.  2) cholestasis--patient currently on ursodiol    Rh status: O+  Rubella: Immune  Gender: Female    Subjective     Postpartum Day 1: Vaginal delivery    The patient feels well. The patient denies emotional concerns. Pain is well controlled with current medications. The baby is well. The patient is ambulating well. The patient is tolerating a normal diet.     Objective     Vital signs in last 24 hours:  Temp:  [97.5 °F (36.4 °C)-98.1 °F (36.7 °C)] 97.7 °F (36.5 °C)  Heart Rate:  [] 94  Resp:  [16] 16  BP: (100-140)/(60-92) 127/85      General:    alert, appears stated age, and cooperative   Abdomen:  Soft, Non-tender    Lochia:  appropriate   Uterine Fundus:   firm   Ext    Edema trace   DVT Evaluation:  No evidence of DVT seen on physical exam.     Lab Results   Component Value Date    WBC 18.42 (H) 11/07/2023    HGB 12.2 11/07/2023    HCT 35.5 11/07/2023    MCV 90.8 11/07/2023     11/07/2023       Rere Jose MD  11/7/2023  09:57 EST

## 2023-11-08 VITALS
HEART RATE: 106 BPM | SYSTOLIC BLOOD PRESSURE: 111 MMHG | TEMPERATURE: 97.7 F | RESPIRATION RATE: 16 BRPM | BODY MASS INDEX: 29.33 KG/M2 | WEIGHT: 160.4 LBS | DIASTOLIC BLOOD PRESSURE: 82 MMHG | OXYGEN SATURATION: 98 %

## 2023-11-08 PROCEDURE — 99238 HOSP IP/OBS DSCHRG MGMT 30/<: CPT | Performed by: OBSTETRICS & GYNECOLOGY

## 2023-11-08 RX ORDER — ENOXAPARIN SODIUM 100 MG/ML
40 INJECTION SUBCUTANEOUS NIGHTLY
Qty: 5.6 ML | Refills: 2 | Status: SHIPPED | OUTPATIENT
Start: 2023-11-08 | End: 2023-12-20

## 2023-11-08 RX ADMIN — DOCUSATE SODIUM 100 MG: 100 CAPSULE, LIQUID FILLED ORAL at 09:42

## 2023-11-08 RX ADMIN — Medication 1 TABLET: at 09:42

## 2023-11-08 RX ADMIN — IBUPROFEN 600 MG: 600 TABLET, FILM COATED ORAL at 11:26

## 2023-11-08 RX ADMIN — URSODIOL 300 MG: 300 CAPSULE ORAL at 00:00

## 2023-11-08 RX ADMIN — IBUPROFEN 600 MG: 600 TABLET, FILM COATED ORAL at 17:25

## 2023-11-08 RX ADMIN — URSODIOL 300 MG: 300 CAPSULE ORAL at 09:42

## 2023-11-08 RX ADMIN — IBUPROFEN 600 MG: 600 TABLET, FILM COATED ORAL at 04:22

## 2023-11-08 NOTE — PLAN OF CARE
Goal Outcome Evaluation:  Plan of Care Reviewed With: patient        Progress: improving  Outcome Evaluation: 2PP. VSS. Voiding and ambulating well. pain managed with prn motrin. bleeding wnl

## 2023-11-08 NOTE — LACTATION NOTE
Patient is being discharged. This is her 4th baby. [facility] denies breastfeeding concerns with patient . Mom has contact info for LC services.

## 2023-11-08 NOTE — PLAN OF CARE
Goal Outcome Evaluation:      Discharge education complete. Patient ready for discharge to home with .

## 2023-11-08 NOTE — LACTATION NOTE
This note was copied from a baby's chart.  Family sleeping, per report she is only formula feeding today.

## 2023-11-08 NOTE — PROGRESS NOTES
Subjective:    Cc:  Postpartum    Postpartum Day 2:     The patient feels well.  Pain is well controlled with current medications. The baby is well.  Urinary output is adequate. The patient is ambulating well. The patient is tolerating a normal diet. Flatus has been passed.      Objective:    Vital signs in last 24 hours:  Blood pressure 111/82, pulse 106, temperature 97.7 °F (36.5 °C), temperature source Oral, resp. rate 16, weight 72.8 kg (160 lb 6.4 oz), last menstrual period 02/20/2023, SpO2 98%, currently breastfeeding.      General:    alert, appears stated age, and cooperative   Uterine Fundus:   firm     Labs    Rh + / Female    Assessment/Plan:.     Postpartum Day #2  - Discharge home.  Reviewed instructions, restrictions and follow up.  - History of thrombophilia.  Continue Lovenox.      Bari Calderon MD

## 2023-12-04 ENCOUNTER — POSTPARTUM VISIT (OUTPATIENT)
Dept: OBSTETRICS AND GYNECOLOGY | Facility: CLINIC | Age: 33
End: 2023-12-04
Payer: MEDICAID

## 2023-12-04 VITALS
SYSTOLIC BLOOD PRESSURE: 108 MMHG | HEIGHT: 62 IN | BODY MASS INDEX: 27.6 KG/M2 | DIASTOLIC BLOOD PRESSURE: 75 MMHG | WEIGHT: 150 LBS

## 2023-12-04 DIAGNOSIS — Z30.09 ENCOUNTER FOR COUNSELING REGARDING CONTRACEPTION: ICD-10-CM

## 2023-12-04 DIAGNOSIS — D68.52 PROTHROMBIN GENE MUTATION: ICD-10-CM

## 2023-12-04 PROCEDURE — 99213 OFFICE O/P EST LOW 20 MIN: CPT | Performed by: STUDENT IN AN ORGANIZED HEALTH CARE EDUCATION/TRAINING PROGRAM

## 2023-12-04 NOTE — PROGRESS NOTES
Chief Complaint   Patient presents with    Postpartum Care     Bottle feeding     female 5lb9oz        SUBJECTIVE:   Yuki Shields is a 33 y.o.  who presents s/p  Spontaneous Vaginal Delivery on 23. She is 4 weeks postpartum. Her pregnancy was complicated by thrombophilia affecting pregnancy- Prothrombin gene mutation heterozygous, asthma, history of postpartum hemorrhage, maternal anemia, cholestasis of pregnancy, single umbilical artery affecting pregnancy, history of IUGR during pregnancy. Her postpartum course was uncomplicated and she has been continued on Lovenox 40 mg daily as prophylactic for 6 weeks. She reports doing well and has no complaints today. Bowel and bladder function have returned to normal. She report bleeding has stopped a week ago but has resumed again today as very light and dark bloody discharge. Denies mood changes and scored a 4 on the postpartum depression scale. She is bottle feeding. She would like to plan for tubal ligation in the summer and would like a Nexplanon for contraception until that time.     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 MEHTA AWARE OF THIS    Family hx-blood disorder     MOTHER HAS FACTOR 2 AND FACTOR 5    History of blood transfusion     Labral tear of hip, degenerative     RIGHT    Lumbar herniated disc 06/2017    X 2  DISC   HISTORY OF EPIDURALS    Urinary tract infection      Past Surgical History:   Procedure Laterality Date    ABDOMINOPLASTY  2019    BLADDER REPAIR  2016    WITH MESH    BREAST AUGMENTATION BILATERAL MASTOPEXY Bilateral 2017    Procedure: BREAST AUGMENTATION, MASTOPEXY W/ IDEAL IMPLANTS  NEW IMAGE;  Surgeon: Niraml Mehta MD;  Location: [facility] OR;  Service:     BREAST SURGERY      CHOLECYSTECTOMY  2012    COSMETIC SURGERY       "TONSILLECTOMY AND ADENOIDECTOMY       Social History     Tobacco Use    Smoking status: Never    Smokeless tobacco: Never   Vaping Use    Vaping Use: Never used   Substance Use Topics    Alcohol use: Not Currently     Comment: OCCAS    Drug use: No     Family History   Problem Relation Age of Onset    Malig Hyperthermia Neg Hx        Patient Active Problem List   Diagnosis    Anemia    Coagulopathy    DDD (degenerative disc disease), lumbar    Factor II deficiency    Folic acid deficiency    Memory impairment    Obesity    Pain    Stress    Asthma     (spontaneous vaginal delivery)        OBJECTIVE:   /75   Ht 157.5 cm (62.01\")   Wt 68 kg (150 lb)   LMP 2023   Breastfeeding No   BMI 27.43 kg/m²    Physical Examination:   General appearance - alert, well appearing, and in no distress  Breasts - Examined in supine position  Symmetric without masses or skin dimpling  Nipples normal without inversion, lesions or discharge  There are no palpable axillary nodes  Chest - no tachypnea, retractions or cyanosis  Heart - normal rate and regular rhythm  Abdomen - soft, nontender, nondistended, no masses or organomegaly  Pelvic - normal external female genitalia, normal vulva, normal appearing vaginal mucosa with moderate amount of red-brown lochia in vaginal vault, normal appearing cervix, uterus non-tender and normal sized, no adnexal masses or tenderness   Neurological - screening mental status exam normal  Musculoskeletal - no joint tenderness, deformity or swelling  Psychiatric - normal mood and affect    Lab Results   Component Value Date    WBC 18.42 (H) 2023    HGB 12.2 2023    HCT 35.5 2023    MCV 90.8 2023     2023        ASSESSMENT:   S/p    2.   H/o Prothrombin gene mutation heterozygous  3.   Contraceptive counseling    PLAN:   Doing well postpartum.  Recommend continuing Lovenox 40 mg daily until she is 6 weeks postpartum.   Baby is doing well. "   Contraception - We discussed contraceptive options and the patient is between a Mirena IUD and Nexplanon as she would like something long acting and reversible until she has her tubal ligation. She would like to proceed with surgery over the summer time and would like to have Nexplanon inserted in the meantime. Will have her return in 2 weeks for Nexplanon insertion.   At this time, continue pelvic rest and lifting precautions. She may return to work without restrictions next Monday on 12/11/23 as she does not lift or have to perform any strenuous activities. Will plan to remove restrictions of pelvic rest and lifting precautions at next visit.   Reviewed last pap smear - 4/10/23- NILM, negative HPV.   Return in about 22 days (around 12/26/2023) for Nexplanon insertion .    Valerie Taylor MD       Statement Selected Statement Selected Statement Selected Statement Selected

## 2023-12-05 DIAGNOSIS — L29.9 PRURITUS OF PREGNANCY IN THIRD TRIMESTER: ICD-10-CM

## 2023-12-05 DIAGNOSIS — O99.713 PRURITUS OF PREGNANCY IN THIRD TRIMESTER: ICD-10-CM

## 2023-12-05 RX ORDER — URSODIOL 300 MG/1
300 CAPSULE ORAL 2 TIMES DAILY
Qty: 60 CAPSULE | Refills: 0 | OUTPATIENT
Start: 2023-12-05

## 2023-12-11 ENCOUNTER — TELEPHONE (OUTPATIENT)
Dept: OBSTETRICS AND GYNECOLOGY | Facility: CLINIC | Age: 33
End: 2023-12-11
Payer: MEDICAID

## 2023-12-11 NOTE — TELEPHONE ENCOUNTER
Pt called said you released her for maternity leave today, however baby is sick and she wants to know if you could extend her leave until Monday 12/18. Please Advise?

## 2023-12-26 ENCOUNTER — OFFICE VISIT (OUTPATIENT)
Dept: OBSTETRICS AND GYNECOLOGY | Facility: CLINIC | Age: 33
End: 2023-12-26
Payer: MEDICAID

## 2023-12-26 VITALS
WEIGHT: 150 LBS | BODY MASS INDEX: 27.6 KG/M2 | DIASTOLIC BLOOD PRESSURE: 82 MMHG | SYSTOLIC BLOOD PRESSURE: 120 MMHG | HEIGHT: 62 IN

## 2023-12-26 DIAGNOSIS — Z30.017 NEXPLANON INSERTION: Primary | ICD-10-CM

## 2023-12-26 LAB
B-HCG UR QL: NEGATIVE
EXPIRATION DATE: NORMAL
INTERNAL NEGATIVE CONTROL: NEGATIVE
INTERNAL POSITIVE CONTROL: POSITIVE
Lab: NORMAL

## 2023-12-26 NOTE — PROGRESS NOTES
Nexplanon Insertion:    Pre Dx: 1) Nexplanon Insertion   Post Dx: 1) Nexplanon Insertion     Risks, benefits, alternatives explained. All questions answered. Consents signed.   Most recent LMP was 12/17/23.   Patient placed on examined table in supine position with her nondominant left arm flexed at the elbow and hand resting under her head. Nexplanon to be inserted into nondominant arm. The area for insertion prepped with betadine in a sterile fashion. About 2 mL of 1% lidocaine.     The insertion site was identified approximately 8-10 cm proximal to the humoral epicondyle and 3-4 cm below with sulcus of the triceps and biceps muscle. The skin at the insertion site was stretched by my thumb and index finger. Then inserted the needle tip, bevel side up, at an angle not greater than 30° to the skin surface, just until the skin was penetrated to below the bevel. The applicator was then lowered so that it was parallel to the arm. To minimize the chance of deep incision, the skin was tented upwards with the tip of the needle. The needle was then gently inserted to the full length. Then fixed the device in place on the arm with one hand. I then slowly and carefully retracted the needle back by retracting the release lever. The obturator was seen in the device to determine that the nexplanon had been released.     Both the patient and I were easily able to feel the device under the skin. Steri-Strips were applied at the site, and the arm was gently wrapped with gauze.    There were no complications.   The patient tolerated the procedure well.    She was given a reminder card. She was advised to use condoms as a backup method for at least a week after insertion.    Expectations, warning signs and limitations reviewed.     Valerie Taylor MD   12/26/2023  14:28 EST

## 2024-01-04 ENCOUNTER — OFFICE VISIT (OUTPATIENT)
Dept: ONCOLOGY | Facility: CLINIC | Age: 34
End: 2024-01-04
Payer: MEDICAID

## 2024-01-04 ENCOUNTER — LAB (OUTPATIENT)
Dept: LAB | Facility: HOSPITAL | Age: 34
End: 2024-01-04
Payer: MEDICAID

## 2024-01-04 VITALS
TEMPERATURE: 98.2 F | DIASTOLIC BLOOD PRESSURE: 76 MMHG | SYSTOLIC BLOOD PRESSURE: 120 MMHG | BODY MASS INDEX: 27.71 KG/M2 | OXYGEN SATURATION: 100 % | HEART RATE: 65 BPM | RESPIRATION RATE: 16 BRPM | HEIGHT: 62 IN | WEIGHT: 150.6 LBS

## 2024-01-04 DIAGNOSIS — D68.52 PROTHROMBIN GENE MUTATION: ICD-10-CM

## 2024-01-04 DIAGNOSIS — D68.52 PROTHROMBIN GENE MUTATION: Primary | ICD-10-CM

## 2024-01-04 DIAGNOSIS — D50.0 IRON DEFICIENCY ANEMIA DUE TO CHRONIC BLOOD LOSS: ICD-10-CM

## 2024-01-04 DIAGNOSIS — E53.8 VITAMIN B12 DEFICIENCY: ICD-10-CM

## 2024-01-04 DIAGNOSIS — Z79.01 CHRONIC ANTICOAGULATION: ICD-10-CM

## 2024-01-04 DIAGNOSIS — R74.8 ELEVATED LIVER ENZYMES: ICD-10-CM

## 2024-01-04 LAB
ALBUMIN SERPL-MCNC: 4.1 G/DL (ref 3.5–5.2)
ALBUMIN/GLOB SERPL: 1.4 G/DL
ALP SERPL-CCNC: 265 U/L (ref 39–117)
ALT SERPL W P-5'-P-CCNC: 111 U/L (ref 1–33)
ANION GAP SERPL CALCULATED.3IONS-SCNC: 8.1 MMOL/L (ref 5–15)
AST SERPL-CCNC: 79 U/L (ref 1–32)
BASOPHILS # BLD AUTO: 0.03 10*3/MM3 (ref 0–0.2)
BASOPHILS NFR BLD AUTO: 0.4 % (ref 0–1.5)
BILIRUB SERPL-MCNC: 0.4 MG/DL (ref 0–1.2)
BUN SERPL-MCNC: 11 MG/DL (ref 6–20)
BUN/CREAT SERPL: 11.8 (ref 7–25)
CALCIUM SPEC-SCNC: 9.1 MG/DL (ref 8.6–10.5)
CHLORIDE SERPL-SCNC: 108 MMOL/L (ref 98–107)
CO2 SERPL-SCNC: 26.9 MMOL/L (ref 22–29)
CREAT SERPL-MCNC: 0.93 MG/DL (ref 0.57–1)
DEPRECATED RDW RBC AUTO: 40.2 FL (ref 37–54)
EGFRCR SERPLBLD CKD-EPI 2021: 82.9 ML/MIN/1.73
EOSINOPHIL # BLD AUTO: 0.14 10*3/MM3 (ref 0–0.4)
EOSINOPHIL NFR BLD AUTO: 1.7 % (ref 0.3–6.2)
ERYTHROCYTE [DISTWIDTH] IN BLOOD BY AUTOMATED COUNT: 11.9 % (ref 12.3–15.4)
FERRITIN SERPL-MCNC: 153 NG/ML (ref 13–150)
GLOBULIN UR ELPH-MCNC: 2.9 GM/DL
GLUCOSE SERPL-MCNC: 88 MG/DL (ref 65–99)
HCT VFR BLD AUTO: 38.4 % (ref 34–46.6)
HGB BLD-MCNC: 12.7 G/DL (ref 12–15.9)
IMM GRANULOCYTES # BLD AUTO: 0.04 10*3/MM3 (ref 0–0.05)
IMM GRANULOCYTES NFR BLD AUTO: 0.5 % (ref 0–0.5)
IRON 24H UR-MRATE: 49 MCG/DL (ref 37–145)
IRON SATN MFR SERPL: 18 % (ref 20–50)
LYMPHOCYTES # BLD AUTO: 1.87 10*3/MM3 (ref 0.7–3.1)
LYMPHOCYTES NFR BLD AUTO: 22.6 % (ref 19.6–45.3)
MCH RBC QN AUTO: 30.3 PG (ref 26.6–33)
MCHC RBC AUTO-ENTMCNC: 33.1 G/DL (ref 31.5–35.7)
MCV RBC AUTO: 91.6 FL (ref 79–97)
MONOCYTES # BLD AUTO: 0.33 10*3/MM3 (ref 0.1–0.9)
MONOCYTES NFR BLD AUTO: 4 % (ref 5–12)
NEUTROPHILS NFR BLD AUTO: 5.85 10*3/MM3 (ref 1.7–7)
NEUTROPHILS NFR BLD AUTO: 70.8 % (ref 42.7–76)
NRBC BLD AUTO-RTO: 0 /100 WBC (ref 0–0.2)
PLATELET # BLD AUTO: 253 10*3/MM3 (ref 140–450)
PMV BLD AUTO: 9 FL (ref 6–12)
POTASSIUM SERPL-SCNC: 4.1 MMOL/L (ref 3.5–5.2)
PROT SERPL-MCNC: 7 G/DL (ref 6–8.5)
RBC # BLD AUTO: 4.19 10*6/MM3 (ref 3.77–5.28)
SODIUM SERPL-SCNC: 143 MMOL/L (ref 136–145)
TIBC SERPL-MCNC: 272 MCG/DL (ref 298–536)
TRANSFERRIN SERPL-MCNC: 194 MG/DL (ref 200–360)
WBC NRBC COR # BLD AUTO: 8.26 10*3/MM3 (ref 3.4–10.8)

## 2024-01-04 PROCEDURE — 1126F AMNT PAIN NOTED NONE PRSNT: CPT | Performed by: INTERNAL MEDICINE

## 2024-01-04 PROCEDURE — 84466 ASSAY OF TRANSFERRIN: CPT

## 2024-01-04 PROCEDURE — 85025 COMPLETE CBC W/AUTO DIFF WBC: CPT

## 2024-01-04 PROCEDURE — 1159F MED LIST DOCD IN RCRD: CPT | Performed by: INTERNAL MEDICINE

## 2024-01-04 PROCEDURE — 36415 COLL VENOUS BLD VENIPUNCTURE: CPT

## 2024-01-04 PROCEDURE — 82728 ASSAY OF FERRITIN: CPT

## 2024-01-04 PROCEDURE — 83540 ASSAY OF IRON: CPT

## 2024-01-04 PROCEDURE — 99214 OFFICE O/P EST MOD 30 MIN: CPT | Performed by: INTERNAL MEDICINE

## 2024-01-04 PROCEDURE — 80053 COMPREHEN METABOLIC PANEL: CPT

## 2024-01-04 PROCEDURE — 1160F RVW MEDS BY RX/DR IN RCRD: CPT | Performed by: INTERNAL MEDICINE

## 2024-01-04 RX ORDER — LANOLIN ALCOHOL/MO/W.PET/CERES
1000 CREAM (GRAM) TOPICAL WEEKLY
Start: 2024-01-04

## 2024-01-04 RX ORDER — FERROUS SULFATE 325(65) MG
325 TABLET ORAL WEEKLY
Start: 2024-01-04

## 2024-01-04 NOTE — PROGRESS NOTES
"Subjective     CHIEF COMPLAINT:      Chief Complaint   Patient presents with    Follow-up     Lab review     HISTORY OF PRESENT ILLNESS:     Yuki Shields is a 33 y.o. female patient who returns today for follow up on her prothrombin gene mutation. She delivered on 11/6/2023. She and her baby are doing good. She completed the 6 week course of Lovenox after delivery. She is not having problem with leg pain or swelling. No chest pain or shortness of breath. No abdominal pain or itching. She is not breastfeeding. She stopped taking the prenatal vitamin, iron and vitamin B12 after she gave birth.     ROS:  Pertinent ROS is in the HPI.     Past medical, surgical, social and family history were reviewed.     MEDICATIONS:    Current Outpatient Medications:     albuterol sulfate  (90 Base) MCG/ACT inhaler, Inhale 2 puffs Every 4 (Four) Hours As Needed for Wheezing., Disp: 6.7 g, Rfl: 1    buPROPion XL (Wellbutrin XL) 150 MG 24 hr tablet, Take 1 tablet by mouth Every Morning., Disp: 90 tablet, Rfl: 1    busPIRone (BUSPAR) 7.5 MG tablet, Take 1 tablet by mouth Daily. for anxiety, Disp: 90 tablet, Rfl: 2    Loratadine (CLARITIN PO), Take  by mouth Daily., Disp: , Rfl:     Mometasone Furoate (Asmanex, 30 Metered Doses,) 220 MCG/ACT inhaler, Inhale 1 puff Every Night., Disp: 1 each, Rfl: 11    pantoprazole (PROTONIX) 20 MG EC tablet, Take 1 tablet by mouth Daily., Disp: 30 tablet, Rfl: 1    prenatal vitamin (prenatal, CLASSIC, vitamin) tablet, Take  by mouth Daily., Disp: , Rfl:     Objective     VITAL SIGNS:     Vitals:    01/04/24 1107   BP: 120/76   Pulse: 65   Resp: 16   Temp: 98.2 °F (36.8 °C)   TempSrc: Temporal   SpO2: 100%   Weight: 68.3 kg (150 lb 9.6 oz)   Height: 157.5 cm (62.01\")   PainSc: 0-No pain     Body mass index is 27.54 kg/m².     Wt Readings from Last 5 Encounters:   01/04/24 68.3 kg (150 lb 9.6 oz)   12/26/23 68 kg (150 lb)   12/04/23 68 kg (150 lb)   11/06/23 72.8 kg (160 lb 6.4 oz)   11/01/23 74.7 " kg (164 lb 9.6 oz)     PHYSICAL EXAMINATION:   GENERAL: The patient appears in good general condition, not in acute distress.   SKIN: No ecchymosis.  EYES: No jaundice. No Pallor.  CHEST: Normal respiratory effort.   CVS: No edema.  ABDOMEN: Non-distended  EXTREMITIES: No edema. No calf tenderness.     DIAGNOSTIC DATA:     Results from last 7 days   Lab Units 01/04/24  1004   WBC 10*3/mm3 8.26   NEUTROS ABS 10*3/mm3 5.85   HEMOGLOBIN g/dL 12.7   HEMATOCRIT % 38.4   PLATELETS 10*3/mm3 253     Results from last 7 days   Lab Units 01/04/24  1004   SODIUM mmol/L 143   POTASSIUM mmol/L 4.1   CHLORIDE mmol/L 108*   CO2 mmol/L 26.9   BUN mg/dL 11   CREATININE mg/dL 0.93   CALCIUM mg/dL 9.1   ALBUMIN g/dL 4.1   BILIRUBIN mg/dL 0.4   ALK PHOS U/L 265*   ALT (SGPT) U/L 111*   AST (SGOT) U/L 79*   GLUCOSE mg/dL 88         Lab 01/04/24  1004   IRON 49   IRON SATURATION (TSAT) 18*   TIBC 272*   TRANSFERRIN 194*   FERRITIN 153.00*          Assessment & Plan    *Prothrombin gene mutation.  Patient was diagnosed when she was a teenager after her aunt and mother were both diagnosed with VTE.  The patient's mother has prothrombin gene and factor V Leiden mutations.  Patient's sister has factor V Leiden mutation and had miscarriages.  The patient herself has no prior history of venous thromboembolism.  She was previously placed on prophylactic anticoagulation with Lovenox 40 mg during her previous pregnancies.  Patient became pregnant in 2023.  Patient was started on Lovenox 40 mg SQ daily at 6 weeks gestation.  On 10/19/2023, she was switched to heparin 5000 SQ every 12 hours.  She delivered on 11/6/2023.  She just completed the 6 weeks of Lovenox 40 mg SQ daily after delivery.   She is doing well with no evidence of development of DVT or PE.    With her prothrombin gene mutation, she is at increased risk for development of DVT/PE in the future.  She states that she is not planning to become pregnant again in the future.  I recommend  maintaining adequate hydration and avoiding prolonged sitting.  I also recommended taking aspirin 81 mg the day she travels and the day after.    *Iron deficiency anemia.  Patient developed severe anemia after she had bleeding after abdominoplasty surgery in 2019.  Reports that her hemoglobin decreased to 3.0 and required PRBC transfusions.  She was in the ICU at that time.  Patient reports weakness in the legs.  6/12/2023: Hemoglobin 12.1.  Hemoglobin decreased to 11.8 on 7/25/2023.  She was started on ferrous sulfate 325 mg once daily.  9/5/2023: Hemoglobin 12.3.  Transferrin saturation decreased to 13%.  Ferritin is 37.2.   She was continued on the ferrous sulfate daily along with the prenatal vitamin.  11/1/2023: Hemoglobin 12.6.  Iron stores remained low with a transferrin saturation at 6%.  She continued on ferrous sulfate until she delivered.  1/4/2024: Hemoglobin 12.7.  Ferritin 153.  Transferrin saturation 18%.    *Vitamin B12 deficiency   Vitamin B12 is low normal at 304.   She was started on vitamin B12 1000 mcg daily.  11/1/2023: Hemoglobin improved to 12.6.  Vitamin B12 improved to 920.  She stopped taking the vitamin B12.    *Cholestasis of pregnancy.  Patient reports developing this with her previous pregnancies.  Patient reports developing itching.  Her OB started her on Ursodiol and Atarax.  11/1/2023: ALT 86 and AST 87.   Bilirubin 0.4. ALK PHOS 297.  1/4/2024: ALT increased to 111.  AST decreased to 79.  Bilirubin 0.4.  Alk phos decreased to 265.  I recommended repeating the CMP in 1 month.  If it remains elevated, I recommended evaluation by GI/Hepatology.    PLAN:    1.  Repeat CMP in 1 month.  2.  Restart ferrous sulfate 325 mg once weekly.  3.  Restart vitamin B12 1000 mcg once weekly.  4.  I recommend maintaining adequate hydration.  I also recommended taking aspirin 81 mg the day of and the day after she travels.  5.  Follow-up in 1 year.  Will obtain CBC CMP ferritin iron panel vitamin B12  and folate levels.        Magan Guido MD  01/04/24

## 2024-02-07 ENCOUNTER — PROCEDURE VISIT (OUTPATIENT)
Dept: OBSTETRICS AND GYNECOLOGY | Facility: CLINIC | Age: 34
End: 2024-02-07
Payer: MEDICAID

## 2024-02-07 ENCOUNTER — LAB (OUTPATIENT)
Dept: LAB | Facility: HOSPITAL | Age: 34
End: 2024-02-07
Payer: MEDICAID

## 2024-02-07 VITALS
WEIGHT: 153 LBS | HEIGHT: 62 IN | SYSTOLIC BLOOD PRESSURE: 127 MMHG | DIASTOLIC BLOOD PRESSURE: 86 MMHG | BODY MASS INDEX: 28.16 KG/M2

## 2024-02-07 DIAGNOSIS — D50.0 IRON DEFICIENCY ANEMIA DUE TO CHRONIC BLOOD LOSS: ICD-10-CM

## 2024-02-07 DIAGNOSIS — E53.8 VITAMIN B12 DEFICIENCY: ICD-10-CM

## 2024-02-07 DIAGNOSIS — Z30.46 NEXPLANON REMOVAL: Primary | ICD-10-CM

## 2024-02-07 DIAGNOSIS — R74.8 ELEVATED LIVER ENZYMES: ICD-10-CM

## 2024-02-07 DIAGNOSIS — D68.52 PROTHROMBIN GENE MUTATION: ICD-10-CM

## 2024-02-07 DIAGNOSIS — Z30.09 UNWANTED FERTILITY: ICD-10-CM

## 2024-02-07 LAB
ALBUMIN SERPL-MCNC: 4.3 G/DL (ref 3.5–5.2)
ALBUMIN/GLOB SERPL: 1.5 G/DL
ALP SERPL-CCNC: 122 U/L (ref 39–117)
ALT SERPL W P-5'-P-CCNC: 20 U/L (ref 1–33)
ANION GAP SERPL CALCULATED.3IONS-SCNC: 10.1 MMOL/L (ref 5–15)
AST SERPL-CCNC: 17 U/L (ref 1–32)
BILIRUB SERPL-MCNC: 0.3 MG/DL (ref 0–1.2)
BUN SERPL-MCNC: 12 MG/DL (ref 6–20)
BUN/CREAT SERPL: 12.8 (ref 7–25)
CALCIUM SPEC-SCNC: 10 MG/DL (ref 8.6–10.5)
CHLORIDE SERPL-SCNC: 104 MMOL/L (ref 98–107)
CO2 SERPL-SCNC: 25.9 MMOL/L (ref 22–29)
CREAT SERPL-MCNC: 0.94 MG/DL (ref 0.57–1)
EGFRCR SERPLBLD CKD-EPI 2021: 81.8 ML/MIN/1.73
GLOBULIN UR ELPH-MCNC: 2.9 GM/DL
GLUCOSE SERPL-MCNC: 113 MG/DL (ref 65–99)
POTASSIUM SERPL-SCNC: 4.2 MMOL/L (ref 3.5–5.2)
PROT SERPL-MCNC: 7.2 G/DL (ref 6–8.5)
SODIUM SERPL-SCNC: 140 MMOL/L (ref 136–145)

## 2024-02-07 PROCEDURE — 80053 COMPREHEN METABOLIC PANEL: CPT

## 2024-02-07 PROCEDURE — 36415 COLL VENOUS BLD VENIPUNCTURE: CPT

## 2024-02-07 NOTE — PROGRESS NOTES
Procedure Note     Pre-Operative Diagnosis: 1. Desire for removal of Nexplanon device from left arm    Post-Operative Diagnosis: Same     Procedure: Nexplanon removal    Anesthetic: 3mL 1% plain Xylocaine     Estimated Blood Loss: Nil     Complications: no complications were noted     Counseling: Patient was seen and counseled.  Patient desires removal of the implant device because of irregular vaginal bleeding, weight gain, and mood swings.  She was counseled on other contraceptive options and would like to just schedule her tubal ligation in June. Will place case request for laparoscopic bilateral salpingectomy.    The risks of removal were reviewed including bleeding, infection, damage to surrounding structures, and scarring.    Description of Procedure:   Patient was consented for procedure; questions were answered. The implant was identified in her left upper extremity . The area of the distal implant was cleaned with alcohol and injected with 3mL of 1% lidocaine.  The skin was prepped with betadine and incised approximately 0.4cm perpendicular to the plane of the implant.  The implant was identified and grasped with a hemostat.  The overlying capsule was dissected off with a hemostat, and the implant was removed in its entirety.  The incision was re-approximated with steri strips and pressure dressing placed for 24 hours. Patient tolerated the procedure well.     Valerie Taylor MD         Case request - bilateral salpingectomy

## 2024-02-08 ENCOUNTER — TELEPHONE (OUTPATIENT)
Dept: ONCOLOGY | Facility: CLINIC | Age: 34
End: 2024-02-08
Payer: MEDICAID

## 2024-02-08 NOTE — TELEPHONE ENCOUNTER
----- Message from Magan Guido MD sent at 2/7/2024  4:47 PM EST -----  Please inform the patient that her CMP showed normalization of 2 liver enzymes (ALT and AST). Alkaline phosphatase significantly improved. Therefore, no additional workup is needed.    Thank you

## 2024-02-12 ENCOUNTER — OFFICE VISIT (OUTPATIENT)
Dept: FAMILY MEDICINE CLINIC | Facility: CLINIC | Age: 34
End: 2024-02-12
Payer: MEDICAID

## 2024-02-12 VITALS
DIASTOLIC BLOOD PRESSURE: 70 MMHG | WEIGHT: 158 LBS | HEIGHT: 62 IN | HEART RATE: 89 BPM | SYSTOLIC BLOOD PRESSURE: 118 MMHG | BODY MASS INDEX: 29.08 KG/M2 | OXYGEN SATURATION: 100 %

## 2024-02-12 DIAGNOSIS — R63.4 WEIGHT LOSS: Primary | ICD-10-CM

## 2024-02-12 DIAGNOSIS — Z79.899 HIGH RISK MEDICATION USE: ICD-10-CM

## 2024-02-12 DIAGNOSIS — M50.30 DDD (DEGENERATIVE DISC DISEASE), CERVICAL: ICD-10-CM

## 2024-02-12 PROBLEM — Z30.09 UNWANTED FERTILITY: Status: ACTIVE | Noted: 2024-02-07

## 2024-02-12 PROCEDURE — 99214 OFFICE O/P EST MOD 30 MIN: CPT | Performed by: NURSE PRACTITIONER

## 2024-02-12 RX ORDER — TOPIRAMATE 50 MG/1
50 TABLET, FILM COATED ORAL DAILY
Qty: 90 TABLET | Refills: 1 | Status: SHIPPED | OUTPATIENT
Start: 2024-02-12

## 2024-02-12 RX ORDER — PHENTERMINE HYDROCHLORIDE 37.5 MG/1
37.5 CAPSULE ORAL EVERY MORNING
Qty: 30 CAPSULE | Refills: 0 | Status: SHIPPED | OUTPATIENT
Start: 2024-02-12

## 2024-02-12 RX ORDER — PREDNISONE 10 MG/1
TABLET ORAL
Qty: 28 TABLET | Refills: 0 | Status: SHIPPED | OUTPATIENT
Start: 2024-02-12 | End: 2024-02-23

## 2024-02-19 LAB — DRUGS UR: NORMAL

## 2024-03-11 ENCOUNTER — TELEPHONE (OUTPATIENT)
Dept: OBSTETRICS AND GYNECOLOGY | Facility: CLINIC | Age: 34
End: 2024-03-11
Payer: MEDICAID

## 2024-03-11 DIAGNOSIS — F41.9 ANXIETY: ICD-10-CM

## 2024-03-11 RX ORDER — BUPROPION HYDROCHLORIDE 150 MG/1
150 TABLET ORAL EVERY MORNING
Qty: 90 TABLET | Refills: 1 | Status: SHIPPED | OUTPATIENT
Start: 2024-03-11 | End: 2025-03-11

## 2024-03-11 NOTE — TELEPHONE ENCOUNTER
Pt wanted to move her procedure to 5-29. Pt is now rescheduled to that date at [place] at 7 am. Pt is also aware she has until 4-25 to come into any of our offices to sign a new medicaid tubal consent form.     Maira Vo

## 2024-03-13 DIAGNOSIS — R63.4 WEIGHT LOSS: ICD-10-CM

## 2024-03-13 RX ORDER — PHENTERMINE HYDROCHLORIDE 37.5 MG/1
37.5 CAPSULE ORAL EVERY MORNING
Qty: 30 CAPSULE | Refills: 0 | Status: SHIPPED | OUTPATIENT
Start: 2024-03-13

## 2024-04-05 ENCOUNTER — TELEPHONE (OUTPATIENT)
Dept: OBSTETRICS AND GYNECOLOGY | Facility: CLINIC | Age: 34
End: 2024-04-05

## 2024-04-11 ENCOUNTER — INITIAL PRENATAL (OUTPATIENT)
Dept: OBSTETRICS AND GYNECOLOGY | Facility: CLINIC | Age: 34
End: 2024-04-11
Payer: MEDICAID

## 2024-04-11 VITALS — SYSTOLIC BLOOD PRESSURE: 146 MMHG | BODY MASS INDEX: 25.6 KG/M2 | DIASTOLIC BLOOD PRESSURE: 91 MMHG | WEIGHT: 140 LBS

## 2024-04-11 DIAGNOSIS — Z32.01 POSITIVE PREGNANCY TEST: ICD-10-CM

## 2024-04-11 DIAGNOSIS — J45.909 ASTHMA DURING PREGNANCY: ICD-10-CM

## 2024-04-11 DIAGNOSIS — Z34.91 INITIAL OBSTETRIC VISIT IN FIRST TRIMESTER: Primary | ICD-10-CM

## 2024-04-11 DIAGNOSIS — O21.9 NAUSEA AND VOMITING OF PREGNANCY, ANTEPARTUM: ICD-10-CM

## 2024-04-11 DIAGNOSIS — Z3A.01 LESS THAN 8 WEEKS GESTATION OF PREGNANCY: ICD-10-CM

## 2024-04-11 DIAGNOSIS — O09.521 MULTIGRAVIDA OF ADVANCED MATERNAL AGE IN FIRST TRIMESTER: ICD-10-CM

## 2024-04-11 DIAGNOSIS — D68.59 THROMBOPHILIA AFFECTING PREGNANCY, ANTEPARTUM: ICD-10-CM

## 2024-04-11 DIAGNOSIS — O99.519 ASTHMA DURING PREGNANCY: ICD-10-CM

## 2024-04-11 DIAGNOSIS — Z87.19 HISTORY OF CHOLESTASIS DURING PREGNANCY: ICD-10-CM

## 2024-04-11 DIAGNOSIS — Z87.59 HISTORY OF CHOLESTASIS DURING PREGNANCY: ICD-10-CM

## 2024-04-11 DIAGNOSIS — O99.119 THROMBOPHILIA AFFECTING PREGNANCY, ANTEPARTUM: ICD-10-CM

## 2024-04-11 RX ORDER — PROMETHAZINE HYDROCHLORIDE 12.5 MG/1
12.5 TABLET ORAL EVERY 6 HOURS PRN
Qty: 30 TABLET | Refills: 1 | Status: SHIPPED | OUTPATIENT
Start: 2024-04-11

## 2024-04-11 NOTE — PROGRESS NOTES
Initial OB Visit    Chief Complaint   Patient presents with    Initial Prenatal Visit     PT has concern that before she found out she was pregnant she had a cervical medial branch block with pain management.        Yuki Shields is being seen today for her first obstetrical visit.  She is a 33 y.o.  at 4w5d gestation by LMP 3/9/24.     This is not a planned pregnancy.   OB History    Para Term  AB Living   5 4 4     4   SAB IAB Ectopic Molar Multiple Live Births           0 4      # Outcome Date GA Lbr Miguel/2nd Weight Sex Type Anes PTL Lv   5 Current            4 Term 23 37w0d 01:05 / 00:14 2530 g (5 lb 9.2 oz) F Vag-Spont EPI N MELANIA      Birth Comments: Panda ld 3   3 Term 04/29/15   3317 g (7 lb 5 oz) M Vag-Spont   MELANIA   2 Term 11   3147 g (6 lb 15 oz) F Vag-Spont      1 Term 07   3204 g (7 lb 1 oz) M Vag-Spont          Current obstetric complaints: She is concerned that she had cervical medial branch block under IV sedation  a week ago when she did not know she was yet pregnant. Her positive pregnancy test was this past Monday. She is also very concerned that she is only 5 months postpartum from her previous pregnancy. She is not sure how she would like to proceed with this pregnancy given above concerns. Denies vaginal bleeding or abdominal cramping. She is having nausea without vomiting.   Prior obstetric issues, potential pregnancy concerns:    G1- - TSVD - low apgar scores. Postpartum hemorrhage.    G2- - TSVD   G3- - TSVD- complicated by cholestasis of pregnancy  G4- 23-  at 37w0d complicated by cholestasis of pregnancy, single umbilical artery, thrombophilia (Prothrombin gene mutation heterozygote), maternal anemia, asthma, history of IUGR   G5- Current   Family history of genetic issues (includes FOB): There is a maternal family history of multiple thrombophilia including Factor 2, Factor 5, Factor 8.    Prior infections concerning in pregnancy  (Rash, fever since LMP): denies  Varicella Hx:She has had the chicken pox as a child.   Prior genetic testing: denies   History of abnormal pap smears: denies; last pap smear-   History of STIs: denies History of HSV in self or partner? 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 MEHTA AWARE OF THIS    Family hx-blood disorder     MOTHER HAS FACTOR 2 AND FACTOR 5    History of blood transfusion     Labral tear of hip, degenerative     RIGHT    Low back pain     Lumbar herniated disc 06/2017    X 2  DISC   HISTORY OF EPIDURALS    Urinary tract infection        Past Surgical History:   Procedure Laterality Date    ABDOMINOPLASTY  2019    BLADDER REPAIR  2016    WITH MESH    BREAST AUGMENTATION BILATERAL MASTOPEXY Bilateral 07/31/2017    Procedure: BREAST AUGMENTATION, MASTOPEXY W/ IDEAL IMPLANTS  NEW IMAGE;  Surgeon: Nirmal Mehta MD;  Location: [facility];  Service:     BREAST SURGERY      CHOLECYSTECTOMY  2012    COSMETIC SURGERY      TONSILLECTOMY AND ADENOIDECTOMY  1990         Current Outpatient Medications:     albuterol sulfate  (90 Base) MCG/ACT inhaler, Inhale 2 puffs Every 4 (Four) Hours As Needed for Wheezing., Disp: 6.7 g, Rfl: 1    buPROPion XL (Wellbutrin XL) 150 MG 24 hr tablet, Take 1 tablet by mouth Every Morning., Disp: 90 tablet, Rfl: 1    busPIRone (BUSPAR) 7.5 MG tablet, Take 1 tablet by mouth Daily. for anxiety, Disp: 90 tablet, Rfl: 2    ferrous sulfate 325 (65 FE) MG tablet, Take 1 tablet by mouth 1 (One) Time Per Week., Disp: , Rfl:     HYDROcodone-acetaminophen (Norco) 5-325 MG per tablet, Take 1 tablet by mouth Every 6 (Six) Hours As Needed for Moderate Pain or Severe Pain., Disp: 10 tablet, Rfl: 0    ibuprofen (ADVIL,MOTRIN) 600 MG tablet, Take 1 tablet by mouth  Every 6 (Six) Hours As Needed for Mild Pain or Moderate Pain for up to 5 days., Disp: 20 tablet, Rfl: 0    Loratadine (CLARITIN PO), Take  by mouth Daily., Disp: , Rfl:     miSOPROStol (CYTOTEC) 200 MCG tablet, Place 3 tablets in the vagina once., Disp: 3 tablet, Rfl: 0    Mometasone Furoate (Asmanex, 30 Metered Doses,) 220 MCG/ACT inhaler, Inhale 1 puff Every Night., Disp: 1 each, Rfl: 11    ondansetron (Zofran) 4 MG tablet, Take 1 tablet by mouth Every 8 (Eight) Hours As Needed for Nausea or Vomiting., Disp: 15 tablet, Rfl: 0    pantoprazole (PROTONIX) 20 MG EC tablet, Take 1 tablet by mouth Daily., Disp: 30 tablet, Rfl: 1    promethazine (PHENERGAN) 12.5 MG tablet, Take 1 tablet by mouth Every 6 (Six) Hours As Needed for Nausea or Vomiting., Disp: 30 tablet, Rfl: 1    vitamin B-12 (CYANOCOBALAMIN) 1000 MCG tablet, Take 1 tablet by mouth 1 (One) Time Per Week., Disp: , Rfl:     Allergies   Allergen Reactions    Dilaudid [Hydromorphone Hcl] Nausea And Vomiting    Hydromorphone Dizziness     Category: Allergy;       Social History     Socioeconomic History    Marital status: Single    Number of children: 3   Tobacco Use    Smoking status: Never    Smokeless tobacco: Never   Vaping Use    Vaping status: Never Used   Substance and Sexual Activity    Alcohol use: Not Currently     Comment: OCCAS    Drug use: No    Sexual activity: Yes     Partners: Male     Birth control/protection: Nexplanon       Family History   Problem Relation Age of Onset    Malig Hyperthermia Neg Hx        Review of systems     Constitutional: negative for chills, fevers and for fatigue  Eyes: negative  Ears, nose, mouth, throat, and face: negative for hearing loss and nasal congestion  Respiratory: negative for asthma and wheezing  Cardiovascular: negative for chest pain and dyspnea  Gastrointestinal: negative for dyspepsia, dysphagia abdominal pain  Genitourinary:negative for urinary incontinence  Integument/breast: negative for breast  lump  Hematologic/lymphatic: negative for bleeding  Musculoskeletal:negative for aches  Neurological: negative for numbness/tingling  Behavioral/Psych: negative for anhedonia  Allergic/Immunologic: negative for rash, allergy         Objective    /91   Wt 63.5 kg (140 lb)   LMP 2024   BMI 25.60 kg/m²       General Appearance:    Alert, cooperative, in no acute distress    Head:    Normocephalic, without obvious abnormality, atraumatic   Eyes:            Lids and lashes normal, conjunctivae and sclerae normal, no   icterus, no pallor, corneas clear   Ears:    Ears appear intact with no abnormalities noted   Neck:   Supple, trachea midline    Back:     No kyphosis present, no scoliosis present,                       Lungs:     No increased work of breathing regular respirations    Abdomen:     No masses, no organomegaly, soft, non-tender, non-distended, no guarding, no rebound tenderness   Extremities:   Moves all extremities well, no edema, no cyanosis, no redness   Pulses:   Pulses palpable and equal bilaterally   Skin:   No bleeding, bruising or rash   Neurologic:   Sensation intact, A&O times 3      Assessment  Pregnancy at 4w5d by LMP   Prenatal care in first trimester  Nausea and vomiting of pregnancy   History of cholestasis x 3 in previous pregnancies  Asthma affecting pregnancy  Thrombophilia affecting pregnancy- Prothrombin gene mutation heterozygote   AMA     Plan  Will obtain an HCG level today given how early in pregnancy the patient is and consider trending since she recently had exposure to IV sedation and injections in her back.   I discussed that exposure to these drugs will likely have an all or nothing effect on the fetus but still this would be difficult to tell.   Prescribed Phenergan 12.5 mg Q 6 hours PRN nausea and vomiting of pregnancy.   We discussed her options moving forward including proceeding with pregnancy versus elective  out of state as this is not legal in the  [facility]. I reviewed risks of moving forward with the pregnancy and that this most likely would be a normal pregnancy; however, she does have a risk of possible complications from early exposure to medications but also of having cholestasis again.. I then reviewed her options of elective termination out of state and how the process works whether it is medical  or surgical . She indicated understanding and all questions answered. She was very tearful and unsure during this conversation.   Will have the patient schedule in 4 weeks for prenatal visit if she continues with pregnancy.     Valerie Taylor MD

## 2024-04-12 LAB — HCG INTACT+B SERPL-ACNC: 488 MIU/ML

## 2024-04-16 ENCOUNTER — TELEPHONE (OUTPATIENT)
Dept: OBSTETRICS AND GYNECOLOGY | Facility: CLINIC | Age: 34
End: 2024-04-16
Payer: MEDICAID

## 2024-04-16 ENCOUNTER — LAB (OUTPATIENT)
Dept: OBSTETRICS AND GYNECOLOGY | Facility: CLINIC | Age: 34
End: 2024-04-16
Payer: MEDICAID

## 2024-04-16 DIAGNOSIS — N91.2 ABSENT MENSES: Primary | ICD-10-CM

## 2024-04-16 LAB — HCG INTACT+B SERPL-ACNC: NORMAL MIU/ML

## 2024-04-22 ENCOUNTER — APPOINTMENT (OUTPATIENT)
Dept: ULTRASOUND IMAGING | Facility: HOSPITAL | Age: 34
End: 2024-04-22
Payer: MEDICAID

## 2024-04-22 ENCOUNTER — TELEPHONE (OUTPATIENT)
Dept: OBSTETRICS AND GYNECOLOGY | Facility: CLINIC | Age: 34
End: 2024-04-22
Payer: MEDICAID

## 2024-04-22 ENCOUNTER — HOSPITAL ENCOUNTER (EMERGENCY)
Facility: HOSPITAL | Age: 34
Discharge: HOME | End: 2024-04-22
Attending: EMERGENCY MEDICINE | Admitting: EMERGENCY MEDICINE
Payer: MEDICAID

## 2024-04-22 VITALS
OXYGEN SATURATION: 99 % | WEIGHT: 140 LBS | RESPIRATION RATE: 16 BRPM | SYSTOLIC BLOOD PRESSURE: 132 MMHG | TEMPERATURE: 98.3 F | BODY MASS INDEX: 24.8 KG/M2 | HEIGHT: 63 IN | DIASTOLIC BLOOD PRESSURE: 93 MMHG | HEART RATE: 87 BPM

## 2024-04-22 DIAGNOSIS — O03.4 INEVITABLE ABORTION: Primary | ICD-10-CM

## 2024-04-22 LAB
ABO GROUP BLD: NORMAL
ALBUMIN SERPL-MCNC: 4.3 G/DL (ref 3.5–5.2)
ALBUMIN/GLOB SERPL: 1.7 G/DL
ALP SERPL-CCNC: 139 U/L (ref 39–117)
ALT SERPL W P-5'-P-CCNC: 62 U/L (ref 1–33)
ANION GAP SERPL CALCULATED.3IONS-SCNC: 10 MMOL/L (ref 5–15)
AST SERPL-CCNC: 47 U/L (ref 1–32)
BACTERIA UR QL AUTO: ABNORMAL /HPF
BASOPHILS # BLD AUTO: 0.08 10*3/MM3 (ref 0–0.2)
BASOPHILS NFR BLD AUTO: 0.9 % (ref 0–1.5)
BILIRUB SERPL-MCNC: 0.5 MG/DL (ref 0–1.2)
BILIRUB UR QL STRIP: NEGATIVE
BLD GP AB SCN SERPL QL: NEGATIVE
BUN SERPL-MCNC: 9 MG/DL (ref 6–20)
BUN/CREAT SERPL: 11.7 (ref 7–25)
CALCIUM SPEC-SCNC: 9.1 MG/DL (ref 8.6–10.5)
CHLORIDE SERPL-SCNC: 104 MMOL/L (ref 98–107)
CLARITY UR: CLEAR
CO2 SERPL-SCNC: 24 MMOL/L (ref 22–29)
COLOR UR: ABNORMAL
CREAT SERPL-MCNC: 0.77 MG/DL (ref 0.57–1)
DEPRECATED RDW RBC AUTO: 38.6 FL (ref 37–54)
EGFRCR SERPLBLD CKD-EPI 2021: 104 ML/MIN/1.73
EOSINOPHIL # BLD AUTO: 0.06 10*3/MM3 (ref 0–0.4)
EOSINOPHIL NFR BLD AUTO: 0.7 % (ref 0.3–6.2)
ERYTHROCYTE [DISTWIDTH] IN BLOOD BY AUTOMATED COUNT: 12.1 % (ref 12.3–15.4)
GLOBULIN UR ELPH-MCNC: 2.5 GM/DL
GLUCOSE SERPL-MCNC: 94 MG/DL (ref 65–99)
GLUCOSE UR STRIP-MCNC: NEGATIVE MG/DL
HCG INTACT+B SERPL-ACNC: 1876 MIU/ML
HCT VFR BLD AUTO: 37.5 % (ref 34–46.6)
HGB BLD-MCNC: 12.3 G/DL (ref 12–15.9)
HGB UR QL STRIP.AUTO: ABNORMAL
HYALINE CASTS UR QL AUTO: ABNORMAL /LPF
IMM GRANULOCYTES # BLD AUTO: 0.03 10*3/MM3 (ref 0–0.05)
IMM GRANULOCYTES NFR BLD AUTO: 0.4 % (ref 0–0.5)
INR PPP: 1.12 (ref 0.9–1.1)
KETONES UR QL STRIP: NEGATIVE
LEUKOCYTE ESTERASE UR QL STRIP.AUTO: ABNORMAL
LYMPHOCYTES # BLD AUTO: 1.49 10*3/MM3 (ref 0.7–3.1)
LYMPHOCYTES NFR BLD AUTO: 17.6 % (ref 19.6–45.3)
MCH RBC QN AUTO: 28.7 PG (ref 26.6–33)
MCHC RBC AUTO-ENTMCNC: 32.8 G/DL (ref 31.5–35.7)
MCV RBC AUTO: 87.4 FL (ref 79–97)
MONOCYTES # BLD AUTO: 0.43 10*3/MM3 (ref 0.1–0.9)
MONOCYTES NFR BLD AUTO: 5.1 % (ref 5–12)
NEUTROPHILS NFR BLD AUTO: 6.36 10*3/MM3 (ref 1.7–7)
NEUTROPHILS NFR BLD AUTO: 75.3 % (ref 42.7–76)
NITRITE UR QL STRIP: NEGATIVE
NRBC BLD AUTO-RTO: 0 /100 WBC (ref 0–0.2)
PH UR STRIP.AUTO: 5.5 [PH] (ref 5–8)
PLATELET # BLD AUTO: 319 10*3/MM3 (ref 140–450)
PMV BLD AUTO: 9 FL (ref 6–12)
POTASSIUM SERPL-SCNC: 4.2 MMOL/L (ref 3.5–5.2)
PROT SERPL-MCNC: 6.8 G/DL (ref 6–8.5)
PROT UR QL STRIP: ABNORMAL
PROTHROMBIN TIME: 14.6 SECONDS (ref 11.7–14.2)
RBC # BLD AUTO: 4.29 10*6/MM3 (ref 3.77–5.28)
RBC # UR STRIP: ABNORMAL /HPF
REF LAB TEST METHOD: ABNORMAL
RH BLD: POSITIVE
SODIUM SERPL-SCNC: 138 MMOL/L (ref 136–145)
SP GR UR STRIP: 1.03 (ref 1–1.03)
SQUAMOUS #/AREA URNS HPF: ABNORMAL /HPF
T&S EXPIRATION DATE: NORMAL
UROBILINOGEN UR QL STRIP: ABNORMAL
WBC # UR STRIP: ABNORMAL /HPF
WBC NRBC COR # BLD AUTO: 8.45 10*3/MM3 (ref 3.4–10.8)

## 2024-04-22 PROCEDURE — 80053 COMPREHEN METABOLIC PANEL: CPT | Performed by: EMERGENCY MEDICINE

## 2024-04-22 PROCEDURE — 76817 TRANSVAGINAL US OBSTETRIC: CPT

## 2024-04-22 PROCEDURE — 85610 PROTHROMBIN TIME: CPT | Performed by: EMERGENCY MEDICINE

## 2024-04-22 PROCEDURE — 85025 COMPLETE CBC W/AUTO DIFF WBC: CPT | Performed by: EMERGENCY MEDICINE

## 2024-04-22 PROCEDURE — 84702 CHORIONIC GONADOTROPIN TEST: CPT | Performed by: EMERGENCY MEDICINE

## 2024-04-22 PROCEDURE — 86901 BLOOD TYPING SEROLOGIC RH(D): CPT | Performed by: EMERGENCY MEDICINE

## 2024-04-22 PROCEDURE — 86850 RBC ANTIBODY SCREEN: CPT | Performed by: EMERGENCY MEDICINE

## 2024-04-22 PROCEDURE — 99284 EMERGENCY DEPT VISIT MOD MDM: CPT

## 2024-04-22 PROCEDURE — 93976 VASCULAR STUDY: CPT

## 2024-04-22 PROCEDURE — 81001 URINALYSIS AUTO W/SCOPE: CPT | Performed by: EMERGENCY MEDICINE

## 2024-04-22 PROCEDURE — 76815 OB US LIMITED FETUS(S): CPT

## 2024-04-22 PROCEDURE — 86900 BLOOD TYPING SEROLOGIC ABO: CPT | Performed by: EMERGENCY MEDICINE

## 2024-04-22 RX ORDER — SODIUM CHLORIDE 0.9 % (FLUSH) 0.9 %
10 SYRINGE (ML) INJECTION AS NEEDED
Status: DISCONTINUED | OUTPATIENT
Start: 2024-04-22 | End: 2024-04-22 | Stop reason: HOSPADM

## 2024-04-22 NOTE — DISCHARGE INSTRUCTIONS
As we discussed, return if you are bleeding more than 2 pads per hour, fever, worsening of abdominal pelvic plain, chest pain, shortness of breath, any concerns.

## 2024-04-22 NOTE — TELEPHONE ENCOUNTER
Pt called stating she believes she's having a miscarriage. Pt stated she has been bleeding heavy since 4/20 and has passed a lot of clots. Pt also stated she has been having to wear a pad. Pt was advised to go to the ED as she is bleeding through a pad an hour.     Thank you

## 2024-04-22 NOTE — ED PROVIDER NOTES
" EMERGENCY DEPARTMENT ENCOUNTER    Room Number:  39/39  Date of encounter:  4/22/2024  PCP: Karissa Jha APRN  Historian: Patient  Relevant information and history provided by sources other than the patient will be included below and in the ED Course.  Review of pertinent past medical records may also be included in record below and ED Course.    HPI:  Chief Complaint: \"I had a miscarriage\"  A complete HPI/ROS/PMH/PSH/SH/FH are unobtainable due to: Not applicable  Context: Yuki Shields is a 33 y.o. female who presents to the ED c/o this is a patient that is G5 and P4.  Her last normal menstrual period was approximately February 9 or February 10.  2 days prior to arrival here on Saturday, April 20 had some pelvic crampy pain and some vaginal bleeding.  The vaginal bleeding at times was heavy on Saturday through Sunday morning.  The cramping at times was more intense and severe.  There were times where she did pass clots.  Currently her bleeding has decreased and is currently similar to her regular menstrual period.  Her crampy pain has also significantly decreased.  Has mild pelvic crampy pain at this time.  She only saw clots.  Did not see any tissue.  She is making the educated guess that she feels that she lost this pregnancy.  She has never had any sort of surgery on her tubes.  Never had a previous tubal pregnancy.  She called her obstetrician gynecologist today, Dr. Taylor, to go see in the office for further evaluation and she was instructed to come to the emergency department for further evaluation.  She did have a positive pregnancy test approximately 2 weeks ago.  She did see Dr. Taylor in the office last week which confirmed the pregnancy.  She was also scheduled to have a tubal ligation in the next couple of months.  This currently was an unplanned pregnancy.  She denies any chest pain, shortness of breath, fever, chills.  Any focal weakness to arms or legs.  She is not on any blood thinning medicine.  " She tells me that she is only supposed to be on blood thinning medicines during her pregnancy.        Previous Episodes: No  Current Symptoms: See above    MEDICAL HISTORY REVIEWED  In looking at old records patient has a history of factor II disorder.  I can see she has also had a history of lumbar herniated disc as well in the past.  It is needed epidurals.  She is not routinely on any anticoagulation.  During pregnancy she has been on anticoagulation in the past.      PAST MEDICAL HISTORY  Active Ambulatory Problems     Diagnosis Date Noted    Anemia 09/15/2023    Coagulopathy 2019    DDD (degenerative disc disease), lumbar 2017    Factor II deficiency 2017    Folic acid deficiency 2020    Memory impairment 2020    Obesity 2020    Pain 2012    Stress 2021    Asthma 09/15/2023     (spontaneous vaginal delivery) 2023    Unwanted fertility 2024     Resolved Ambulatory Problems     Diagnosis Date Noted    Pregnancy 2023    Maternal anemia in pregnancy, antepartum 2023    Single umbilical artery 2023    Asthma affecting pregnancy, antepartum 2023    Thrombophilia affecting pregnancy, antepartum 2023    Cholestasis during pregnancy in third trimester 2023     Past Medical History:   Diagnosis Date    Anxiety and depression     Deep vein thrombosis     Family hx-blood disorder     History of blood transfusion     Labral tear of hip, degenerative     Low back pain     Lumbar herniated disc 2017    Urinary tract infection          PAST SURGICAL HISTORY  Past Surgical History:   Procedure Laterality Date    ABDOMINOPLASTY  2019    BLADDER REPAIR  2016    WITH MESH    BREAST AUGMENTATION BILATERAL MASTOPEXY Bilateral 2017    Procedure: BREAST AUGMENTATION, MASTOPEXY W/ IDEAL IMPLANTS  NEW IMAGE;  Surgeon: Nirmal Mehta MD;  Location: [facility] OR;  Service:     BREAST SURGERY      CHOLECYSTECTOMY  2012    COSMETIC  SURGERY      TONSILLECTOMY AND ADENOIDECTOMY  1990         FAMILY HISTORY  Family History   Problem Relation Age of Onset    Malig Hyperthermia Neg Hx          SOCIAL HISTORY  Social History     Socioeconomic History    Marital status: Single    Number of children: 3   Tobacco Use    Smoking status: Never    Smokeless tobacco: Never   Vaping Use    Vaping status: Never Used   Substance and Sexual Activity    Alcohol use: Not Currently     Comment: OCCAS    Drug use: No    Sexual activity: Yes     Partners: Male     Birth control/protection: Nexplanon         ALLERGIES  Dilaudid [hydromorphone hcl] and Hydromorphone        REVIEW OF SYSTEMS  Review of Systems     All systems reviewed and negative except for those discussed in HPI.       PHYSICAL EXAM    I have reviewed the triage vital signs and nursing notes.    ED Triage Vitals   Temp Heart Rate Resp BP SpO2   04/22/24 0854 04/22/24 0855 04/22/24 0855 -- 04/22/24 0855   98.3 °F (36.8 °C) 81 16  92 %      Temp src Heart Rate Source Patient Position BP Location FiO2 (%)   04/22/24 0854 04/22/24 0855 -- -- --   Tympanic Monitor          GENERAL: Young pleasant female.  No acute distress.Vital signs on my initial evaluation she has a normal heart rate in the 80s.  Blood pressure is normal.  O2 sats 100% on room air.  HENT: nares patent  Head/neck/ face are symmetric without gross deformity, signs of trauma, or swelling  EYES: no scleral icterus, no conjunctival pallor.  NECK: Supple, no meningismus  CV: regular rhythm, regular rate with intact distal pulses.  RESPIRATORY: normal effort and no respiratory distress.  Clear to auscultation bilaterally  ABDOMEN: soft and nontender.  Obese.  Her belly has normal inspection.  It is soft.  There is no tenderness on exam I do not feel any contraction or muscle contraction on exam as well.  Normal bowel sounds  MUSCULOSKELETAL: no deformity.  No edema.  Intact distal pulses that are equal strong and symmetric.  NEURO: alert and  appropriate, moves all extremities, follows commands.  No focal motor or sensory changes  SKIN: warm, dry    Vital signs and nursing notes reviewed.        LAB RESULTS  Recent Results (from the past 24 hour(s))   Comprehensive Metabolic Panel    Collection Time: 04/22/24  9:11 AM    Specimen: Blood   Result Value Ref Range    Glucose 94 65 - 99 mg/dL    BUN 9 6 - 20 mg/dL    Creatinine 0.77 0.57 - 1.00 mg/dL    Sodium 138 136 - 145 mmol/L    Potassium 4.2 3.5 - 5.2 mmol/L    Chloride 104 98 - 107 mmol/L    CO2 24.0 22.0 - 29.0 mmol/L    Calcium 9.1 8.6 - 10.5 mg/dL    Total Protein 6.8 6.0 - 8.5 g/dL    Albumin 4.3 3.5 - 5.2 g/dL    ALT (SGPT) 62 (H) 1 - 33 U/L    AST (SGOT) 47 (H) 1 - 32 U/L    Alkaline Phosphatase 139 (H) 39 - 117 U/L    Total Bilirubin 0.5 0.0 - 1.2 mg/dL    Globulin 2.5 gm/dL    A/G Ratio 1.7 g/dL    BUN/Creatinine Ratio 11.7 7.0 - 25.0    Anion Gap 10.0 5.0 - 15.0 mmol/L    eGFR 104.0 >60.0 mL/min/1.73   Protime-INR    Collection Time: 04/22/24  9:11 AM    Specimen: Blood   Result Value Ref Range    Protime 14.6 (H) 11.7 - 14.2 Seconds    INR 1.12 (H) 0.90 - 1.10   hCG, Quantitative, Pregnancy    Collection Time: 04/22/24  9:11 AM    Specimen: Blood   Result Value Ref Range    HCG Quantitative 1,876.00 mIU/mL   Type & Screen    Collection Time: 04/22/24  9:11 AM    Specimen: Blood   Result Value Ref Range    ABO Type O     RH type Positive     Antibody Screen Negative     T&S Expiration Date 4/25/2024 11:59:59 PM    CBC Auto Differential    Collection Time: 04/22/24  9:11 AM    Specimen: Blood   Result Value Ref Range    WBC 8.45 3.40 - 10.80 10*3/mm3    RBC 4.29 3.77 - 5.28 10*6/mm3    Hemoglobin 12.3 12.0 - 15.9 g/dL    Hematocrit 37.5 34.0 - 46.6 %    MCV 87.4 79.0 - 97.0 fL    MCH 28.7 26.6 - 33.0 pg    MCHC 32.8 31.5 - 35.7 g/dL    RDW 12.1 (L) 12.3 - 15.4 %    RDW-SD 38.6 37.0 - 54.0 fl    MPV 9.0 6.0 - 12.0 fL    Platelets 319 140 - 450 10*3/mm3    Neutrophil % 75.3 42.7 - 76.0 %     Lymphocyte % 17.6 (L) 19.6 - 45.3 %    Monocyte % 5.1 5.0 - 12.0 %    Eosinophil % 0.7 0.3 - 6.2 %    Basophil % 0.9 0.0 - 1.5 %    Immature Grans % 0.4 0.0 - 0.5 %    Neutrophils, Absolute 6.36 1.70 - 7.00 10*3/mm3    Lymphocytes, Absolute 1.49 0.70 - 3.10 10*3/mm3    Monocytes, Absolute 0.43 0.10 - 0.90 10*3/mm3    Eosinophils, Absolute 0.06 0.00 - 0.40 10*3/mm3    Basophils, Absolute 0.08 0.00 - 0.20 10*3/mm3    Immature Grans, Absolute 0.03 0.00 - 0.05 10*3/mm3    nRBC 0.0 0.0 - 0.2 /100 WBC   Urinalysis With Microscopic If Indicated (No Culture) - Urine, Clean Catch    Collection Time: 04/22/24  9:20 AM    Specimen: Urine, Clean Catch   Result Value Ref Range    Color, UA Dark Yellow (A) Yellow, Straw    Appearance, UA Clear Clear    pH, UA 5.5 5.0 - 8.0    Specific Gravity, UA 1.029 1.005 - 1.030    Glucose, UA Negative Negative    Ketones, UA Negative Negative    Bilirubin, UA Negative Negative    Blood, UA Large (3+) (A) Negative    Protein, UA Trace (A) Negative    Leuk Esterase, UA Trace (A) Negative    Nitrite, UA Negative Negative    Urobilinogen, UA 1.0 E.U./dL 0.2 - 1.0 E.U./dL   Urinalysis, Microscopic Only - Urine, Clean Catch    Collection Time: 04/22/24  9:20 AM    Specimen: Urine, Clean Catch   Result Value Ref Range    RBC, UA Too Numerous to Count (A) None Seen, 0-2 /HPF    WBC, UA 3-5 (A) None Seen, 0-2 /HPF    Bacteria, UA None Seen None Seen /HPF    Squamous Epithelial Cells, UA 3-6 (A) None Seen, 0-2 /HPF    Hyaline Casts, UA 0-2 None Seen /LPF    Methodology Automated Microscopy        Ordered the above labs and independently reviewed the results.        RADIOLOGY  US Ob Limited 1 + Fetuses, US Ob Transvaginal, US Testicular or Ovarian Vascular Limited    Result Date: 4/22/2024  US OB LIMITED 1 + FETUSES-, US OB TRANSVAGINAL-, US TESTICULAR OR OVARIAN VASCULAR LIMITED-  COMPARISON: None  HISTORY: Approximately 6 weeks and 2 days pregnant quantitative hCG of 1876.  TECHNIQUE: Grayscale,  color Doppler and spectral Doppler evaluation of the pelvis with transabdominal and transvaginal probes.  FINDINGS:  The uterus measures approximately 8.7 cm in length. Endometrium measures up to 1.8 centimeters in thickness. No intrauterine gestational sac.  Right ovary is not visualized. No right adnexal mass.  Left ovary measures 2.6 x 1.8 x 2.1 cm. Left intraovarian thick-walled cyst with peripheral flow suggesting corpus luteum. Intraovarian location confirmed on cine imaging.  There are normal arterial and venous waveforms in the left ovary.  No free fluid is seen in the cul-de-sac.       No ultrasonographic evidence of intrauterine or ectopic pregnancy.    This report was finalized on 4/22/2024 11:08 AM by Dr. Chaz Treadwell M.D on Workstation: [employer]       I ordered the above noted radiological studies. Reviewed by me and discussed with radiologist.  See dictation for official radiology interpretation.      PROCEDURES    Procedures      MEDICATIONS GIVEN IN ER    Medications - No data to display      All labs have been independently reviewed by me.  All radiology studies have been reviewed by me and I discussed with radiologist dictating the report when indicated below.  All EKG's independently viewed and interpreted by me.  Discussion below represents my analysis of pertinent findings related to patient's condition, differential diagnosis, treatment plan and final disposition.        PROGRESS, DATA ANALYSIS, CONSULTS, AND MEDICAL DECISION MAKING    This is a female that has had some pelvic crampy pain and vaginal bleeding.  It is currently decreased and has decreased since when it started 2 days prior to arrival here.  She very potentially could have had a miscarriage.  Informed her that we will get a check an ultrasound, lab work, and a quantitative hCG on her.  Will then communicate to her gynecologist that sent her here Dr. Taylor.  She does not need anything for pain at this time.  Informed her of my  clinical concerns and evaluation and all questions answered.      ED Course as of 24 1650   Mon 2024   0943 RBC, UA(!): Too Numerous to Count  Patient with vaginal bleeding.  No urinary symptoms at all. [MM]   0943 Nitrite, UA: Negative [MM]   0943 Bacteria, UA: None Seen [MM]   0943 WBC, UA(!): 3-5 [MM]   0943 Hemoglobin: 12.3  Previous hemoglobin was 12.73 months ago. [MM]   1137 HCG Quantitative: 1,876.00 [MM]   1137 I reviewed the ultrasound report from the radiologist.  There is no ultrasonography  evidence of intrauterine or ectopic pregnancy.  No obvious free fluid.  Please see complete dictated report from the radiologist. [MM]   1137 RH type: Positive [MM]   1142 I have reassessed the patient.  She is stable in no distress.  No pain.  Her bleeding is similar to her menstrual period to mild.  I informed her about the results of the ultrasound and her lab work.  I will put a call out to Dr. Taylor who sent her here.  I anticipate she will be going home and I believe that she has a failed pregnancy in the first trimester.  She needs to follow-up to make sure her hormone level goes to 0.  All questions answered. [MM]   1155 I did discuss the case with Dr. Vogt who is on for Dr. Taylor informed him of the patient's presenting symptoms and results of the test and my clinical evaluation.  They will follow-up in the office later this week.  All questions answered [MM]      ED Course User Index  [MM] Conrad Quiroz MD       AS OF 16:50 EDT VITALS:    BP - 132/93  HR - 87  TEMP - 98.3 °F (36.8 °C) (Tympanic)  02 SATS - 99%    SOCIAL DETERMINANTS OF HEALTH THAT IMPACT OR LIMIT CARE (For example..Homelessness,safe discharge, inability to obtain care, follow up, or prescriptions):      DIAGNOSIS  Final diagnoses:   Inevitable          DISPOSITION  DISCHARGE    Patient discharged in stable condition.    Reviewed implications of results, diagnosis, meds, responsibility to follow up, warning signs  and symptoms of possible worsening, potential complications and reasons to return to ER, including worsening of symptoms, bleeding more than 2 pads per hour, fever, chest pain, shortness of breath, worsening of pelvic pain, or any other concerns..    Patient/Family voiced understanding of above instructions.    Discussed plan for discharge, as there is no emergent indication for admission. Pt/family is agreeable and understands need for follow up and repeat testing.  Pt is aware that discharge does not mean that nothing is wrong but it indicates no emergency is present that requires admission and they must continue care with follow-up as given below or physician of their choice.     FOLLOW-UP  Valerie Taylor MD  [address]  [place] [address]  [phone]               Medication List      No changes were made to your prescriptions during this visit.                 DICTATED UTILIZING DRAGON DICTATION    Note Disclaimer: At [facility], we believe that sharing information builds trust and better relationships. You are receiving this note because you recently visited [facility]. It is possible you will see health information before a provider has talked with you about it. This kind of information can be easy to misunderstand. To help you fully understand what it means for your health, we urge you to discuss this note with your provider.       Conrad Quiroz MD  04/22/24 [id number]

## 2024-04-24 ENCOUNTER — OFFICE VISIT (OUTPATIENT)
Dept: OBSTETRICS AND GYNECOLOGY | Facility: CLINIC | Age: 34
End: 2024-04-24
Payer: MEDICAID

## 2024-04-24 VITALS
BODY MASS INDEX: 26.58 KG/M2 | HEART RATE: 110 BPM | SYSTOLIC BLOOD PRESSURE: 125 MMHG | HEIGHT: 63 IN | DIASTOLIC BLOOD PRESSURE: 79 MMHG | WEIGHT: 150 LBS

## 2024-04-24 DIAGNOSIS — O03.9 MISCARRIAGE: Primary | ICD-10-CM

## 2024-04-24 LAB — HCG INTACT+B SERPL-ACNC: 556 MIU/ML

## 2024-04-26 ENCOUNTER — TELEPHONE (OUTPATIENT)
Dept: OBSTETRICS AND GYNECOLOGY | Facility: CLINIC | Age: 34
End: 2024-04-26
Payer: MEDICAID

## 2024-04-26 DIAGNOSIS — O03.9 MISCARRIAGE: Primary | ICD-10-CM

## 2024-04-26 DIAGNOSIS — R63.4 WEIGHT LOSS: ICD-10-CM

## 2024-04-26 RX ORDER — ONDANSETRON 4 MG/1
4 TABLET, FILM COATED ORAL EVERY 8 HOURS PRN
Qty: 15 TABLET | Refills: 0 | Status: SHIPPED | OUTPATIENT
Start: 2024-04-26

## 2024-04-26 RX ORDER — HYDROCODONE BITARTRATE AND ACETAMINOPHEN 5; 325 MG/1; MG/1
1 TABLET ORAL EVERY 6 HOURS PRN
Qty: 10 TABLET | Refills: 0 | Status: SHIPPED | OUTPATIENT
Start: 2024-04-26

## 2024-04-26 RX ORDER — IBUPROFEN 600 MG/1
600 TABLET ORAL EVERY 6 HOURS PRN
Qty: 20 TABLET | Refills: 0 | Status: SHIPPED | OUTPATIENT
Start: 2024-04-26 | End: 2024-05-01

## 2024-04-26 RX ORDER — MISOPROSTOL 200 UG/1
TABLET ORAL
Qty: 3 TABLET | Refills: 0 | Status: SHIPPED | OUTPATIENT
Start: 2024-04-26

## 2024-04-26 NOTE — TELEPHONE ENCOUNTER
Patient is concerned about to amount of bleeding she is still experiencing from having a miscarriage last week. She though it would've lightened up by now, but it is still very heavy. She is starting to feel weak and more fatigue as the bleeding goes on. She is wondering if she could come in and get labs done to make sure everything is okay, what do you suggest?    Please advise, thanks!    Brandon patient*

## 2024-04-27 LAB — HCG INTACT+B SERPL-ACNC: 206 MIU/ML

## 2024-04-28 ENCOUNTER — TELEPHONE (OUTPATIENT)
Dept: OBSTETRICS AND GYNECOLOGY | Facility: CLINIC | Age: 34
End: 2024-04-28
Payer: MEDICAID

## 2024-04-28 NOTE — TELEPHONE ENCOUNTER
Call placed to patient regarding preliminary ultrasound read (see prelim read below). Options discussed with patient including medical versus surgical treatment for retained products of conception. Patient would like to trial medication for treatment of retained products of conception. Medications sent and education provided for misoprostol, ibuprofen, hydrocodone, and ondansetron. Potential need for surgical intervention despite medication treatment also discussed with patient. Understanding verbalized by patient.     Bleeding precautions and when to present to the emergency department discussed with patient, who verbalizes understanding. Labs ordered for ABO RH, ferritin, iron profile, and CBC. Patient will visit a [facility] or [facility] facility close to her home to have those drawn.     Patient understands she will return to care on Thursday May 2nd in order to have a follow up visit with ultrasound that day.     All questions answered at this time.           TRANSVAGINAL ULTRASOUND PRELIMINARY RESULT   2024  DIAGNOSIS:  missed , possible retained products of conception  UTERUS:  length 8.81 cm, volume: 113.558 cm3  ENDOMETRIAL LININ.69 cm   FIBROIDS/POLYPS: not seen. .   OVARIES: right - not visualized, left - 2.48 x 1.96 x 1.96 cm.   OVARIAN CYSTS/MASSES: seen-complex follicles noted on left ovary x 2; #1: 1.52 x 1.21 x 1.37 cm, #2: 1.40 x 1.27 x 1.33 cm.  COMPARATIVE DATA: not available for examination  COMMENTS: endometrium appears thick and complex, right ovary not visualized, complex follicles noted on left ovary, no free fluid seen.   Radha Fisher CNM  2024  16:05 EDT

## 2024-04-29 ENCOUNTER — LAB (OUTPATIENT)
Dept: OBSTETRICS AND GYNECOLOGY | Facility: CLINIC | Age: 34
End: 2024-04-29
Payer: MEDICAID

## 2024-04-29 DIAGNOSIS — O03.9 MISCARRIAGE: ICD-10-CM

## 2024-04-29 DIAGNOSIS — O03.9 MISCARRIAGE: Primary | ICD-10-CM

## 2024-04-29 DIAGNOSIS — R63.4 WEIGHT LOSS: Primary | ICD-10-CM

## 2024-04-29 RX ORDER — PHENTERMINE HYDROCHLORIDE 37.5 MG/1
37.5 CAPSULE ORAL EVERY MORNING
Qty: 30 CAPSULE | Refills: 5 | Status: SHIPPED | OUTPATIENT
Start: 2024-04-29

## 2024-04-29 RX ORDER — PHENTERMINE HYDROCHLORIDE 37.5 MG/1
37.5 CAPSULE ORAL EVERY MORNING
Qty: 30 CAPSULE | OUTPATIENT
Start: 2024-04-29

## 2024-04-30 LAB — BLD GP AB SCN SERPL QL: NEGATIVE

## 2024-05-01 DIAGNOSIS — O03.9 MISCARRIAGE: Primary | ICD-10-CM

## 2024-05-02 ENCOUNTER — OFFICE VISIT (OUTPATIENT)
Dept: OBSTETRICS AND GYNECOLOGY | Facility: CLINIC | Age: 34
End: 2024-05-02
Payer: MEDICAID

## 2024-05-02 VITALS
BODY MASS INDEX: 26.58 KG/M2 | HEIGHT: 63 IN | WEIGHT: 150 LBS | DIASTOLIC BLOOD PRESSURE: 81 MMHG | SYSTOLIC BLOOD PRESSURE: 134 MMHG

## 2024-05-02 DIAGNOSIS — O03.9 MISCARRIAGE: Primary | ICD-10-CM

## 2024-05-02 LAB — HCG INTACT+B SERPL-ACNC: 123 MIU/ML

## 2024-05-02 NOTE — PROGRESS NOTES
"GYN VISIT    Chief Complaint   Patient presents with    Follow-up     Here today to discuss U/S        SUBJECTIVE    Yuki is a 33 y.o.  who presents for a follow up visit with ultrasound for a missed ab. She did take misoprostol on Saturday. She reports that now she is just having some light spotting.      LMP: Patient's last menstrual period was 2024.    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 MEHTA AWARE OF THIS    Family hx-blood disorder     MOTHER HAS FACTOR 2 AND FACTOR 5    History of blood transfusion     Labral tear of hip, degenerative     RIGHT    Low back pain     Lumbar herniated disc 06/2017    X 2  DISC   HISTORY OF EPIDURALS    Urinary tract infection         Past Surgical History:   Procedure Laterality Date    ABDOMINOPLASTY  2019    BLADDER REPAIR  2016    WITH MESH    BREAST AUGMENTATION BILATERAL MASTOPEXY Bilateral 2017    Procedure: BREAST AUGMENTATION, MASTOPEXY W/ IDEAL IMPLANTS  NEW IMAGE;  Surgeon: Nirmal Mehta MD;  Location: [facility];  Service:     BREAST SURGERY      CHOLECYSTECTOMY  2012    COSMETIC SURGERY      TONSILLECTOMY AND ADENOIDECTOMY          Review of Systems   Genitourinary:  Positive for vaginal bleeding. Negative for decreased urine volume, difficulty urinating, dyspareunia, dysuria, flank pain, frequency, hematuria, pelvic pain, pelvic pressure, urgency, urinary incontinence, vaginal discharge and vaginal pain.   All other systems reviewed and are negative.      OBJECTIVE     Vitals:    24 1046   BP: 134/81   Weight: 68 kg (150 lb)   Height: 160 cm (62.99\")        Physical Exam  Constitutional:       General: She is awake.      Appearance: Normal appearance. She is well-developed and well-groomed.   HENT:      Head: Normocephalic and atraumatic. "   Pulmonary:      Effort: Pulmonary effort is normal.   Musculoskeletal:      Cervical back: Normal range of motion.   Neurological:      General: No focal deficit present.      Mental Status: She is alert and oriented to person, place, and time.   Skin:     General: Skin is warm and dry.   Psychiatric:         Mood and Affect: Mood normal.         Behavior: Behavior normal. Behavior is cooperative.   Vitals reviewed.         ASSESSMENT/PLAN    Diagnoses and all orders for this visit:    1. Miscarriage (Primary)  -     HCG, B-subunit, Quantitative; Future    Discussed TVUS results - see preliminary read below.  Follow with serial HCG levels to ensure HCG is decreasing satisfactorily.   She would like to keep her original date for her salpingectomy if it is possible.     Return for for HCG draw on 2024. .    I spent 15 minutes caring for Yuki on this date of service. This time includes time spent by me in the following activities: preparing for the visit, reviewing tests, obtaining and/or reviewing a separately obtained history, performing a medically appropriate examination and/or evaluation, counseling and educating the patient/family/caregiver, ordering medications, tests, or procedures, referring and communicating with other health care professionals, documenting information in the medical record, independently interpreting results and communicating that information with the patient/family/caregiver, and care coordination    Radha Fisher CNM  2024  12:37 EDT      TRANSVAGINAL ULTRASOUND PRELIMINARY RESULT   2024  DIAGNOSIS: MAB  UTERUS:  length 7304 cm, volume: 39.408 cm3  ENDOMETRIAL LININ.50 cm   FIBROIDS/POLYPS: not seen. .   OVARIES: right - not seen, left - not seen.   OVARIAN CYSTS/MASSES: not seen.   COMPARATIVE DATA: available for examination  COMMENTS: uterus is anteverted, normal uterus with no retained products of conception seen, ovaries not seen, no free fluid, suboptimal  visualization of the pelvic anatomy was obtained secondary to attenuation from bowel.   Radha Fisher, BBI  5/2/2024  10:49 EDT

## 2024-05-10 DIAGNOSIS — N91.2 ABSENT MENSES: Primary | ICD-10-CM

## 2024-05-10 DIAGNOSIS — O03.9 MISCARRIAGE: ICD-10-CM

## 2024-05-11 DIAGNOSIS — O99.511 ASTHMA AFFECTING PREGNANCY IN FIRST TRIMESTER: ICD-10-CM

## 2024-05-11 DIAGNOSIS — J45.909 ASTHMA AFFECTING PREGNANCY IN FIRST TRIMESTER: ICD-10-CM

## 2024-05-13 ENCOUNTER — TELEMEDICINE (OUTPATIENT)
Dept: FAMILY MEDICINE CLINIC | Facility: CLINIC | Age: 34
End: 2024-05-13
Payer: MEDICAID

## 2024-05-13 DIAGNOSIS — R63.4 WEIGHT LOSS: Primary | ICD-10-CM

## 2024-05-13 PROCEDURE — 1126F AMNT PAIN NOTED NONE PRSNT: CPT | Performed by: NURSE PRACTITIONER

## 2024-05-13 PROCEDURE — 1159F MED LIST DOCD IN RCRD: CPT | Performed by: NURSE PRACTITIONER

## 2024-05-13 PROCEDURE — 1160F RVW MEDS BY RX/DR IN RCRD: CPT | Performed by: NURSE PRACTITIONER

## 2024-05-13 PROCEDURE — 99213 OFFICE O/P EST LOW 20 MIN: CPT | Performed by: NURSE PRACTITIONER

## 2024-05-13 NOTE — PROGRESS NOTES
Subjective   Yuki Shields is a 33 y.o. female. Presents today for No chief complaint on file.      This is a video visit.  This provider is in her office on [facility].  Patient consented to video visit by accepting the link to this call.  She is in her car with her daughter.    Call Start:   Call End:      History Of Present Illness:  She is making a video visit to discuss her weight loss.  She had made an appointment to come into the office, but her daughter got out of school early, changing her plans.    Diet is good    Exercise chasing 4 kids around    Weight is down to 140 lbs the last she was weighed.  Asked her to come in for  a nurse visit to have her weight checked and to follow up in 3 months.  Patient Active Problem List   Diagnosis    Anemia    Coagulopathy    DDD (degenerative disc disease), lumbar    Factor II deficiency    Folic acid deficiency    Memory impairment    Obesity    Pain    Stress    Asthma     (spontaneous vaginal delivery)    Unwanted fertility       Social History     Socioeconomic History    Marital status: Single    Number of children: 3   Tobacco Use    Smoking status: Never    Smokeless tobacco: Never   Vaping Use    Vaping status: Never Used   Substance and Sexual Activity    Alcohol use: Not Currently     Comment: OCCAS    Drug use: No    Sexual activity: Yes     Partners: Male     Birth control/protection: Nexplanon       Allergies   Allergen Reactions    Dilaudid [Hydromorphone Hcl] Nausea And Vomiting    Hydromorphone Dizziness     Category: Allergy;       Current Outpatient Medications on File Prior to Visit   Medication Sig Dispense Refill    albuterol sulfate  (90 Base) MCG/ACT inhaler Inhale 2 puffs Every 4 (Four) Hours As Needed for Wheezing. 6.7 g 1    buPROPion XL (Wellbutrin XL) 150 MG 24 hr tablet Take 1 tablet by mouth Every Morning. 90 tablet 1    busPIRone (BUSPAR) 7.5 MG tablet Take 1 tablet by mouth Daily. for anxiety 90 tablet 2    ferrous  sulfate 325 (65 FE) MG tablet Take 1 tablet by mouth 1 (One) Time Per Week.      HYDROcodone-acetaminophen (Norco) 5-325 MG per tablet Take 1 tablet by mouth Every 6 (Six) Hours As Needed for Moderate Pain or Severe Pain. 10 tablet 0    Loratadine (CLARITIN PO) Take  by mouth Daily.      miSOPROStol (CYTOTEC) 200 MCG tablet Place 3 tablets in the vagina once. 3 tablet 0    Mometasone Furoate (Asmanex, 30 Metered Doses,) 220 MCG/ACT inhaler Inhale 1 puff Every Night. 1 each 11    ondansetron (Zofran) 4 MG tablet Take 1 tablet by mouth Every 8 (Eight) Hours As Needed for Nausea or Vomiting. 15 tablet 0    pantoprazole (PROTONIX) 20 MG EC tablet Take 1 tablet by mouth Daily. 30 tablet 1    phentermine 37.5 MG capsule Take 1 capsule by mouth Every Morning. 30 capsule 5    promethazine (PHENERGAN) 12.5 MG tablet Take 1 tablet by mouth Every 6 (Six) Hours As Needed for Nausea or Vomiting. 30 tablet 1    vitamin B-12 (CYANOCOBALAMIN) 1000 MCG tablet Take 1 tablet by mouth 1 (One) Time Per Week.       No current facility-administered medications on file prior to visit.       Objective   There were no vitals filed for this visit.  There is no height or weight on file to calculate BMI.    Physical Exam    Waived due to video visit    Procedures     Assessment & Plan   Diagnoses and all orders for this visit:    1. Weight loss (Primary)     Refilled medication on 4/29/24.  Patient has scheduled a follow up for today; however her daughter was released from school early today, changing her plans.       Discussed Care Gaps, ordered referrals and encouraged vaccination updates.       - Pt agrees with plan of care and denies further questions/concerns today  - This document is intended for medical expert use only. Persons  reading this document without medical staff guidance may result in misinterpretation and unintended morbidity     Go to the ER for any possible life-threatening symptoms such as chest pain or shortness of air.       Please allow 3-5 business days for recommendations based on new results      I personally spent time with this patient, preparing for the visit, reviewing tests, obtaining and/or reviewing a separately obtained history, performing a medically appropriate examination and/or evaluation, counseling and educating the patient/family/caregiver, ordering medications,  documenting information in the medical record and indepentently interpreting results.           Return in about 3 months (around 8/13/2024) for Next scheduled follow up.        Answers submitted by the patient for this visit:  Other (Submitted on 5/13/2024)  Please describe your symptoms.: Follow up from starting medication.  Have you had these symptoms before?: No  How long have you been having these symptoms?: Greater than 2 weeks  Please list any medications you are currently taking for this condition.: Phentremine 37.5  Please describe any probable cause for these symptoms. : No problems.  Primary Reason for Visit (Submitted on 5/13/2024)  What is the primary reason for your visit?: Other

## 2024-05-14 RX ORDER — MOMETASONE FUROATE 220 UG/1
INHALANT RESPIRATORY (INHALATION)
Qty: 1 EACH | Refills: 11 | Status: SHIPPED | OUTPATIENT
Start: 2024-05-14

## 2024-05-16 ENCOUNTER — DOCUMENTATION (OUTPATIENT)
Dept: FAMILY MEDICINE CLINIC | Facility: CLINIC | Age: 34
End: 2024-05-16
Payer: MEDICAID

## 2024-05-17 DIAGNOSIS — O03.9 MISCARRIAGE: Primary | ICD-10-CM

## 2024-06-06 ENCOUNTER — OFFICE VISIT (OUTPATIENT)
Dept: FAMILY MEDICINE CLINIC | Facility: CLINIC | Age: 34
End: 2024-06-06
Payer: MEDICAID

## 2024-06-06 VITALS
WEIGHT: 143.4 LBS | DIASTOLIC BLOOD PRESSURE: 76 MMHG | OXYGEN SATURATION: 98 % | SYSTOLIC BLOOD PRESSURE: 112 MMHG | HEIGHT: 63 IN | HEART RATE: 110 BPM | BODY MASS INDEX: 25.41 KG/M2

## 2024-06-06 DIAGNOSIS — Z13.220 LIPID SCREENING: ICD-10-CM

## 2024-06-06 DIAGNOSIS — R41.3 MEMORY DEFICIT: ICD-10-CM

## 2024-06-06 DIAGNOSIS — R10.13 EPIGASTRIC PAIN: Primary | ICD-10-CM

## 2024-06-06 PROCEDURE — 1126F AMNT PAIN NOTED NONE PRSNT: CPT | Performed by: NURSE PRACTITIONER

## 2024-06-06 PROCEDURE — 1159F MED LIST DOCD IN RCRD: CPT | Performed by: NURSE PRACTITIONER

## 2024-06-06 PROCEDURE — 99214 OFFICE O/P EST MOD 30 MIN: CPT | Performed by: NURSE PRACTITIONER

## 2024-06-06 PROCEDURE — 1160F RVW MEDS BY RX/DR IN RCRD: CPT | Performed by: NURSE PRACTITIONER

## 2024-06-06 RX ORDER — DEXTROMETHORPHAN HBR AND GUAIFENESIN 5; 100 MG/5ML; MG/5ML
5 LIQUID ORAL 4 TIMES DAILY
Qty: 355 ML | Refills: 1 | Status: SHIPPED | OUTPATIENT
Start: 2024-06-06 | End: 2024-06-10

## 2024-06-06 NOTE — PROGRESS NOTES
Subjective   Yuki Shields is a 33 y.o. female. Presents today for   Chief Complaint   Patient presents with    GI Problem     After eating she gets stomach pain and cold sweat until she either vomits or has a bowel movement X's 2 weeks       History Of Present Illness  Patient reports having stomach issues, memory issues and to check on current medications (miscarriage 24).  She is having stomach cramping and sweats until she either vomits or has a bowel movement (sometime she is constipated and vomits, sometimes she has diarrhea).        Patient Active Problem List   Diagnosis    Anemia    Coagulopathy    DDD (degenerative disc disease), lumbar    Factor II deficiency    Folic acid deficiency    Memory impairment    Obesity    Pain    Stress    Asthma     (spontaneous vaginal delivery)    Unwanted fertility       Social History     Socioeconomic History    Marital status: Single    Number of children: 3   Tobacco Use    Smoking status: Never    Smokeless tobacco: Never   Vaping Use    Vaping status: Never Used   Substance and Sexual Activity    Alcohol use: Not Currently     Comment: OCCAS    Drug use: Never    Sexual activity: Yes     Partners: Male     Birth control/protection: Birth control pill       Allergies   Allergen Reactions    Dilaudid [Hydromorphone Hcl] Nausea And Vomiting    Hydromorphone Dizziness     Category: Allergy;       Current Outpatient Medications on File Prior to Visit   Medication Sig Dispense Refill    albuterol sulfate  (90 Base) MCG/ACT inhaler Inhale 2 puffs Every 4 (Four) Hours As Needed for Wheezing. 6.7 g 1    buPROPion XL (Wellbutrin XL) 150 MG 24 hr tablet Take 1 tablet by mouth Every Morning. 90 tablet 1    busPIRone (BUSPAR) 7.5 MG tablet Take 1 tablet by mouth Daily. for anxiety 90 tablet 2    Loratadine (CLARITIN PO) Take  by mouth Daily.      ondansetron (Zofran) 4 MG tablet Take 1 tablet by mouth Every 8 (Eight) Hours As Needed for Nausea or Vomiting. 15  "tablet 0    pantoprazole (PROTONIX) 20 MG EC tablet Take 1 tablet by mouth Daily. 30 tablet 1    phentermine 37.5 MG capsule Take 1 capsule by mouth Every Morning. 30 capsule 5    promethazine (PHENERGAN) 12.5 MG tablet Take 1 tablet by mouth Every 6 (Six) Hours As Needed for Nausea or Vomiting. 30 tablet 1    vitamin B-12 (CYANOCOBALAMIN) 1000 MCG tablet Take 1 tablet by mouth 1 (One) Time Per Week.      [DISCONTINUED] Asmanex, 30 Metered Doses, 220 MCG/ACT inhaler INHALE ONE PUFF BY MOUTH ONCE NIGHTLY 1 each 11    [DISCONTINUED] ferrous sulfate 325 (65 FE) MG tablet Take 1 tablet by mouth 1 (One) Time Per Week.      [DISCONTINUED] HYDROcodone-acetaminophen (Norco) 5-325 MG per tablet Take 1 tablet by mouth Every 6 (Six) Hours As Needed for Moderate Pain or Severe Pain. 10 tablet 0    [DISCONTINUED] miSOPROStol (CYTOTEC) 200 MCG tablet Place 3 tablets in the vagina once. 3 tablet 0     No current facility-administered medications on file prior to visit.       Objective   Vitals:    06/06/24 1025   BP: 112/76   Pulse: 110   SpO2: 98%   Weight: 65 kg (143 lb 6.4 oz)   Height: 160 cm (62.99\")     Body mass index is 25.41 kg/m².    Physical Exam  Constitutional:       Appearance: She is obese.   HENT:      Head: Normocephalic and atraumatic.      Mouth/Throat:      Mouth: Mucous membranes are moist.   Eyes:      Pupils: Pupils are equal, round, and reactive to light.   Cardiovascular:      Rate and Rhythm: Normal rate and regular rhythm.      Pulses: Normal pulses.      Heart sounds: Normal heart sounds.   Pulmonary:      Effort: Pulmonary effort is normal.      Breath sounds: Normal breath sounds.   Abdominal:      General: Bowel sounds are normal.   Musculoskeletal:         General: Normal range of motion.   Skin:     General: Skin is warm and dry.      Capillary Refill: Capillary refill takes less than 2 seconds.   Neurological:      General: No focal deficit present.      Mental Status: She is alert.   Psychiatric: "         Mood and Affect: Mood normal.      Procedures     Assessment & Plan   Diagnoses and all orders for this visit:    1. Epigastric pain (Primary)  -     Ambulatory Referral to Gastroenterology  -     Dextromethorphan-guaiFENesin 5-100 MG/5ML liquid; Take 5 mL by mouth 4 (Four) Times a Day.  Dispense: 355 mL; Refill: 1  -     CBC & Differential  -     Comprehensive Metabolic Panel    2. Memory deficit  -     Ambulatory Referral to Neurology    3. Lipid screening  -     Lipid Panel       Body mass index is 25.41 kg/m².       Discussed Care Gaps, ordered referrals and encouraged vaccination updates.       - Pt agrees with plan of care and denies further questions/concerns today  - This document is intended for medical expert use only. Persons  reading this document without medical staff guidance may result in misinterpretation and unintended morbidity     Go to the ER for any possible life-threatening symptoms such as chest pain or shortness of air.      Please allow 3-5 business days for recommendations based on new results      I personally spent time with this patient, preparing for the visit, reviewing tests, obtaining and/or reviewing a separately obtained history, performing a medically appropriate examination and/or evaluation, counseling and educating the patient/family/caregiver, ordering medications,  documenting information in the medical record and indepentently interpreting results.               Return in about 6 months (around 12/6/2024) for Next scheduled follow up.

## 2024-06-07 LAB
ALBUMIN SERPL-MCNC: 4.6 G/DL (ref 3.9–4.9)
ALBUMIN/GLOB SERPL: 1.8 {RATIO} (ref 1.2–2.2)
ALP SERPL-CCNC: 124 IU/L (ref 44–121)
ALT SERPL-CCNC: 28 IU/L (ref 0–32)
AST SERPL-CCNC: 30 IU/L (ref 0–40)
BASOPHILS # BLD AUTO: 0.1 X10E3/UL (ref 0–0.2)
BASOPHILS NFR BLD AUTO: 1 %
BILIRUB SERPL-MCNC: 0.3 MG/DL (ref 0–1.2)
BUN SERPL-MCNC: 14 MG/DL (ref 6–20)
BUN/CREAT SERPL: 15 (ref 9–23)
CALCIUM SERPL-MCNC: 9.9 MG/DL (ref 8.7–10.2)
CHLORIDE SERPL-SCNC: 104 MMOL/L (ref 96–106)
CHOLEST SERPL-MCNC: 145 MG/DL (ref 100–199)
CO2 SERPL-SCNC: 24 MMOL/L (ref 20–29)
CREAT SERPL-MCNC: 0.91 MG/DL (ref 0.57–1)
EGFRCR SERPLBLD CKD-EPI 2021: 85 ML/MIN/1.73
EOSINOPHIL # BLD AUTO: 0.2 X10E3/UL (ref 0–0.4)
EOSINOPHIL NFR BLD AUTO: 2 %
ERYTHROCYTE [DISTWIDTH] IN BLOOD BY AUTOMATED COUNT: 12.3 % (ref 11.7–15.4)
GLOBULIN SER CALC-MCNC: 2.5 G/DL (ref 1.5–4.5)
GLUCOSE SERPL-MCNC: 81 MG/DL (ref 70–99)
HCT VFR BLD AUTO: 40.1 % (ref 34–46.6)
HDLC SERPL-MCNC: 61 MG/DL
HGB BLD-MCNC: 13.2 G/DL (ref 11.1–15.9)
IMM GRANULOCYTES # BLD AUTO: 0 X10E3/UL (ref 0–0.1)
IMM GRANULOCYTES NFR BLD AUTO: 0 %
LDLC SERPL CALC-MCNC: 71 MG/DL (ref 0–99)
LYMPHOCYTES # BLD AUTO: 1.5 X10E3/UL (ref 0.7–3.1)
LYMPHOCYTES NFR BLD AUTO: 17 %
MCH RBC QN AUTO: 29 PG (ref 26.6–33)
MCHC RBC AUTO-ENTMCNC: 32.9 G/DL (ref 31.5–35.7)
MCV RBC AUTO: 88 FL (ref 79–97)
MONOCYTES # BLD AUTO: 0.6 X10E3/UL (ref 0.1–0.9)
MONOCYTES NFR BLD AUTO: 7 %
NEUTROPHILS # BLD AUTO: 6.2 X10E3/UL (ref 1.4–7)
NEUTROPHILS NFR BLD AUTO: 73 %
PLATELET # BLD AUTO: 348 X10E3/UL (ref 150–450)
POTASSIUM SERPL-SCNC: 4.5 MMOL/L (ref 3.5–5.2)
PROT SERPL-MCNC: 7.1 G/DL (ref 6–8.5)
RBC # BLD AUTO: 4.55 X10E6/UL (ref 3.77–5.28)
SODIUM SERPL-SCNC: 139 MMOL/L (ref 134–144)
TRIGL SERPL-MCNC: 65 MG/DL (ref 0–149)
VLDLC SERPL CALC-MCNC: 13 MG/DL (ref 5–40)
WBC # BLD AUTO: 8.5 X10E3/UL (ref 3.4–10.8)

## 2024-06-09 DIAGNOSIS — F41.9 ANXIETY: ICD-10-CM

## 2024-06-10 RX ORDER — BUPROPION HYDROCHLORIDE 150 MG/1
150 TABLET ORAL EVERY MORNING
Qty: 90 TABLET | Refills: 1 | Status: SHIPPED | OUTPATIENT
Start: 2024-06-10

## 2024-06-13 DIAGNOSIS — R12 HEART BURN: ICD-10-CM

## 2024-06-13 RX ORDER — PANTOPRAZOLE SODIUM 20 MG/1
20 TABLET, DELAYED RELEASE ORAL DAILY
Qty: 30 TABLET | Refills: 1 | Status: SHIPPED | OUTPATIENT
Start: 2024-06-13 | End: 2025-06-13

## 2024-06-17 ENCOUNTER — TELEPHONE (OUTPATIENT)
Dept: OBSTETRICS AND GYNECOLOGY | Facility: CLINIC | Age: 34
End: 2024-06-17
Payer: MEDICAID

## 2024-06-17 NOTE — TELEPHONE ENCOUNTER
Pt never came in to sign the tubal form where her old one . I had to move her to  and pt said she will come into our office tomorrow to get it signed, also went over procedure details. Pt is also requesting a refill on her prescription to last her until her surgery now on .

## 2024-06-17 NOTE — TELEPHONE ENCOUNTER
"  Caller: Yuki Shields \"Ali\"    Relationship: Self    Best call back number: [phone]    What was the call regarding: PT RETURNING A MISSED CALL  "

## 2024-06-19 RX ORDER — ONDANSETRON 4 MG/1
TABLET, FILM COATED ORAL
Qty: 15 TABLET | Refills: 0 | OUTPATIENT
Start: 2024-06-19

## 2024-06-19 NOTE — TELEPHONE ENCOUNTER
LVM for pt to return our call regarding her tubal consent form. Pt stated she would come into our office yesterday to sign it however we still do not have it. Pt surgery is on 7-31 and it has to be signed at least 30 days prior to procedure and we are coming up on the 30 day window.

## 2024-06-20 NOTE — TELEPHONE ENCOUNTER
My Chart to ask if still having nausea and also you need to come to the office to sign your tubal consent form.

## 2024-06-26 ENCOUNTER — TELEPHONE (OUTPATIENT)
Dept: OBSTETRICS AND GYNECOLOGY | Facility: CLINIC | Age: 34
End: 2024-06-26
Payer: MEDICAID

## 2024-06-26 NOTE — TELEPHONE ENCOUNTER
Lvm about coming in to sign the tubal form. Pt only has a couple more days to sign the form before the procedure is rescheduled again.     Maira Vo

## 2024-07-02 ENCOUNTER — OFFICE VISIT (OUTPATIENT)
Dept: GASTROENTEROLOGY | Facility: CLINIC | Age: 34
End: 2024-07-02
Payer: MEDICAID

## 2024-07-02 ENCOUNTER — PREP FOR SURGERY (OUTPATIENT)
Dept: SURGERY | Facility: SURGERY CENTER | Age: 34
End: 2024-07-02
Payer: MEDICAID

## 2024-07-02 DIAGNOSIS — R10.84 GENERALIZED ABDOMINAL PAIN: ICD-10-CM

## 2024-07-02 DIAGNOSIS — R12 HEARTBURN: ICD-10-CM

## 2024-07-02 DIAGNOSIS — K59.09 CHRONIC CONSTIPATION: Primary | ICD-10-CM

## 2024-07-02 DIAGNOSIS — K58.1 IRRITABLE BOWEL SYNDROME WITH CONSTIPATION: ICD-10-CM

## 2024-07-02 DIAGNOSIS — R11.0 NAUSEA: ICD-10-CM

## 2024-07-02 DIAGNOSIS — R10.13 EPIGASTRIC PAIN: ICD-10-CM

## 2024-07-02 PROCEDURE — 99214 OFFICE O/P EST MOD 30 MIN: CPT | Performed by: NURSE PRACTITIONER

## 2024-07-02 RX ORDER — SODIUM CHLORIDE 0.9 % (FLUSH) 0.9 %
10 SYRINGE (ML) INJECTION AS NEEDED
OUTPATIENT
Start: 2024-07-02

## 2024-07-02 RX ORDER — MOMETASONE FUROATE 220 UG/1
INHALANT RESPIRATORY (INHALATION)
COMMUNITY
Start: 2024-06-13

## 2024-07-02 RX ORDER — SODIUM CHLORIDE, SODIUM LACTATE, POTASSIUM CHLORIDE, CALCIUM CHLORIDE 600; 310; 30; 20 MG/100ML; MG/100ML; MG/100ML; MG/100ML
30 INJECTION, SOLUTION INTRAVENOUS CONTINUOUS PRN
OUTPATIENT
Start: 2024-07-02

## 2024-07-02 RX ORDER — SODIUM CHLORIDE 0.9 % (FLUSH) 0.9 %
3 SYRINGE (ML) INJECTION EVERY 12 HOURS SCHEDULED
OUTPATIENT
Start: 2024-07-02

## 2024-07-02 NOTE — PATIENT INSTRUCTIONS
For constipation, it may take trying several different medications to find the right medication regimen to help regulate your bowel movements.    You have been given samples of different medications and different dosages to see which medication works best to promote more regular bowel movements with complete evacuation.  Please follow these instructions regarding the samples.  Side effects of medications for constipation include nausea, abdominal pain, headache, diarrhea, cramping and in rare cases severe diarrhea.  The symptoms may be present when you first start the medication and then improve after several doses or may persist and become intolerable.  If any of the medications provided because prolonged and intolerable side effects, discontinue and contact the office.    FIRST Start Linzess 145 mcg - BAG ONE.    Take 1 tablet daily 30 minutes before first meal of the day.    If you do not notice more regular complete bowel movements,   STOP Linzess 145 mcg and   START Linzess 290 mcg - BAG TWO.    Take 1 tablet daily 30 minutes before first meal of the day.        Please contact the office with an update and we will send in medication that works the best to your pharmacy for regular use.      Heartburn/acid reflux, chronic, stable, continue pantoprazole.    Recommend lowest dose of Zofran possible for nausea as this can worsen constipation.    If you are not having regular bowel movements 2 weeks prior to colonoscopy, please contact the office for a prescription strength bowel prep.    Schedule EGD and colonoscopy, orders placed.    Additional recommendations will be made based on results of EGD and colonoscopy findings.    Follow-up visit after procedures to discuss results and make any additional recommendations.

## 2024-07-02 NOTE — PROGRESS NOTES
Chief Complaint   Patient presents with    Constipation           History of Present Illness  34-year-old female presents today for longstanding stomach issues.   She is a new patient with our practice.    She reports long standing constipation with associated sweating and then either vomit, bowel movement or both.   Typically she has a bowel movement once per week.   She can have diarrhea with an initial hard stool plug that can last 24-48 hours at times.   Once she has a bowel movement she will have complete evacuation and symptoms of bloating and abdominal distention resolve.    When she is significantly constipated, she will take colace 1 pill for a couple of days in a row, 2-3 times per month for constipation.   When she takes Colace it will take several days for the medication to start working.  Colace causes nausea.     No rectal bleeding.     No previous colonoscopy or EGD.  No family history of colon cancer or colon polyps.    She has heartburn.   This is controlled with pantoprazole 20 mg dialy.   No trouble swallowing.      Result Review :       CBC & Differential (06/06/2024 11:24)  Lipid Panel (06/06/2024 11:24)  Comprehensive Metabolic Panel (06/06/2024 11:24)  Office Visit with Emily Sifuentes APRN (06/06/2024)     Physical Exam  Vitals reviewed.   Constitutional:       General: She is not in acute distress.     Appearance: Normal appearance. She is well-developed. She is not diaphoretic.   HENT:      Head: Normocephalic and atraumatic.   Eyes:      General: No scleral icterus.  Cardiovascular:      Rate and Rhythm: Normal rate and regular rhythm.   Pulmonary:      Effort: Pulmonary effort is normal. No respiratory distress.      Breath sounds: Normal breath sounds.   Abdominal:      General: Bowel sounds are normal. There is no distension.      Palpations: Abdomen is soft.      Tenderness: There is no abdominal tenderness. There is no guarding or rebound.   Skin:     General: Skin is warm and dry.       Coloration: Skin is not jaundiced.   Neurological:      Mental Status: She is alert and oriented to person, place, and time.   Psychiatric:         Mood and Affect: Mood normal.         Behavior: Behavior normal.         Thought Content: Thought content normal.         Judgment: Judgment normal.       Assessment and Plan    Diagnoses and all orders for this visit:    1. Chronic constipation (Primary)  -     Discontinue: linaclotide (Linzess) 145 MCG capsule capsule; Take 1 capsule by mouth Every Morning Before Breakfast.  Dispense: 16 capsule; Refill: 0    2. Irritable bowel syndrome with constipation  -     Discontinue: linaclotide (Linzess) 290 MCG capsule capsule; Take 1 capsule by mouth Every Morning Before Breakfast.  Dispense: 16 capsule; Refill: 0    3. Generalized abdominal pain    4. Heartburn    5. Nausea      Chronic constipation, abdominal pain, nausea, heartburn.  Discussed the importance of regulating bowel movements,  Patient was given samples of Linzess 145 mcg and Linzess 290 mcg.  Discussion regarding expectations for treatment of constipation including that it may take several different medications to find the right fit as well as combination therapy.  Patient is agreeable to contact the office if 1 of these medications provide improvement we will send prescription otherwise we can make additional recommendations for constipation.    Heartburn/acid reflux, chronic, stable, continue pantoprazole.    Recommend lowest dose of Zofran possible for nausea as this can worsen constipation.    If you are not having regular bowel movements 2 weeks prior to colonoscopy, please contact the office for a prescription strength bowel prep.    Schedule EGD and colonoscopy, orders placed.    Additional recommendations will be made based on results of EGD and colonoscopy findings.    Follow-up visit after procedures to discuss results and make any additional recommendations.                 Patient Instructions    For constipation, it may take trying several different medications to find the right medication regimen to help regulate your bowel movements.    You have been given samples of different medications and different dosages to see which medication works best to promote more regular bowel movements with complete evacuation.  Please follow these instructions regarding the samples.  Side effects of medications for constipation include nausea, abdominal pain, headache, diarrhea, cramping and in rare cases severe diarrhea.  The symptoms may be present when you first start the medication and then improve after several doses or may persist and become intolerable.  If any of the medications provided because prolonged and intolerable side effects, discontinue and contact the office.    FIRST Start Linzess 145 mcg - BAG ONE.    Take 1 tablet daily 30 minutes before first meal of the day.    If you do not notice more regular complete bowel movements,   STOP Linzess 145 mcg and   START Linzess 290 mcg - BAG TWO.    Take 1 tablet daily 30 minutes before first meal of the day.        Please contact the office with an update and we will send in medication that works the best to your pharmacy for regular use.      Heartburn/acid reflux, chronic, stable, continue pantoprazole.    Recommend lowest dose of Zofran possible for nausea as this can worsen constipation.    If you are not having regular bowel movements 2 weeks prior to colonoscopy, please contact the office for a prescription strength bowel prep.    Schedule EGD and colonoscopy, orders placed.    Additional recommendations will be made based on results of EGD and colonoscopy findings.    Follow-up visit after procedures to discuss results and make any additional recommendations.         EMR Dragon/Transcription Disclaimer:  This document has been Dictated utilizing Dragon dictation.

## 2024-07-22 ENCOUNTER — TELEPHONE (OUTPATIENT)
Dept: NEUROLOGY | Facility: CLINIC | Age: 34
End: 2024-07-22
Payer: MEDICAID

## 2024-07-22 NOTE — TELEPHONE ENCOUNTER
Contacted patient left vm regarding aug 5th appt. Advised will need to reschedule due to provider being out of office.

## 2024-07-29 ENCOUNTER — TELEPHONE (OUTPATIENT)
Dept: OBSTETRICS AND GYNECOLOGY | Facility: CLINIC | Age: 34
End: 2024-07-29
Payer: MEDICAID

## 2024-07-29 NOTE — TELEPHONE ENCOUNTER
Spoke with pt she stated she actually was wanting to reschedule the surgery because it does not line up with her work schedule. I let pt know to call us back when she is ready to have the procedure and we will get her scheduled.     Maira Vo

## 2024-07-29 NOTE — TELEPHONE ENCOUNTER
[facility] called stated the pt had called them wanting to cancel her surgery because she received a notification that her surgery wasn't covered. Pts surgery is covered and there was no prior authorization needed. Lvm for pt to return our call.     Maira Vo

## 2024-07-30 DIAGNOSIS — K59.09 CHRONIC CONSTIPATION: ICD-10-CM

## 2024-08-04 DIAGNOSIS — F41.9 ANXIETY: ICD-10-CM

## 2024-08-05 ENCOUNTER — PATIENT MESSAGE (OUTPATIENT)
Dept: GASTROENTEROLOGY | Facility: CLINIC | Age: 34
End: 2024-08-05
Payer: MEDICAID

## 2024-08-05 DIAGNOSIS — K59.04 CHRONIC IDIOPATHIC CONSTIPATION: Primary | ICD-10-CM

## 2024-08-05 DIAGNOSIS — K59.09 CHRONIC CONSTIPATION: ICD-10-CM

## 2024-08-05 RX ORDER — BUSPIRONE HYDROCHLORIDE 7.5 MG/1
7.5 TABLET ORAL DAILY
Qty: 90 TABLET | Refills: 2 | Status: SHIPPED | OUTPATIENT
Start: 2024-08-05

## 2024-08-12 DIAGNOSIS — R12 HEART BURN: ICD-10-CM

## 2024-08-12 DIAGNOSIS — R63.4 WEIGHT LOSS: ICD-10-CM

## 2024-08-12 RX ORDER — TOPIRAMATE 50 MG/1
50 TABLET, FILM COATED ORAL DAILY
Qty: 90 TABLET | Refills: 1 | OUTPATIENT
Start: 2024-08-12

## 2024-08-12 RX ORDER — DROSPIRENONE 4 MG/1
1 TABLET, FILM COATED ORAL DAILY
Qty: 28 TABLET | Refills: 1 | Status: SHIPPED | OUTPATIENT
Start: 2024-08-12

## 2024-08-12 RX ORDER — PANTOPRAZOLE SODIUM 20 MG/1
20 TABLET, DELAYED RELEASE ORAL DAILY
Qty: 30 TABLET | Refills: 0 | Status: ON HOLD | OUTPATIENT
Start: 2024-08-12 | End: 2024-08-16

## 2024-08-12 NOTE — TELEPHONE ENCOUNTER
Med refsaurav. Brandon pt. 5/2/24 Miscarriage. Had planned a tubal but did not finish to schedule. Lesli. Thank you

## 2024-08-14 ENCOUNTER — TELEPHONE (OUTPATIENT)
Dept: GASTROENTEROLOGY | Facility: CLINIC | Age: 34
End: 2024-08-14

## 2024-08-14 NOTE — TELEPHONE ENCOUNTER
"    Hub staff attempted to follow warm transfer process and was unsuccessful     Caller: Yuki Shields \"Ali\"    Relationship to patient: Self    Best call back number: [phone]     Patient is needing: PT NEEDS PREP INSTRUCTIONS PREFERABLY THROUGH MYCHART IF POSSIBLE, PLEASE ADVISE.         "

## 2024-08-15 ENCOUNTER — TELEPHONE (OUTPATIENT)
Dept: GASTROENTEROLOGY | Facility: CLINIC | Age: 34
End: 2024-08-15
Payer: MEDICAID

## 2024-08-15 NOTE — SIGNIFICANT NOTE
Education provided the Patient on the following:    - Nothing to Eat or Drink after MN the night before the procedure    - Avoid red/purple fluids while completing their bowel prep as ordered by physician  -Contact Gastrointerologist office for any questions about specific details regarding colon prep    -You will need to have someone drive you home after your colonoscopy and remain with you for 24 hours after the procedure  - The date of your procedure, your are welcome to have one visitor at bedside or remain within 10-15 minutes of [facility]  -Please wear warm socks when you arrive for your procedure  -Remove all jewelry and leave any valuables before arriving the day of your procedure (all will have to be removed before leaving preop)  -You will need to arrive at 1115 on 8/16 procedure    -Feel free to contact us at: [phone] with any additional questions/concerns

## 2024-08-15 NOTE — TELEPHONE ENCOUNTER
Returned call and am sending  a message to see if she can stay on the schedule tomorrow. Told her I would call her back asap

## 2024-08-15 NOTE — TELEPHONE ENCOUNTER
"Hub staff attempted to follow warm transfer process and was unsuccessful     Caller: Yuki Shields \"Ali\"    Relationship to patient: Self    Best call back number: [phone]    Patient is needing: PATIENT CALLED IN AND STATED THAT SHE DIDN'T REALIZE THAT SHE WAS NOT SUPPOSED TO HAVE IBUPROFEN 5 DAYS PRIOR TO HER PROCEDURE. PATIENT STATED THAT SHE TOOK IBUPROFEN ON 08/13/24 & 08/14/24, A TOTAL OF 8 PILLS. PATIENT WOULD LIKE TO KNOW IF SHE IS STILL GOOD TO HAVE THE PROCEDURE SCHEDULED FOR 08/16/2024 OR DOES SHE NEED TO RESCHEDULE. PLEASE CALL BACK ANYTIME TO ADVISE, OKAY TO LEAVE VM.        "

## 2024-08-16 ENCOUNTER — ANESTHESIA (OUTPATIENT)
Dept: SURGERY | Facility: SURGERY CENTER | Age: 34
End: 2024-08-16
Payer: MEDICAID

## 2024-08-16 ENCOUNTER — ANESTHESIA EVENT (OUTPATIENT)
Dept: SURGERY | Facility: SURGERY CENTER | Age: 34
End: 2024-08-16
Payer: MEDICAID

## 2024-08-16 ENCOUNTER — HOSPITAL ENCOUNTER (OUTPATIENT)
Facility: SURGERY CENTER | Age: 34
Setting detail: HOSPITAL OUTPATIENT SURGERY
Discharge: HOME | End: 2024-08-16
Attending: INTERNAL MEDICINE | Admitting: INTERNAL MEDICINE
Payer: MEDICAID

## 2024-08-16 VITALS
RESPIRATION RATE: 16 BRPM | WEIGHT: 141.4 LBS | TEMPERATURE: 97 F | DIASTOLIC BLOOD PRESSURE: 78 MMHG | HEART RATE: 71 BPM | OXYGEN SATURATION: 99 % | BODY MASS INDEX: 26.02 KG/M2 | HEIGHT: 62 IN | SYSTOLIC BLOOD PRESSURE: 116 MMHG

## 2024-08-16 DIAGNOSIS — R12 HEART BURN: ICD-10-CM

## 2024-08-16 DIAGNOSIS — R12 HEARTBURN: ICD-10-CM

## 2024-08-16 DIAGNOSIS — R10.84 GENERALIZED ABDOMINAL PAIN: ICD-10-CM

## 2024-08-16 DIAGNOSIS — R10.13 EPIGASTRIC PAIN: ICD-10-CM

## 2024-08-16 DIAGNOSIS — K59.09 CHRONIC CONSTIPATION: ICD-10-CM

## 2024-08-16 DIAGNOSIS — R11.0 NAUSEA: ICD-10-CM

## 2024-08-16 LAB
B-HCG UR QL: NEGATIVE
EXPIRATION DATE: NORMAL
INTERNAL NEGATIVE CONTROL: NORMAL
INTERNAL POSITIVE CONTROL: NORMAL
Lab: NORMAL

## 2024-08-16 PROCEDURE — 45378 DIAGNOSTIC COLONOSCOPY: CPT | Performed by: INTERNAL MEDICINE

## 2024-08-16 PROCEDURE — 43239 EGD BIOPSY SINGLE/MULTIPLE: CPT | Performed by: INTERNAL MEDICINE

## 2024-08-16 PROCEDURE — 88305 TISSUE EXAM BY PATHOLOGIST: CPT | Performed by: INTERNAL MEDICINE

## 2024-08-16 PROCEDURE — 25810000003 LACTATED RINGERS PER 1000 ML: Performed by: NURSE PRACTITIONER

## 2024-08-16 PROCEDURE — 25010000002 PROPOFOL 1000 MG/100ML EMULSION

## 2024-08-16 PROCEDURE — 81025 URINE PREGNANCY TEST: CPT | Performed by: NURSE PRACTITIONER

## 2024-08-16 PROCEDURE — 25010000002 PROPOFOL 10 MG/ML EMULSION

## 2024-08-16 PROCEDURE — 25010000002 GLYCOPYRROLATE 1 MG/5ML SOLUTION

## 2024-08-16 RX ORDER — SODIUM CHLORIDE 0.9 % (FLUSH) 0.9 %
10 SYRINGE (ML) INJECTION AS NEEDED
Status: DISCONTINUED | OUTPATIENT
Start: 2024-08-16 | End: 2024-08-16 | Stop reason: HOSPADM

## 2024-08-16 RX ORDER — SUCRALFATE 1 G/1
1 TABLET ORAL 2 TIMES DAILY
Qty: 60 TABLET | Refills: 1 | Status: SHIPPED | OUTPATIENT
Start: 2024-08-16

## 2024-08-16 RX ORDER — PANTOPRAZOLE SODIUM 20 MG/1
40 TABLET, DELAYED RELEASE ORAL DAILY
Qty: 30 TABLET | Refills: 0 | Status: SHIPPED | OUTPATIENT
Start: 2024-08-16 | End: 2025-08-16

## 2024-08-16 RX ORDER — GLYCOPYRROLATE 0.2 MG/ML
INJECTION INTRAMUSCULAR; INTRAVENOUS AS NEEDED
Status: DISCONTINUED | OUTPATIENT
Start: 2024-08-16 | End: 2024-08-16 | Stop reason: SURG

## 2024-08-16 RX ORDER — SODIUM CHLORIDE, SODIUM LACTATE, POTASSIUM CHLORIDE, CALCIUM CHLORIDE 600; 310; 30; 20 MG/100ML; MG/100ML; MG/100ML; MG/100ML
30 INJECTION, SOLUTION INTRAVENOUS CONTINUOUS PRN
Status: DISCONTINUED | OUTPATIENT
Start: 2024-08-16 | End: 2024-08-16 | Stop reason: HOSPADM

## 2024-08-16 RX ORDER — LIDOCAINE HYDROCHLORIDE 20 MG/ML
INJECTION, SOLUTION INFILTRATION; PERINEURAL AS NEEDED
Status: DISCONTINUED | OUTPATIENT
Start: 2024-08-16 | End: 2024-08-16 | Stop reason: SURG

## 2024-08-16 RX ORDER — SODIUM CHLORIDE 0.9 % (FLUSH) 0.9 %
3 SYRINGE (ML) INJECTION EVERY 12 HOURS SCHEDULED
Status: DISCONTINUED | OUTPATIENT
Start: 2024-08-16 | End: 2024-08-16 | Stop reason: HOSPADM

## 2024-08-16 RX ORDER — PROPOFOL 10 MG/ML
INJECTION, EMULSION INTRAVENOUS AS NEEDED
Status: DISCONTINUED | OUTPATIENT
Start: 2024-08-16 | End: 2024-08-16 | Stop reason: SURG

## 2024-08-16 RX ADMIN — PROPOFOL 50 MG: 10 INJECTION, EMULSION INTRAVENOUS at 12:24

## 2024-08-16 RX ADMIN — LIDOCAINE HYDROCHLORIDE 60 MG: 20 INJECTION, SOLUTION INFILTRATION; PERINEURAL at 12:22

## 2024-08-16 RX ADMIN — PROPOFOL 50 MG: 10 INJECTION, EMULSION INTRAVENOUS at 12:23

## 2024-08-16 RX ADMIN — GLYCOPYRROLATE 0.1 MG: 0.2 INJECTION, SOLUTION INTRAMUSCULAR; INTRAVENOUS at 12:22

## 2024-08-16 RX ADMIN — PROPOFOL 300 MCG/KG/MIN: 10 INJECTION, EMULSION INTRAVENOUS at 12:22

## 2024-08-16 RX ADMIN — PROPOFOL 100 MG: 10 INJECTION, EMULSION INTRAVENOUS at 12:22

## 2024-08-16 RX ADMIN — SODIUM CHLORIDE, POTASSIUM CHLORIDE, SODIUM LACTATE AND CALCIUM CHLORIDE 30 ML/HR: 600; 310; 30; 20 INJECTION, SOLUTION INTRAVENOUS at 12:14

## 2024-08-16 NOTE — ANESTHESIA POSTPROCEDURE EVALUATION
Patient: Yuki Shields    Procedure Summary       Date: 08/16/24 Room / Location: [facility]  / [facility]    Anesthesia Start: 1219 Anesthesia Stop: 1251    Procedures:       ESOPHAGOGASTRODUODENOSCOPY (EGD) WITH BIOPSY      COLONOSCOPY TO CECUM Diagnosis:       Chronic constipation      Generalized abdominal pain      Nausea      Heartburn      Epigastric pain      (Chronic constipation [K59.09])      (Generalized abdominal pain [R10.84])      (Nausea [R11.0])      (Heartburn [R12])      (Epigastric pain [R10.13])    Surgeons: Pedro Burns MD Provider: Raf Lange MD    Anesthesia Type: MAC ASA Status: 2            Anesthesia Type: MAC    Vitals  Vitals Value Taken Time   /78 08/16/24 1315   Temp 36.1 °C (97 °F) 08/16/24 1250   Pulse 71 08/16/24 1315   Resp 16 08/16/24 1315   SpO2 99 % 08/16/24 1315           Post Anesthesia Care and Evaluation    Level of consciousness: awake and alert  Pain management: adequate    Airway patency: patent  Anesthetic complications: No anesthetic complications  PONV Status: controlled  Cardiovascular status: blood pressure returned to baseline and acceptable  Respiratory status: acceptable  Hydration status: acceptable

## 2024-08-16 NOTE — ANESTHESIA PREPROCEDURE EVALUATION
Anesthesia Evaluation     Patient summary reviewed and Nursing notes reviewed   NPO Solid Status: > 8 hours  NPO Liquid Status: > 2 hours           Airway   Mallampati: II  TM distance: >3 FB  Neck ROM: full  Dental      Pulmonary    (+) asthma,  Cardiovascular     (+) valvular problems/murmurs murmur      Neuro/Psych  (+) psychiatric history Anxiety and Depression  GI/Hepatic/Renal/Endo    (+) GERD    Musculoskeletal     Abdominal    Substance History - negative use     OB/GYN negative ob/gyn ROS         Other   arthritis,     ROS/Med Hx Other: Prothrombin gene mutation                Anesthesia Plan    ASA 2     MAC     intravenous induction     Anesthetic plan, risks, benefits, and alternatives have been provided, discussed and informed consent has been obtained with: patient.      CODE STATUS:

## 2024-08-16 NOTE — H&P
No chief complaint on file.      HPI  Gerd  Nausea  Abdominal pain  constipation         Problem List:    Patient Active Problem List   Diagnosis    Anemia    Coagulopathy    DDD (degenerative disc disease), lumbar    Factor II deficiency    Folic acid deficiency    Memory impairment    Obesity    Pain    Stress    Asthma     (spontaneous vaginal delivery)    Unwanted fertility    Chronic constipation    Generalized abdominal pain    Nausea    Heartburn    Epigastric pain       Medical History:    Past Medical History:   Diagnosis Date    Anemia     Anxiety and depression     Asthma     As child    DDD (degenerative disc disease), lumbar     Deep vein thrombosis     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 GARZA AWARE OF THIS    Family hx-blood disorder     MOTHER HAS FACTOR 2 AND FACTOR 5    GERD (gastroesophageal reflux disease)     Heart murmur     History of blood transfusion     Labral tear of hip, degenerative     RIGHT    Low back pain     Lumbar herniated disc 06/2017    X 2  DISC   HISTORY OF EPIDURALS    Urinary tract infection         Social History:    Social History     Socioeconomic History    Marital status: Single    Number of children: 3   Tobacco Use    Smoking status: Never    Smokeless tobacco: Never   Vaping Use    Vaping status: Never Used   Substance and Sexual Activity    Alcohol use: Not Currently     Comment: OCCAS    Drug use: Never    Sexual activity: Yes     Partners: Male     Birth control/protection: Birth control pill       Family History:   Family History   Problem Relation Age of Onset    Ulcerative colitis Mother     Irritable bowel syndrome Mother     Ulcerative colitis Father     Diverticulitis Father     Malig Hyperthermia Neg Hx     Colon cancer Neg Hx     Colon polyps Neg Hx     Crohn's disease Neg Hx        Surgical History:   Past Surgical History:   Procedure Laterality Date    ABDOMINOPLASTY  2019    ADENOIDECTOMY      BLADDER REPAIR  2016    WITH MESH     BREAST AUGMENTATION BILATERAL MASTOPEXY Bilateral 07/31/2017    Procedure: BREAST AUGMENTATION, MASTOPEXY W/ IDEAL IMPLANTS  NEW IMAGE;  Surgeon: Nirmal Mehta MD;  Location: [facility] OR;  Service:     BREAST SURGERY      CERVICAL/THORACIC FACET INJECTION      pt states cervial fusion in the past , PONV from these    CHOLECYSTECTOMY  2012    COSMETIC SURGERY      TONSILLECTOMY AND ADENOIDECTOMY  1990       No current facility-administered medications for this encounter.    Current Outpatient Medications:     albuterol sulfate  (90 Base) MCG/ACT inhaler, Inhale 2 puffs Every 4 (Four) Hours As Needed for Wheezing., Disp: 6.7 g, Rfl: 1    Asmanex, 30 Metered Doses, 220 MCG/ACT inhaler, , Disp: , Rfl:     buPROPion XL (WELLBUTRIN XL) 150 MG 24 hr tablet, TAKE ONE TABLET BY MOUTH EVERY MORNING, Disp: 90 tablet, Rfl: 1    busPIRone (BUSPAR) 7.5 MG tablet, TAKE 1 TABLET BY MOUTH DAILY FOR ANXIETY, Disp: 90 tablet, Rfl: 2    linaclotide (Linzess) 145 MCG capsule capsule, Take 1 capsule by mouth Every Morning Before Breakfast., Disp: 30 capsule, Rfl: 3    Loratadine (CLARITIN PO), Take  by mouth Daily., Disp: , Rfl:     ondansetron (Zofran) 4 MG tablet, Take 1 tablet by mouth Every 8 (Eight) Hours As Needed for Nausea or Vomiting., Disp: 15 tablet, Rfl: 0    pantoprazole (PROTONIX) 20 MG EC tablet, TAKE 1 TABLET BY MOUTH DAILY, Disp: 30 tablet, Rfl: 0    phentermine 37.5 MG capsule, Take 1 capsule by mouth Every Morning., Disp: 30 capsule, Rfl: 5    promethazine (PHENERGAN) 12.5 MG tablet, Take 1 tablet by mouth Every 6 (Six) Hours As Needed for Nausea or Vomiting., Disp: 30 tablet, Rfl: 1    Slynd 4 MG tablet, TAKE 1 TABLET BY MOUTH DAILY, Disp: 28 tablet, Rfl: 1    Allergies:   Allergies   Allergen Reactions    Dilaudid [Hydromorphone Hcl] Nausea And Vomiting    Hydromorphone Dizziness     Category: Allergy;        The following portions of the patient's history were reviewed by me and updated as appropriate:  review of systems, allergies, current medications, past family history, past medical history, past social history, past surgical history and problem list.    There were no vitals filed for this visit.    PHYSICAL EXAM:    CONSTITUTIONAL:  today's vital signs reviewed by me  GASTROINTESTINAL: abdomen is soft nontender nondistended with normal active bowel sounds, no masses are appreciated    Assessment/ Plan  Gerd  Nausea  Abdominal pain  Constipation    Egd and colonoscopy    Risks and benefits as well as alternatives to endoscopic evaluation were explained to the patient and they voiced understanding and wish to proceed.  These risks include but are not limited to the risk of bleeding, perforation, adverse reaction to sedation, and missed lesions.  The patient was given the opportunity to ask questions prior to the endoscopic procedure.

## 2024-08-20 LAB
CYTO UR: NORMAL
LAB AP CASE REPORT: NORMAL
LAB AP CLINICAL INFORMATION: NORMAL
PATH REPORT.FINAL DX SPEC: NORMAL
PATH REPORT.GROSS SPEC: NORMAL

## 2024-08-30 ENCOUNTER — OFFICE VISIT (OUTPATIENT)
Dept: GASTROENTEROLOGY | Facility: CLINIC | Age: 34
End: 2024-08-30
Payer: MEDICAID

## 2024-08-30 VITALS
HEART RATE: 112 BPM | BODY MASS INDEX: 26.31 KG/M2 | WEIGHT: 143 LBS | SYSTOLIC BLOOD PRESSURE: 100 MMHG | OXYGEN SATURATION: 95 % | TEMPERATURE: 96.7 F | HEIGHT: 62 IN | DIASTOLIC BLOOD PRESSURE: 60 MMHG

## 2024-08-30 DIAGNOSIS — R12 HEART BURN: ICD-10-CM

## 2024-08-30 PROCEDURE — 99213 OFFICE O/P EST LOW 20 MIN: CPT | Performed by: NURSE PRACTITIONER

## 2024-08-30 PROCEDURE — 1160F RVW MEDS BY RX/DR IN RCRD: CPT | Performed by: NURSE PRACTITIONER

## 2024-08-30 PROCEDURE — 1159F MED LIST DOCD IN RCRD: CPT | Performed by: NURSE PRACTITIONER

## 2024-08-30 RX ORDER — PANTOPRAZOLE SODIUM 40 MG/1
40 TABLET, DELAYED RELEASE ORAL DAILY
Qty: 90 TABLET | Refills: 3 | Status: SHIPPED | OUTPATIENT
Start: 2024-08-30

## 2024-08-30 NOTE — PATIENT INSTRUCTIONS
Reviewed recent EGD and colonoscopy.  Chronic constipation, continue Linzess 145 mcg daily.  Samples of 290 mcg provided to use as needed if constipation related to medication side effects, can send in higher dose if 290 works better in long term.   EGD with mild gastritis, continue pantoprazole 40 mg daily and sucralfate one pill daily x 14 days then discontinue.    For GERD, follow antireflux precautions.  Recommend avoiding eating within 3 to 4 hours of bedtime.  Avoid foods that can trigger symptoms which may include citrus fruits, spicy foods, tomatoes and red sauces, chocolate, coffee/tea, caffeinated or carbonated beverages, alcohol.

## 2024-08-30 NOTE — PROGRESS NOTES
"Chief Complaint   Patient presents with    Follow-up     Recent EGD and colonoscopy           History of Present Illness  34-year-old female presents today for follow-up after EGD and colonoscopy August 16, 2024.  Initial consult July 2, 2024.  EGD with mild gastritis, biopsies without H. pylori, minimal nonspecific chronic inflammation.  Colonoscopy with nonbleeding internal hemorrhoids, otherwise normal.  Given findings of gastritis on EGD she was started on pantoprazole 20 mg twice daily.  Also started on sucralfate 1 tablet 2 times daily.  For chronic constipation she is currently taking Linzess 145 mcg daily.  History of Present Illness      During her initial consultation in 07/2024, she was provided with samples to manage her constipation.   Linzess 145 mcg proved to be the most effective until she began taking sucralfate once daily in the morning.   She also resumed phentermine after her scopes, which she acknowledges can contribute to constipation.   Currently, she is experiencing difficulty with bowel movements.   Previously the higher dose of Linzess resulted in excessive bowel movements.    Prior to her EGD she was on 20 mg once daily which worked well to control acid reflux symptoms.  At the time of her EGD she was started on pantoprazole milligrams daily.    Current prescription is for pantoprazole 20 mg 1 tablet twice daily.    She is unsure if she should take two 20 mg tablets simultaneously or at different times.  She does not experience reflux symptoms later in the day.     She takes ibuprofen a few times a week for chronic back pain.     Result Review :       Tissue Pathology Exam (08/16/2024 12:28)  Colonoscopy (08/16/2024 12:17)  Upper GI Endoscopy (08/16/2024 12:17)    Vital Signs:   /60   Pulse 112   Temp 96.7 °F (35.9 °C)   Ht 157.5 cm (62.01\")   Wt 64.9 kg (143 lb)   SpO2 95%   BMI 26.15 kg/m²     Body mass index is 26.15 kg/m².     Physical Exam  Vitals reviewed. "   Constitutional:       General: She is not in acute distress.     Appearance: Normal appearance. She is not ill-appearing or toxic-appearing.   Eyes:      General: No scleral icterus.  Pulmonary:      Effort: Pulmonary effort is normal. No respiratory distress.   Skin:     Coloration: Skin is not jaundiced.   Neurological:      Mental Status: She is alert and oriented to person, place, and time.   Psychiatric:         Mood and Affect: Mood normal.         Behavior: Behavior normal.         Thought Content: Thought content normal.         Judgment: Judgment normal.         Assessment and Plan    Diagnoses and all orders for this visit:    1. Heart burn  -     pantoprazole (PROTONIX) 40 MG EC tablet; Take 1 tablet by mouth Daily.  Dispense: 90 tablet; Refill: 3       Assessment & Plan  1. Constipation.  Her constipation is likely exacerbated by the use of phentermine.   Samples of Linzess 290 mcg will be provided for intermittent use.   If Linzess 145 mcg is no longer controlling symptoms and 290 mcg is better tolerated with more frequent bowel movements, patient instructed to contact the office and we will send a prescription for Linzess 290 mcg to her pharmacy.     She is advised to take sucralfate once or twice daily for a period of 2 to 3 weeks.     2. Gastritis.  The recent upper endoscopy revealed mild gastritis with biopsies indicating mild chronic inflammation due to acid reflux.   She was on pantoprazole 20 mg daily prior to EGD, given gastritis on EGD and chronic NSAID use, Dr. Burns recommends pantoprazole 40 mg daily.  A prescription for pantoprazole 40 mg, to be taken once daily, will be sent in to pharmacy.   She should continue taking two 20 mg tablets until her current prescription is depleted, after which she should switch to the higher dose of one 40 mg tablet daily.      3.  Gastritis.    Prescribed sucralfate.    This is a large pill, recommend breaking the tablet in half or crushing it in 1 inch  of water and drinking.  Do not recommend taking this medication around the same time as other medications as it can decrease absorption and efficacy of other medications.  Recommend 1 sucralfate daily x 14 days then discontinue.     4.  Follow-up office visit yearly if prescription refills are needed and if symptoms are stable and primary care provider is comfortable with refills of Linzess and pantoprazole, okay for refills with primary care provider and follow-up with our office as needed only.  Otherwise follow-up visit yearly for refills.        Patient Instructions     Reviewed recent EGD and colonoscopy.  Chronic constipation, continue Linzess 145 mcg daily.  Samples of 290 mcg provided to use as needed if constipation related to medication side effects, can send in higher dose if 290 works better in long term.   EGD with mild gastritis, continue pantoprazole 40 mg daily and sucralfate one pill daily x 14 days then discontinue.    For GERD, follow antireflux precautions.  Recommend avoiding eating within 3 to 4 hours of bedtime.  Avoid foods that can trigger symptoms which may include citrus fruits, spicy foods, tomatoes and red sauces, chocolate, coffee/tea, caffeinated or carbonated beverages, alcohol.       Patient or patient representative verbalized consent for the use of Ambient Listening during the visit with  MICHAEL Arredondo for chart documentation. 8/30/2024  15:50 EDT    EMR Dragon/Transcription Disclaimer:  This document has been Dictated utilizing Dragon dictation.

## 2024-09-11 ENCOUNTER — OFFICE VISIT (OUTPATIENT)
Dept: FAMILY MEDICINE CLINIC | Facility: CLINIC | Age: 34
End: 2024-09-11
Payer: MEDICAID

## 2024-09-11 VITALS
DIASTOLIC BLOOD PRESSURE: 70 MMHG | TEMPERATURE: 98 F | OXYGEN SATURATION: 97 % | SYSTOLIC BLOOD PRESSURE: 108 MMHG | BODY MASS INDEX: 25.47 KG/M2 | HEART RATE: 93 BPM | WEIGHT: 138.4 LBS | HEIGHT: 62 IN

## 2024-09-11 DIAGNOSIS — J18.9 PNEUMONIA OF RIGHT LOWER LOBE DUE TO INFECTIOUS ORGANISM: Primary | ICD-10-CM

## 2024-09-11 PROCEDURE — 1125F AMNT PAIN NOTED PAIN PRSNT: CPT | Performed by: NURSE PRACTITIONER

## 2024-09-11 PROCEDURE — 99213 OFFICE O/P EST LOW 20 MIN: CPT | Performed by: NURSE PRACTITIONER

## 2024-09-11 RX ORDER — HYDROCODONE BITARTRATE AND ACETAMINOPHEN 10; 325 MG/1; MG/1
TABLET ORAL
COMMUNITY
Start: 2024-09-09 | End: 2024-09-18

## 2024-09-11 RX ORDER — BENZONATATE 200 MG/1
CAPSULE ORAL
COMMUNITY
Start: 2024-09-09

## 2024-09-11 RX ORDER — LEVOFLOXACIN 750 MG/1
TABLET, FILM COATED ORAL
COMMUNITY
Start: 2024-09-09 | End: 2024-09-18

## 2024-09-17 ENCOUNTER — TELEPHONE (OUTPATIENT)
Dept: FAMILY MEDICINE CLINIC | Facility: CLINIC | Age: 34
End: 2024-09-17
Payer: MEDICAID

## 2024-09-18 ENCOUNTER — OFFICE VISIT (OUTPATIENT)
Dept: FAMILY MEDICINE CLINIC | Facility: CLINIC | Age: 34
End: 2024-09-18
Payer: MEDICAID

## 2024-09-18 VITALS
BODY MASS INDEX: 25.91 KG/M2 | HEART RATE: 88 BPM | OXYGEN SATURATION: 100 % | SYSTOLIC BLOOD PRESSURE: 122 MMHG | HEIGHT: 62 IN | WEIGHT: 140.8 LBS | TEMPERATURE: 98.6 F | DIASTOLIC BLOOD PRESSURE: 82 MMHG

## 2024-09-18 VITALS — BODY MASS INDEX: 25.31 KG/M2 | HEIGHT: 62 IN

## 2024-09-18 DIAGNOSIS — Z53.21 PROCEDURE AND TREATMENT NOT CARRIED OUT DUE TO PATIENT LEAVING PRIOR TO BEING SEEN BY HEALTH CARE PROVIDER: Primary | ICD-10-CM

## 2024-09-18 DIAGNOSIS — J18.9 PNEUMONIA DUE TO INFECTIOUS ORGANISM, UNSPECIFIED LATERALITY, UNSPECIFIED PART OF LUNG: Primary | ICD-10-CM

## 2024-09-18 PROCEDURE — 1125F AMNT PAIN NOTED PAIN PRSNT: CPT

## 2024-09-18 PROCEDURE — 99213 OFFICE O/P EST LOW 20 MIN: CPT

## 2024-09-18 PROCEDURE — 1159F MED LIST DOCD IN RCRD: CPT

## 2024-09-18 PROCEDURE — 1160F RVW MEDS BY RX/DR IN RCRD: CPT

## 2024-10-22 ENCOUNTER — OFFICE VISIT (OUTPATIENT)
Dept: FAMILY MEDICINE CLINIC | Facility: CLINIC | Age: 34
End: 2024-10-22
Payer: MEDICAID

## 2024-10-22 VITALS
WEIGHT: 144.4 LBS | SYSTOLIC BLOOD PRESSURE: 112 MMHG | OXYGEN SATURATION: 95 % | DIASTOLIC BLOOD PRESSURE: 42 MMHG | HEART RATE: 88 BPM | BODY MASS INDEX: 26.57 KG/M2 | HEIGHT: 62 IN

## 2024-10-22 DIAGNOSIS — D64.9 ANEMIA, UNSPECIFIED TYPE: Primary | ICD-10-CM

## 2024-10-22 DIAGNOSIS — M54.41 ACUTE RIGHT-SIDED LOW BACK PAIN WITH RIGHT-SIDED SCIATICA: ICD-10-CM

## 2024-10-22 DIAGNOSIS — E78.2 MIXED HYPERLIPIDEMIA: ICD-10-CM

## 2024-10-22 PROCEDURE — 99214 OFFICE O/P EST MOD 30 MIN: CPT | Performed by: NURSE PRACTITIONER

## 2024-10-22 PROCEDURE — 72100 X-RAY EXAM L-S SPINE 2/3 VWS: CPT | Performed by: NURSE PRACTITIONER

## 2024-10-22 PROCEDURE — 1125F AMNT PAIN NOTED PAIN PRSNT: CPT | Performed by: NURSE PRACTITIONER

## 2024-10-22 RX ORDER — CYCLOBENZAPRINE HCL 10 MG
10 TABLET ORAL 3 TIMES DAILY PRN
Qty: 60 TABLET | Refills: 1 | Status: SHIPPED | OUTPATIENT
Start: 2024-10-22

## 2024-10-22 NOTE — LETTER
October 22, 2024     Patient: Yuki Shields   YOB: 1990   Date of Visit: 10/22/2024       To Whom It May Concern:    It is my medical opinion that Yuki Shields may return to work Monday, October 28, 2024; no lifting over 10 lbs except occasionally.  No working as no pushing or pulling over 20 lbs except occasionally.  May not work in a  capacity.         Sincerely,        MICHAEL Godinez    CC: No Recipients

## 2024-10-22 NOTE — PROGRESS NOTES
Subjective   Yuki Shields is a 34 y.o. female.     Chief Complaint   Patient presents with    Back Pain    Hip Pain     Rt Hip   was injured through Work  a Child came down on her lower back     HPI: Shama is a 34-year-old female presenting with hip and back pain and annual physical;     Onset - last Wednesday, 6 days ago  Location lower back near spine  Duration  ongoing  Characteristics burning pain right hip into leg with numbness right leg  Aggravating sitting or walking to long make hip hurt  Relieving - rest  Treatments - heat/ice/PT, Ibuprofen  Situation - affecting ADLs    Care gaps:  Influenza - refused  Annual physical-today    History of Present Illness  The patient presents for evaluation of hip pain.    She reports experiencing a burning sensation in her hip, which she attributes to a previous labrum tear on the opposite side. She has a history of frequent running.    She is interested in undergoing a test to confirm if she has a labral tear on the current side. Her next therapy session is scheduled for Thursday.    She has been given work restrictions by [employer], which include no lifting or lifting only a certain amount. She is expected to return to work tomorrow but has an appointment with [employer] on Thursday. She is considering taking the rest of the week off to rest.    She does not have any muscle relaxants at home.       The following portions of the patient's history were reviewed and updated as appropriate: allergies, current medications, past family history, past medical history, past social history, past surgical history and problem list.    Results  Imaging  Degenerative changes at the right hip joint.       Objective     Vitals:    10/22/24 1008   BP: 112/42   Pulse: 88   SpO2: 95%      Body mass index is 26.4 kg/m².    Physical Exam  Constitutional:       Appearance: Normal appearance.   HENT:      Head: Normocephalic and atraumatic.      Nose: Nose normal.      Mouth/Throat:       Mouth: Mucous membranes are moist.   Cardiovascular:      Rate and Rhythm: Normal rate and regular rhythm.      Pulses: Normal pulses.      Heart sounds: Normal heart sounds.   Pulmonary:      Effort: Pulmonary effort is normal.      Breath sounds: Normal breath sounds.   Abdominal:      General: Bowel sounds are normal.   Musculoskeletal:         General: Tenderness present. Normal range of motion.      Cervical back: Normal range of motion.      Comments: Right buttocks with radiation down right leg   Skin:     General: Skin is warm and dry.      Capillary Refill: Capillary refill takes less than 2 seconds.   Neurological:      General: No focal deficit present.      Mental Status: She is alert.   Psychiatric:         Mood and Affect: Mood normal.     Assessment & Plan  1. Right hip pain.  The hip pain is likely due to degenerative changes in the right hip joint and sciatic nerve impingement. Physical therapy was recommended, focusing on pelvic stability exercises to prevent the pelvis from rocking and irritating the sciatic nerve. Cyclobenzaprine was prescribed for muscle relaxation, with a caution about potential drowsiness and advice not to drive after taking it. The use of ice or heat was suggested for symptom relief, with a note that ice might be more effective. An MRI was ordered to investigate the possibility of a labral tear. She was advised to take ibuprofen with food to prevent gastrointestinal upset.    2. Sciatic nerve impingement.  The sciatic nerve impingement is contributing to the burning pain in the hip. Physical therapy focusing on pelvic stability exercises was recommended to alleviate the symptoms. Cyclobenzaprine was prescribed to help relax the muscle around the nerve. The use of ice or heat was suggested to see which provides better relief.    3. Degenerative changes in the right hip joint.  The degenerative changes are considered normal age-related wear and tear. No specific treatment was  recommended other than managing symptoms with ibuprofen, ensuring it is taken with food to avoid gastrointestinal issues.    4. Work restrictions.  Work restrictions were provided, limiting lifting to 10 pounds occasionally and pushing or pulling up to 20 pounds occasionally. 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 She requested to take off the rest of the week to rest.    Diagnoses and all orders for this visit:    1. Anemia, unspecified type (Primary)  -     CBC & Differential  -     Comprehensive Metabolic Panel    2. Mixed hyperlipidemia  -     Lipid Panel    3. Acute right-sided low back pain with right-sided sciatica  -     XR Spine Lumbar 2 or 3 View  -     cyclobenzaprine (FLEXERIL) 10 MG tablet; Take 1 tablet by mouth 3 (Three) Times a Day As Needed for Muscle Spasms.  Dispense: 60 tablet; Refill: 1  -     MRI hip right w wo contrast; Future           Discussed Care Gaps, ordered referrals and encouraged vaccination updates.       - Pt agrees with plan of care and denies further questions/concerns today  - This document is intended for medical expert use only. Persons  reading this document without medical staff guidance may result in misinterpretation and unintended morbidity     Go to the ER for any possible life-threatening symptoms such as chest pain or shortness of air.      Please allow 3-5 business days for recommendations based on new results      I personally spent time with this patient, preparing for the visit, reviewing tests, obtaining and/or reviewing a separately obtained history, performing a medically appropriate examination and/or evaluation, counseling and educating the patient/family/caregiver, ordering medications,  documenting information in the medical record and indepentently interpreting results.       Patient or patient representative verbalized consent for the use of Ambient Listening during the visit with  Emily MEADE  MICHAEL Sifuentes for chart documentation. 10/22/2024  10:45EDT    MDM:  Patient is seeing PT for her neck and lower back via workman's comp.  X-ray to ensure nothing is dislocated.  Flexeril to stop the muscle spasms.  MRI ordered to rule out labrum tear right hip.

## 2024-10-24 ENCOUNTER — OFFICE VISIT (OUTPATIENT)
Dept: NEUROLOGY | Facility: CLINIC | Age: 34
End: 2024-10-24
Payer: MEDICAID

## 2024-10-24 VITALS
DIASTOLIC BLOOD PRESSURE: 88 MMHG | BODY MASS INDEX: 26.31 KG/M2 | HEART RATE: 70 BPM | HEIGHT: 62 IN | SYSTOLIC BLOOD PRESSURE: 114 MMHG | WEIGHT: 143 LBS

## 2024-10-24 DIAGNOSIS — F41.9 ANXIETY: ICD-10-CM

## 2024-10-24 DIAGNOSIS — R41.3 MEMORY IMPAIRMENT: Primary | ICD-10-CM
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
## 2024-10-25 ENCOUNTER — PATIENT ROUNDING (BHMG ONLY) (OUTPATIENT)
Dept: NEUROLOGY | Facility: CLINIC | Age: 34
End: 2024-10-25
Payer: MEDICAID

## 2024-10-28 ENCOUNTER — OFFICE VISIT (OUTPATIENT)
Dept: FAMILY MEDICINE CLINIC | Facility: CLINIC | Age: 34
End: 2024-10-28
Payer: MEDICAID

## 2024-10-28 VITALS
WEIGHT: 146.6 LBS | SYSTOLIC BLOOD PRESSURE: 114 MMHG | OXYGEN SATURATION: 99 % | HEART RATE: 92 BPM | BODY MASS INDEX: 26.98 KG/M2 | HEIGHT: 62 IN | DIASTOLIC BLOOD PRESSURE: 82 MMHG

## 2024-10-28 DIAGNOSIS — M54.50 ACUTE BILATERAL LOW BACK PAIN WITHOUT SCIATICA: Primary | ICD-10-CM

## 2024-10-28 DIAGNOSIS — S39.92XD INJURY OF BACK, SUBSEQUENT ENCOUNTER: ICD-10-CM

## 2024-10-28 PROCEDURE — 99213 OFFICE O/P EST LOW 20 MIN: CPT | Performed by: NURSE PRACTITIONER

## 2024-10-28 PROCEDURE — 3079F DIAST BP 80-89 MM HG: CPT | Performed by: NURSE PRACTITIONER

## 2024-10-28 PROCEDURE — 1125F AMNT PAIN NOTED PAIN PRSNT: CPT | Performed by: NURSE PRACTITIONER

## 2024-10-28 PROCEDURE — 3074F SYST BP LT 130 MM HG: CPT | Performed by: NURSE PRACTITIONER
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
## 2024-10-28 NOTE — LETTER
October 28, 2024     Patient: Yuki Shields   YOB: 1990   Date of Visit: 10/28/2024       To Whom It May Concern:    It is my medical opinion that Yuki Shields may return to work on October 31, 2024.         Sincerely,        MICHAEL Godinez    CC: No Recipients

## 2024-11-12 DIAGNOSIS — M54.50 LOW BACK PAIN RADIATING TO RIGHT LOWER EXTREMITY: Primary | ICD-10-CM

## 2024-11-12 DIAGNOSIS — M79.604 LOW BACK PAIN RADIATING TO RIGHT LOWER EXTREMITY: Primary | ICD-10-CM

## 2024-11-19 ENCOUNTER — OFFICE VISIT (OUTPATIENT)
Dept: FAMILY MEDICINE CLINIC | Facility: CLINIC | Age: 34
End: 2024-11-19
Payer: MEDICAID

## 2024-11-19 VITALS
OXYGEN SATURATION: 99 % | WEIGHT: 145.2 LBS | DIASTOLIC BLOOD PRESSURE: 66 MMHG | BODY MASS INDEX: 26.72 KG/M2 | HEIGHT: 62 IN | HEART RATE: 98 BPM | SYSTOLIC BLOOD PRESSURE: 118 MMHG

## 2024-11-19 DIAGNOSIS — B96.89 ACUTE BACTERIAL SINUSITIS: Primary | ICD-10-CM

## 2024-11-19 DIAGNOSIS — J01.90 ACUTE BACTERIAL SINUSITIS: Primary | ICD-10-CM

## 2024-11-19 DIAGNOSIS — R41.3 MEMORY IMPAIRMENT: ICD-10-CM

## 2024-11-19 PROCEDURE — 99213 OFFICE O/P EST LOW 20 MIN: CPT | Performed by: NURSE PRACTITIONER

## 2024-11-19 PROCEDURE — 1125F AMNT PAIN NOTED PAIN PRSNT: CPT | Performed by: NURSE PRACTITIONER

## 2024-11-19 PROCEDURE — 3078F DIAST BP <80 MM HG: CPT | Performed by: NURSE PRACTITIONER

## 2024-11-19 PROCEDURE — 3074F SYST BP LT 130 MM HG: CPT | Performed by: NURSE PRACTITIONER

## 2024-11-19 NOTE — PROGRESS NOTES
Subjective   Yuki Shields is a 34 y.o. female. Presents today for No chief complaint on file.      History Of Present Illness: Shama Shields is a 34-year-old female presenting today with sinus congestion and to receive her annual physical    Care gaps:  Influenza - ill today  Annual physical - today    History of Present Illness      Patient Active Problem List   Diagnosis    Anemia    Coagulopathy    DDD (degenerative disc disease), lumbar    Factor II deficiency    Folic acid deficiency    Memory impairment    Obesity    Pain    Stress    Asthma     (spontaneous vaginal delivery)    Unwanted fertility    Chronic constipation    Generalized abdominal pain    Nausea    Heartburn    Epigastric pain    Depressive disorder    Essential hypertension    Prothrombin gene mutation       Social History     Socioeconomic History    Marital status: Single    Number of children: 3   Tobacco Use    Smoking status: Never    Smokeless tobacco: Never   Vaping Use    Vaping status: Never Used   Substance and Sexual Activity    Alcohol use: Not Currently     Comment: OCCAS    Drug use: Never    Sexual activity: Yes     Partners: Male     Birth control/protection: Natural family planning/Rhythm       Allergies   Allergen Reactions    Dilaudid [Hydromorphone Hcl] Nausea And Vomiting    Hydromorphone Dizziness     Category: Allergy;       Current Outpatient Medications on File Prior to Visit   Medication Sig Dispense Refill    albuterol sulfate  (90 Base) MCG/ACT inhaler Inhale 2 puffs Every 4 (Four) Hours As Needed for Wheezing. 6.7 g 1    Asmanex, 30 Metered Doses, 220 MCG/ACT inhaler       buPROPion XL (WELLBUTRIN XL) 150 MG 24 hr tablet TAKE ONE TABLET BY MOUTH EVERY MORNING 90 tablet 1    busPIRone (BUSPAR) 7.5 MG tablet TAKE 1 TABLET BY MOUTH DAILY FOR ANXIETY 90 tablet 2    cyclobenzaprine (FLEXERIL) 10 MG tablet Take 1 tablet by mouth 3 (Three) Times a Day As Needed for Muscle Spasms. 60 tablet 1    linaclotide  (Linzess) 145 MCG capsule capsule Take 1 capsule by mouth Every Morning Before Breakfast. 30 capsule 3    Loratadine (CLARITIN PO) Take  by mouth Daily.      pantoprazole (PROTONIX) 40 MG EC tablet Take 1 tablet by mouth Daily. 90 tablet 3    phentermine 37.5 MG capsule Take 1 capsule by mouth Every Morning. 30 capsule 5    Slynd 4 MG tablet TAKE 1 TABLET BY MOUTH DAILY 28 tablet 1     No current facility-administered medications on file prior to visit.       Objective   There were no vitals filed for this visit.  There is no height or weight on file to calculate BMI.    Physical Exam    Physical Exam  Constitutional:       Appearance: Normal appearance.   HENT:      Head: Normocephalic and atraumatic.      Nose: Nose normal.      Mouth/Throat:      Mouth: Mucous membranes are moist.   Eyes:      Pupils: Pupils are equal, round, and reactive to light.   Cardiovascular:      Rate and Rhythm: Normal rate and regular rhythm.      Pulses: Normal pulses.      Heart sounds: Normal heart sounds.   Pulmonary:      Effort: Pulmonary effort is normal.      Breath sounds: Normal breath sounds.   Abdominal:      General: Bowel sounds are normal.   Musculoskeletal:         General: Normal range of motion.      Cervical back: Normal range of motion.   Skin:     General: Skin is warm and dry.      Capillary Refill: Capillary refill takes less than 2 seconds.   Neurological:      General: No focal deficit present.      Mental Status: She is alert.   Psychiatric:         Mood and Affect: Mood normal.     Results         Procedures     Assessment & Plan   There are no diagnoses linked to this encounter.     Assessment & Plan                   No follow-ups on file.          Answers submitted by the patient for this visit:  Primary Reason for Visit (Submitted on 11/19/2024)  What is the primary reason for your visit?: Cough  Cough Questionnaire (Submitted on 11/19/2024)  Chief Complaint: Cough  Chronicity: recurrent  Onset: more than  1 month ago  Progression since onset: unchanged  Frequency: hourly  Cough characteristics: productive of yellow sputum  chest pain: No  chills: No  ear congestion: No  ear pain: No  fever: No  headaches: Yes  heartburn: No  hemoptysis: No  myalgias: No  nasal congestion: Yes  postnasal drip: Yes  rash: No  rhinorrhea: Yes  shortness of breath: No  sore throat: Yes  sweats: No  weight loss: No  wheezing: No  Aggravated by: lying down    MDM:  Symptoms present 14 days likely bacterial, will treat as such.

## 2024-11-27 ENCOUNTER — OFFICE VISIT (OUTPATIENT)
Age: 34
End: 2024-11-27
Payer: MEDICAID

## 2024-11-27 VITALS
HEIGHT: 62 IN | DIASTOLIC BLOOD PRESSURE: 85 MMHG | SYSTOLIC BLOOD PRESSURE: 135 MMHG | WEIGHT: 145 LBS | BODY MASS INDEX: 26.68 KG/M2 | HEART RATE: 88 BPM | OXYGEN SATURATION: 98 %

## 2024-11-27 DIAGNOSIS — F33.41 MAJOR DEPRESSIVE DISORDER, RECURRENT EPISODE, IN PARTIAL REMISSION: ICD-10-CM

## 2024-11-27 DIAGNOSIS — F41.1 GENERALIZED ANXIETY DISORDER: Primary | ICD-10-CM

## 2024-11-27 RX ORDER — BUSPIRONE HYDROCHLORIDE 5 MG/1
TABLET ORAL
Qty: 21 TABLET | Refills: 0 | Status: SHIPPED | OUTPATIENT
Start: 2024-11-27 | End: 2024-12-25

## 2024-11-27 NOTE — PROGRESS NOTES
"Subjective   Yuki Shields is a 34 y.o. female who presents today for initial evaluation     Chief Complaint:  \"memory problems and anxiety.\"    History of Present Illness:   Miss Yuki Shields is a 34-year-old female who presents as a new patient to [facility] care after being referred by established neurologist for further psychiatric evaluation and management.    Patient reports that she has been experiencing progressively worsening difficulties with short-term memory and sustaining/directing her focus/attention over the past 4 to 5 years.  It is of note that patient reports undergoing rather severe surgical complication in 2019 resulting in a potential brain injury which the patient feels may be directly related to the ongoing symptoms mentioned above.  Patient explains that she will frequently lose/misplace items around the home as well as frequently forgetting why she is doing certain things or why she walks into a room.  She reports that people have to tell her things multiple times/over and over again and that she will still frequently forget important details or conversations entirely.  Patient also reports difficulty sustaining/directing her attention/focus in addition to frequent distractibility and episodes of her mind going blank.  Patient denies any significant limitations in functioning associated with these symptoms, but reports that she feels as if it is getting harder and harder for her to manage and appropriately complete all necessary tasks at home and at work.  In regard to potential anxious symptoms patient does consider herself a worrier and states that she has always worried about most things on most days.  She does feel as if she worries about things most people would not worry about and will frequently think of worst case scenarios/catastrophize especially in regard to her children.  She also reports near constant feelings of restlessness/feeling on edge as well as significant difficulties " controlling or stopping this anxiety/worry.  She reports difficulties winding down/relaxing as well as a frequent fear that something bad or awful is going to happen to her or her loved ones.  Patient does also endorse a mildly depressed mood associated with decreased levels of energy/fatigue, low self worth, excessive feelings of guilt, and intermittent anhedonia.  She does endorse experiencing recurrent episodes of these depressive symptoms in the past lasting 2 to 3 weeks at the most. Patient otherwise denies ever experiencing any hypomanic/manic symptoms such as a persistently elevated mood lasting 4 days or more at a time associated with a decreased need for sleep, increased levels of energy, increased goal-directed behaviors, racing thoughts, pressured speech, increased grandiosity or uncharacteristic behaviors such as excessive spending on unnecessary items or increased sex drive/promiscuity.  Patient also denies ever experiencing any auditory, visual, or tactile hallucinations and does not currently appear to be responding to internal stimuli.  She denies any history of generalized paranoia and displays no evidence of delusional thinking.    Past Psychiatric History:  Patient reports that she first began to seek psychiatric care approximately 4 years ago after completion of her divorce.  She has never seen an outpatient psychiatrist prior to today's visit.  Patient has undergone psychotherapy in the past and does currently have a therapist that she has not seen in quite a while.  Patient is currently prescribed buspirone 7.5 mg p.o. once daily in addition to bupropion  mg as prescribed her primary care physician, states that she has been on these medications for approximately 2 years with no adjustment in dosage.  Patient is unsure whether or not she has been trialed on any other psychiatric medications in the past.  She denies any history of inpatient psychiatric hospitalizations.  She denies any  history of suicide attempts or self-injurious behavior.    Family Psychiatric History:  Patient reports a history of numerous depressive and anxious symptoms in multiple family members but states they have never been formally diagnosed.    Medical History:  Review of EMR.  Patient denies any history of seizure disorders or traumatic brain injuries aside from potential injury associated with surgical complications in 2019.  Patient did recently see neurology and underwent MRI which is pending and official read.  Patient has also been scheduled for neuropsychological testing in March 2025 through the [facility] per recommendation of her neurologist.    Substance Abuse History:  Patient reports the very occasional use of alcohol in social settings.  She denies the use of cannabis, tobacco, or any other illegal/illicit substance.    Social History:  Patient reports that she was born and raised in [place], [place] and that she had a good childhood growing up.  Patient states that she did relatively well in school and did graduate high school.  Patient has been  and  on 1 occasion with the divorce being finalized approximately 4 years ago.  Patient does currently have 4 children and states that she is working 2 jobs while also going to school full-time to complete her bachelor's degree in criminal justice from [employer].  She denies any history of legal issues or  experience.    The following portions of the patient's history were reviewed and updated as appropriate: allergies, current medications, past family history, past medical history, past social history, past surgical history and problem list.       Past Medical History:  Past Medical History:   Diagnosis Date    Anemia     Anxiety and depression     Asthma     As child    DDD (degenerative disc disease), lumbar     Deep vein thrombosis     Essential hypertension 6/20/2014    Factor II deficiency     DX IN HER TEENS,  STATES    KAYLA AWARE OF THIS    Family hx-blood disorder     MOTHER HAS FACTOR 2 AND FACTOR 5    GERD (gastroesophageal reflux disease)     Heart murmur     History of blood transfusion     Labral tear of hip, degenerative     RIGHT    Low back pain     Lumbar herniated disc 06/2017    X 2  DISC   HISTORY OF EPIDURALS    Memory loss     Migraine     Pneumonia     PONV (postoperative nausea and vomiting)     Urinary tract infection        Social History:  Social History     Socioeconomic History    Marital status: Single    Number of children: 3   Tobacco Use    Smoking status: Never    Smokeless tobacco: Never   Vaping Use    Vaping status: Never Used   Substance and Sexual Activity    Alcohol use: Not Currently     Comment: OCCAS    Drug use: Never    Sexual activity: Yes     Partners: Male     Birth control/protection: Natural family planning/Rhythm       Family History:  Family History   Problem Relation Age of Onset    Ulcerative colitis Mother     Irritable bowel syndrome Mother     Ulcerative colitis Father     Diverticulitis Father     Malig Hyperthermia Neg Hx     Colon cancer Neg Hx     Colon polyps Neg Hx     Crohn's disease Neg Hx        Past Surgical History:  Past Surgical History:   Procedure Laterality Date    ABDOMINOPLASTY  2019    ADENOIDECTOMY      BLADDER REPAIR  2016    WITH MESH    BREAST AUGMENTATION BILATERAL MASTOPEXY Bilateral 07/31/2017    Procedure: BREAST AUGMENTATION, MASTOPEXY W/ IDEAL IMPLANTS  NEW IMAGE;  Surgeon: Nirmal Mehta MD;  [facility] MAIN OR;  Service:     BREAST SURGERY      CERVICAL/THORACIC FACET INJECTION      pt states cervial fusion in the past , PONV from these    CHOLECYSTECTOMY  2012    COLONOSCOPY N/A 8/16/2024    Procedure: COLONOSCOPY TO CECUM;  Surgeon: Pedro Burns MD;  Location: [facility] MAIN OR;  Service: Gastroenterology;  Laterality: N/A;  hemorrhoids    COSMETIC SURGERY      ENDOSCOPY N/A 8/16/2024    Procedure: ESOPHAGOGASTRODUODENOSCOPY (EGD) WITH BIOPSY;   Surgeon: Pedro Burns MD;  [place] [facility] [place];  Service: Gastroenterology;  Laterality: N/A;  mild gastritis    TONSILLECTOMY AND ADENOIDECTOMY         Problem List:  Patient Active Problem List   Diagnosis    Anemia    Coagulopathy    DDD (degenerative disc disease), lumbar    Factor II deficiency    Folic acid deficiency    Memory impairment    Obesity    Pain    Stress    Asthma     (spontaneous vaginal delivery)    Unwanted fertility    Chronic constipation    Generalized abdominal pain    Nausea    Heartburn    Epigastric pain    Essential hypertension    Prothrombin gene mutation    Generalized anxiety disorder    Major depressive disorder, recurrent episode, in partial remission       Allergy:   Allergies   Allergen Reactions    Dilaudid [Hydromorphone Hcl] Nausea And Vomiting    Hydromorphone Dizziness     Category: Allergy;        Current Medications:   Current Outpatient Medications   Medication Sig Dispense Refill    busPIRone (BUSPAR) 5 MG tablet Take 1 tablet by mouth Daily for 14 days, THEN 0.5 tablets Daily for 14 days. Discontinue thereafter. 21 tablet 0    albuterol sulfate  (90 Base) MCG/ACT inhaler Inhale 2 puffs Every 4 (Four) Hours As Needed for Wheezing. 6.7 g 1    amoxicillin-clavulanate (AUGMENTIN) 875-125 MG per tablet Take 1 tablet by mouth 2 (Two) Times a Day. 20 tablet 0    Asmanex, 30 Metered Doses, 220 MCG/ACT inhaler       cyclobenzaprine (FLEXERIL) 10 MG tablet Take 1 tablet by mouth 3 (Three) Times a Day As Needed for Muscle Spasms. 60 tablet 1    linaclotide (Linzess) 145 MCG capsule capsule Take 1 capsule by mouth Every Morning Before Breakfast. 30 capsule 3    Loratadine (CLARITIN PO) Take  by mouth Daily.      pantoprazole (PROTONIX) 40 MG EC tablet Take 1 tablet by mouth Daily. 90 tablet 3    phentermine 37.5 MG capsule Take 1 capsule by mouth Every Morning. 30 capsule 5    sertraline (Zoloft) 50 MG tablet Take 1 tablet by mouth Daily for 60 days. 30  "tablet 1    Slynd 4 MG tablet TAKE 1 TABLET BY MOUTH DAILY 28 tablet 1     No current facility-administered medications for this visit.       Review of Symptoms:    Review of Systems   Constitutional:  Positive for activity change and fatigue.   HENT:  Negative for tinnitus.    Eyes:  Negative for visual disturbance.   Endocrine: Negative for cold intolerance and heat intolerance.   Skin:  Negative for rash.   Neurological:  Positive for memory problem and confusion.   Psychiatric/Behavioral:  Positive for decreased concentration, sleep disturbance and depressed mood. Negative for hallucinations, self-injury and suicidal ideas. The patient is nervous/anxious.        Physical Exam:   Blood pressure 135/85, pulse 88, height 157.5 cm (62.01\"), weight 65.8 kg (145 lb), last menstrual period 03/09/2024, SpO2 98%, not currently breastfeeding.  Appearance: Appears documented age, appropriate hygiene and grooming.  Gait, Station, Strength: Normal gait, station, and strength.       Mental Status Exam:   Hygiene:   good  Cooperation:  Cooperative  Eye Contact:  Good  Psychomotor Behavior:  Appropriate  Affect:  Full range and Appropriate  Mood: normal and euthymic  Hopelessness: Denies  Speech:  Normal  Thought Process:  Goal directed and Linear  Thought Content:  Normal  Suicidal:  None  Homicidal:  None  Hallucinations:  None  Delusion:  None  Memory:   Grossly intact, no memory impairments observed  Orientation:  Person, Place, Time, and Situation  Reliability:  good  Insight:  Fair  Judgement:  Good  Impulse Control:  Good      Lab Results:   No visits with results within 1 Month(s) from this visit.   Latest known visit with results is:   Admission on 08/16/2024, Discharged on 08/16/2024   Component Date Value Ref Range Status    HCG, Urine, QL 08/16/2024 Negative  Negative Final    Lot Number 08/16/2024 [id number]   Final    Internal Positive Control 08/16/2024 Passed  Positive, Passed Final    Internal Negative Control " 08/16/2024 Passed  Negative, Passed Final    Expiration Date 08/16/2024 10-   Final    Case Report 08/16/2024    Final                    Value:Surgical Pathology Report                         Case: [id number]                                  Authorizing Provider:  Pedro Burns MD      Collected:           08/16/2024 12:28 PM          Ordering Location:     [facility] SURGERY     Received:            08/16/2024 01:27 PM                                 CENTER [place] MAIN OR                                                     Pathologist:           Nargis Fischer MD                                                    Specimens:   1) - Small Intestine, duodenum biopsy                                                               2) - Gastric, Antrum, antrum biopsy                                                        Clinical Information 08/16/2024    Final                    Value:This result contains rich text formatting which cannot be displayed here.    Final Diagnosis 08/16/2024    Final                    Value:This result contains rich text formatting which cannot be displayed here.    Gross Description 08/16/2024    Final                    Value:This result contains rich text formatting which cannot be displayed here.    Microscopic Description 08/16/2024    Final                    Value:This result contains rich text formatting which cannot be displayed here.       PHQ-9 Total Score: 9     Assessment & Plan    Diagnoses and all orders for this visit:    1. Generalized anxiety disorder (Primary)  -     busPIRone (BUSPAR) 5 MG tablet; Take 1 tablet by mouth Daily for 14 days, THEN 0.5 tablets Daily for 14 days. Discontinue thereafter.  Dispense: 21 tablet; Refill: 0  -     sertraline (Zoloft) 50 MG tablet; Take 1 tablet by mouth Daily for 60 days.  Dispense: 30 tablet; Refill: 1    2. Major depressive disorder, recurrent episode, in partial remission  -     busPIRone (BUSPAR) 5 MG  tablet; Take 1 tablet by mouth Daily for 14 days, THEN 0.5 tablets Daily for 14 days. Discontinue thereafter.  Dispense: 21 tablet; Refill: 0  -     sertraline (Zoloft) 50 MG tablet; Take 1 tablet by mouth Daily for 60 days.  Dispense: 30 tablet; Refill: 1    3. Anxiety       Miss Yuki Shields is a 34-year-old female seen as a new patient to [facility] care after being referred by established neurologist for further psychiatric evaluation and management.    Upon today's evaluation the patient reports and displays numerous signs/symptoms most consistent with that of generalized anxiety disorder and major depressive disorder in partial remission.  Patient's primary concern involves progressively worsening issues with short-term memory experienced over the past 4 to 5 years after surgical complication in 2019 as detailed above.  Patient reports that these issues with her memory have been negatively impacting her daily functioning and ability to successfully complete tasks in a timely manner.  Patient does also endorse numerous and rather severe anxious symptoms that are very likely contributing to/exacerbating her short-term memory issues and difficulty sustaining/directing her attention/focus.  Patient does also endorse numerous depressive symptoms that are quite mild at the moment but are very likely contributing to her clinical picture as well.  Patient reports that she has been prescribed the same combination of buspirone and bupropion for approximately 1 to 2 years, with little to no therapeutic benefit associated with current dosages.  It was explained to the patient in detail at today's visit that while buspirone may be a good choice for her anxiety she has been prescribed a subtherapeutic dose, and that while bupropion is a very good choice as an adjunctive treatment to target depressive symptoms it does very little and may actually exacerbate anxious symptoms by causing increased feelings of restlessness and  irritability.  As such I do recommend discontinuing bupropion at this time in addition to tapering and discontinuing buspirone in favor of an SSRI medication such as sertraline.  Patient is agreeable to this plan and is open to initiating sertraline at 50 mg p.o. once daily in hopes of addressing her depressive and anxious symptoms.  Patient was also highly encouraged to remain compliant with treatment plan as established by neurologist, as the patient reports that she does have neuropsychological testing scheduled with [facility] in March 2025.  She is also waiting to review MRI results with neurologist.She will be seen again in approximately 4 weeks for reassessment.    Patient voices understanding of this and is agreeable to this plan.      Medications:  -Decrease buspirone to 5 mg PO once daily x 14 days. Further decrease to 2.5 mg PO once daily for additional 14 days, discontinue thereafter.   -Discontinue bupropion  mg PO once daily at this time.   -Initiate sertraline 50 mg PO once daily for management of depressive and anxious symptoms.       TREATMENT PLAN - SHORT AND LONG-TERM GOALS:   -Continue supportive psychotherapy efforts and medications as indicated. Treatment and medication options discussed during today's visit.   -Patient acknowledged and verbally consented to continue with current treatment plan and was educated on the importance of compliance with treatment and follow-up appointments.    SUMMARY/EDUCATION/DISCUSSION:  -Pt was given appropriate time to ask questions and concerns were addressed. A thorough discussion was had that included review of disease process, need for continued monitoring and additional treatment options including use of pharmacological and non-pharmacological approaches to care, decisions were made and agreed upon by patient and provider.   -Discussed medication options and treatment plan of prescribed medication as well as the risks, benefits, and side  effects including potential falls, possible impaired driving and metabolic adversities among others; patient acknowledged and provided verbal consent.   -Patient has been educated regarding multimodal approach with healthy nutrition, healthy sleep, regular physical activity, social activities, counseling, and medications.  -Please call the office at [phone] within normal business hours (Monday-Friday, 8:00 AM - 4:30 PM) with any worsening of symptoms or onset of intolerable side effects. Please ask to leave a message with office staff.  Please allow up to 24-48 hours for response to a patient call/question/refill request.  -Safety plan has been established and discussed in detail with the patient, who is agreeable to contact support system and/or call 911 or go to the nearest ER should he/she/they have any thoughts of harm to self or others.    MEDS ORDERED DURING VISIT:  New Medications Ordered This Visit   Medications    busPIRone (BUSPAR) 5 MG tablet     Sig: Take 1 tablet by mouth Daily for 14 days, THEN 0.5 tablets Daily for 14 days. Discontinue thereafter.     Dispense:  21 tablet     Refill:  0    sertraline (Zoloft) 50 MG tablet     Sig: Take 1 tablet by mouth Daily for 60 days.     Dispense:  30 tablet     Refill:  1       FOLLOW UP:  Return in about 4 weeks (around 12/25/2024) for Next scheduled follow up.      Chandana Lara DO    This document has been electronically signed by Chandana Lara DO  November 27, 2024 08:39 EST    Part of this note may be an electronic transcription/translation of spoken language to printed text using the Dragon Dictation System. Some of the data in this electronic note has been brought forward from a previous encounter, any necessary changes have been made, it has been reviewed by this provider, and it is accurate.

## 2024-11-30 ENCOUNTER — HOSPITAL ENCOUNTER (OUTPATIENT)
Dept: MRI IMAGING | Facility: HOSPITAL | Age: 34
Discharge: HOME | End: 2024-11-30
Payer: MEDICAID

## 2024-11-30 DIAGNOSIS — M54.50 LOW BACK PAIN RADIATING TO RIGHT LOWER EXTREMITY: ICD-10-CM

## 2024-11-30 DIAGNOSIS — M79.604 LOW BACK PAIN RADIATING TO RIGHT LOWER EXTREMITY: ICD-10-CM

## 2024-11-30 PROCEDURE — A9577 INJ MULTIHANCE: HCPCS | Performed by: NURSE PRACTITIONER

## 2024-11-30 PROCEDURE — 25510000002 GADOBENATE DIMEGLUMINE 529 MG/ML SOLUTION: Performed by: NURSE PRACTITIONER

## 2024-11-30 PROCEDURE — 72158 MRI LUMBAR SPINE W/O & W/DYE: CPT

## 2024-11-30 RX ADMIN — GADOBENATE DIMEGLUMINE 13 ML: 529 INJECTION, SOLUTION INTRAVENOUS at 11:24

## 2024-12-05 ENCOUNTER — TELEPHONE (OUTPATIENT)
Age: 34
End: 2024-12-05
Payer: MEDICAID

## 2024-12-05 NOTE — TELEPHONE ENCOUNTER
Patient called and stated that Zoloft has been making her very tired and she can't focus at work, would like to know if she should keep taking it or switch medications

## 2024-12-06 NOTE — TELEPHONE ENCOUNTER
Patient reports excessive daytime sedation/drowsiness associated with current dose of sertraline at 50 mg p.o. once daily.  Patient states that she did attempt to take this dose of medication at nighttime, but does continue to experience symptoms mentioned above.  As such, patient was advised to decrease dose to 25 mg p.o. nightly at this time in hopes of avoiding any tolerability issues.  Patient voices understanding of this and is agreeable to this plan.

## 2024-12-10 ENCOUNTER — PATIENT MESSAGE (OUTPATIENT)
Dept: FAMILY MEDICINE CLINIC | Facility: CLINIC | Age: 34
End: 2024-12-10
Payer: MEDICAID

## 2024-12-10 DIAGNOSIS — M51.9 ANNULAR TEAR: Primary | ICD-10-CM

## 2024-12-19 ENCOUNTER — TELEPHONE (OUTPATIENT)
Dept: OBSTETRICS AND GYNECOLOGY | Facility: CLINIC | Age: 34
End: 2024-12-19
Payer: MEDICAID

## 2024-12-19 NOTE — TELEPHONE ENCOUNTER
"Caller: Yuki Shields \"Ali\"    Relationship to patient: Self    Best call back number: [phone]    Chief complaint: POSITIVE HOME PREGNANCY TEST -    Type of visit: NEW OB APPOINTMENT    Additional notes: LMP - 11/20/24 PATIENT IS STATING SHE IS HIGH RISK ; FACTOR 2 BLOOD DISORDER - PAST PREGNANCIES ISSUES - IUGR - SINGLE VESSEL CORD     CHOLESTASIS     PATIENT CALLED INTO HUB TODAY WAS SCHEDULED WITH DR. LOMBARDI 1/20/25 AND WANTING TO BE SEEN SOONER DUE TO HIGH RISK AND PREVIOUSLY ESTABLISHED WITH DR. HIGGINS.         "

## 2024-12-19 NOTE — TELEPHONE ENCOUNTER
I have scheduled you for Dr Taylor's first available 1/22/25 at 2:15 pm at the [facility]. She is out of the office today and on call tomorrow. I will have to ask permission for you to be seen sooner and get it from her only. I wanted to hold a place for you though. Are you ok with this? Please let me know if we can hold a spot until we can speak with her. My Chart message sent.

## 2024-12-20 NOTE — TELEPHONE ENCOUNTER
New OB scheduled for 1/22/25.   LMP - 11/20/24 PATIENT IS STATING SHE IS HIGH RISK ; FACTOR 2 BLOOD DISORDER - PAST PREGNANCIES ISSUES - IUGR - SINGLE VESSEL CORD     CHOLESTASIS. Wanted to make sure this is ok and I do not need to try to get her in sooner. Thank you

## 2025-01-08 DIAGNOSIS — D50.0 IRON DEFICIENCY ANEMIA DUE TO CHRONIC BLOOD LOSS: ICD-10-CM

## 2025-01-08 DIAGNOSIS — D68.52 PROTHROMBIN GENE MUTATION: Primary | ICD-10-CM

## 2025-01-08 DIAGNOSIS — E53.8 VITAMIN B12 DEFICIENCY: ICD-10-CM

## 2025-01-09 ENCOUNTER — LAB (OUTPATIENT)
Dept: LAB | Facility: HOSPITAL | Age: 35
End: 2025-01-09
Payer: MEDICAID

## 2025-01-09 ENCOUNTER — OFFICE VISIT (OUTPATIENT)
Dept: ONCOLOGY | Facility: CLINIC | Age: 35
End: 2025-01-09
Payer: MEDICAID

## 2025-01-09 VITALS
DIASTOLIC BLOOD PRESSURE: 84 MMHG | SYSTOLIC BLOOD PRESSURE: 123 MMHG | TEMPERATURE: 98.1 F | OXYGEN SATURATION: 97 % | BODY MASS INDEX: 27.46 KG/M2 | WEIGHT: 149.2 LBS | HEART RATE: 89 BPM | HEIGHT: 62 IN | RESPIRATION RATE: 17 BRPM

## 2025-01-09 DIAGNOSIS — D50.0 IRON DEFICIENCY ANEMIA DUE TO CHRONIC BLOOD LOSS: ICD-10-CM

## 2025-01-09 DIAGNOSIS — E53.8 VITAMIN B12 DEFICIENCY: ICD-10-CM

## 2025-01-09 DIAGNOSIS — R74.8 ELEVATED LIVER ENZYMES: ICD-10-CM

## 2025-01-09 DIAGNOSIS — D68.52 PROTHROMBIN GENE MUTATION: Primary | ICD-10-CM

## 2025-01-09 DIAGNOSIS — D68.52 PROTHROMBIN GENE MUTATION: ICD-10-CM

## 2025-01-09 LAB
ALBUMIN SERPL-MCNC: 4.2 G/DL (ref 3.5–5.2)
ALBUMIN/GLOB SERPL: 1.4 G/DL
ALP SERPL-CCNC: 125 U/L (ref 39–117)
ALT SERPL W P-5'-P-CCNC: 14 U/L (ref 1–33)
ANION GAP SERPL CALCULATED.3IONS-SCNC: 12 MMOL/L (ref 5–15)
AST SERPL-CCNC: 20 U/L (ref 1–32)
BASOPHILS # BLD AUTO: 0.07 10*3/MM3 (ref 0–0.2)
BASOPHILS NFR BLD AUTO: 0.7 % (ref 0–1.5)
BILIRUB SERPL-MCNC: 0.3 MG/DL (ref 0–1.2)
BUN SERPL-MCNC: 11 MG/DL (ref 6–20)
BUN/CREAT SERPL: 15.9 (ref 7–25)
CALCIUM SPEC-SCNC: 9.5 MG/DL (ref 8.6–10.5)
CHLORIDE SERPL-SCNC: 103 MMOL/L (ref 98–107)
CO2 SERPL-SCNC: 24 MMOL/L (ref 22–29)
CREAT SERPL-MCNC: 0.69 MG/DL (ref 0.57–1)
DEPRECATED RDW RBC AUTO: 41.1 FL (ref 37–54)
EGFRCR SERPLBLD CKD-EPI 2021: 116.2 ML/MIN/1.73
EOSINOPHIL # BLD AUTO: 0.12 10*3/MM3 (ref 0–0.4)
EOSINOPHIL NFR BLD AUTO: 1.1 % (ref 0.3–6.2)
ERYTHROCYTE [DISTWIDTH] IN BLOOD BY AUTOMATED COUNT: 13.2 % (ref 12.3–15.4)
FERRITIN SERPL-MCNC: 28.6 NG/ML (ref 13–150)
FOLATE SERPL-MCNC: >20 NG/ML (ref 4.78–24.2)
GLOBULIN UR ELPH-MCNC: 3 GM/DL
GLUCOSE SERPL-MCNC: 93 MG/DL (ref 65–99)
HCT VFR BLD AUTO: 39.5 % (ref 34–46.6)
HGB BLD-MCNC: 13.2 G/DL (ref 12–15.9)
IMM GRANULOCYTES # BLD AUTO: 0.04 10*3/MM3 (ref 0–0.05)
IMM GRANULOCYTES NFR BLD AUTO: 0.4 % (ref 0–0.5)
IRON 24H UR-MRATE: 69 MCG/DL (ref 37–145)
IRON SATN MFR SERPL: 16 % (ref 20–50)
LYMPHOCYTES # BLD AUTO: 2.21 10*3/MM3 (ref 0.7–3.1)
LYMPHOCYTES NFR BLD AUTO: 20.7 % (ref 19.6–45.3)
MCH RBC QN AUTO: 28.7 PG (ref 26.6–33)
MCHC RBC AUTO-ENTMCNC: 33.4 G/DL (ref 31.5–35.7)
MCV RBC AUTO: 85.9 FL (ref 79–97)
MONOCYTES # BLD AUTO: 0.69 10*3/MM3 (ref 0.1–0.9)
MONOCYTES NFR BLD AUTO: 6.4 % (ref 5–12)
NEUTROPHILS NFR BLD AUTO: 7.57 10*3/MM3 (ref 1.7–7)
NEUTROPHILS NFR BLD AUTO: 70.7 % (ref 42.7–76)
NRBC BLD AUTO-RTO: 0 /100 WBC (ref 0–0.2)
PLATELET # BLD AUTO: 342 10*3/MM3 (ref 140–450)
PMV BLD AUTO: 9 FL (ref 6–12)
POTASSIUM SERPL-SCNC: 4.8 MMOL/L (ref 3.5–5.2)
PROT SERPL-MCNC: 7.2 G/DL (ref 6–8.5)
RBC # BLD AUTO: 4.6 10*6/MM3 (ref 3.77–5.28)
SODIUM SERPL-SCNC: 139 MMOL/L (ref 136–145)
TIBC SERPL-MCNC: 432 MCG/DL (ref 298–536)
TRANSFERRIN SERPL-MCNC: 290 MG/DL (ref 200–360)
VIT B12 BLD-MCNC: 699 PG/ML (ref 211–946)
WBC NRBC COR # BLD AUTO: 10.7 10*3/MM3 (ref 3.4–10.8)

## 2025-01-09 PROCEDURE — 82607 VITAMIN B-12: CPT | Performed by: INTERNAL MEDICINE

## 2025-01-09 PROCEDURE — 82728 ASSAY OF FERRITIN: CPT

## 2025-01-09 PROCEDURE — 99214 OFFICE O/P EST MOD 30 MIN: CPT | Performed by: INTERNAL MEDICINE

## 2025-01-09 PROCEDURE — 1126F AMNT PAIN NOTED NONE PRSNT: CPT | Performed by: INTERNAL MEDICINE

## 2025-01-09 PROCEDURE — 85025 COMPLETE CBC W/AUTO DIFF WBC: CPT

## 2025-01-09 PROCEDURE — 80053 COMPREHEN METABOLIC PANEL: CPT

## 2025-01-09 PROCEDURE — 82746 ASSAY OF FOLIC ACID SERUM: CPT | Performed by: INTERNAL MEDICINE

## 2025-01-09 PROCEDURE — 84466 ASSAY OF TRANSFERRIN: CPT

## 2025-01-09 PROCEDURE — 83540 ASSAY OF IRON: CPT

## 2025-01-09 PROCEDURE — 36415 COLL VENOUS BLD VENIPUNCTURE: CPT

## 2025-01-09 PROCEDURE — 3079F DIAST BP 80-89 MM HG: CPT | Performed by: INTERNAL MEDICINE

## 2025-01-09 PROCEDURE — 3074F SYST BP LT 130 MM HG: CPT | Performed by: INTERNAL MEDICINE

## 2025-01-09 PROCEDURE — 1160F RVW MEDS BY RX/DR IN RCRD: CPT | Performed by: INTERNAL MEDICINE

## 2025-01-09 PROCEDURE — 1159F MED LIST DOCD IN RCRD: CPT | Performed by: INTERNAL MEDICINE

## 2025-01-09 RX ORDER — ENOXAPARIN SODIUM 100 MG/ML
40 INJECTION SUBCUTANEOUS
Qty: 12 ML | Refills: 8 | Status: SHIPPED | OUTPATIENT
Start: 2025-01-09

## 2025-01-09 NOTE — PROGRESS NOTES
"Subjective     CHIEF COMPLAINT:      Chief Complaint   Patient presents with    Follow-up     HISTORY OF PRESENT ILLNESS:     Yuki Shields is a 34 y.o. female patient who returns today for follow up on her prothrombin gene mutation. She returns today for follow up accompanied by her significant other. She is 6 weeks pregnant. She did not see her OB yet. She started taking prenatal multivitamin daily. No nausea or vomiting. She feels that her legs are heavy. No leg pain or swelling.     ROS:  Pertinent ROS is in the HPI.     Past medical, surgical, social and family history were reviewed.     MEDICATIONS:    Current Outpatient Medications:     albuterol sulfate  (90 Base) MCG/ACT inhaler, Inhale 2 puffs Every 4 (Four) Hours As Needed for Wheezing., Disp: 6.7 g, Rfl: 1    Asmanex, 30 Metered Doses, 220 MCG/ACT inhaler, , Disp: , Rfl:     cyclobenzaprine (FLEXERIL) 10 MG tablet, Take 1 tablet by mouth 3 (Three) Times a Day As Needed for Muscle Spasms., Disp: 60 tablet, Rfl: 1    linaclotide (Linzess) 145 MCG capsule capsule, Take 1 capsule by mouth Every Morning Before Breakfast., Disp: 30 capsule, Rfl: 3    Loratadine (CLARITIN PO), Take  by mouth Daily., Disp: , Rfl:     pantoprazole (PROTONIX) 40 MG EC tablet, Take 1 tablet by mouth Daily., Disp: 90 tablet, Rfl: 3    phentermine 37.5 MG capsule, Take 1 capsule by mouth Every Morning., Disp: 30 capsule, Rfl: 5    sertraline (Zoloft) 50 MG tablet, Take 1 tablet by mouth Daily for 60 days., Disp: 30 tablet, Rfl: 1    Slynd 4 MG tablet, TAKE 1 TABLET BY MOUTH DAILY, Disp: 28 tablet, Rfl: 1    busPIRone (BUSPAR) 5 MG tablet, Take 1 tablet by mouth Daily for 14 days, THEN 0.5 tablets Daily for 14 days. Discontinue thereafter., Disp: 21 tablet, Rfl: 0  Objective     VITAL SIGNS:     Vitals:    01/09/25 1333   BP: 123/84   Pulse: 89   Resp: 17   Temp: 98.1 °F (36.7 °C)   TempSrc: Oral   SpO2: 97%   Weight: 67.7 kg (149 lb 3.2 oz)   Height: 157.5 cm (62.01\") "   PainSc: 0-No pain     Body mass index is 27.28 kg/m².     Wt Readings from Last 5 Encounters:   01/09/25 67.7 kg (149 lb 3.2 oz)   11/27/24 65.8 kg (145 lb)   11/19/24 65.9 kg (145 lb 3.2 oz)   10/28/24 66.5 kg (146 lb 9.6 oz)   10/24/24 64.9 kg (143 lb)     PHYSICAL EXAMINATION:   GENERAL: The patient appears in good general condition, not in acute distress.   SKIN: No Ecchymosis.  EYES: No jaundice. No Pallor.  CHEST: Normal respiratory effort. Normal breathing sounds bilaterally. No added sounds.  CVS: Normal S1 and S2. No Murmur.  ABDOMEN: Non-distended.  EXTREMITIES: No edema. No calf tenderness.     DIAGNOSTIC DATA:     Results from last 7 days   Lab Units 01/09/25  1322   WBC 10*3/mm3 10.70   NEUTROS ABS 10*3/mm3 7.57*   HEMOGLOBIN g/dL 13.2   HEMATOCRIT % 39.5   PLATELETS 10*3/mm3 342     Results from last 7 days   Lab Units 01/09/25  1322   SODIUM mmol/L 139   POTASSIUM mmol/L 4.8   CHLORIDE mmol/L 103   CO2 mmol/L 24.0   BUN mg/dL 11   CREATININE mg/dL 0.69   CALCIUM mg/dL 9.5   ALBUMIN g/dL 4.2   BILIRUBIN mg/dL 0.3   ALK PHOS U/L 125*   ALT (SGPT) U/L 14   AST (SGOT) U/L 20   GLUCOSE mg/dL 93         Lab 01/09/25  1322   IRON 69   IRON SATURATION (TSAT) 16*   TIBC 432   TRANSFERRIN 290   FERRITIN 28.60   FOLATE >20.00   VITAMIN B 12 699      Assessment & Plan    *Prothrombin gene mutation.  Patient was diagnosed when she was a teenager after her aunt and mother were both diagnosed with VTE.  The patient's mother has prothrombin gene and factor V Leiden mutations.  Patient's sister has factor V Leiden mutation and had miscarriages.  The patient herself has no history of venous thromboembolism.  She was previously placed on prophylactic anticoagulation with Lovenox 40 mg during her previous pregnancies.  Patient became pregnant in 2023.  Patient was started on Lovenox 40 mg SQ daily at 6 weeks gestation.  On 10/19/2023, she was switched to heparin 5000 SQ every 12 hours.  She delivered on 11/6/2023.  She  just completed the 6 weeks of Lovenox 40 mg SQ daily after delivery.    With her prothrombin gene mutation, she is at increased risk for development of DVT/PE in the future.  She was advised to take aspirin 81 mg when she travels (the day of and the day after she travels).  She was advised to maintain adequate hydration.  1/9/2025: Patient is pregnant. GA 6 weeks.  Due to the prothrombin gene mutation and due to the family history of thrombosis and recurrent miscarriages, I recommended prophylactic anticoagulation with Lovenox.   I explained that oral anticoagulants are not considered safe during pregnancy.     *Iron deficiency anemia.  Patient developed severe anemia after she had bleeding after abdominoplasty surgery in 2019.  Reports that her hemoglobin decreased to 3.0 and required PRBC transfusions.  She was in the ICU at that time.  Patient reports weakness in the legs.  6/12/2023: Hemoglobin 12.1.  Hemoglobin decreased to 11.8 on 7/25/2023.  She was started on ferrous sulfate 325 mg once daily.  9/5/2023: Hemoglobin 12.3.  Transferrin saturation decreased to 13%.  Ferritin is 37.2.   She was continued on the ferrous sulfate daily along with the prenatal vitamin.  11/1/2023: Hemoglobin 12.6.  Iron stores remained low with a transferrin saturation at 6%.  She continued on ferrous sulfate until she delivered.  1/4/2024: Hemoglobin 12.7.  Ferritin 153.  Transferrin saturation 18%.  She was restarted on ferrous sulfate 325 mg once weekly.  1/9/2025: Hemoglobin 13.2.  Ferritin decreased to 28.6. Transferrin saturation decreased to 16%.    *Vitamin B12 deficiency   Vitamin B12 is low normal at 304.   She was started on vitamin B12 1000 mcg daily.  11/1/2023: Hemoglobin improved to 12.6.  Vitamin B12 improved to 920.  She stopped taking the vitamin B12.  1/9/2025: Vitamin B12 adequate at 699.     *Elevated liver enzymes.  Patient reports developing this with her previous pregnancies.  Patient reports developing  itching.  Her OB started her on Ursodiol and Atarax.  11/1/2023: ALT 86 and AST 87.   Bilirubin 0.4. ALK PHOS 297.  1/4/2024: ALT increased to 111.  AST decreased to 79.  Bilirubin 0.4.  Alk phos increased to 265.  Repeat labs on 2/7/2024 revealed improvement with ALT at 20, AST at 17, alkaline phosphatase at 122 and bilirubin 0.3.  1/9/2025: ALT 14. AST 20. ALK PHOS 125. Bilirubin 0.3.    PLAN:    1.  We will start the patient on Lovenox 40 mg SQ daily.  2.  Restart ferrous sulfate 325 mg 3 days a week.  3.  She will continue prenatal multivitamin daily.  4.  Follow-up in 6 weeks.  We will obtain CBC CMP ferritin and iron panel.        Depression: Not at risk (1/9/2025)    PHQ-2     PHQ-2 Score: 0   Recent Concern: Depression - At risk (11/27/2024)    PHQ-2     PHQ-2 Score: 9      Patient screened positive for depression based on a PHQ-9 score of 9 on 11/27/2024. Follow-up recommendations include: PCP managing depression.        Magan Guido MD  01/09/25

## 2025-01-22 ENCOUNTER — TELEMEDICINE (OUTPATIENT)
Age: 35
End: 2025-01-22
Payer: MEDICAID

## 2025-01-22 DIAGNOSIS — F41.1 GENERALIZED ANXIETY DISORDER: Primary | ICD-10-CM

## 2025-01-22 DIAGNOSIS — F33.41 MAJOR DEPRESSIVE DISORDER, RECURRENT EPISODE, IN PARTIAL REMISSION: ICD-10-CM

## 2025-01-22 NOTE — PROGRESS NOTES
Subjective   Yuki Shields is a 34 y.o. female who presents today in follow up for management of generalized anxiety disorder and major depressive disorder.  Patient was seen via telehealth during today's visit and was located at her home residence due to inclement weather.  This provider was located at the [facility] clinic at [address] [place] [address].    Patient reports that she is doing fairly well since our initial visit roughly 6 weeks ago, and does endorse a noticeable improvement in multiple depressive/anxious symptoms associated with current dose of sertraline.  More specifically, patient reports an improved mood overall associated with decreased periods of irritability, mildly improved levels of energy, mildly improved self worth, and near resolution of anhedonia.  She does continue to endorse high levels of anxiety associated with near constant feelings of restlessness/feeling on edge but does report an overall improvement in her anxious symptoms associated clinical status several months ago.  It is of note the patient does continue to endorse some daytime drowsiness/sleepiness associated with current dose of sertraline despite taking this medication at night.  Per telephone note patient's previously prescribed dose was decreased to 25 mg due to excessive daytime sedation/sleepiness the patient reports further increasing back to 50 mg once daily after approximately 2 weeks of this decreased dose.  Patient does feel as if her current medication regimen is helping address some of her symptoms but she does endorse higher levels of anxiety associated with her sertraline when compared to previously prescribed buspirone.  She does however endorse an improved memory associated with current medication regimen.  Patient otherwise denies any suicidal or homicidal ideation, intent, or plan.  She denies any auditory, visual, or tactile hallucinations and  does not currently appear to be responding to internal stimuli.  Patient denies any generalized paranoia and displays no evidence of delusional thinking.    The following portions of the patient's history were reviewed and updated as appropriate: allergies, current medications, past family history, past medical history, past social history, past surgical history and problem list.       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 GARZA AWARE OF THIS    Family hx-blood disorder     MOTHER HAS FACTOR 2 AND FACTOR 5    GERD (gastroesophageal reflux disease)     Heart murmur     History of blood transfusion     Labral tear of hip, degenerative     RIGHT    Low back pain     Lumbar herniated disc 06/2017    X 2  DISC   HISTORY OF EPIDURALS    Memory loss     Migraine     Pneumonia     PONV (postoperative nausea and vomiting)     Urinary tract infection        Social History:  Social History     Socioeconomic History    Marital status: Single    Number of children: 3   Tobacco Use    Smoking status: Never    Smokeless tobacco: Never   Vaping Use    Vaping status: Never Used   Substance and Sexual Activity    Alcohol use: Not Currently     Comment: OCCAS    Drug use: Never    Sexual activity: Yes     Partners: Male     Birth control/protection: Natural family planning/Rhythm       Family History:  Family History   Problem Relation Age of Onset    Ulcerative colitis Mother     Irritable bowel syndrome Mother     Ulcerative colitis Father     Diverticulitis Father     Malig Hyperthermia Neg Hx     Colon cancer Neg Hx     Colon polyps Neg Hx     Crohn's disease Neg Hx        Past Surgical History:  Past Surgical History:   Procedure Laterality Date    ABDOMINOPLASTY  2019    ADENOIDECTOMY       BLADDER REPAIR      WITH MESH    BREAST AUGMENTATION BILATERAL MASTOPEXY Bilateral 2017    Procedure: BREAST AUGMENTATION, MASTOPEXY W/ IDEAL IMPLANTS  NEW IMAGE;  Surgeon: Nirmal Mehta MD;  Location: [facility] MAIN OR;  Service:     BREAST SURGERY      CERVICAL/THORACIC FACET INJECTION      pt states cervial fusion in the past , PONV from these    CHOLECYSTECTOMY      COLONOSCOPY N/A 2024    Procedure: COLONOSCOPY TO CECUM;  Surgeon: Pedro Burns MD;  Location: [facility] MAIN OR;  Service: Gastroenterology;  Laterality: N/A;  hemorrhoids    COSMETIC SURGERY      ENDOSCOPY N/A 2024    Procedure: ESOPHAGOGASTRODUODENOSCOPY (EGD) WITH BIOPSY;  Surgeon: Pedro Burns MD;  Location: [facility] MAIN OR;  Service: Gastroenterology;  Laterality: N/A;  mild gastritis    TONSILLECTOMY AND ADENOIDECTOMY         Problem List:  Patient Active Problem List   Diagnosis    Anemia    Coagulopathy    DDD (degenerative disc disease), lumbar    Factor II deficiency    Folic acid deficiency    Memory impairment    Obesity    Pain    Stress    Asthma     (spontaneous vaginal delivery)    Unwanted fertility    Chronic constipation    Generalized abdominal pain    Nausea    Heartburn    Epigastric pain    Essential hypertension    Prothrombin gene mutation    Generalized anxiety disorder    Major depressive disorder, recurrent episode, in partial remission       Allergy:   Allergies   Allergen Reactions    Dilaudid [Hydromorphone Hcl] Nausea And Vomiting    Hydromorphone Dizziness     Category: Allergy;        Current Medications:   Current Outpatient Medications   Medication Sig Dispense Refill    sertraline (Zoloft) 50 MG tablet Take 1.5 tablets by mouth Daily for 60 days. 45 tablet 1    albuterol sulfate  (90 Base) MCG/ACT inhaler Inhale 2 puffs Every 4 (Four) Hours As Needed for Wheezing. 6.7 g 1    Asmanex, 30 Metered Doses, 220 MCG/ACT inhaler       busPIRone (BUSPAR) 5 MG tablet Take 1  tablet by mouth Daily for 14 days, THEN 0.5 tablets Daily for 14 days. Discontinue thereafter. 21 tablet 0    cyclobenzaprine (FLEXERIL) 10 MG tablet Take 1 tablet by mouth 3 (Three) Times a Day As Needed for Muscle Spasms. 60 tablet 1    Enoxaparin Sodium (LOVENOX) 40 MG/0.4ML solution prefilled syringe syringe Inject 0.4 mL under the skin into the appropriate area as directed Daily. 12 mL 8    linaclotide (Linzess) 145 MCG capsule capsule Take 1 capsule by mouth Every Morning Before Breakfast. 30 capsule 3    Loratadine (CLARITIN PO) Take  by mouth Daily.      pantoprazole (PROTONIX) 40 MG EC tablet Take 1 tablet by mouth Daily. 90 tablet 3    phentermine 37.5 MG capsule Take 1 capsule by mouth Every Morning. 30 capsule 5    Slynd 4 MG tablet TAKE 1 TABLET BY MOUTH DAILY 28 tablet 1     No current facility-administered medications for this visit.       Review of Symptoms:    Review of Systems   Constitutional:  Positive for activity change and fatigue.   Endocrine: Negative for cold intolerance and heat intolerance.   Neurological:  Negative for seizures and confusion.   Psychiatric/Behavioral:  Positive for decreased concentration, sleep disturbance and depressed mood. Negative for hallucinations, self-injury and suicidal ideas. The patient is nervous/anxious.          Physical Exam:   Last menstrual period 03/09/2024, not currently breastfeeding.  Appearance: Appears documented age, appropriate hygiene and grooming.  Gait, Station, Strength: Normal gait, station and strength.       Mental Status Exam:   Hygiene:   good  Cooperation:  Cooperative  Eye Contact:  Good  Psychomotor Behavior:  Appropriate  Affect:  Full range and Appropriate  Mood: normal and euthymic  Hopelessness: Denies  Speech:  Normal  Thought Process:  Goal directed and Linear  Thought Content:  Normal  Suicidal:  None  Homicidal:  None  Hallucinations:  None  Delusion:  None  Memory:  Intact  Orientation:  Person, Place, Time, and  Situation  Reliability:  good  Insight:  Good  Judgement:  Good  Impulse Control:  Good      Lab Results:   Lab on 01/09/2025   Component Date Value Ref Range Status    Glucose 01/09/2025 93  65 - 99 mg/dL Final    BUN 01/09/2025 11  6 - 20 mg/dL Final    Creatinine 01/09/2025 0.69  0.57 - 1.00 mg/dL Final    Sodium 01/09/2025 139  136 - 145 mmol/L Final    Potassium 01/09/2025 4.8  3.5 - 5.2 mmol/L Final    Chloride 01/09/2025 103  98 - 107 mmol/L Final    CO2 01/09/2025 24.0  22.0 - 29.0 mmol/L Final    Calcium 01/09/2025 9.5  8.6 - 10.5 mg/dL Final    Total Protein 01/09/2025 7.2  6.0 - 8.5 g/dL Final    Albumin 01/09/2025 4.2  3.5 - 5.2 g/dL Final    ALT (SGPT) 01/09/2025 14  1 - 33 U/L Final    AST (SGOT) 01/09/2025 20  1 - 32 U/L Final    Alkaline Phosphatase 01/09/2025 125 (H)  39 - 117 U/L Final    Total Bilirubin 01/09/2025 0.3  0.0 - 1.2 mg/dL Final    Globulin 01/09/2025 3.0  gm/dL Final    A/G Ratio 01/09/2025 1.4  g/dL Final    BUN/Creatinine Ratio 01/09/2025 15.9  7.0 - 25.0 Final    Anion Gap 01/09/2025 12.0  5.0 - 15.0 mmol/L Final    eGFR 01/09/2025 116.2  >60.0 mL/min/1.73 Final    Ferritin 01/09/2025 28.60  13.00 - 150.00 ng/mL Final    Iron 01/09/2025 69  37 - 145 mcg/dL Final    Iron Saturation (TSAT) 01/09/2025 16 (L)  20 - 50 % Final    Transferrin 01/09/2025 290  200 - 360 mg/dL Final    TIBC 01/09/2025 432  298 - 536 mcg/dL Final    Vitamin B-12 01/09/2025 699  211 - 946 pg/mL Final    Folate 01/09/2025 >20.00  4.78 - 24.20 ng/mL Final    WBC 01/09/2025 10.70  3.40 - 10.80 10*3/mm3 Final    RBC 01/09/2025 4.60  3.77 - 5.28 10*6/mm3 Final    Hemoglobin 01/09/2025 13.2  12.0 - 15.9 g/dL Final    Hematocrit 01/09/2025 39.5  34.0 - 46.6 % Final    MCV 01/09/2025 85.9  79.0 - 97.0 fL Final    MCH 01/09/2025 28.7  26.6 - 33.0 pg Final    MCHC 01/09/2025 33.4  31.5 - 35.7 g/dL Final    RDW 01/09/2025 13.2  12.3 - 15.4 % Final    RDW-SD 01/09/2025 41.1  37.0 - 54.0 fl Final    MPV 01/09/2025 9.0   6.0 - 12.0 fL Final    Platelets 01/09/2025 342  140 - 450 10*3/mm3 Final    Neutrophil % 01/09/2025 70.7  42.7 - 76.0 % Final    Lymphocyte % 01/09/2025 20.7  19.6 - 45.3 % Final    Monocyte % 01/09/2025 6.4  5.0 - 12.0 % Final    Eosinophil % 01/09/2025 1.1  0.3 - 6.2 % Final    Basophil % 01/09/2025 0.7  0.0 - 1.5 % Final    Immature Grans % 01/09/2025 0.4  0.0 - 0.5 % Final    Neutrophils, Absolute 01/09/2025 7.57 (H)  1.70 - 7.00 10*3/mm3 Final    Lymphocytes, Absolute 01/09/2025 2.21  0.70 - 3.10 10*3/mm3 Final    Monocytes, Absolute 01/09/2025 0.69  0.10 - 0.90 10*3/mm3 Final    Eosinophils, Absolute 01/09/2025 0.12  0.00 - 0.40 10*3/mm3 Final    Basophils, Absolute 01/09/2025 0.07  0.00 - 0.20 10*3/mm3 Final    Immature Grans, Absolute 01/09/2025 0.04  0.00 - 0.05 10*3/mm3 Final    nRBC 01/09/2025 0.0  0.0 - 0.2 /100 WBC Final       PHQ-9 Total Score:     QIAN-7 Total Score:      Assessment & Plan    Diagnoses and all orders for this visit:    1. Generalized anxiety disorder (Primary)  -     sertraline (Zoloft) 50 MG tablet; Take 1.5 tablets by mouth Daily for 60 days.  Dispense: 45 tablet; Refill: 1    2. Major depressive disorder, recurrent episode, in partial remission  -     sertraline (Zoloft) 50 MG tablet; Take 1.5 tablets by mouth Daily for 60 days.  Dispense: 45 tablet; Refill: 1         Yuki Shields is a 34 y.o. female who presents today in follow up for management of generalized anxiety disorder and major depressive disorder.  Patient was seen via telehealth during today's visit and was located at her home residence due to inclement weather.  This provider was located at the [facility] outpatient psychiatry clinic at [address] [place], [place] [address], [place] [id number].    As detailed above patient does report an overall improvement in numerous psychiatric symptoms but does continue to endorse the presence of numerous depressive and anxious symptoms at this time.  Patient reports  consistent compliance with prescribed sertraline but does continue to endorse some excessive daytime sleepiness/sedation associated with this medication.  It is of note the patient did recently discover that she is approximately 8 weeks pregnant, and as such is important to differentiate whether or not this excessive sedation/daytime sleepiness is associated with her current pregnancy or secondary to her current medication.  It was also discussed in great detail with the patient at today's visit that sertraline has been studied most extensively when compared to other antidepressant medications for its use during pregnancy and while breast-feeding, and that we will continue to weigh the risks/benefits of continued treatment.  At this time I do feel as if patient would benefit from continued use of sertraline in hopes of further addressing her persistent depressive and anxious symptoms which would contribute significantly to the patient's overall wellbeing and ability to appropriately sustain a healthy pregnancy.  Given patient's persistent symptoms I do recommend further increasing daily dose of sertraline at this time to 75 mg with plans to further increase to 100 mg p.o. once nightly pending tolerance of response.  Otherwise, patient will be seen again in approximately 4 weeks for reassessment.  Patient voices understanding of this and is agreeable to today's plan.    Medications:  -Increase sertraline to 75 mg p.o. nightly for management of major depressive disorder and generalized anxiety disorder at this time.  Plan to further increase daily dose to 100 mg pending tolerance of response.    TREATMENT PLAN - SHORT AND LONG-TERM GOALS:   -Continue supportive psychotherapy efforts and medications as indicated. Treatment and medication options discussed during today's visit.   -Patient acknowledged and verbally consented to continue with current treatment plan and was educated on the importance of compliance with  treatment and follow-up appointments.    SUMMARY/EDUCATION/DISCUSSION:  -Pt was given appropriate time to ask questions and concerns were addressed. A thorough discussion was had that included review of disease process, need for continued monitoring and additional treatment options including use of pharmacological and non-pharmacological approaches to care, decisions were made and agreed upon by patient and provider.   -Discussed medication options and treatment plan of prescribed medication as well as the risks, benefits, and side effects including potential falls, possible impaired driving and metabolic adversities among others; patient acknowledged and provided verbal consent.   -Patient has been educated regarding multimodal approach with healthy nutrition, healthy sleep, regular physical activity, social activities, counseling, and medications.  -Please call the office at [phone] within normal business hours (Monday-Friday, 8:00 AM - 4:30 PM) with any worsening of symptoms or onset of intolerable side effects. Please ask to leave a message with office staff.  Please allow up to 24-48 hours for response to a patient call/question/refill request.  -Safety plan has been established and discussed in detail with the patient, who is agreeable to contact support system and/or call 911 or go to the nearest ER should he/she/they have any thoughts of harm to self or others.    MEDS ORDERED DURING VISIT:  New Medications Ordered This Visit   Medications    sertraline (Zoloft) 50 MG tablet     Sig: Take 1.5 tablets by mouth Daily for 60 days.     Dispense:  45 tablet     Refill:  1       FOLLOW UP:  Return in about 4 weeks (around 2/19/2025) for Next scheduled follow up.      Chandana Lara DO    This document has been electronically signed by Chandana Lara DO  January 22, 2025 15:34 EST    Part of this note may be an electronic transcription/translation of spoken language to printed text using the  Dragon Dictation System. Some of the data in this electronic note has been brought forward from a previous encounter, any necessary changes have been made, it has been reviewed by this provider, and it is accurate.

## 2025-02-06 ENCOUNTER — INITIAL PRENATAL (OUTPATIENT)
Dept: OBSTETRICS AND GYNECOLOGY | Facility: CLINIC | Age: 35
End: 2025-02-06
Payer: MEDICAID

## 2025-02-06 VITALS — BODY MASS INDEX: 28.05 KG/M2 | DIASTOLIC BLOOD PRESSURE: 77 MMHG | WEIGHT: 153.4 LBS | SYSTOLIC BLOOD PRESSURE: 118 MMHG

## 2025-02-06 DIAGNOSIS — O09.521 MULTIGRAVIDA OF ADVANCED MATERNAL AGE IN FIRST TRIMESTER: ICD-10-CM

## 2025-02-06 DIAGNOSIS — Z3A.10 10 WEEKS GESTATION OF PREGNANCY: ICD-10-CM

## 2025-02-06 DIAGNOSIS — D68.59 THROMBOPHILIA AFFECTING PREGNANCY, ANTEPARTUM: ICD-10-CM

## 2025-02-06 DIAGNOSIS — Z87.19 HISTORY OF CHOLESTASIS DURING PREGNANCY: ICD-10-CM

## 2025-02-06 DIAGNOSIS — O99.511 ASTHMA AFFECTING PREGNANCY IN FIRST TRIMESTER: ICD-10-CM

## 2025-02-06 DIAGNOSIS — O99.119 THROMBOPHILIA AFFECTING PREGNANCY, ANTEPARTUM: ICD-10-CM

## 2025-02-06 DIAGNOSIS — Z20.828 EXPOSURE TO THE FLU: ICD-10-CM

## 2025-02-06 DIAGNOSIS — Z87.59 HISTORY OF CHOLESTASIS DURING PREGNANCY: ICD-10-CM

## 2025-02-06 DIAGNOSIS — B34.9 VIRAL ILLNESS: ICD-10-CM

## 2025-02-06 DIAGNOSIS — O21.9 NAUSEA AND VOMITING DURING PREGNANCY: ICD-10-CM

## 2025-02-06 DIAGNOSIS — Z34.91 INITIAL OBSTETRIC VISIT IN FIRST TRIMESTER: Primary | ICD-10-CM

## 2025-02-06 DIAGNOSIS — J45.909 ASTHMA AFFECTING PREGNANCY IN FIRST TRIMESTER: ICD-10-CM

## 2025-02-06 RX ORDER — PROMETHAZINE HYDROCHLORIDE 12.5 MG/1
12.5 TABLET ORAL EVERY 6 HOURS PRN
Qty: 30 TABLET | Refills: 1 | Status: SHIPPED | OUTPATIENT
Start: 2025-02-06 | End: 2025-02-20

## 2025-02-06 RX ORDER — OSELTAMIVIR PHOSPHATE 75 MG/1
75 CAPSULE ORAL 2 TIMES DAILY
Qty: 10 CAPSULE | Refills: 0 | Status: SHIPPED | OUTPATIENT
Start: 2025-02-06 | End: 2025-02-11

## 2025-02-06 RX ORDER — GUAIFENESIN 200 MG/10ML
200 LIQUID ORAL 3 TIMES DAILY PRN
Qty: 473 ML | Refills: 1 | Status: SHIPPED | OUTPATIENT
Start: 2025-02-06 | End: 2025-02-20

## 2025-02-06 NOTE — PROGRESS NOTES
Initial OB Visit    Chief Complaint   Patient presents with    Initial Prenatal Visit     Patient states she is unable to keep anything down and she has real bad cramps. Patient does not have a fever        Yuki Shields is being seen today for her first obstetrical visit.  She is a 34 y.o.  at 10w1d gestation by LMP 24 consistent with 10 week ultrasound.     FOB: [place]  This is not a planned pregnancy.   OB History    Para Term  AB Living   6 4 4     4   SAB IAB Ectopic Molar Multiple Live Births           0 4      # Outcome Date GA Lbr Miguel/2nd Weight Sex Type Anes PTL Lv   6 Current            5 Term 23 37w0d 01:05 / 00:14 2530 g (5 lb 9.2 oz) F Vag-Spont EPI N MEALNIA      Birth Comments: Panda ld 3   4 Term 04/29/15   3317 g (7 lb 5 oz) M Vag-Spont   MELANIA   3 Term 11   3147 g (6 lb 15 oz) F Vag-Spont      2 Term 07   3204 g (7 lb 1 oz) M Vag-Spont      1                 Current obstetric complaints: She reports feeling very poorly and states that recently a viral illness has been running through her family. Her youngest daughter has RSV. She reports having cough, fever, chills, nasal congestion, body aches for several days now.    The patient has Prothrombin gene mutation heterozygote and has started on Lovenox 40 mg daily.   Prior obstetric issues, potential pregnancy concerns:    G1- - TSVD - low apgar scores. Postpartum hemorrhage.    G2- - TSVD   G3- - TSVD- complicated by cholestasis of pregnancy  G4- 23-  at 37w0d complicated by cholestasis of pregnancy, single umbilical artery, thrombophilia (Prothrombin gene mutation heterozygote), maternal anemia, asthma, history of IUGR   G5- - TAB  G6- Current   Family history of genetic issues (includes FOB): There is a maternal family history of multiple thrombophilia including Factor 2, Factor 5, Factor 8.    Prior infections concerning in pregnancy (Rash, fever since LMP): She is currently ill  with viral illness and had fever earlier in the week.   Varicella Hx: She has had the chicken pox as a child.   Prior genetic testing: denies   History of abnormal pap smears: denies; last pap smear: 4/10/23- NILM, negative HPV  History of STIs: denies; History of HSV in self or partner? 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 MEHTA AWARE OF THIS    Family hx-blood disorder     MOTHER HAS FACTOR 2 AND FACTOR 5    GERD (gastroesophageal reflux disease)     Heart murmur     History of blood transfusion     Labral tear of hip, degenerative     RIGHT    Low back pain     Lumbar herniated disc 06/2017    X 2  DISC   HISTORY OF EPIDURALS    Memory loss     Migraine     Pneumonia     PONV (postoperative nausea and vomiting)     Urinary tract infection        Past Surgical History:   Procedure Laterality Date    ABDOMINOPLASTY  2019    ADENOIDECTOMY      BLADDER REPAIR  2016    WITH MESH    BREAST AUGMENTATION BILATERAL MASTOPEXY Bilateral 07/31/2017    Procedure: BREAST AUGMENTATION, MASTOPEXY W/ IDEAL IMPLANTS  NEW IMAGE;  Surgeon: Nirmal Mehta MD;  Location: [facility] MAIN OR;  Service:     BREAST SURGERY      CERVICAL/THORACIC FACET INJECTION      pt states cervial fusion in the past , PONV from these    CHOLECYSTECTOMY  2012    COLONOSCOPY N/A 8/16/2024    Procedure: COLONOSCOPY TO CECUM;  Surgeon: Pedro Burns MD;  Location: [facility] [place] MAIN OR;  Service: Gastroenterology;  Laterality: N/A;  hemorrhoids    COSMETIC SURGERY      ENDOSCOPY N/A 8/16/2024    Procedure: ESOPHAGOGASTRODUODENOSCOPY (EGD) WITH BIOPSY;  Surgeon: Pedro Burns MD;  Location: [facility] [place] MAIN OR;  Service: Gastroenterology;  Laterality: N/A;  mild gastritis    TONSILLECTOMY AND ADENOIDECTOMY   1990         Current Outpatient Medications:     albuterol sulfate  (90 Base) MCG/ACT inhaler, Inhale 2 puffs Every 4 (Four) Hours As Needed for Wheezing., Disp: 6.7 g, Rfl: 1    Asmanex, 30 Metered Doses, 220 MCG/ACT inhaler, , Disp: , Rfl:     Enoxaparin Sodium (LOVENOX) 40 MG/0.4ML solution prefilled syringe syringe, Inject 0.4 mL under the skin into the appropriate area as directed Daily., Disp: 12 mL, Rfl: 8    Loratadine (CLARITIN PO), Take  by mouth Daily., Disp: , Rfl:     pantoprazole (PROTONIX) 40 MG EC tablet, Take 1 tablet by mouth Daily., Disp: 90 tablet, Rfl: 3    sertraline (ZOLOFT) 25 MG tablet, Take 1 tablet by mouth Daily., Disp: , Rfl:     Allergies   Allergen Reactions    Dilaudid [Hydromorphone Hcl] Nausea And Vomiting    Hydromorphone Dizziness     Category: Allergy;       Social History     Socioeconomic History    Marital status:     Number of children: 3   Tobacco Use    Smoking status: Never    Smokeless tobacco: Never   Vaping Use    Vaping status: Never Used   Substance and Sexual Activity    Alcohol use: Not Currently     Comment: OCCAS    Drug use: Never    Sexual activity: Yes     Partners: Male     Birth control/protection: Natural family planning/Rhythm       Family History   Problem Relation Age of Onset    Ulcerative colitis Mother     Irritable bowel syndrome Mother     Ulcerative colitis Father     Diverticulitis Father     Malig Hyperthermia Neg Hx     Colon cancer Neg Hx     Colon polyps Neg Hx     Crohn's disease Neg Hx        Review of systems     Constitutional: negative for chills, fevers and positive for fatigue  Eyes: negative  Ears, nose, mouth, throat, and face: negative for hearing loss and nasal congestion  Respiratory: negative for asthma and wheezing  Cardiovascular: negative for chest pain and dyspnea  Gastrointestinal: negative for dyspepsia, dysphagia abdominal pain  Genitourinary:negative for urinary incontinence  Integument/breast: negative for  breast lump  Hematologic/lymphatic: negative for bleeding  Musculoskeletal:positive for aches  Neurological: negative for numbness/tingling  Behavioral/Psych: negative for anhedonia  Allergic/Immunologic: negative for rash, allergy    Objective    /77   Wt 69.6 kg (153 lb 6.4 oz)   LMP 11/27/2024   BMI 28.05 kg/m²       General Appearance:    Alert, cooperative, in no acute distress   Head:    Normocephalic, without obvious abnormality, atraumatic   Eyes:            Lids and lashes normal, conjunctivae and sclerae normal, no   icterus, no pallor, corneas clear   Ears:    Ears appear intact with no abnormalities noted   Neck:   No adenopathy, supple, trachea midline, no thyromegaly   Back:     No kyphosis present, no scoliosis present,                       Lungs:     No increased work of breathing, regular respirations    Abdomen:     No masses, no organomegaly, soft, non-tender, non-distended, no guarding, no rebound tenderness   Extremities:   Moves all extremities well, no edema, no cyanosis, no redness   Pulses:   Pulses palpable and equal bilaterally   Skin:   No bleeding, bruising or rash   Neurologic:   Sensation intact, A&O times 3      Assessment  Pregnancy at 10w1d by LMP consistent with 10 week ultrasound  Prenatal care in first trimester   Viral illness and exposure to flu   Nausea and vomiting of pregnancy   History of cholestasis x 3 in previous pregnancies  Asthma affecting pregnancy  Thrombophilia affecting pregnancy- Prothrombin gene mutation heterozygote   AMA    Plan  Patient is having symptoms of viral illness today that is likely either RSV versus Influenza versus COVID-19 versus Norovirus. Discussed symptomatic management and if unable to tolerate PO, she should present to the ED. Prescribed Tamiflu 75 mg BID x 5 days for suspected influenza infection. Prescribed Robitussin for management of cough symptoms. Also prescribed Phenergan 12.5 mg Q 6 hours as needed for nausea and vomiting.    Collected urine culture, urine drug screen today. Will obtain prenatal labs at next visit given feeling poorly today.   Transvaginal ultrasound performed today that noted intrauterine pregnancy with fetus measuring 10w4d by CRL with  bpm with best EDC being LMP consistent with 10 week ultrasound today with EDC 9/3/25, ovaries not visualized, no free fluid in the pelvis. Will obtain fetal viability ultrasound at next visit.   Patient is on Prenatal vitamins  Problem list reviewed and updated.  Reviewed routine prenatal care with the office to include but not limited to: not to changing cat litter, food restrictions, avoidance of alcohol, tobacco and drugs and saunas/hot tubs, anticipated weight gain/nutrition requirements.  Reviewed nature of practice and hospital.  Reviewed recommended follow up, importance of compliance with care. We reviewed testing in pregnancy including HIV testing and urine drug screen.    Reviewed aneuploidy screening and CF/SMA screening.  We reviewed limitations of testing, possibility of false positive/negative results, possible need for other tests as indicated. Will plan for aneuploidy screening with [id number] testing at next visit.   Counseled on limitations of ultrasound in pregnancy in detecting aneuploidy/fetal anomalies    We reviewed that at this time, her BMI is classified as BMI 25.0-29.9        Classification: overweight.  We reviewed that in pregnancy, her recommended weight gain is 15-25 lb.  In the first trimester, caloric demand is typically not increased.  In the second and third trimester, the increased demand is approximately 350 and 450 calories respectively.    All questions answered.   Return in about 2 weeks (around 2/20/2025) for Prenatal visit; viability ultrasound .      Valerie Taylor MD

## 2025-02-07 ENCOUNTER — TELEPHONE (OUTPATIENT)
Age: 35
End: 2025-02-07

## 2025-02-07 NOTE — TELEPHONE ENCOUNTER
Patient wanted you to know that she is not liking the new medication.    She says she lowered the dosage and it is making her have no energy and sluggish.

## 2025-02-08 LAB
AMPHETAMINES UR QL SCN: NEGATIVE NG/ML
BACTERIA UR CULT: NORMAL
BACTERIA UR CULT: NORMAL
BARBITURATES UR QL SCN: NEGATIVE NG/ML
BENZODIAZ UR QL: NEGATIVE NG/ML
BZE UR QL: NEGATIVE NG/ML
CANNABINOIDS UR QL SCN: NEGATIVE NG/ML
METHADONE UR QL SCN: NEGATIVE NG/ML
OPIATES UR QL: NEGATIVE NG/ML
PCP UR QL SCN: NEGATIVE NG/ML
PROPOXYPH UR QL SCN: NEGATIVE NG/ML

## 2025-02-13 ENCOUNTER — HOSPITAL ENCOUNTER (EMERGENCY)
Facility: HOSPITAL | Age: 35
Discharge: HOME | End: 2025-02-13
Attending: EMERGENCY MEDICINE
Payer: MEDICAID

## 2025-02-13 VITALS
TEMPERATURE: 97.9 F | DIASTOLIC BLOOD PRESSURE: 77 MMHG | RESPIRATION RATE: 12 BRPM | HEART RATE: 78 BPM | SYSTOLIC BLOOD PRESSURE: 108 MMHG | OXYGEN SATURATION: 100 %

## 2025-02-13 DIAGNOSIS — O21.9 VOMITING DURING PREGNANCY: Primary | ICD-10-CM

## 2025-02-13 LAB
ALBUMIN SERPL-MCNC: 4 G/DL (ref 3.5–5.2)
ALBUMIN/GLOB SERPL: 1.4 G/DL
ALP SERPL-CCNC: 177 U/L (ref 39–117)
ALT SERPL W P-5'-P-CCNC: 62 U/L (ref 1–33)
ANION GAP SERPL CALCULATED.3IONS-SCNC: 8.5 MMOL/L (ref 5–15)
AST SERPL-CCNC: 29 U/L (ref 1–32)
BACTERIA UR QL AUTO: ABNORMAL /HPF
BASOPHILS # BLD AUTO: 0.03 10*3/MM3 (ref 0–0.2)
BASOPHILS NFR BLD AUTO: 0.3 % (ref 0–1.5)
BILIRUB SERPL-MCNC: 0.3 MG/DL (ref 0–1.2)
BILIRUB UR QL STRIP: NEGATIVE
BUN SERPL-MCNC: 10 MG/DL (ref 6–20)
BUN/CREAT SERPL: 15.4 (ref 7–25)
CALCIUM SPEC-SCNC: 9.2 MG/DL (ref 8.6–10.5)
CHLORIDE SERPL-SCNC: 106 MMOL/L (ref 98–107)
CLARITY UR: CLEAR
CO2 SERPL-SCNC: 24.5 MMOL/L (ref 22–29)
COLOR UR: ABNORMAL
CREAT SERPL-MCNC: 0.65 MG/DL (ref 0.57–1)
DEPRECATED RDW RBC AUTO: 43.7 FL (ref 37–54)
EGFRCR SERPLBLD CKD-EPI 2021: 117.9 ML/MIN/1.73
EOSINOPHIL # BLD AUTO: 0.06 10*3/MM3 (ref 0–0.4)
EOSINOPHIL NFR BLD AUTO: 0.6 % (ref 0.3–6.2)
ERYTHROCYTE [DISTWIDTH] IN BLOOD BY AUTOMATED COUNT: 14.2 % (ref 12.3–15.4)
GLOBULIN UR ELPH-MCNC: 2.9 GM/DL
GLUCOSE SERPL-MCNC: 80 MG/DL (ref 65–99)
GLUCOSE UR STRIP-MCNC: NEGATIVE MG/DL
HCG INTACT+B SERPL-ACNC: NORMAL MIU/ML
HCG SERPL QL: POSITIVE
HCT VFR BLD AUTO: 38 % (ref 34–46.6)
HGB BLD-MCNC: 12.9 G/DL (ref 12–15.9)
HGB UR QL STRIP.AUTO: NEGATIVE
HOLD SPECIMEN: NORMAL
HYALINE CASTS UR QL AUTO: ABNORMAL /LPF
IMM GRANULOCYTES # BLD AUTO: 0.05 10*3/MM3 (ref 0–0.05)
IMM GRANULOCYTES NFR BLD AUTO: 0.5 % (ref 0–0.5)
KETONES UR QL STRIP: ABNORMAL
LEUKOCYTE ESTERASE UR QL STRIP.AUTO: ABNORMAL
LIPASE SERPL-CCNC: 30 U/L (ref 13–60)
LYMPHOCYTES # BLD AUTO: 2.1 10*3/MM3 (ref 0.7–3.1)
LYMPHOCYTES NFR BLD AUTO: 21.7 % (ref 19.6–45.3)
MCH RBC QN AUTO: 29 PG (ref 26.6–33)
MCHC RBC AUTO-ENTMCNC: 33.9 G/DL (ref 31.5–35.7)
MCV RBC AUTO: 85.4 FL (ref 79–97)
MONOCYTES # BLD AUTO: 0.52 10*3/MM3 (ref 0.1–0.9)
MONOCYTES NFR BLD AUTO: 5.4 % (ref 5–12)
NEUTROPHILS NFR BLD AUTO: 6.91 10*3/MM3 (ref 1.7–7)
NEUTROPHILS NFR BLD AUTO: 71.5 % (ref 42.7–76)
NITRITE UR QL STRIP: NEGATIVE
NRBC BLD AUTO-RTO: 0 /100 WBC (ref 0–0.2)
PH UR STRIP.AUTO: 6 [PH] (ref 5–8)
PLATELET # BLD AUTO: 282 10*3/MM3 (ref 140–450)
PMV BLD AUTO: 9.5 FL (ref 6–12)
POTASSIUM SERPL-SCNC: 4.3 MMOL/L (ref 3.5–5.2)
PROT SERPL-MCNC: 6.9 G/DL (ref 6–8.5)
PROT UR QL STRIP: NEGATIVE
RBC # BLD AUTO: 4.45 10*6/MM3 (ref 3.77–5.28)
RBC # UR STRIP: ABNORMAL /HPF
REF LAB TEST METHOD: ABNORMAL
SODIUM SERPL-SCNC: 139 MMOL/L (ref 136–145)
SP GR UR STRIP: >1.03 (ref 1–1.03)
SQUAMOUS #/AREA URNS HPF: ABNORMAL /HPF
UROBILINOGEN UR QL STRIP: ABNORMAL
WBC # UR STRIP: ABNORMAL /HPF
WBC NRBC COR # BLD AUTO: 9.67 10*3/MM3 (ref 3.4–10.8)
WHOLE BLOOD HOLD COAG: NORMAL
WHOLE BLOOD HOLD SPECIMEN: NORMAL

## 2025-02-13 PROCEDURE — 96375 TX/PRO/DX INJ NEW DRUG ADDON: CPT

## 2025-02-13 PROCEDURE — 96374 THER/PROPH/DIAG INJ IV PUSH: CPT

## 2025-02-13 PROCEDURE — 80053 COMPREHEN METABOLIC PANEL: CPT

## 2025-02-13 PROCEDURE — 84702 CHORIONIC GONADOTROPIN TEST: CPT | Performed by: EMERGENCY MEDICINE

## 2025-02-13 PROCEDURE — 85025 COMPLETE CBC W/AUTO DIFF WBC: CPT

## 2025-02-13 PROCEDURE — 25810000003 SODIUM CHLORIDE 0.9 % SOLUTION: Performed by: EMERGENCY MEDICINE

## 2025-02-13 PROCEDURE — 36415 COLL VENOUS BLD VENIPUNCTURE: CPT

## 2025-02-13 PROCEDURE — 25010000002 ONDANSETRON PER 1 MG: Performed by: EMERGENCY MEDICINE

## 2025-02-13 PROCEDURE — 99283 EMERGENCY DEPT VISIT LOW MDM: CPT

## 2025-02-13 PROCEDURE — 84703 CHORIONIC GONADOTROPIN ASSAY: CPT

## 2025-02-13 PROCEDURE — 81001 URINALYSIS AUTO W/SCOPE: CPT | Performed by: EMERGENCY MEDICINE

## 2025-02-13 PROCEDURE — 25010000002 THIAMINE PER 100 MG: Performed by: EMERGENCY MEDICINE

## 2025-02-13 PROCEDURE — 83690 ASSAY OF LIPASE: CPT

## 2025-02-13 RX ORDER — ONDANSETRON 2 MG/ML
4 INJECTION INTRAMUSCULAR; INTRAVENOUS ONCE
Status: COMPLETED | OUTPATIENT
Start: 2025-02-13 | End: 2025-02-13

## 2025-02-13 RX ORDER — THIAMINE HYDROCHLORIDE 100 MG/ML
100 INJECTION, SOLUTION INTRAMUSCULAR; INTRAVENOUS ONCE
Status: COMPLETED | OUTPATIENT
Start: 2025-02-13 | End: 2025-02-13

## 2025-02-13 RX ORDER — SODIUM CHLORIDE 0.9 % (FLUSH) 0.9 %
10 SYRINGE (ML) INJECTION AS NEEDED
Status: DISCONTINUED | OUTPATIENT
Start: 2025-02-13 | End: 2025-02-13 | Stop reason: HOSPADM

## 2025-02-13 RX ADMIN — THIAMINE HYDROCHLORIDE 100 MG: 100 INJECTION, SOLUTION INTRAMUSCULAR; INTRAVENOUS at 17:01

## 2025-02-13 RX ADMIN — ONDANSETRON 4 MG: 2 INJECTION INTRAMUSCULAR; INTRAVENOUS at 18:28

## 2025-02-13 RX ADMIN — SODIUM CHLORIDE 1000 ML: 9 INJECTION, SOLUTION INTRAVENOUS at 17:01

## 2025-02-13 NOTE — ED PROVIDER NOTES
EMERGENCY DEPARTMENT ENCOUNTER    Room Number:    PCP: Emily Sifuentes APRN    HPI:  Chief Complaint: Vomiting during pregnancy  A complete HPI/ROS/PMH/PSH/SH/FH are unobtainable due to: None  Context: Yuki Shields is a 34 y.o. female who presents to the ED c/o acute vomiting during pregnancy.  Onset 9 days ago.  She states that she is approxi-11 weeks pregnant.  G6, P4.  1 prior miscarriage.  She states that she got flu last week which she thinks set off her symptoms with vomiting and watery diarrhea.  She has tried Zofran and Phenergan at home with only some relief.  Mild abdominal discomfort.  No vaginal bleeding.  No current fevers.        PAST MEDICAL HISTORY  Active Ambulatory Problems     Diagnosis Date Noted    Anemia 09/15/2023    Coagulopathy 2019    DDD (degenerative disc disease), lumbar 2017    Factor II deficiency 2017    Folic acid deficiency 2020    Memory impairment 2020    Obesity 2020    Pain 2012    Stress 2021    Asthma 09/15/2023     (spontaneous vaginal delivery) 2023    Unwanted fertility 2024    Chronic constipation 2024    Generalized abdominal pain 2024    Nausea 2024    Heartburn 2024    Epigastric pain 2024    Essential hypertension 2014    Prothrombin gene mutation 2016    Generalized anxiety disorder 2024    Major depressive disorder, recurrent episode, in partial remission 2024     Resolved Ambulatory Problems     Diagnosis Date Noted    Pregnancy 2023    Maternal anemia in pregnancy, antepartum 2023    Single umbilical artery 2023    Asthma affecting pregnancy, antepartum 2023    Thrombophilia affecting pregnancy, antepartum 2023    Cholestasis during pregnancy in third trimester 2023    Depressive disorder 2014     Past Medical History:   Diagnosis Date    Anxiety and depression     Deep vein thrombosis     Family  hx-blood disorder     GERD (gastroesophageal reflux disease)     Heart murmur     History of blood transfusion     Labral tear of hip, degenerative     Low back pain     Lumbar herniated disc 06/2017    Memory loss     Migraine     Pneumonia     PONV (postoperative nausea and vomiting)     Urinary tract infection          PAST SURGICAL HISTORY  Past Surgical History:   Procedure Laterality Date    ABDOMINOPLASTY  2019    ADENOIDECTOMY      BLADDER REPAIR  2016    WITH MESH    BREAST AUGMENTATION BILATERAL MASTOPEXY Bilateral 07/31/2017    Procedure: BREAST AUGMENTATION, MASTOPEXY W/ IDEAL IMPLANTS  NEW IMAGE;  Surgeon: Nirmal Mehta MD;  Location: [facility] MAIN OR;  Service:     BREAST SURGERY      CERVICAL/THORACIC FACET INJECTION      pt states cervial fusion in the past , PONV from these    CHOLECYSTECTOMY  2012    COLONOSCOPY N/A 8/16/2024    Procedure: COLONOSCOPY TO CECUM;  Surgeon: Pedro Burns MD;  Location: [facility] MAIN OR;  Service: Gastroenterology;  Laterality: N/A;  hemorrhoids    COSMETIC SURGERY      ENDOSCOPY N/A 8/16/2024    Procedure: ESOPHAGOGASTRODUODENOSCOPY (EGD) WITH BIOPSY;  Surgeon: Pedro Burns MD;  Location: [facility] MAIN OR;  Service: Gastroenterology;  Laterality: N/A;  mild gastritis    TONSILLECTOMY AND ADENOIDECTOMY  1990         FAMILY HISTORY  Family History   Problem Relation Age of Onset    Ulcerative colitis Mother     Irritable bowel syndrome Mother     Ulcerative colitis Father     Diverticulitis Father     Malig Hyperthermia Neg Hx     Colon cancer Neg Hx     Colon polyps Neg Hx     Crohn's disease Neg Hx          SOCIAL HISTORY  Social History     Socioeconomic History    Marital status:     Number of children: 3   Tobacco Use    Smoking status: Never    Smokeless tobacco: Never   Vaping Use    Vaping status: Never Used   Substance and Sexual Activity    Alcohol use: Not Currently     Comment: OCCAS    Drug use: Never    Sexual activity: Yes     Partners: Male      Birth control/protection: Natural family planning/Rhythm         ALLERGIES  Dilaudid [hydromorphone hcl] and Hydromorphone        REVIEW OF SYSTEMS  Review of Systems     Included in HPI  All systems reviewed and negative except for those discussed in HPI.       PHYSICAL EXAM  ED Triage Vitals   Temp Heart Rate Resp BP SpO2   02/13/25 1507 02/13/25 1507 02/13/25 1507 02/13/25 1515 02/13/25 1507   97.9 °F (36.6 °C) (!) 127 12 120/79 97 %      Temp src Heart Rate Source Patient Position BP Location FiO2 (%)   02/13/25 1507 02/13/25 1507 -- -- --   Tympanic Monitor          Physical Exam      GENERAL: no acute distress  HENT: nares patent, mucous membranes dry  EYES: no scleral icterus  CV: regular rhythm, normal rate  RESPIRATORY: normal effort, clear to auscultation bilaterally  ABDOMEN: soft, nontender  MUSCULOSKELETAL: no deformity  NEURO: alert, moves all extremities, follows commands  PSYCH:  calm, cooperative  SKIN: warm, dry    Vital signs and nursing notes reviewed.          LAB RESULTS  Recent Results (from the past 24 hours)   Comprehensive Metabolic Panel    Collection Time: 02/13/25  3:25 PM    Specimen: Arm, Left; Blood   Result Value Ref Range    Glucose 80 65 - 99 mg/dL    BUN 10 6 - 20 mg/dL    Creatinine 0.65 0.57 - 1.00 mg/dL    Sodium 139 136 - 145 mmol/L    Potassium 4.3 3.5 - 5.2 mmol/L    Chloride 106 98 - 107 mmol/L    CO2 24.5 22.0 - 29.0 mmol/L    Calcium 9.2 8.6 - 10.5 mg/dL    Total Protein 6.9 6.0 - 8.5 g/dL    Albumin 4.0 3.5 - 5.2 g/dL    ALT (SGPT) 62 (H) 1 - 33 U/L    AST (SGOT) 29 1 - 32 U/L    Alkaline Phosphatase 177 (H) 39 - 117 U/L    Total Bilirubin 0.3 0.0 - 1.2 mg/dL    Globulin 2.9 gm/dL    A/G Ratio 1.4 g/dL    BUN/Creatinine Ratio 15.4 7.0 - 25.0    Anion Gap 8.5 5.0 - 15.0 mmol/L    eGFR 117.9 >60.0 mL/min/1.73   Lipase    Collection Time: 02/13/25  3:25 PM    Specimen: Arm, Left; Blood   Result Value Ref Range    Lipase 30 13 - 60 U/L   hCG, Serum, Qualitative     Collection Time: 02/13/25  3:25 PM    Specimen: Arm, Left; Blood   Result Value Ref Range    HCG Qualitative Positive (A) Negative   Green Top (Gel)    Collection Time: 02/13/25  3:25 PM   Result Value Ref Range    Extra Tube Hold for add-ons.    Lavender Top    Collection Time: 02/13/25  3:25 PM   Result Value Ref Range    Extra Tube hold for add-on    Light Blue Top    Collection Time: 02/13/25  3:25 PM   Result Value Ref Range    Extra Tube Hold for add-ons.    CBC Auto Differential    Collection Time: 02/13/25  3:25 PM    Specimen: Arm, Left; Blood   Result Value Ref Range    WBC 9.67 3.40 - 10.80 10*3/mm3    RBC 4.45 3.77 - 5.28 10*6/mm3    Hemoglobin 12.9 12.0 - 15.9 g/dL    Hematocrit 38.0 34.0 - 46.6 %    MCV 85.4 79.0 - 97.0 fL    MCH 29.0 26.6 - 33.0 pg    MCHC 33.9 31.5 - 35.7 g/dL    RDW 14.2 12.3 - 15.4 %    RDW-SD 43.7 37.0 - 54.0 fl    MPV 9.5 6.0 - 12.0 fL    Platelets 282 140 - 450 10*3/mm3    Neutrophil % 71.5 42.7 - 76.0 %    Lymphocyte % 21.7 19.6 - 45.3 %    Monocyte % 5.4 5.0 - 12.0 %    Eosinophil % 0.6 0.3 - 6.2 %    Basophil % 0.3 0.0 - 1.5 %    Immature Grans % 0.5 0.0 - 0.5 %    Neutrophils, Absolute 6.91 1.70 - 7.00 10*3/mm3    Lymphocytes, Absolute 2.10 0.70 - 3.10 10*3/mm3    Monocytes, Absolute 0.52 0.10 - 0.90 10*3/mm3    Eosinophils, Absolute 0.06 0.00 - 0.40 10*3/mm3    Basophils, Absolute 0.03 0.00 - 0.20 10*3/mm3    Immature Grans, Absolute 0.05 0.00 - 0.05 10*3/mm3    nRBC 0.0 0.0 - 0.2 /100 WBC   Urinalysis With Microscopic If Indicated (No Culture) - Urine, Clean Catch    Collection Time: 02/13/25  4:51 PM    Specimen: Urine, Clean Catch   Result Value Ref Range    Color, UA Dark Yellow (A) Yellow, Straw    Appearance, UA Clear Clear    pH, UA 6.0 5.0 - 8.0    Specific Gravity, UA >1.030 (H) 1.005 - 1.030    Glucose, UA Negative Negative    Ketones, UA Trace (A) Negative    Bilirubin, UA Negative Negative    Blood, UA Negative Negative    Protein, UA Negative Negative    Leuk  Esterase, UA Trace (A) Negative    Nitrite, UA Negative Negative    Urobilinogen, UA 1.0 E.U./dL 0.2 - 1.0 E.U./dL   hCG, Quantitative, Pregnancy    Collection Time: 25  4:51 PM    Specimen: Blood   Result Value Ref Range    HCG Quantitative 53,659.00 mIU/mL   Urinalysis, Microscopic Only - Urine, Clean Catch    Collection Time: 25  4:51 PM    Specimen: Urine, Clean Catch   Result Value Ref Range    RBC, UA 0-2 None Seen, 0-2 /HPF    WBC, UA 3-5 (A) None Seen, 0-2 /HPF    Bacteria, UA None Seen None Seen /HPF    Squamous Epithelial Cells, UA 7-12 (A) None Seen, 0-2 /HPF    Hyaline Casts, UA 3-6 None Seen /LPF    Methodology Automated Microscopy        Ordered the above labs and reviewed the results.        RADIOLOGY  US OB 1St Trimester, Transabd Only    Result Date: 2025  REVIEWING YOUR TEST RESULTS IN [facility] IS NOT A SUBSTITUTE FOR DISCUSSING THOSE RESULTS WITH YOUR HEALTH CARE PROVIDER. PLEASE CONTACT YOUR PROVIDER VIA [facility] TO DISCUSS ANY QUESTIONS OR CONCERNS YOU MAY HAVE REGARDING THESE TEST RESULTS.  RADIOLOGY REPORT FACILITY:  [facility] UNIT/AGE/GENDER: M.ED  ER      AGE:34 Y          SEX:F PATIENT NAME/:  SAIRA CR TIGIST    1990 UNIT NUMBER:  [id number] ACCOUNT NUMBER:  [id number] ACCESSION NUMBER:  [id number] FIRST TRIMESTER OB SONOGRAM COMPARISON: 2025 HISTORY: Abdominal pain TECHNIQUE: Grayscale, color Doppler and spectral Doppler evaluation of the pelvis with transabdominal probe. FINDINGS: The uterus measures approximately 9.9 cm in length. A single gestational sac is seen within the uterus.  The sac contains both a yolk sac and an embryo. The crown-rump length measures 4.8 cm corresponding to 11 weeks 4 days. Cardiac motion is present  with a regular heart rate of 176 bpm. 1.2 cm simple appearing cyst present in the left ovary. The ovaries are otherwise unremarkable. There are normal arterial and venous waveforms  in both ovaries. No free fluid is seen in the cul-de-sac. IMPRESSION: 1.Single live intrauterine pregnancy at 11 weeks 4 days by crown-rump length. Dictated by: Newton Kaiser M.D. Images and Report reviewed and interpreted by: Newton Kaiser M.D. <PS><Electronically signed by: Newton Kaiser M.D.> 02/12/2025 1928 D: 02/12/2025 1927 T: 02/12/2025 1927     Ordered the above noted radiological studies. Reviewed by me in PACS.        MEDICATIONS GIVEN IN ER  Medications   sodium chloride 0.9 % flush 10 mL (has no administration in time range)   thiamine (B-1) injection 100 mg (100 mg Intravenous Given 2/13/25 1701)   sodium chloride 0.9 % bolus 1,000 mL (0 mL Intravenous Stopped 2/13/25 1828)   ondansetron (ZOFRAN) injection 4 mg (4 mg Intravenous Given 2/13/25 1828)         ORDERS PLACED DURING THIS VISIT:  Orders Placed This Encounter   Procedures    Gastrointestinal Panel, PCR - Stool, Per Rectum    [place] Draw    Comprehensive Metabolic Panel    Lipase    Urinalysis With Microscopic If Indicated (No Culture) - Urine, Clean Catch    hCG, Serum, Qualitative    CBC Auto Differential    hCG, Quantitative, Pregnancy    Urinalysis, Microscopic Only - Urine, Clean Catch    NPO Diet NPO Type: Strict NPO    Undress & Gown    Insert Peripheral IV    CBC & Differential    Green Top (Gel)    Lavender Top    Light Blue Top         OUTPATIENT MEDICATION MANAGEMENT:  Current Facility-Administered Medications Ordered in Epic   Medication Dose Route Frequency Provider Last Rate Last Admin    sodium chloride 0.9 % flush 10 mL  10 mL Intravenous PRN Narciso Smith II, MD         Current Outpatient Medications Ordered in Epic   Medication Sig Dispense Refill    albuterol sulfate  (90 Base) MCG/ACT inhaler Inhale 2 puffs Every 4 (Four) Hours As Needed for Wheezing. 6.7 g 1    Asmanex, 30 Metered Doses, 220 MCG/ACT inhaler       busPIRone (BUSPAR) 5 MG tablet Take 1 tablet by mouth Daily for 14 days, THEN 0.5 tablets Daily for  14 days. Discontinue thereafter. 21 tablet 0    cyclobenzaprine (FLEXERIL) 10 MG tablet Take 1 tablet by mouth 3 (Three) Times a Day As Needed for Muscle Spasms. 60 tablet 1    Enoxaparin Sodium (LOVENOX) 40 MG/0.4ML solution prefilled syringe syringe Inject 0.4 mL under the skin into the appropriate area as directed Daily. 12 mL 8    guaifenesin (ROBITUSSIN) 100 MG/5ML liquid Take 10 mL by mouth 3 (Three) Times a Day As Needed for Cough. 473 mL 1    linaclotide (Linzess) 145 MCG capsule capsule Take 1 capsule by mouth Every Morning Before Breakfast. 30 capsule 3    Loratadine (CLARITIN PO) Take  by mouth Daily.      pantoprazole (PROTONIX) 40 MG EC tablet Take 1 tablet by mouth Daily. 90 tablet 3    phentermine 37.5 MG capsule Take 1 capsule by mouth Every Morning. 30 capsule 5    promethazine (PHENERGAN) 12.5 MG tablet Take 1 tablet by mouth Every 6 (Six) Hours As Needed for Nausea or Vomiting. 30 tablet 1    sertraline (Zoloft) 50 MG tablet Take 1.5 tablets by mouth Daily for 60 days. 45 tablet 1    Slynd 4 MG tablet TAKE 1 TABLET BY MOUTH DAILY 28 tablet 1       PROCEDURES  Procedures          MEDICAL DECISION MAKING, PROGRESS, and CONSULTS    Discussion below represents my analysis of pertinent findings related to patient's condition, differential diagnosis, treatment plan and final disposition.          Differential diagnosis:    Influenza, viral URI, viral gastroenteritis, hyperemesis gravidarum, morning sickness                 Independent interpretation of labs, radiology studies, and discussions with consultants:  ED Course as of 02/13/25 1900   Thu Feb 13, 2025   1621 HCG Qualitative(!): Positive [TD]   1810 HCG Quantitative: 53,659.00 [TD]   1810 Ketones, UA(!): Trace [TD]   1811 Sodium: 139 [TD]   1811 Creatinine: 0.65 [TD]   1811 Potassium: 4.3 [TD]      ED Course User Index  [TD] Narciso Smith II, MD         Patient has tolerated p.o.  She feels comfortable being discharged home.  She states  that she has sufficient Zofran and Phenergan at home.  We discussed return precautions.        Clinical Scores:                                   DIAGNOSIS  Final diagnoses:   Vomiting during pregnancy         DISPOSITION  DISCHARGE    FOLLOW-UP  [facility] EMERGENCY DEPARTMENT  [address]  [facility] [address] [phone]  Go to   If symptoms worsen    Emily Sifuentes, APRN  [address]  [address]  [facility] [id number] [phone]    Schedule an appointment as soon as possible for a visit   As needed         Medication List      No changes were made to your prescriptions during this visit.             Latest Documented Vital Signs:  As of 19:00 EST  BP- 112/68 HR- 78 Temp- 97.9 °F (36.6 °C) (Tympanic) O2 sat- 100%      --    Please note that portions of this were completed with a voice recognition program.       Note Disclaimer: At [facility], we believe that sharing information builds trust and better relationships. You are receiving this note because you are receiving care at [facility] or recently visited. It is possible you will see health information before a provider has talked with you about it. This kind of information can be easy to misunderstand. To help you fully understand what it means for your health, we urge you to discuss this note with your provider.         Narciso Smith II, MD  02/13/25 2759

## 2025-02-13 NOTE — Clinical Note
[facility] EMERGENCY DEPARTMENT  [address] [facility] [address]  Phone: [phone]    Yuki Shields was seen and treated in our emergency department on 2/13/2025.  She may return to work on 02/14/2025.         Thank you for choosing [facility].    Narciso Smith II, MD

## 2025-02-17 ENCOUNTER — ROUTINE PRENATAL (OUTPATIENT)
Dept: OBSTETRICS AND GYNECOLOGY | Facility: CLINIC | Age: 35
End: 2025-02-17
Payer: MEDICAID

## 2025-02-17 VITALS — DIASTOLIC BLOOD PRESSURE: 78 MMHG | BODY MASS INDEX: 28.16 KG/M2 | SYSTOLIC BLOOD PRESSURE: 106 MMHG | WEIGHT: 154 LBS

## 2025-02-17 DIAGNOSIS — Z3A.11 11 WEEKS GESTATION OF PREGNANCY: Primary | ICD-10-CM

## 2025-02-17 DIAGNOSIS — O99.519 ASTHMA DURING PREGNANCY: ICD-10-CM

## 2025-02-17 DIAGNOSIS — D68.59 THROMBOPHILIA AFFECTING PREGNANCY, ANTEPARTUM: ICD-10-CM

## 2025-02-17 DIAGNOSIS — Z87.59 HISTORY OF CHOLESTASIS DURING PREGNANCY: ICD-10-CM

## 2025-02-17 DIAGNOSIS — O09.521 MULTIGRAVIDA OF ADVANCED MATERNAL AGE IN FIRST TRIMESTER: ICD-10-CM

## 2025-02-17 DIAGNOSIS — Z34.91 PRENATAL CARE IN FIRST TRIMESTER: ICD-10-CM

## 2025-02-17 DIAGNOSIS — Z13.79 GENETIC SCREENING: ICD-10-CM

## 2025-02-17 DIAGNOSIS — J45.909 ASTHMA DURING PREGNANCY: ICD-10-CM

## 2025-02-17 DIAGNOSIS — O99.119 THROMBOPHILIA AFFECTING PREGNANCY, ANTEPARTUM: ICD-10-CM

## 2025-02-17 DIAGNOSIS — O36.80X0 ENCOUNTER TO DETERMINE FETAL VIABILITY OF PREGNANCY, SINGLE OR UNSPECIFIED FETUS: ICD-10-CM

## 2025-02-17 DIAGNOSIS — Z87.19 HISTORY OF CHOLESTASIS DURING PREGNANCY: ICD-10-CM

## 2025-02-17 LAB
GLUCOSE UR STRIP-MCNC: NEGATIVE MG/DL
PROT UR STRIP-MCNC: NEGATIVE MG/DL

## 2025-02-20 ENCOUNTER — LAB (OUTPATIENT)
Dept: LAB | Facility: HOSPITAL | Age: 35
End: 2025-02-20
Payer: MEDICAID

## 2025-02-20 ENCOUNTER — TELEPHONE (OUTPATIENT)
Age: 35
End: 2025-02-20
Payer: MEDICAID

## 2025-02-20 ENCOUNTER — OFFICE VISIT (OUTPATIENT)
Dept: ONCOLOGY | Facility: CLINIC | Age: 35
End: 2025-02-20
Payer: MEDICAID

## 2025-02-20 VITALS
RESPIRATION RATE: 16 BRPM | HEART RATE: 83 BPM | OXYGEN SATURATION: 100 % | WEIGHT: 156.1 LBS | BODY MASS INDEX: 28.73 KG/M2 | DIASTOLIC BLOOD PRESSURE: 83 MMHG | SYSTOLIC BLOOD PRESSURE: 119 MMHG | HEIGHT: 62 IN | TEMPERATURE: 97.8 F

## 2025-02-20 DIAGNOSIS — D50.0 IRON DEFICIENCY ANEMIA DUE TO CHRONIC BLOOD LOSS: ICD-10-CM

## 2025-02-20 DIAGNOSIS — D68.52 PROTHROMBIN GENE MUTATION: Primary | ICD-10-CM

## 2025-02-20 DIAGNOSIS — R74.8 ELEVATED LIVER ENZYMES: ICD-10-CM

## 2025-02-20 DIAGNOSIS — F33.41 MAJOR DEPRESSIVE DISORDER, RECURRENT EPISODE, IN PARTIAL REMISSION: Primary | ICD-10-CM

## 2025-02-20 DIAGNOSIS — E53.8 VITAMIN B12 DEFICIENCY: ICD-10-CM

## 2025-02-20 DIAGNOSIS — F41.1 GENERALIZED ANXIETY DISORDER: ICD-10-CM

## 2025-02-20 LAB
ALBUMIN SERPL-MCNC: 3.8 G/DL (ref 3.5–5.2)
ALBUMIN SERPL-MCNC: 4 G/DL (ref 3.9–4.9)
ALBUMIN/GLOB SERPL: 1.3 G/DL
ALP SERPL-CCNC: 141 U/L (ref 39–117)
ALP SERPL-CCNC: 143 IU/L (ref 44–121)
ALT SERPL W P-5'-P-CCNC: 21 U/L (ref 1–33)
ALT SERPL-CCNC: 23 IU/L (ref 0–32)
ANION GAP SERPL CALCULATED.3IONS-SCNC: 11.6 MMOL/L (ref 5–15)
AST SERPL-CCNC: 14 U/L (ref 1–32)
AST SERPL-CCNC: 16 IU/L (ref 0–40)
BASOPHILS # BLD AUTO: 0.05 10*3/MM3 (ref 0–0.2)
BASOPHILS NFR BLD AUTO: 0.5 % (ref 0–1.5)
BILIRUB SERPL-MCNC: 0.2 MG/DL (ref 0–1.2)
BILIRUB SERPL-MCNC: 0.3 MG/DL (ref 0–1.2)
BUN SERPL-MCNC: 11 MG/DL (ref 6–20)
BUN SERPL-MCNC: 8 MG/DL (ref 6–20)
BUN/CREAT SERPL: 12 (ref 9–23)
BUN/CREAT SERPL: 19 (ref 7–25)
CALCIUM SERPL-MCNC: 9.2 MG/DL (ref 8.7–10.2)
CALCIUM SPEC-SCNC: 9.6 MG/DL (ref 8.6–10.5)
CHLORIDE SERPL-SCNC: 102 MMOL/L (ref 98–107)
CHLORIDE SERPL-SCNC: 105 MMOL/L (ref 96–106)
CO2 SERPL-SCNC: 21 MMOL/L (ref 20–29)
CO2 SERPL-SCNC: 22.4 MMOL/L (ref 22–29)
CREAT SERPL-MCNC: 0.58 MG/DL (ref 0.57–1)
CREAT SERPL-MCNC: 0.68 MG/DL (ref 0.57–1)
DEPRECATED RDW RBC AUTO: 44.3 FL (ref 37–54)
EGFRCR SERPLBLD CKD-EPI 2021: 116 ML/MIN/1.73
EGFRCR SERPLBLD CKD-EPI 2021: 121.2 ML/MIN/1.73
EOSINOPHIL # BLD AUTO: 0.07 10*3/MM3 (ref 0–0.4)
EOSINOPHIL NFR BLD AUTO: 0.7 % (ref 0.3–6.2)
ERYTHROCYTE [DISTWIDTH] IN BLOOD BY AUTOMATED COUNT: 14 % (ref 12.3–15.4)
FERRITIN SERPL-MCNC: 22.2 NG/ML (ref 13–150)
GLOBULIN SER CALC-MCNC: 2.5 G/DL (ref 1.5–4.5)
GLOBULIN UR ELPH-MCNC: 3 GM/DL
GLUCOSE SERPL-MCNC: 122 MG/DL (ref 65–99)
GLUCOSE SERPL-MCNC: 82 MG/DL (ref 70–99)
HBA1C MFR BLD: 5.5 % (ref 4.8–5.6)
HCT VFR BLD AUTO: 37.2 % (ref 34–46.6)
HGB A MFR BLD ELPH: 97.3 % (ref 96.4–98.8)
HGB A2 MFR BLD ELPH: 2.7 % (ref 1.8–3.2)
HGB BLD-MCNC: 12.4 G/DL (ref 12–15.9)
HGB F MFR BLD ELPH: 0 % (ref 0–2)
HGB FRACT BLD-IMP: NORMAL
HGB S MFR BLD ELPH: 0 %
IMM GRANULOCYTES # BLD AUTO: 0.06 10*3/MM3 (ref 0–0.05)
IMM GRANULOCYTES NFR BLD AUTO: 0.6 % (ref 0–0.5)
IRON 24H UR-MRATE: 34 MCG/DL (ref 37–145)
IRON SATN MFR SERPL: 9 % (ref 20–50)
LYMPHOCYTES # BLD AUTO: 2.16 10*3/MM3 (ref 0.7–3.1)
LYMPHOCYTES NFR BLD AUTO: 20.6 % (ref 19.6–45.3)
MCH RBC QN AUTO: 29 PG (ref 26.6–33)
MCHC RBC AUTO-ENTMCNC: 33.3 G/DL (ref 31.5–35.7)
MCV RBC AUTO: 86.9 FL (ref 79–97)
MONOCYTES # BLD AUTO: 0.52 10*3/MM3 (ref 0.1–0.9)
MONOCYTES NFR BLD AUTO: 4.9 % (ref 5–12)
NEUTROPHILS NFR BLD AUTO: 7.65 10*3/MM3 (ref 1.7–7)
NEUTROPHILS NFR BLD AUTO: 72.7 % (ref 42.7–76)
NRBC BLD AUTO-RTO: 0 /100 WBC (ref 0–0.2)
PLATELET # BLD AUTO: 341 10*3/MM3 (ref 140–450)
PMV BLD AUTO: 9.3 FL (ref 6–12)
POTASSIUM SERPL-SCNC: 4 MMOL/L (ref 3.5–5.2)
POTASSIUM SERPL-SCNC: 4.3 MMOL/L (ref 3.5–5.2)
PROT SERPL-MCNC: 6.5 G/DL (ref 6–8.5)
PROT SERPL-MCNC: 6.8 G/DL (ref 6–8.5)
RBC # BLD AUTO: 4.28 10*6/MM3 (ref 3.77–5.28)
SODIUM SERPL-SCNC: 136 MMOL/L (ref 136–145)
SODIUM SERPL-SCNC: 139 MMOL/L (ref 134–144)
TIBC SERPL-MCNC: 395 MCG/DL (ref 298–536)
TRANSFERRIN SERPL-MCNC: 265 MG/DL (ref 200–360)
TSH SERPL DL<=0.005 MIU/L-ACNC: 0.65 UIU/ML (ref 0.45–4.5)
VZV IGG SER QL IA: REACTIVE
WBC NRBC COR # BLD AUTO: 10.51 10*3/MM3 (ref 3.4–10.8)

## 2025-02-20 PROCEDURE — 82728 ASSAY OF FERRITIN: CPT

## 2025-02-20 PROCEDURE — 1159F MED LIST DOCD IN RCRD: CPT | Performed by: INTERNAL MEDICINE

## 2025-02-20 PROCEDURE — 3074F SYST BP LT 130 MM HG: CPT | Performed by: INTERNAL MEDICINE

## 2025-02-20 PROCEDURE — 85025 COMPLETE CBC W/AUTO DIFF WBC: CPT

## 2025-02-20 PROCEDURE — 3079F DIAST BP 80-89 MM HG: CPT | Performed by: INTERNAL MEDICINE

## 2025-02-20 PROCEDURE — 36415 COLL VENOUS BLD VENIPUNCTURE: CPT

## 2025-02-20 PROCEDURE — 1126F AMNT PAIN NOTED NONE PRSNT: CPT | Performed by: INTERNAL MEDICINE

## 2025-02-20 PROCEDURE — 1160F RVW MEDS BY RX/DR IN RCRD: CPT | Performed by: INTERNAL MEDICINE

## 2025-02-20 PROCEDURE — 83540 ASSAY OF IRON: CPT

## 2025-02-20 PROCEDURE — 99214 OFFICE O/P EST MOD 30 MIN: CPT | Performed by: INTERNAL MEDICINE

## 2025-02-20 PROCEDURE — 80053 COMPREHEN METABOLIC PANEL: CPT

## 2025-02-20 PROCEDURE — 84466 ASSAY OF TRANSFERRIN: CPT

## 2025-02-20 RX ORDER — VENLAFAXINE HYDROCHLORIDE 37.5 MG/1
37.5 CAPSULE, EXTENDED RELEASE ORAL DAILY
Qty: 30 CAPSULE | Refills: 0 | Status: SHIPPED | OUTPATIENT
Start: 2025-02-20 | End: 2025-02-20

## 2025-02-20 RX ORDER — SERTRALINE HYDROCHLORIDE 25 MG/1
25 TABLET, FILM COATED ORAL DAILY
COMMUNITY

## 2025-02-20 NOTE — PROGRESS NOTES
"Subjective     CHIEF COMPLAINT:      Chief Complaint   Patient presents with    Follow-up     HISTORY OF PRESENT ILLNESS:     Yuki Shields is a 34 y.o. female patient who returns today for follow up on her prothrombin gene mutation.  She is on Lovenox 40 mg SQ daily.  She is tolerating it well.  She is not having problem with bleeding or excessive bruising.  She is not having chest pain or shortness of breath.  No leg pain or swelling.  She reports some nausea.  As a result, she was not able to start the iron supplement yet.  She has been taking the prenatal multivitamin.    ROS:  Pertinent ROS is in the HPI.     Past medical, surgical, social and family history were reviewed.     MEDICATIONS:    Current Outpatient Medications:     albuterol sulfate  (90 Base) MCG/ACT inhaler, Inhale 2 puffs Every 4 (Four) Hours As Needed for Wheezing., Disp: 6.7 g, Rfl: 1    Asmanex, 30 Metered Doses, 220 MCG/ACT inhaler, , Disp: , Rfl:     Enoxaparin Sodium (LOVENOX) 40 MG/0.4ML solution prefilled syringe syringe, Inject 0.4 mL under the skin into the appropriate area as directed Daily., Disp: 12 mL, Rfl: 8    Loratadine (CLARITIN PO), Take  by mouth Daily., Disp: , Rfl:     pantoprazole (PROTONIX) 40 MG EC tablet, Take 1 tablet by mouth Daily., Disp: 90 tablet, Rfl: 3    sertraline (ZOLOFT) 25 MG tablet, Take 1 tablet by mouth Daily., Disp: , Rfl:     Objective     VITAL SIGNS:     Vitals:    02/20/25 1539   BP: 119/83   Pulse: 83   Resp: 16   Temp: 97.8 °F (36.6 °C)   TempSrc: Oral   SpO2: 100%   Weight: 70.8 kg (156 lb 1.6 oz)   Height: 157.5 cm (62.01\")   PainSc: 0-No pain     Body mass index is 28.54 kg/m².     Wt Readings from Last 5 Encounters:   02/20/25 70.8 kg (156 lb 1.6 oz)   02/17/25 69.9 kg (154 lb)   02/06/25 69.6 kg (153 lb 6.4 oz)   01/09/25 67.7 kg (149 lb 3.2 oz)   11/27/24 65.8 kg (145 lb)     PHYSICAL EXAMINATION:   GENERAL: The patient appears in good general condition, not in acute distress.   SKIN: " No Ecchymosis.  EYES: No jaundice. No Pallor.  CHEST: Normal respiratory effort. Normal breathing sounds bilaterally. No added sounds.  CVS: Normal S1 and S2. No Murmur.  ABDOMEN: Nondistended.  EXTREMITIES: No edema.  No calf tenderness.    DIAGNOSTIC DATA:     Results from last 7 days   Lab Units 02/20/25  1530   WBC 10*3/mm3 10.51   NEUTROS ABS 10*3/mm3 7.65*   HEMOGLOBIN g/dL 12.4   HEMATOCRIT % 37.2   PLATELETS 10*3/mm3 341     Results from last 7 days   Lab Units 02/19/25  1519   SODIUM mmol/L 139   POTASSIUM mmol/L 4.0   CHLORIDE mmol/L 105   CO2 mmol/L 21   BUN mg/dL 8   CREATININE mg/dL 0.68   CALCIUM mg/dL 9.2   ALBUMIN g/dL 4.0   BILIRUBIN mg/dL 0.3   ALK PHOS IU/L 143*   ALT (SGPT) IU/L 23   AST (SGOT) IU/L 16   GLUCOSE mg/dL 82         Lab 02/20/25  1530   IRON 34*   IRON SATURATION (TSAT) 9*   TIBC 395   TRANSFERRIN 265   FERRITIN 22.20      Component      Latest Ref Rng 2/19/2025   Hgb F Quant      0.0 - 2.0 % 0.0    Hemoglobin A      96.4 - 98.8 % 97.3    Hgb A2 Quant      1.8 - 3.2 % 2.7    Hgb S      0.0 % 0.0      Assessment & Plan    *Prothrombin gene mutation.  Patient was diagnosed when she was a teenager after her aunt and mother were both diagnosed with VTE.  The patient's mother has prothrombin gene and factor V Leiden mutations.  Patient's sister has factor V Leiden mutation and had miscarriages.  The patient herself has no history of venous thromboembolism.  She was previously placed on prophylactic anticoagulation with Lovenox 40 mg during her previous pregnancies.  Patient became pregnant in 2023.  Patient was started on Lovenox 40 mg SQ daily at 6 weeks gestation.  On 10/19/2023, she was switched to heparin 5000 SQ every 12 hours.  She delivered on 11/6/2023.  She completed the 6 weeks of Lovenox 40 mg SQ daily after delivery.    With her prothrombin gene mutation, she is at increased risk for development of DVT/PE in the future.  She was advised to take aspirin 81 mg when she travels  (the day of and the day after she travels).  She was advised to maintain adequate hydration.  1/9/2025: Patient 6 weeks pregnant. GA 6 weeks.  Due to the prothrombin gene mutation and due to the family history of thrombosis and recurrent miscarriages, prophylactic anticoagulation with Lovenox was recommended.   She was started on Lovenox 40 mg SQ Q 24 hours.   She is tolerating Lovenox.  No problem with bleeding or excessive bruising.  No symptoms or signs of DVT/PE..     *Iron deficiency anemia.  Patient developed severe anemia after she had bleeding after abdominoplasty surgery in 2019.  Reports that her hemoglobin decreased to 3.0 and required PRBC transfusions.  She was in the ICU at that time.  Patient reports weakness in the legs.  6/12/2023: Hemoglobin 12.1.  Hemoglobin decreased to 11.8 on 7/25/2023.  She was started on ferrous sulfate 325 mg once daily.  9/5/2023: Hemoglobin 12.3.  Transferrin saturation decreased to 13%.  Ferritin is 37.2.   She was continued on the ferrous sulfate daily along with the prenatal vitamin.  11/1/2023: Hemoglobin 12.6.  Iron stores remained low with a transferrin saturation at 6%.  She continued on ferrous sulfate until she delivered.  1/4/2024: Hemoglobin 12.7.  Ferritin 153.  Transferrin saturation 18%.  She was restarted on ferrous sulfate 325 mg once weekly.  1/9/2025: Hemoglobin 13.2.  Ferritin decreased to 28.6. Transferrin saturation decreased to 16%.  Patient continued with the prenatal vitamin daily and did not start the ferrous sulfate.  2/20/2025: Hemoglobin 12.4.  Ferritin 22.2.  Transferrin saturation decreased to 9%.     *Vitamin B12 deficiency   Vitamin B12 is low normal at 304.   She was started on vitamin B12 1000 mcg daily.  11/1/2023: Hemoglobin improved to 12.6.  Vitamin B12 improved to 920.  She stopped taking the vitamin B12.  1/9/2025: Vitamin B12 adequate at 699.   She is taking prenatal vitamin daily.     *Elevated liver enzymes.  Patient reports  developing this with her previous pregnancies.  Patient reports developing itching.  Her OB started her on Ursodiol and Atarax.  11/1/2023: ALT 86 and AST 87.   Bilirubin 0.4. ALK PHOS 297.  1/4/2024: ALT increased to 111.  AST decreased to 79.  Bilirubin 0.4.  Alk phos increased to 265.  Repeat labs on 2/7/2024 revealed improvement with ALT at 20, AST at 17, alkaline phosphatase at 122 and bilirubin 0.3.  1/9/2025: ALT 14. AST 20. ALK PHOS 125. Bilirubin 0.3.  2/20/2025: ALT 21.  AST 14.  Alkaline phosphatase 141 (attributed to pregnancy).  Bilirubin 0.2.     PLAN:     1.  Continue Lovenox 40 mg SQ daily.  2.  I asked her to take ferrous sulfate 325 mg 3 days a week-to take with food.  3.  Continue prenatal multivitamin daily.  4.  Follow-up in 6 weeks.  Will obtain CBC ferritin iron panel.      Magan Guido MD  02/20/25

## 2025-02-20 NOTE — TELEPHONE ENCOUNTER
Patient was contacted and reports rather significant daytime sedation/drowsiness associated with recently increased dose of sertraline.  She reports taking increased dose of sertraline 75 mg for approximately 14 days before deciding to decrease this medication given the persistence and severity of side effects listed above.  Patient reports that she has been taking sertraline 25 mg for approximately 2 weeks and continues to endorse significant daytime drowsiness/sedation.  As such, patient was advised to continue her sertraline at 25 mg for an additional 7 days and then to continue this medication.  Patient was also advised to initiate venlafaxine XR 37.5 mg p.o. once daily starting tomorrow and to continue this medication until our next scheduled appointment on 3/12/2025.

## 2025-02-21 LAB
ABO GROUP BLD: ABNORMAL
BASOPHILS # BLD AUTO: 0.1 X10E3/UL (ref 0–0.2)
BASOPHILS NFR BLD AUTO: 1 %
BLD GP AB SCN SERPL QL: NEGATIVE
EOSINOPHIL # BLD AUTO: 0 X10E3/UL (ref 0–0.4)
EOSINOPHIL NFR BLD AUTO: 0 %
ERYTHROCYTE [DISTWIDTH] IN BLOOD BY AUTOMATED COUNT: 14.3 % (ref 11.7–15.4)
HBV SURFACE AG SERPL QL IA: NEGATIVE
HCT VFR BLD AUTO: 36.5 % (ref 34–46.6)
HCV AB SERPL QL IA: NORMAL
HCV IGG SERPL QL IA: NON REACTIVE
HGB BLD-MCNC: 12.4 G/DL (ref 11.1–15.9)
HIV 1+2 AB+HIV1 P24 AG SERPL QL IA: NON REACTIVE
IMM GRANULOCYTES # BLD AUTO: 0.2 X10E3/UL (ref 0–0.1)
IMM GRANULOCYTES NFR BLD AUTO: 2 %
LYMPHOCYTES # BLD AUTO: 2 X10E3/UL (ref 0.7–3.1)
LYMPHOCYTES NFR BLD AUTO: 18 %
MCH RBC QN AUTO: 29.7 PG (ref 26.6–33)
MCHC RBC AUTO-ENTMCNC: 34 G/DL (ref 31.5–35.7)
MCV RBC AUTO: 87 FL (ref 79–97)
MONOCYTES # BLD AUTO: 0.5 X10E3/UL (ref 0.1–0.9)
MONOCYTES NFR BLD AUTO: 5 %
NEUTROPHILS # BLD AUTO: 8.3 X10E3/UL (ref 1.4–7)
NEUTROPHILS NFR BLD AUTO: 74 %
PLATELET # BLD AUTO: 360 X10E3/UL (ref 150–450)
RBC # BLD AUTO: 4.18 X10E6/UL (ref 3.77–5.28)
RH BLD: POSITIVE
RUBV IGG SERPL IA-ACNC: 1.21 INDEX
TREPONEMA PALLIDUM IGG+IGM AB [PRESENCE] IN SERUM OR PLASMA BY IMMUNOASSAY: NON REACTIVE
WBC # BLD AUTO: 11 X10E3/UL (ref 3.4–10.8)

## 2025-02-23 LAB
CFDNA.FET/CFDNA.TOTAL SFR FETUS: NORMAL %
CITATION REF LAB TEST: NORMAL
FET 13+18+21+X+Y ANEUP PLAS.CFDNA: NEGATIVE
FET CHR 21 TS PLAS.CFDNA QL: NEGATIVE
FET SEX PLAS.CFDNA DOSAGE CFDNA: NORMAL
FET TS 13 RISK PLAS.CFDNA QL: NEGATIVE
FET TS 18 RISK WBC.DNA+CFDNA QL: NEGATIVE
GA EST FROM CONCEPTION DATE: NORMAL D
GESTATIONAL AGE > 9:: YES
LAB DIRECTOR NAME PROVIDER: NORMAL
LAB DIRECTOR NAME PROVIDER: NORMAL
LABORATORY COMMENT REPORT: NORMAL
LIMITATIONS OF THE TEST: NORMAL
NEGATIVE PREDICTIVE VALUE: NORMAL
PERFORMANCE CHARACTERISTICS: NORMAL
POSITIVE PREDICTIVE VALUE: NORMAL
REF LAB TEST METHOD: NORMAL
SERVICE CMNT-IMP: NORMAL
TEST PERFORMANCE INFO SPEC: NORMAL

## 2025-02-25 ENCOUNTER — TELEPHONE (OUTPATIENT)
Dept: OBSTETRICS AND GYNECOLOGY | Facility: CLINIC | Age: 35
End: 2025-02-25
Payer: MEDICAID

## 2025-02-25 NOTE — TELEPHONE ENCOUNTER
Patient notified low risk for Trisomy 13, 18 and Down's syndrome. Patient does not want to know gender.

## 2025-03-03 NOTE — PROGRESS NOTES
Chief Complaint   Patient presents with    Routine Prenatal Visit      Yuki Shields is a 34 y.o.  at 11w5d who presents for routine prenatal visit. She reports feeling much better and is doing well today. Denies vaginal bleeding or abdominal cramping. Too early for fetal movement.     /78   Wt 69.9 kg (154 lb)   LMP 2024   BMI 28.16 kg/m²    Gen: well appearing, NAD   Abd: gravid, nontender  See OB Flowsheet    ASSESSMENT:   IUP at 11w5d   Prenatal care in first trimester  History of cholestasis x 3 in previous pregnancies  Asthma affecting pregnancy  Thrombophilia affecting pregnancy- Prothrombin gene mutation heterozygote - on Lovenox 40 mg daily   AMA  Fetal viability ultrasound  Genetic screening     PLAN:  See updated Problem List   Reviewed expectations of this stage of pregnancy.   First trimester bleeding/pain precautions reviewed.  Ultrasound performed today for fetal viability that demonstrated a viable first trimester gestation measuring 12w3d by CRL,  bpm, ovaries appeared normal bilaterally, and no free fluid in the pelvis.   Ordered prenatal labs today including OB Panel with HIV, Varicella Zoster Antibody, TSH, HgbA1c, Hemoglobinopathy fractionation cascade, CMP.   Patient desires aneuploidy screening today and ordered [id number] testing today. She understands that there is false positives and negatives.   Will plan for serial growth ultrasounds every 4 weeks following anatomy survey given AMA status.   All questions and concerns answered.   Return in about 4 weeks (around 3/17/2025) for Prenatal visit.    Valerie Taylor MD

## 2025-03-12 ENCOUNTER — OFFICE VISIT (OUTPATIENT)
Age: 35
End: 2025-03-12
Payer: MEDICAID

## 2025-03-12 VITALS
BODY MASS INDEX: 28.52 KG/M2 | HEART RATE: 89 BPM | HEIGHT: 62 IN | WEIGHT: 155 LBS | SYSTOLIC BLOOD PRESSURE: 109 MMHG | OXYGEN SATURATION: 98 % | DIASTOLIC BLOOD PRESSURE: 73 MMHG

## 2025-03-12 DIAGNOSIS — F33.41 MAJOR DEPRESSIVE DISORDER, RECURRENT EPISODE, IN PARTIAL REMISSION: ICD-10-CM

## 2025-03-12 DIAGNOSIS — F41.1 GENERALIZED ANXIETY DISORDER: Primary | ICD-10-CM

## 2025-03-12 RX ORDER — VENLAFAXINE HYDROCHLORIDE 37.5 MG/1
37.5 CAPSULE, EXTENDED RELEASE ORAL DAILY
Qty: 30 CAPSULE | Refills: 1 | Status: SHIPPED | OUTPATIENT
Start: 2025-03-12 | End: 2025-05-11

## 2025-03-12 NOTE — PROGRESS NOTES
Subjective   Yuki Shields is a 34 y.o. female who presents today in follow up for management of generalized anxiety disorder and major depressive disorder.    Patient reports that she is doing well overall and does endorse a rather significant improvement in numerous depressive and anxious symptoms associated with recently initiated venlafaxine XR.  Patient reports consistent compliance with this medication over the past 3 weeks or so stating that she did begin on 2/21/2024 after our telephone call which has been documented accordingly.  The patient was able to discontinue sertraline successfully as well without any issue.  Patient denies experiencing any side effects associated with recently initiated venlafaxine and reports noticeable improvement in her levels of energy overall especially when compared to her previously prescribed sertraline.  Patient does also report a noticeable improvement in mood associated with decreased fatigue, increased productivity overall, improved sleep, improved self worth, and the resolution of anhedonia.  She does continue to endorse difficulty sustaining her attention and concentration but denies experiencing any suicidal ideation, intent or plan.  Patient also reports decreased levels of anxiety overall associated with improved ability to control/stop her anxiety and worry.  She denies worrying excessively about most things on most days since last visit and reports decreased frequency of catastrophizing/thinking about a scenarios.  Patient states that she is approximately 15 weeks pregnant and that things seem to be going well overall.  She continues to routinely see her OB/GYN and denies any significant complications associated with her pregnancy. Patient otherwise denies any auditory, visual, or tactile hallucinations and does not currently appear to be responding to internal stimuli.  Patient denies any generalized paranoia and displays no evidence of delusional  thinking.    The following portions of the patient's history were reviewed and updated as appropriate: allergies, current medications, past family history, past medical history, past social history, past surgical history and problem list.       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 GARZA AWARE OF THIS    Family hx-blood disorder     MOTHER HAS FACTOR 2 AND FACTOR 5    GERD (gastroesophageal reflux disease)     Heart murmur     History of blood transfusion     Labral tear of hip, degenerative     RIGHT    Low back pain     Lumbar herniated disc 06/2017    X 2  DISC   HISTORY OF EPIDURALS    Memory loss     Migraine     Pneumonia     PONV (postoperative nausea and vomiting)     Urinary tract infection        Social History:  Social History     Socioeconomic History    Marital status:     Number of children: 3   Tobacco Use    Smoking status: Never    Smokeless tobacco: Never   Vaping Use    Vaping status: Never Used   Substance and Sexual Activity    Alcohol use: Not Currently     Comment: OCCAS    Drug use: Never    Sexual activity: Yes     Partners: Male     Birth control/protection: Natural family planning/Rhythm       Family History:  Family History   Problem Relation Age of Onset    Ulcerative colitis Mother     Irritable bowel syndrome Mother     Ulcerative colitis Father     Diverticulitis Father     Malig Hyperthermia Neg Hx     Colon cancer Neg Hx     Colon polyps Neg Hx     Crohn's disease Neg Hx        Past Surgical History:  Past Surgical History:   Procedure Laterality Date    ABDOMINOPLASTY  2019    ADENOIDECTOMY      BLADDER REPAIR  2016    WITH MESH    BREAST AUGMENTATION BILATERAL MASTOPEXY Bilateral 07/31/2017    Procedure: BREAST AUGMENTATION,  MASTOPEXY W/ IDEAL IMPLANTS  NEW IMAGE;  Surgeon: Nirmal Mehta MD;  Location: [facility] MAIN OR;  Service:     BREAST SURGERY      CERVICAL/THORACIC FACET INJECTION      pt states cervial fusion in the past , PONV from these    CHOLECYSTECTOMY  2012    COLONOSCOPY N/A 2024    Procedure: COLONOSCOPY TO CECUM;  Surgeon: Pedro Burns MD;  Location: [facility] MAIN OR;  Service: Gastroenterology;  Laterality: N/A;  hemorrhoids    COSMETIC SURGERY      ENDOSCOPY N/A 2024    Procedure: ESOPHAGOGASTRODUODENOSCOPY (EGD) WITH BIOPSY;  Surgeon: Pedro Burns MD;  [place] [facility] [place];  Service: Gastroenterology;  Laterality: N/A;  mild gastritis    TONSILLECTOMY AND ADENOIDECTOMY         Problem List:  Patient Active Problem List   Diagnosis    Anemia    Coagulopathy    DDD (degenerative disc disease), lumbar    Factor II deficiency    Folic acid deficiency    Memory impairment    Obesity    Pain    Stress    Asthma     (spontaneous vaginal delivery)    Unwanted fertility    Chronic constipation    Generalized abdominal pain    Nausea    Heartburn    Epigastric pain    Essential hypertension    Prothrombin gene mutation    Generalized anxiety disorder    Major depressive disorder, recurrent episode, in partial remission       Allergy:   Allergies   Allergen Reactions    Dilaudid [Hydromorphone Hcl] Nausea And Vomiting    Hydromorphone Dizziness     Category: Allergy;        Current Medications:   Current Outpatient Medications   Medication Sig Dispense Refill    albuterol sulfate  (90 Base) MCG/ACT inhaler Inhale 2 puffs Every 4 (Four) Hours As Needed for Wheezing. 6.7 g 1    Asmanex, 30 Metered Doses, 220 MCG/ACT inhaler       Enoxaparin Sodium (LOVENOX) 40 MG/0.4ML solution prefilled syringe syringe Inject 0.4 mL under the skin into the appropriate area as directed Daily. 12 mL 8    Loratadine (CLARITIN PO) Take  by mouth Daily.      pantoprazole (PROTONIX) 40 MG EC tablet Take 1 tablet by  "mouth Daily. 90 tablet 3    venlafaxine XR (Effexor XR) 37.5 MG 24 hr capsule Take 1 capsule by mouth Daily for 60 days. 30 capsule 1     No current facility-administered medications for this visit.       Review of Symptoms:    Review of Systems   Constitutional:  Positive for fatigue. Negative for activity change.   HENT:  Negative for tinnitus.    Endocrine: Negative for cold intolerance and heat intolerance.   Skin:  Negative for rash.   Neurological:  Negative for dizziness and confusion.   Psychiatric/Behavioral:  Positive for decreased concentration, sleep disturbance and depressed mood. Negative for hallucinations, self-injury and suicidal ideas. The patient is nervous/anxious.          Physical Exam:   Blood pressure 109/73, pulse 89, height 157.5 cm (62.01\"), weight 70.3 kg (155 lb), last menstrual period 11/27/2024, SpO2 98%, not currently breastfeeding.  Appearance: Appears documented age, appropriate hygiene and grooming.  Gait, Station, Strength: Normal gait, station and strength.       Mental Status Exam:   Hygiene:   good  Cooperation:  Cooperative  Eye Contact:  Good  Psychomotor Behavior:  Appropriate  Affect:  Full range and Appropriate  Mood: normal and euthymic  Hopelessness: Denies  Speech:  Normal  Thought Process:  Goal directed and Linear  Thought Content:  Normal  Suicidal:  None  Homicidal:  None  Hallucinations:  None  Delusion:  None  Memory:  Intact  Orientation:  Person, Place, Time, and Situation  Reliability:  good  Insight:  Good  Judgement:  Good  Impulse Control:  Good      Lab Results:   Lab on 02/20/2025   Component Date Value Ref Range Status    Glucose 02/20/2025 122 (H)  65 - 99 mg/dL Final    BUN 02/20/2025 11  6 - 20 mg/dL Final    Creatinine 02/20/2025 0.58  0.57 - 1.00 mg/dL Final    Sodium 02/20/2025 136  136 - 145 mmol/L Final    Potassium 02/20/2025 4.3  3.5 - 5.2 mmol/L Final    Chloride 02/20/2025 102  98 - 107 mmol/L Final    CO2 02/20/2025 22.4  22.0 - 29.0 mmol/L " Final    Calcium 02/20/2025 9.6  8.6 - 10.5 mg/dL Final    Total Protein 02/20/2025 6.8  6.0 - 8.5 g/dL Final    Albumin 02/20/2025 3.8  3.5 - 5.2 g/dL Final    ALT (SGPT) 02/20/2025 21  1 - 33 U/L Final    AST (SGOT) 02/20/2025 14  1 - 32 U/L Final    Alkaline Phosphatase 02/20/2025 141 (H)  39 - 117 U/L Final    Total Bilirubin 02/20/2025 0.2  0.0 - 1.2 mg/dL Final    Globulin 02/20/2025 3.0  gm/dL Final    A/G Ratio 02/20/2025 1.3  g/dL Final    BUN/Creatinine Ratio 02/20/2025 19.0  7.0 - 25.0 Final    Anion Gap 02/20/2025 11.6  5.0 - 15.0 mmol/L Final    eGFR 02/20/2025 121.2  >60.0 mL/min/1.73 Final    Ferritin 02/20/2025 22.20  13.00 - 150.00 ng/mL Final    Iron 02/20/2025 34 (L)  37 - 145 mcg/dL Final    Iron Saturation (TSAT) 02/20/2025 9 (L)  20 - 50 % Final    Transferrin 02/20/2025 265  200 - 360 mg/dL Final    TIBC 02/20/2025 395  298 - 536 mcg/dL Final    WBC 02/20/2025 10.51  3.40 - 10.80 10*3/mm3 Final    RBC 02/20/2025 4.28  3.77 - 5.28 10*6/mm3 Final    Hemoglobin 02/20/2025 12.4  12.0 - 15.9 g/dL Final    Hematocrit 02/20/2025 37.2  34.0 - 46.6 % Final    MCV 02/20/2025 86.9  79.0 - 97.0 fL Final    MCH 02/20/2025 29.0  26.6 - 33.0 pg Final    MCHC 02/20/2025 33.3  31.5 - 35.7 g/dL Final    RDW 02/20/2025 14.0  12.3 - 15.4 % Final    RDW-SD 02/20/2025 44.3  37.0 - 54.0 fl Final    MPV 02/20/2025 9.3  6.0 - 12.0 fL Final    Platelets 02/20/2025 341  140 - 450 10*3/mm3 Final    Neutrophil % 02/20/2025 72.7  42.7 - 76.0 % Final    Lymphocyte % 02/20/2025 20.6  19.6 - 45.3 % Final    Monocyte % 02/20/2025 4.9 (L)  5.0 - 12.0 % Final    Eosinophil % 02/20/2025 0.7  0.3 - 6.2 % Final    Basophil % 02/20/2025 0.5  0.0 - 1.5 % Final    Immature Grans % 02/20/2025 0.6 (H)  0.0 - 0.5 % Final    Neutrophils, Absolute 02/20/2025 7.65 (H)  1.70 - 7.00 10*3/mm3 Final    Lymphocytes, Absolute 02/20/2025 2.16  0.70 - 3.10 10*3/mm3 Final    Monocytes, Absolute 02/20/2025 0.52  0.10 - 0.90 10*3/mm3 Final     Eosinophils, Absolute 02/20/2025 0.07  0.00 - 0.40 10*3/mm3 Final    Basophils, Absolute 02/20/2025 0.05  0.00 - 0.20 10*3/mm3 Final    Immature Grans, Absolute 02/20/2025 0.06 (H)  0.00 - 0.05 10*3/mm3 Final    nRBC 02/20/2025 0.0  0.0 - 0.2 /100 WBC Final   Routine Prenatal on 02/17/2025   Component Date Value Ref Range Status    Glucose, UA 02/17/2025 Negative  Negative mg/dL Final    Protein, POC 02/17/2025 Negative  Negative mg/dL Final    Varicella IgG 02/19/2025 Reactive  Non Reactive Final    Comment: **Please note reference interval change**  A Reactive result is considered evidence of immunity to VZV.  Reactive indicates that VZV IgG was detected consistent with  previous infection and/or vaccination.  A Non Reactive result indicates that VZV IgG was not detected  suggesting that immunity has not been acquired.      Hgb F Quant 02/19/2025 0.0  0.0 - 2.0 % Final    Hgb A 02/19/2025 97.3  96.4 - 98.8 % Final    Hgb A2 Quant 02/19/2025 2.7  1.8 - 3.2 % Final    Hgb S 02/19/2025 0.0  0.0 % Final    Hgb Interp. 02/19/2025 Comment   Final    Comment: Normal hemoglobin present; no hemoglobin variant or beta thalassemia  identified.  Note: Alpha thalassemia may not be detected by the Hgb Fractionation  Cascade panel. If alpha thalassemia is suspected, [facility] offers  Alpha-Thalassemia DNA Analysis (#[id number]).      TSH 02/19/2025 0.652  0.450 - 4.500 uIU/mL Final    Hemoglobin A1C 02/19/2025 5.5  4.8 - 5.6 % Final    Comment:          Prediabetes: 5.7 - 6.4           Diabetes: >6.4           Glycemic control for adults with diabetes: <7.0      Hepatitis B Surface Ag 02/19/2025 Negative  Negative Final    Hepatitis C Ab 02/19/2025 Non Reactive  Non Reactive Final    T pallidum Antibodies 02/19/2025 Non Reactive  Non Reactive Final    Rubella Antibodies, IgG 02/19/2025 1.21  Immune >0.99 index Final    Comment:                                 Non-immune       <0.90                                  Equivocal  0.90 -  0.99                                  Immune           >0.99      ABO Type 02/19/2025 O   Final    Rh Factor 02/19/2025 Positive   Final    Comment: Please note: Prior records for this patient's ABO / Rh type are not  available for additional verification.      Antibody Screen 02/19/2025 Negative  Negative Final    HIV Screen 4th Gen w/RFX (Referenc* 02/19/2025 Non Reactive  Non Reactive Final    Comment: HIV-1/HIV-2 antibodies and HIV-1 p24 antigen were NOT detected.  There is no laboratory evidence of HIV infection.  HIV Negative      WBC 02/19/2025 11.0 (H)  3.4 - 10.8 x10E3/uL Final    RBC 02/19/2025 4.18  3.77 - 5.28 x10E6/uL Final    Hemoglobin 02/19/2025 12.4  11.1 - 15.9 g/dL Final    Hematocrit 02/19/2025 36.5  34.0 - 46.6 % Final    MCV 02/19/2025 87  79 - 97 fL Final    MCH 02/19/2025 29.7  26.6 - 33.0 pg Final    MCHC 02/19/2025 34.0  31.5 - 35.7 g/dL Final    RDW 02/19/2025 14.3  11.7 - 15.4 % Final    Platelets 02/19/2025 360  150 - 450 x10E3/uL Final    Neutrophil Rel % 02/19/2025 74  Not Estab. % Final    Lymphocyte Rel % 02/19/2025 18  Not Estab. % Final    Monocyte Rel % 02/19/2025 5  Not Estab. % Final    Eosinophil Rel % 02/19/2025 0  Not Estab. % Final    Basophil Rel % 02/19/2025 1  Not Estab. % Final    Neutrophils Absolute 02/19/2025 8.3 (H)  1.4 - 7.0 x10E3/uL Final    Lymphocytes Absolute 02/19/2025 2.0  0.7 - 3.1 x10E3/uL Final    Monocytes Absolute 02/19/2025 0.5  0.1 - 0.9 x10E3/uL Final    Eosinophils Absolute 02/19/2025 0.0  0.0 - 0.4 x10E3/uL Final    Basophils Absolute 02/19/2025 0.1  0.0 - 0.2 x10E3/uL Final    Immature Granulocyte Rel % 02/19/2025 2  Not Estab. % Final    Immature Grans Absolute 02/19/2025 0.2 (H)  0.0 - 0.1 x10E3/uL Final    Comment: (An elevated percentage of Immature Granulocytes has not been found  to be clinically significant as a sole clinical predictor of disease.  Does NOT include bands or blast cells.  Pregnancy associated  physiological leukocytosis may  also show increased immature  granulocytes without clinical significance.)      Glucose 02/19/2025 82  70 - 99 mg/dL Final    BUN 02/19/2025 8  6 - 20 mg/dL Final    Creatinine 02/19/2025 0.68  0.57 - 1.00 mg/dL Final    EGFR Result 02/19/2025 116  >59 mL/min/1.73 Final    BUN/Creatinine Ratio 02/19/2025 12  9 - 23 Final    Sodium 02/19/2025 139  134 - 144 mmol/L Final    Potassium 02/19/2025 4.0  3.5 - 5.2 mmol/L Final    Chloride 02/19/2025 105  96 - 106 mmol/L Final    Total CO2 02/19/2025 21  20 - 29 mmol/L Final    Calcium 02/19/2025 9.2  8.7 - 10.2 mg/dL Final    Total Protein 02/19/2025 6.5  6.0 - 8.5 g/dL Final    Albumin 02/19/2025 4.0  3.9 - 4.9 g/dL Final    Globulin 02/19/2025 2.5  1.5 - 4.5 g/dL Final    Total Bilirubin 02/19/2025 0.3  0.0 - 1.2 mg/dL Final    Alkaline Phosphatase 02/19/2025 143 (H)  44 - 121 IU/L Final    AST (SGOT) 02/19/2025 16  0 - 40 IU/L Final    ALT (SGPT) 02/19/2025 23  0 - 32 IU/L Final    Interpretation 02/19/2025 Comment   Final    Comment: Not infected with HCV unless early or acute infection is  suspected (which may be delayed in an immunocompromised  individual), or other evidence exists to indicate HCV infection.      Gestation 02/19/2025 Flynn   Final    Fetal Fraction 02/19/2025 12%   Final    Gestational Age >9: 02/19/2025 Yes   Final    Result 02/19/2025 Negative   Final     Comments 02/19/2025 Comment   Final    Comment: This specimen showed an expected representation of  chromosome 21, 18 and 13 material. Clinical correlation is  suggested.      Approved By 02/19/2025 Comment   Final    Jovany Delcid MD, PhD, Director, [facility]    TRISOMY 21 (DOWN SYNDROME) 02/19/2025 Negative   Final    TRISOMY 18 (BERNAL SYNDROME) 02/19/2025 Negative   Final    TRISOMY 13 (PATAU SYNDROME) 02/19/2025 Negative   Final    FETAL SEX 02/19/2025 Comment   Final    Consistent with Male    NEGATIVE PREDICTIVE VALUE 02/19/2025 Note   Final    Comment: The  Negative Predictive Value (NPV) for trisomy 21, 18, and  13 is greater than 99%. The NPV for SCA and ESS cannot be  calculated as SCA and ESS are only reported when an  abnormality is detected.      POSITIVE PREDICTIVE VALUE 02/19/2025 N/A   Final    About The Test 02/19/2025 Comment   Final    Comment: The MaterniT(R) 21 PLUS laboratory-developed test (LDT)  analyzes circulating cell-free DNA from a maternal blood  sample. This test is used for screening purposes and not  diagnostic. Clinical correlation is recommended. Validation  data on twin pregnancies is limited and the ability of this  test to detect aneuploidy in higher multiple gestations has  not yet been validated.      Test Method 02/19/2025 Comment   Final    Comment: See Notes  Circulating cell-free DNA was purified from the plasma  component of maternal blood. The extracted DNA was then  converted into a genomic DNA library for aneuploidy  analysis of chromosomes 21, 18, and 13 via next generation  sequencing.[1] Optional findings based on the test order  include sex chromosome aneuploidy (SCA)[2], and enhanced  sequencing series (ESS)[3], which will only be reported on  as an additional finding when an abnormality is detected.  SCA testing includes information on X and Y representation,  while ESS testing includes deletions in selected regions  (22q, 15q, 11q, 8q, 5p, 4p, 1p) and trisomy of chromosomes  16 and 22.      Performance 02/19/2025 Comment   Final    Comment: The performance characteristics of the MaterniT(R) 21 PLUS  laboratory-developed test (LDT) have been determined in a  clinical validation study with pregnant women at increased  risk for fetal chromosomal aneuploidy.[1-4]      PERFORMANCE CHARACTERISTICS 02/19/2025 Note   Final    Comment: -----------------------------------------------------------  ! Fetal Sex                      ! Accuracy: 99.4%        !  !---------------------------------------------------------!  ! Region  (associated syndrome)   ! Est. Sens# ! Est. Spec !  !---------------------------------------------------------!  ! Trisomy 21 (Down Syndrome)     ! 99.1%      ! 99.9%     !  !---------------------------------------------------------!  ! Trisomy 18 (Brown Syndrome)  ! >99.9%     ! 99.6%     !  !---------------------------------------------------------!  ! Trisomy 13 (Patau Syndrome)    ! 91.7%      ! 99.7%     !  !---------------------------------------------------------!  ! Sex Chromosome Aneuploidies##  ! 96.2%      ! 99.7%     !  !---------------------------------------------------------!  * As reported in ISCA database nstd37  [https://www.ncbi.nlm.nih.gov/dbvar/studies/nstd37/ ]  # Estimated Sensitivity. Sensitivity estimated across the  observed size distribution of each syndrome [per ISCA                             database nstd37] and across the range of fetal fractions  observed in routine clinical NIPT. Actual sensitivity can  also be influenced by other factors such as the size of the  event, total sequence counts, amplification bias, or  sequence bias.  ## Flynn gestation only.      Limitations of the Test 02/19/2025 Comment   Final    Comment: While the results of these tests are highly reliable,  discordant results, including inaccurate fetal sex  prediction, may occur due to placental, maternal, or fetal  mosaicism or neoplasm; vanishing twin; prior maternal organ  transplant; or other causes. These tests are screening  tests and not diagnostic; they do not replace the accuracy  and precision of prenatal diagnosis with CVS or  amniocentesis. A patient with a positive test result should  be referred for genetic counseling and offered invasive  prenatal diagnosis for confirmation of test results.[5] The  results of this testing, including the benefits and  limitations, should be discussed with a qualified  healthcare provider. Pregnancy management decisions,  including termination of the  pregnancy, should not be based  on the results of these tests alone. The healthcare  provider is responsible for the use of this information in  the management of their patient.  Sex chromosomal  aneuploidies are not reportable for known mul                           tiple  gestations. A negative result does not ensure an unaffected  pregnancy nor does it exclude the possibility of other  chromosomal abnormalities or birth defects which are not a  part of these tests. An uninformative result may be  reported, the causes of which may include, but are not  limited to, insufficient sequencing coverage, noise or  artifacts in the region, amplification or sequencing bias,  or insufficient fetal fraction. These tests are not  intended to identify pregnancies at risk for neural tube  defects or ventral wall defects. Testing for whole  chromosome abnormalities (including sex chromosomes) and  for subchromosomal abnormalities could lead to the  potential discovery of both fetal and maternal genomic  abnormalities that could have major, minor, or no, clinical  significance. Evaluating the significance of a positive or  a non-reportable result may involve both invasive testing  and additional studies on the mother. Such investigations  may lead to a diagnosis                            of maternal chromosomal or  subchromosomal abnormalities, which on occasion may be  associated with benign or malignant maternal neoplasms.  These tests may not accurately identify fetal triploidy,  balanced rearrangements, or the precise location of  subchromosomal duplications or deletions; these may be  detected by prenatal diagnosis with CVS or amniocentesis.  The ability to report results may be impacted by maternal  BMI, maternal weight, maternal systemic lupus erythematosus  (SLE) and/or by certain pharmaceutical agents such as low  molecular weight heparin (for example: Lovenox(R),  Xaparin(R), Clexane(R) and Fragmin(R)).      Note  02/19/2025 Comment   Final    Comment: See Notes  [facility] is a subsidiary of [employer], using the brand [facility]. This test was  developed and its performance characteristics determined by  [facility]. It has not been cleared or approved by the Food  and Drug Administration. This laboratory is certified under  the Clinical Laboratory Improvement Amendments (CLIA) as  qualified to perform high complexity clinical laboratory  testing and accredited by the College of American  Pathologists (CAP).  If there is future clinical need for adding MaterniT GENOME  testing, this specimen will be available until term.  [place] samples will not be retained beyond 60 days.  [facility] patients will have to send a new sample for  re-sequencing ([facility] Test Code: 211423).      References 02/19/2025 Comment   Final    Comment: 1. José CARDONA et al. [place] Med. 2012;14(3):296-305.  2. Nikunj SCOTT et al. Prenat Diag. 2013;33(6):591-597.  3. Sam C, et al. Clin Chem. 2015 Apr;61(4):608-616.  4. José CARDONA et al. [facility] Med. 2011;13(11):913-920.  5. ACOG/SMFM Practice Bulletin No. 226, Oct 2020.     Admission on 02/13/2025, Discharged on 02/13/2025   Component Date Value Ref Range Status    Glucose 02/13/2025 80  65 - 99 mg/dL Final    BUN 02/13/2025 10  6 - 20 mg/dL Final    Creatinine 02/13/2025 0.65  0.57 - 1.00 mg/dL Final    Sodium 02/13/2025 139  136 - 145 mmol/L Final    Potassium 02/13/2025 4.3  3.5 - 5.2 mmol/L Final    Chloride 02/13/2025 106  98 - 107 mmol/L Final    CO2 02/13/2025 24.5  22.0 - 29.0 mmol/L Final    Calcium 02/13/2025 9.2  8.6 - 10.5 mg/dL Final    Total Protein 02/13/2025 6.9  6.0 - 8.5 g/dL Final    Albumin 02/13/2025 4.0  3.5 - 5.2 g/dL Final    ALT (SGPT) 02/13/2025 62 (H)  1 - 33 U/L Final    AST (SGOT) 02/13/2025 29  1 - 32 U/L Final    Alkaline Phosphatase 02/13/2025 177 (H)  39 - 117 U/L Final    Total Bilirubin 02/13/2025 0.3  0.0 - 1.2 mg/dL Final     Globulin 02/13/2025 2.9  gm/dL Final    A/G Ratio 02/13/2025 1.4  g/dL Final    BUN/Creatinine Ratio 02/13/2025 15.4  7.0 - 25.0 Final    Anion Gap 02/13/2025 8.5  5.0 - 15.0 mmol/L Final    eGFR 02/13/2025 117.9  >60.0 mL/min/1.73 Final    Lipase 02/13/2025 30  13 - 60 U/L Final    Color, UA 02/13/2025 Dark Yellow (A)  Yellow, Straw Final    Appearance, UA 02/13/2025 Clear  Clear Final    pH, UA 02/13/2025 6.0  5.0 - 8.0 Final    Specific Gravity, UA 02/13/2025 >1.030 (H)  1.005 - 1.030 Final    Glucose, UA 02/13/2025 Negative  Negative Final    Ketones, UA 02/13/2025 Trace (A)  Negative Final    Bilirubin, UA 02/13/2025 Negative  Negative Final    Blood, UA 02/13/2025 Negative  Negative Final    Protein, UA 02/13/2025 Negative  Negative Final    Leuk Esterase, UA 02/13/2025 Trace (A)  Negative Final    Nitrite, UA 02/13/2025 Negative  Negative Final    Urobilinogen, UA 02/13/2025 1.0 E.U./dL  0.2 - 1.0 E.U./dL Final    HCG Qualitative 02/13/2025 Positive (A)  Negative Final    Extra Tube 02/13/2025 Hold for add-ons.   Final    Auto resulted.    Extra Tube 02/13/2025 hold for add-on   Final    Auto resulted    Extra Tube 02/13/2025 Hold for add-ons.   Final    Auto resulted    WBC 02/13/2025 9.67  3.40 - 10.80 10*3/mm3 Final    RBC 02/13/2025 4.45  3.77 - 5.28 10*6/mm3 Final    Hemoglobin 02/13/2025 12.9  12.0 - 15.9 g/dL Final    Hematocrit 02/13/2025 38.0  34.0 - 46.6 % Final    MCV 02/13/2025 85.4  79.0 - 97.0 fL Final    MCH 02/13/2025 29.0  26.6 - 33.0 pg Final    MCHC 02/13/2025 33.9  31.5 - 35.7 g/dL Final    RDW 02/13/2025 14.2  12.3 - 15.4 % Final    RDW-SD 02/13/2025 43.7  37.0 - 54.0 fl Final    MPV 02/13/2025 9.5  6.0 - 12.0 fL Final    Platelets 02/13/2025 282  140 - 450 10*3/mm3 Final    Neutrophil % 02/13/2025 71.5  42.7 - 76.0 % Final    Lymphocyte % 02/13/2025 21.7  19.6 - 45.3 % Final    Monocyte % 02/13/2025 5.4  5.0 - 12.0 % Final    Eosinophil % 02/13/2025 0.6  0.3 - 6.2 % Final    Basophil %  02/13/2025 0.3  0.0 - 1.5 % Final    Immature Grans % 02/13/2025 0.5  0.0 - 0.5 % Final    Neutrophils, Absolute 02/13/2025 6.91  1.70 - 7.00 10*3/mm3 Final    Lymphocytes, Absolute 02/13/2025 2.10  0.70 - 3.10 10*3/mm3 Final    Monocytes, Absolute 02/13/2025 0.52  0.10 - 0.90 10*3/mm3 Final    Eosinophils, Absolute 02/13/2025 0.06  0.00 - 0.40 10*3/mm3 Final    Basophils, Absolute 02/13/2025 0.03  0.00 - 0.20 10*3/mm3 Final    Immature Grans, Absolute 02/13/2025 0.05  0.00 - 0.05 10*3/mm3 Final    nRBC 02/13/2025 0.0  0.0 - 0.2 /100 WBC Final    HCG Quantitative 02/13/2025 53,659.00  mIU/mL Final    RBC, UA 02/13/2025 0-2  None Seen, 0-2 /HPF Final    WBC, UA 02/13/2025 3-5 (A)  None Seen, 0-2 /HPF Final    Bacteria, UA 02/13/2025 None Seen  None Seen /HPF Final    Squamous Epithelial Cells, UA 02/13/2025 7-12 (A)  None Seen, 0-2 /HPF Final    Hyaline Casts, UA 02/13/2025 3-6  None Seen /LPF Final    Methodology 02/13/2025 Automated Microscopy   Final       PHQ-9 Total Score: 9   QIAN-7 Total Score: 6     Assessment & Plan    Diagnoses and all orders for this visit:    1. Generalized anxiety disorder (Primary)  -     venlafaxine XR (Effexor XR) 37.5 MG 24 hr capsule; Take 1 capsule by mouth Daily for 60 days.  Dispense: 30 capsule; Refill: 1    2. Major depressive disorder, recurrent episode, in partial remission  -     venlafaxine XR (Effexor XR) 37.5 MG 24 hr capsule; Take 1 capsule by mouth Daily for 60 days.  Dispense: 30 capsule; Refill: 1         Yuki Shields is a 34 y.o. female who presents today in follow up for management of generalized anxiety disorder and major depressive disorder.    As detailed above patient appears to be doing well overall and does endorse a rather significant improvement in numerous depressive and anxious symptoms when compared to clinical status at last visit following initiation of venlafaxine XR approximately 3 weeks ago.  She reports improvement in various depressive and anxious  symptoms as evidenced by current PHQ-9 and QAIN-7 scores and denies any significant side effects associated with this medication including any daytime sedation/drowsiness.  Patient reports the resolution of her excessive daytime sedation/drowsiness following discontinuation of sertraline.  Given the positive therapeutic response associated with current dosage of venlafaxine XR 37.5 mg p.o. once daily I do recommend continuing this medication for ongoing management of the patient's major depressive disorder and generalized anxiety disorder.    It is of note that the patient is currently 15 weeks.  And as such it was discussed in great detail at today's visit that the utilization of SSRI/SNRI medications during pregnancy is determined on a case by case basis depending on the persistence and severity of psychiatric symptoms, with her previously prescribed sertraline being the most extensively studied during pregnancy and/or breast-feeding.  It was explained that sertraline is typically the first-line agent when managing depression and anxiety during pregnancy/breast-feeding but due to the patient's tolerability issues transitioning to currently prescribed venlafaxine XR is both appropriate and reasonable given the patient's persistence and severity of symptoms.  It was explained in great detail to the patient that while venlafaxine XR is transmitted to the fetus to the placenta into the  me approximately it is safe for use during pregnancy but has been associated with potential side effects during the third trimester such as an increased risk of bleeding or persistent pulmonary hypertension of the .  As such we will continue to monitor for any potential adverse reactions/side effects and will discuss potential adjustments to medication regimen in the near future if necessary.  Otherwise, I do feel as if the benefit associated with currently prescribed venlafaxine XR at a low dose of 37.5 mg p.o. once daily  significantly outweighs risk of potentially developing the side effects detailed above.  Patient voices understanding of this and is agreeable to continue currently prescribed medication.  She will be seen again in approximately 8 weeks here in the clinic for reassessment.  She voices understanding of this and is agreeable to this plan.    Medications:  -Continue venlafaxine XR 37.5 mg p.o. once daily for management of generalized anxiety disorder and major depressive disorder.    TREATMENT PLAN - SHORT AND LONG-TERM GOALS:   -Continue supportive psychotherapy efforts and medications as indicated. Treatment and medication options discussed during today's visit.   -Patient acknowledged and verbally consented to continue with current treatment plan and was educated on the importance of compliance with treatment and follow-up appointments.    SUMMARY/EDUCATION/DISCUSSION:  -Pt was given appropriate time to ask questions and concerns were addressed. A thorough discussion was had that included review of disease process, need for continued monitoring and additional treatment options including use of pharmacological and non-pharmacological approaches to care, decisions were made and agreed upon by patient and provider.   -Discussed medication options and treatment plan of prescribed medication as well as the risks, benefits, and side effects including potential falls, possible impaired driving and metabolic adversities among others; patient acknowledged and provided verbal consent.   -Patient has been educated regarding multimodal approach with healthy nutrition, healthy sleep, regular physical activity, social activities, counseling, and medications.  -Please call the office at [phone] within normal business hours (Monday-Friday, 8:00 AM - 4:30 PM) with any worsening of symptoms or onset of intolerable side effects. Please ask to leave a message with office staff.  Please allow up to 24-48 hours for response to a  patient call/question/refill request.  -Safety plan has been established and discussed in detail with the patient, who is agreeable to contact support system and/or call 911 or go to the nearest ER should he/she/they have any thoughts of harm to self or others.    MEDS ORDERED DURING VISIT:  New Medications Ordered This Visit   Medications    venlafaxine XR (Effexor XR) 37.5 MG 24 hr capsule     Sig: Take 1 capsule by mouth Daily for 60 days.     Dispense:  30 capsule     Refill:  1       FOLLOW UP:  Return in about 8 weeks (around 5/7/2025) for Next scheduled follow up.      Chandana Lara DO    This document has been electronically signed by Chandana Lara DO  March 12, 2025 15:39 EDT    Part of this note may be an electronic transcription/translation of spoken language to printed text using the Dragon Dictation System. Some of the data in this electronic note has been brought forward from a previous encounter, any necessary changes have been made, it has been reviewed by this provider, and it is accurate.    Answers submitted by the patient for this visit:  Depression (Submitted on 3/6/2025)  Chief Complaint: Depression  Visit: follow-up  Frequency: most days  Severity: mild  excessive worry: Yes  insomnia: No  irritability: Yes  malaise/fatigue: Yes  obsessions: No  hypersomnia: No  difficulty controlling mood: No  Medication compliance: %

## 2025-03-17 ENCOUNTER — ROUTINE PRENATAL (OUTPATIENT)
Dept: OBSTETRICS AND GYNECOLOGY | Facility: CLINIC | Age: 35
End: 2025-03-17
Payer: MEDICAID

## 2025-03-17 VITALS — BODY MASS INDEX: 28.16 KG/M2 | SYSTOLIC BLOOD PRESSURE: 118 MMHG | WEIGHT: 154 LBS | DIASTOLIC BLOOD PRESSURE: 78 MMHG

## 2025-03-17 DIAGNOSIS — Z36.89 ENCOUNTER FOR FETAL ANATOMIC SURVEY: ICD-10-CM

## 2025-03-17 DIAGNOSIS — O99.119 THROMBOPHILIA AFFECTING PREGNANCY, ANTEPARTUM: ICD-10-CM

## 2025-03-17 DIAGNOSIS — Z87.19 HISTORY OF CHOLESTASIS DURING PREGNANCY: ICD-10-CM

## 2025-03-17 DIAGNOSIS — O09.522 MULTIGRAVIDA OF ADVANCED MATERNAL AGE IN SECOND TRIMESTER: ICD-10-CM

## 2025-03-17 DIAGNOSIS — J45.909 ASTHMA DURING PREGNANCY: ICD-10-CM

## 2025-03-17 DIAGNOSIS — Z87.59 HISTORY OF CHOLESTASIS DURING PREGNANCY: ICD-10-CM

## 2025-03-17 DIAGNOSIS — Z34.92 PRENATAL CARE IN SECOND TRIMESTER: ICD-10-CM

## 2025-03-17 DIAGNOSIS — O26.812 PREGNANCY RELATED FATIGUE IN SECOND TRIMESTER: ICD-10-CM

## 2025-03-17 DIAGNOSIS — Z36.86 ENCOUNTER FOR ANTENATAL SCREENING FOR CERVICAL LENGTH: ICD-10-CM

## 2025-03-17 DIAGNOSIS — O99.519 ASTHMA DURING PREGNANCY: ICD-10-CM

## 2025-03-17 DIAGNOSIS — Z3A.15 15 WEEKS GESTATION OF PREGNANCY: Primary | ICD-10-CM

## 2025-03-17 DIAGNOSIS — D68.59 THROMBOPHILIA AFFECTING PREGNANCY, ANTEPARTUM: ICD-10-CM

## 2025-03-17 NOTE — PROGRESS NOTES
Chief Complaint   Patient presents with    Routine Prenatal Visit      Yuki Shields is a 34 y.o.  at 15w5d who presents of routine prenatal visit. She reports feeling very tired and fatigued but otherwise doing well. Denies vaginal bleeding, cramping, contractions, LOF. She reports feeling some fetal movement.     /78   Wt 69.9 kg (154 lb)   LMP 2024   BMI 28.16 kg/m²    Gen: well appearing, NAD   Abd: gravid, nontender  See OB Flowsheet    ASSESSMENT:   IUP at 15w5d   Prenatal care in second trimester  History of cholestasis x 3 in prior pregnancies- baseline LFTs normal  Asthma affecting pregnancy  Thrombophilia affecting pregnancy- Prothrombin gene mutation heterozygote - on Lovenox 40 mg daily   AMA- low risk NIPT   Fatigue in pregnancy     PLAN:  Problem list reviewed and updated.   Reviewed expectations of this stage of pregnancy.   Second trimester precautions reviewed including  labor precautions, anticipated fetal movements.   Will obtain Vitamin D hydroxy level to rule out vitamin D deficiency contributing to her fatigue. She had normal hemoglobin/hematocrit on prenatal labs as well as normal thyroid function. Suspect most likely related to pregnancy.  Will obtain fetal anatomy survey and cervical length screening at next visit.   Return in about 4 weeks (around 2025) for Anatomy Survey, Prenatal visit.    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    DDD (degenerative disc disease), lumbar 06/21/2017    Prothrombin gene mutation 03/31/2016    Essential hypertension 06/20/2014    Pain 11/01/2012       Orders Placed This Encounter   Procedures    US Ob 14 + Weeks Single or First Gestation     Standing Status:   Future     Expected Date:   4/18/2025     Expiration Date:   3/18/2026     Reason for Exam::   fetal anatomy survey     Release to patient:   Routine Release [[id number]]    US Ob Transvaginal     Standing Status:   Future     Expected Date:   4/18/2025     Expiration Date:   3/18/2026     Reason for Exam::   cervical length screening     Release to patient:   Routine Release [[id number]]    Vitamin D 25 Hydroxy     Release to patient:   Routine Release [[id number]]     Valerie Taylor MD

## 2025-03-18 LAB — 25(OH)D3+25(OH)D2 SERPL-MCNC: 35 NG/ML (ref 30–100)

## 2025-03-19 DIAGNOSIS — F33.41 MAJOR DEPRESSIVE DISORDER, RECURRENT EPISODE, IN PARTIAL REMISSION: ICD-10-CM

## 2025-03-19 DIAGNOSIS — F41.1 GENERALIZED ANXIETY DISORDER: ICD-10-CM

## 2025-03-19 RX ORDER — VENLAFAXINE HYDROCHLORIDE 37.5 MG/1
37.5 CAPSULE, EXTENDED RELEASE ORAL DAILY
Qty: 30 CAPSULE | Refills: 1 | Status: SHIPPED | OUTPATIENT
Start: 2025-03-19 | End: 2025-03-24

## 2025-03-24 DIAGNOSIS — F33.41 MAJOR DEPRESSIVE DISORDER, RECURRENT EPISODE, IN PARTIAL REMISSION: ICD-10-CM

## 2025-03-24 DIAGNOSIS — F41.1 GENERALIZED ANXIETY DISORDER: ICD-10-CM

## 2025-03-24 RX ORDER — VENLAFAXINE HYDROCHLORIDE 37.5 MG/1
37.5 CAPSULE, EXTENDED RELEASE ORAL DAILY
Qty: 30 CAPSULE | Refills: 1 | Status: SHIPPED | OUTPATIENT
Start: 2025-03-24

## 2025-03-26 ENCOUNTER — TELEPHONE (OUTPATIENT)
Dept: NEUROLOGY | Facility: CLINIC | Age: 35
End: 2025-03-26
Payer: MEDICAID

## 2025-03-26 NOTE — TELEPHONE ENCOUNTER
Called patient, she stated that she has been unable to have her testing completed, but is going to reach out and get it scheduled. She said that she will call our office and reschedule once she has her neuropysch completed.

## 2025-03-26 NOTE — TELEPHONE ENCOUNTER
----- Message from Valerie Sanches sent at 3/24/2025  3:54 PM EDT -----  Regarding: neuropsych  This patient has not an appointment Thursday.  I do not see her neuropsych testing and there.  Could you see what is going on with this?

## 2025-04-10 ENCOUNTER — OFFICE VISIT (OUTPATIENT)
Dept: ONCOLOGY | Facility: CLINIC | Age: 35
End: 2025-04-10
Payer: MEDICAID

## 2025-04-10 ENCOUNTER — LAB (OUTPATIENT)
Dept: LAB | Facility: HOSPITAL | Age: 35
End: 2025-04-10
Payer: MEDICAID

## 2025-04-10 VITALS
WEIGHT: 155.5 LBS | SYSTOLIC BLOOD PRESSURE: 119 MMHG | HEIGHT: 62 IN | HEART RATE: 89 BPM | RESPIRATION RATE: 17 BRPM | BODY MASS INDEX: 28.61 KG/M2 | DIASTOLIC BLOOD PRESSURE: 80 MMHG | TEMPERATURE: 97.3 F | OXYGEN SATURATION: 97 %

## 2025-04-10 DIAGNOSIS — D68.52 PROTHROMBIN GENE MUTATION: Primary | ICD-10-CM

## 2025-04-10 DIAGNOSIS — D50.0 IRON DEFICIENCY ANEMIA DUE TO CHRONIC BLOOD LOSS: ICD-10-CM

## 2025-04-10 DIAGNOSIS — D68.52 PROTHROMBIN GENE MUTATION: ICD-10-CM

## 2025-04-10 DIAGNOSIS — E53.8 VITAMIN B12 DEFICIENCY: ICD-10-CM

## 2025-04-10 DIAGNOSIS — T45.4X5A ADVERSE EFFECT OF PREPARATION OF IRON COMPOUND, INITIAL ENCOUNTER: ICD-10-CM

## 2025-04-10 LAB
ALBUMIN SERPL-MCNC: 3.4 G/DL (ref 3.5–5.2)
ALBUMIN/GLOB SERPL: 1.1 G/DL
ALP SERPL-CCNC: 163 U/L (ref 39–117)
ALT SERPL W P-5'-P-CCNC: 16 U/L (ref 1–33)
ANION GAP SERPL CALCULATED.3IONS-SCNC: 12.9 MMOL/L (ref 5–15)
AST SERPL-CCNC: 18 U/L (ref 1–32)
BASOPHILS # BLD AUTO: 0.06 10*3/MM3 (ref 0–0.2)
BASOPHILS NFR BLD AUTO: 0.5 % (ref 0–1.5)
BILIRUB SERPL-MCNC: 0.2 MG/DL (ref 0–1.2)
BUN SERPL-MCNC: 8 MG/DL (ref 6–20)
BUN/CREAT SERPL: 12.7 (ref 7–25)
CALCIUM SPEC-SCNC: 9.4 MG/DL (ref 8.6–10.5)
CHLORIDE SERPL-SCNC: 105 MMOL/L (ref 98–107)
CO2 SERPL-SCNC: 20.1 MMOL/L (ref 22–29)
CREAT SERPL-MCNC: 0.63 MG/DL (ref 0.57–1)
DEPRECATED RDW RBC AUTO: 49.2 FL (ref 37–54)
EGFRCR SERPLBLD CKD-EPI 2021: 118.8 ML/MIN/1.73
EOSINOPHIL # BLD AUTO: 0.19 10*3/MM3 (ref 0–0.4)
EOSINOPHIL NFR BLD AUTO: 1.6 % (ref 0.3–6.2)
ERYTHROCYTE [DISTWIDTH] IN BLOOD BY AUTOMATED COUNT: 15.1 % (ref 12.3–15.4)
FERRITIN SERPL-MCNC: <8 NG/ML (ref 13–150)
GLOBULIN UR ELPH-MCNC: 3 GM/DL
GLUCOSE SERPL-MCNC: 83 MG/DL (ref 65–99)
HCT VFR BLD AUTO: 34.1 % (ref 34–46.6)
HGB BLD-MCNC: 11.4 G/DL (ref 12–15.9)
IMM GRANULOCYTES # BLD AUTO: 0.07 10*3/MM3 (ref 0–0.05)
IMM GRANULOCYTES NFR BLD AUTO: 0.6 % (ref 0–0.5)
IRON 24H UR-MRATE: 48 MCG/DL (ref 37–145)
IRON SATN MFR SERPL: 10 % (ref 20–50)
LYMPHOCYTES # BLD AUTO: 2.24 10*3/MM3 (ref 0.7–3.1)
LYMPHOCYTES NFR BLD AUTO: 18.4 % (ref 19.6–45.3)
MAGNESIUM SERPL-MCNC: 1.8 MG/DL (ref 1.6–2.6)
MCH RBC QN AUTO: 29.5 PG (ref 26.6–33)
MCHC RBC AUTO-ENTMCNC: 33.4 G/DL (ref 31.5–35.7)
MCV RBC AUTO: 88.3 FL (ref 79–97)
MONOCYTES # BLD AUTO: 0.73 10*3/MM3 (ref 0.1–0.9)
MONOCYTES NFR BLD AUTO: 6 % (ref 5–12)
NEUTROPHILS NFR BLD AUTO: 72.9 % (ref 42.7–76)
NEUTROPHILS NFR BLD AUTO: 8.86 10*3/MM3 (ref 1.7–7)
NRBC BLD AUTO-RTO: 0 /100 WBC (ref 0–0.2)
PLATELET # BLD AUTO: 321 10*3/MM3 (ref 140–450)
PMV BLD AUTO: 9.3 FL (ref 6–12)
POTASSIUM SERPL-SCNC: 4.1 MMOL/L (ref 3.5–5.2)
PROT SERPL-MCNC: 6.4 G/DL (ref 6–8.5)
RBC # BLD AUTO: 3.86 10*6/MM3 (ref 3.77–5.28)
SODIUM SERPL-SCNC: 138 MMOL/L (ref 136–145)
TIBC SERPL-MCNC: 493 MCG/DL (ref 298–536)
TRANSFERRIN SERPL-MCNC: 331 MG/DL (ref 200–360)
WBC NRBC COR # BLD AUTO: 12.15 10*3/MM3 (ref 3.4–10.8)

## 2025-04-10 PROCEDURE — 83540 ASSAY OF IRON: CPT

## 2025-04-10 PROCEDURE — 82728 ASSAY OF FERRITIN: CPT

## 2025-04-10 PROCEDURE — 36415 COLL VENOUS BLD VENIPUNCTURE: CPT

## 2025-04-10 PROCEDURE — 85025 COMPLETE CBC W/AUTO DIFF WBC: CPT

## 2025-04-10 PROCEDURE — 83735 ASSAY OF MAGNESIUM: CPT | Performed by: INTERNAL MEDICINE

## 2025-04-10 PROCEDURE — 80053 COMPREHEN METABOLIC PANEL: CPT | Performed by: INTERNAL MEDICINE

## 2025-04-10 PROCEDURE — 84466 ASSAY OF TRANSFERRIN: CPT

## 2025-04-10 NOTE — PROGRESS NOTES
"Subjective     CHIEF COMPLAINT:      Chief Complaint   Patient presents with    Follow-up     HISTORY OF PRESENT ILLNESS:     Yuki Shields is a 34 y.o. female patient who returns today for follow up on prothrombin gene mutation.  She is currently pregnant and gestational age is 19 weeks + 1 day.  She is on Lovenox for prophylaxis at 40 mg SQ daily.  She is tolerating it.  She has occasional bruising at some of the injection sites.  No bruising elsewhere in her body.    Patient just returned back from a trip to [place].  She did a lot of walking.  She reports developing leg swelling.  She reports some leg cramping but no true pain.  She is taking magnesium regularly.    ROS:  Pertinent ROS is in the HPI.     Past medical, surgical, social and family history were reviewed.     MEDICATIONS:    Current Outpatient Medications:     albuterol sulfate  (90 Base) MCG/ACT inhaler, Inhale 2 puffs Every 4 (Four) Hours As Needed for Wheezing., Disp: 6.7 g, Rfl: 1    Asmanex, 30 Metered Doses, 220 MCG/ACT inhaler, , Disp: , Rfl:     Enoxaparin Sodium (LOVENOX) 40 MG/0.4ML solution prefilled syringe syringe, Inject 0.4 mL under the skin into the appropriate area as directed Daily., Disp: 12 mL, Rfl: 8    Loratadine (CLARITIN PO), Take  by mouth Daily., Disp: , Rfl:     pantoprazole (PROTONIX) 40 MG EC tablet, Take 1 tablet by mouth Daily., Disp: 90 tablet, Rfl: 3    venlafaxine XR (EFFEXOR-XR) 37.5 MG 24 hr capsule, TAKE 1 CAPSULE BY MOUTH DAILY, Disp: 30 capsule, Rfl: 1  Objective     VITAL SIGNS:     Vitals:    04/10/25 1533   BP: 119/80   Pulse: 89   Resp: 17   Temp: 97.3 °F (36.3 °C)   TempSrc: Oral   SpO2: 97%   Weight: 70.5 kg (155 lb 8 oz)   Height: 157.7 cm (62.09\")   PainSc: 0-No pain     Body mass index is 28.36 kg/m².     Wt Readings from Last 5 Encounters:   04/10/25 70.5 kg (155 lb 8 oz)   03/17/25 69.9 kg (154 lb)   03/12/25 70.3 kg (155 lb)   02/20/25 70.8 kg (156 lb 1.6 oz)   02/17/25 69.9 kg (154 lb) "     PHYSICAL EXAMINATION:   GENERAL: The patient appears in good general condition, not in acute distress.   SKIN: No Ecchymosis.  EYES: No jaundice. No Pallor.  CHEST: Normal respiratory effort. Normal breathing sounds bilaterally. No added sounds.  CVS: Normal S1 and S2. No Murmur.  EXTREMITIES: No edema.  No calf tenderness.    DIAGNOSTIC DATA:     Results from last 7 days   Lab Units 04/10/25  1505   WBC 10*3/mm3 12.15*   NEUTROS ABS 10*3/mm3 8.86*   HEMOGLOBIN g/dL 11.4*   HEMATOCRIT % 34.1   PLATELETS 10*3/mm3 321     Results from last 7 days   Lab Units 04/10/25  1505   SODIUM mmol/L 138   POTASSIUM mmol/L 4.1   CHLORIDE mmol/L 105   CO2 mmol/L 20.1*   BUN mg/dL 8   CREATININE mg/dL 0.63   CALCIUM mg/dL 9.4   ALBUMIN g/dL 3.4*   BILIRUBIN mg/dL 0.2   ALK PHOS U/L 163*   ALT (SGPT) U/L 16   AST (SGOT) U/L 18   GLUCOSE mg/dL 83   MAGNESIUM mg/dL 1.8         Lab 04/10/25  1505   IRON 48   IRON SATURATION (TSAT) 10*   TIBC 493   TRANSFERRIN 331   FERRITIN <8.00*        Assessment & Plan    *Prothrombin gene mutation.  Patient was diagnosed when she was a teenager after her aunt and mother were both diagnosed with VTE.  The patient's mother has prothrombin gene and factor V Leiden mutations.  Patient's sister has factor V Leiden mutation and had miscarriages.  The patient herself has no history of venous thromboembolism.  She was previously placed on prophylactic anticoagulation with Lovenox 40 mg during her previous pregnancies.  Patient became pregnant in 2023.  Patient was started on Lovenox 40 mg SQ daily at 6 weeks gestation.  On 10/19/2023, she was switched to heparin 5000 SQ every 12 hours.  She delivered on 11/6/2023.  She completed the 6 weeks of Lovenox 40 mg SQ daily after delivery.    With her prothrombin gene mutation, she is at increased risk for development of DVT/PE in the future.  She was advised to take aspirin 81 mg when she travels (the day of and the day after she travels).  She was advised to  maintain adequate hydration.  1/9/2025: Patient 6 weeks pregnant. GA 6 weeks.  Due to the prothrombin gene mutation and due to the family history of thrombosis and recurrent miscarriages, prophylactic anticoagulation with Lovenox was recommended.   She was started on Lovenox 40 mg SQ Q 24 hours.   She is tolerating Lovenox except for occasional bruising at the injection site.  She is not having bruising elsewhere in her body.     *Iron deficiency anemia.  Patient developed severe anemia after she had bleeding after abdominoplasty surgery in 2019.  Reports that her hemoglobin decreased to 3.0 and required PRBC transfusions.  She was in the ICU at that time.  Patient reports weakness in the legs.  6/12/2023: Hemoglobin 12.1.  Hemoglobin decreased to 11.8 on 7/25/2023.  She was started on ferrous sulfate 325 mg once daily.  9/5/2023: Hemoglobin 12.3.  Transferrin saturation decreased to 13%.  Ferritin is 37.2.   She was continued on the ferrous sulfate daily along with the prenatal vitamin.  11/1/2023: Hemoglobin 12.6.  Iron stores remained low with a transferrin saturation at 6%.  She continued on ferrous sulfate until she delivered.  1/4/2024: Hemoglobin 12.7.  Ferritin 153.  Transferrin saturation 18%.  She was restarted on ferrous sulfate 325 mg once weekly.  1/9/2025: Hemoglobin 13.2.  Ferritin decreased to 28.6. Transferrin saturation decreased to 16%.  Patient continued with the prenatal vitamin daily and did not start the ferrous sulfate.  2/20/2025: Hemoglobin 12.4.  Ferritin 22.2.  Transferrin saturation decreased to 9%.  4/10/2025: Hemoglobin decreased to 11.4.  Ferritin decreased to <8.  Transferrin saturation 10%.  She has not been able to tolerate oral iron with development of nausea every time she took it.  Therefore, I recommended IV iron therapy with IV Venofer weekly x 3 doses.  I explained the side effects including the infusion reaction and the need to give Zyrtec and Pepcid before the  infusion.  She decided to proceed.     *Vitamin B12 deficiency   Vitamin B12 is low normal at 304.   She was started on vitamin B12 1000 mcg daily.  11/1/2023: Hemoglobin improved to 12.6.  Vitamin B12 improved to 920.  She stopped taking the vitamin B12.  1/9/2025: Vitamin B12 adequate at 699.   She is tolerating the prenatal multivitamin.     *Elevated liver enzymes.  Patient reports developing this with her previous pregnancies.  Patient reports developing itching.  Her OB started her on Ursodiol and Atarax.  11/1/2023: ALT 86 and AST 87.   Bilirubin 0.4. ALK PHOS 297.  1/4/2024: ALT increased to 111.  AST decreased to 79.  Bilirubin 0.4.  Alk phos increased to 265.  Repeat labs on 2/7/2024 revealed improvement with ALT at 20, AST at 17, alkaline phosphatase at 122 and bilirubin 0.3.  1/9/2025: ALT 14. AST 20. ALK PHOS 125. Bilirubin 0.3.  2/20/2025: ALT 21.  AST 14.  Alkaline phosphatase 141 (attributed to pregnancy).  Bilirubin 0.2.  4/10/2025: ALT 16.  AST 18.  Alkaline phosphatase 163.  Bilirubin 0.2.     PLAN:     1.  Continue Lovenox 40 mg SQ daily.  2.  Will schedule her for IV Venofer 300 mg weekly x 3 doses.  3.  Continue prenatal multivitamin daily.  4.  Follow-up in 6 weeks.  Will obtain CBC CMP ferritin iron panel vitamin B12 and folate levels.      Depression: Not at risk (4/10/2025)    PHQ-2     PHQ-2 Score: 0   Recent Concern: Depression - At risk (3/12/2025)    PHQ-2     PHQ-2 Score: 9      Patient screened positive for depression based on a PHQ-9 score of 9 on 3/12/2025. Follow-up recommendations include:  Patient follows with Psychiatry .         Magan Guido MD  04/10/25

## 2025-04-15 ENCOUNTER — TELEPHONE (OUTPATIENT)
Dept: ONCOLOGY | Facility: CLINIC | Age: 35
End: 2025-04-15
Payer: MEDICAID

## 2025-04-15 NOTE — TELEPHONE ENCOUNTER
"    Caller: Yuki Shields \"Ali\"    Relationship to patient: Self    Best call back number:   Telephone Information:   Mobile [phone]     Type of visit: IRON INFUSION    Requested date: CALL TO R/S WANTS SOONER APPT    If rescheduling, when is the original appointment: 4/24  "

## 2025-04-21 ENCOUNTER — ROUTINE PRENATAL (OUTPATIENT)
Dept: OBSTETRICS AND GYNECOLOGY | Facility: CLINIC | Age: 35
End: 2025-04-21
Payer: MEDICAID

## 2025-04-21 VITALS — WEIGHT: 165 LBS | DIASTOLIC BLOOD PRESSURE: 73 MMHG | SYSTOLIC BLOOD PRESSURE: 109 MMHG | BODY MASS INDEX: 30.09 KG/M2

## 2025-04-21 DIAGNOSIS — J45.909 ASTHMA DURING PREGNANCY: ICD-10-CM

## 2025-04-21 DIAGNOSIS — O99.119 THROMBOPHILIA AFFECTING PREGNANCY, ANTEPARTUM: ICD-10-CM

## 2025-04-21 DIAGNOSIS — Z87.59 HISTORY OF PRIOR PREGNANCY WITH IUGR NEWBORN: ICD-10-CM

## 2025-04-21 DIAGNOSIS — O99.519 ASTHMA DURING PREGNANCY: ICD-10-CM

## 2025-04-21 DIAGNOSIS — Z87.19 HISTORY OF CHOLESTASIS DURING PREGNANCY: ICD-10-CM

## 2025-04-21 DIAGNOSIS — O99.712 PRURITUS OF PREGNANCY IN SECOND TRIMESTER: ICD-10-CM

## 2025-04-21 DIAGNOSIS — Z87.59 HISTORY OF POSTPARTUM HEMORRHAGE: ICD-10-CM

## 2025-04-21 DIAGNOSIS — Z3A.20 20 WEEKS GESTATION OF PREGNANCY: Primary | ICD-10-CM

## 2025-04-21 DIAGNOSIS — D68.59 THROMBOPHILIA AFFECTING PREGNANCY, ANTEPARTUM: ICD-10-CM

## 2025-04-21 DIAGNOSIS — L29.9 PRURITUS OF PREGNANCY IN SECOND TRIMESTER: ICD-10-CM

## 2025-04-21 DIAGNOSIS — Z36.2 ENCOUNTER FOR FOLLOW-UP ULTRASOUND OF FETAL ANATOMY: ICD-10-CM

## 2025-04-21 DIAGNOSIS — Z34.92 PRENATAL CARE, SECOND TRIMESTER: ICD-10-CM

## 2025-04-21 DIAGNOSIS — Z87.59 HISTORY OF CHOLESTASIS DURING PREGNANCY: ICD-10-CM

## 2025-04-21 DIAGNOSIS — Z36.89 ENCOUNTER FOR ULTRASOUND TO ASSESS FETAL GROWTH: ICD-10-CM

## 2025-04-21 DIAGNOSIS — O09.522 MULTIGRAVIDA OF ADVANCED MATERNAL AGE IN SECOND TRIMESTER: ICD-10-CM

## 2025-04-21 LAB
GLUCOSE UR STRIP-MCNC: NEGATIVE MG/DL
PROT UR STRIP-MCNC: ABNORMAL MG/DL

## 2025-04-21 RX ORDER — METOCLOPRAMIDE 5 MG/1
5 TABLET ORAL 4 TIMES DAILY
COMMUNITY
Start: 2025-01-21

## 2025-04-21 RX ORDER — PROMETHAZINE HYDROCHLORIDE 25 MG/1
25 TABLET ORAL
COMMUNITY
Start: 2025-01-21

## 2025-04-21 NOTE — PROGRESS NOTES
Chief Complaint   Patient presents with    Routine Prenatal Visit      Yuki Shields is a 34 y.o.  at 20w5d who presents of routine prenatal visit. She reports doing okay. She has been having itching of her palms recently that is not severe. Denies vaginal bleeding, cramping, contractions, LOF. She reports feeling active fetal movement.     /73   Wt 74.8 kg (165 lb)   LMP 2024   BMI 30.09 kg/m²    Gen: well appearing, NAD   Abd: gravid, nontender  See OB Flowsheet    ASSESSMENT:   IUP at 20w5d   Prenatal care in second trimester  History of cholestasis x 3 in prior pregnancies- baseline LFTs normal  Asthma affecting pregnancy  Thrombophilia affecting pregnancy- Prothrombin gene mutation heterozygote - on Lovenox 40 mg daily   AMA- low risk NIPT  History of IUGR in prior pregnancy  History of postpartum hemorrhage   Pruritus of pregnancy in second trimester     PLAN:  Problem list reviewed and updated.   Reviewed expectations of this stage of pregnancy.   Second trimester precautions reviewed including  labor precautions, anticipated fetal movements.   Discussed ultrasound results that returned incomplete fetal anatomy survey with normal appearing anatomy with exception of suboptimal view of renal arteries, normal cervical length screening measuring 4.27 cm, posterior low lying placenta measuring 1.66 cm from internal cervical os,  g (measuring 20w5d), transverse presentation, DVP 5.25 cm,  bpm. Will plan for follow up anatomy survey at next visit in 4 weeks.   Will obtain 1h GTT, CBC, T. Pallidum Antibody at next visit.   Will collect CMP and bile acids today to rule out cholestasis of pregnancy given pruritus that has developed over the last few weeks. Will notify patient of results and need for treatment if returns showing cholestasis.   Return in about 4 weeks (around 2025) for 1 h GTT, Follow up- Anatomy Survey, Prenatal visit.    Patient Active Problem List     Diagnosis Date Noted    Adverse reaction to compound iron preparation 2025    Iron deficiency anemia 2025    Generalized anxiety disorder 2024    Major depressive disorder, recurrent episode, in partial remission 2024    Chronic constipation 2024    Generalized abdominal pain 2024    Nausea 2024    Heartburn 2024    Epigastric pain 2024    Unwanted fertility 2024     (spontaneous vaginal delivery) 2023    Anemia 09/15/2023    Asthma 09/15/2023    Stress 2021    Folic acid deficiency 2020    Memory impairment 2020    Obesity 2020    Coagulopathy 2019    Factor II deficiency 2017    DDD (degenerative disc disease), lumbar 2017    Prothrombin gene mutation 2016    Essential hypertension 2014    Pain 2012       Orders Placed This Encounter   Procedures    US Ob Follow Up Transabdominal Approach     Standing Status:   Future     Expected Date:   2025     Expiration Date:   2026     Reason for Exam::   follow up fetal anatomy- renal arteries, fetal growth assessment     Release to patient:   Routine Release [[id number]]    Bile Acids, Total     Release to patient:   Routine Release [[id number]]    Comprehensive Metabolic Panel     Release to patient:   Routine Release [[id number]]    CBC (No Diff)     Release to patient:   Routine Release [[id number]]    Gestational Screen 1 Hr ([facility])     Release to patient:   Routine Release [[id number]]    Treponema pallidum AB w/Reflex RPR     Release to patient:   Routine Release [[id number]]    POC Urinalysis Dipstick     Release to patient:   Routine Release [[id number]]     Valerie Taylor MD

## 2025-04-23 PROBLEM — T45.4X5A ADVERSE REACTION TO COMPOUND IRON PREPARATION: Status: ACTIVE | Noted: 2025-04-23

## 2025-04-23 PROBLEM — D50.9 IRON DEFICIENCY ANEMIA: Status: ACTIVE | Noted: 2025-04-23

## 2025-04-24 ENCOUNTER — INFUSION (OUTPATIENT)
Dept: ONCOLOGY | Facility: HOSPITAL | Age: 35
End: 2025-04-24
Payer: MEDICAID

## 2025-04-24 VITALS
OXYGEN SATURATION: 100 % | HEART RATE: 115 BPM | BODY MASS INDEX: 29.84 KG/M2 | RESPIRATION RATE: 16 BRPM | DIASTOLIC BLOOD PRESSURE: 76 MMHG | WEIGHT: 163.6 LBS | TEMPERATURE: 98.2 F | SYSTOLIC BLOOD PRESSURE: 115 MMHG

## 2025-04-24 DIAGNOSIS — T45.4X5A ADVERSE EFFECT OF PREPARATION OF IRON COMPOUND, INITIAL ENCOUNTER: Primary | ICD-10-CM

## 2025-04-24 DIAGNOSIS — D50.9 IRON DEFICIENCY ANEMIA, UNSPECIFIED IRON DEFICIENCY ANEMIA TYPE: ICD-10-CM

## 2025-04-24 LAB
ALBUMIN SERPL-MCNC: 3.6 G/DL (ref 3.9–4.9)
ALP SERPL-CCNC: 191 IU/L (ref 44–121)
ALT SERPL-CCNC: 14 IU/L (ref 0–32)
AST SERPL-CCNC: 19 IU/L (ref 0–40)
BILE AC SERPL-SCNC: 8.6 UMOL/L (ref 0–10)
BILIRUB SERPL-MCNC: 0.3 MG/DL (ref 0–1.2)
BUN SERPL-MCNC: 10 MG/DL (ref 6–20)
BUN/CREAT SERPL: 16 (ref 9–23)
CALCIUM SERPL-MCNC: 9.1 MG/DL (ref 8.7–10.2)
CHLORIDE SERPL-SCNC: 101 MMOL/L (ref 96–106)
CO2 SERPL-SCNC: 20 MMOL/L (ref 20–29)
CREAT SERPL-MCNC: 0.64 MG/DL (ref 0.57–1)
EGFRCR SERPLBLD CKD-EPI 2021: 118 ML/MIN/1.73
GLOBULIN SER CALC-MCNC: 2.8 G/DL (ref 1.5–4.5)
GLUCOSE SERPL-MCNC: 99 MG/DL (ref 70–99)
POTASSIUM SERPL-SCNC: 4.4 MMOL/L (ref 3.5–5.2)
PROT SERPL-MCNC: 6.4 G/DL (ref 6–8.5)
SODIUM SERPL-SCNC: 135 MMOL/L (ref 134–144)

## 2025-04-24 PROCEDURE — 25810000003 SODIUM CHLORIDE 0.9 % SOLUTION: Performed by: INTERNAL MEDICINE

## 2025-04-24 PROCEDURE — 25810000003 SODIUM CHLORIDE 0.9 % SOLUTION 250 ML FLEX CONT: Performed by: INTERNAL MEDICINE

## 2025-04-24 PROCEDURE — 96366 THER/PROPH/DIAG IV INF ADDON: CPT

## 2025-04-24 PROCEDURE — 96365 THER/PROPH/DIAG IV INF INIT: CPT

## 2025-04-24 PROCEDURE — 25010000002 IRON SUCROSE PER 1 MG: Performed by: INTERNAL MEDICINE

## 2025-04-24 RX ORDER — SODIUM CHLORIDE 9 MG/ML
20 INJECTION, SOLUTION INTRAVENOUS ONCE
OUTPATIENT
Start: 2025-05-01

## 2025-04-24 RX ORDER — METHYLPREDNISOLONE SODIUM SUCCINATE 125 MG/2ML
125 INJECTION, POWDER, LYOPHILIZED, FOR SOLUTION INTRAMUSCULAR; INTRAVENOUS ONCE AS NEEDED
OUTPATIENT
Start: 2025-05-01

## 2025-04-24 RX ORDER — EPINEPHRINE 0.3 MG/.3ML
0.3 INJECTION SUBCUTANEOUS ONCE AS NEEDED
OUTPATIENT
Start: 2025-05-01

## 2025-04-24 RX ORDER — CETIRIZINE HYDROCHLORIDE 10 MG/1
10 TABLET ORAL ONCE
Status: COMPLETED | OUTPATIENT
Start: 2025-04-24 | End: 2025-04-24

## 2025-04-24 RX ORDER — SODIUM CHLORIDE 9 MG/ML
1000 INJECTION, SOLUTION INTRAVENOUS ONCE
OUTPATIENT
Start: 2025-05-01 | End: 2025-05-01

## 2025-04-24 RX ORDER — CETIRIZINE HYDROCHLORIDE 10 MG/1
10 TABLET ORAL DAILY
Start: 2025-05-01

## 2025-04-24 RX ORDER — ACETAMINOPHEN 325 MG/1
650 TABLET ORAL ONCE
OUTPATIENT
Start: 2025-05-01

## 2025-04-24 RX ORDER — ACETAMINOPHEN 325 MG/1
650 TABLET ORAL ONCE
Status: COMPLETED | OUTPATIENT
Start: 2025-04-24 | End: 2025-04-24

## 2025-04-24 RX ORDER — SODIUM CHLORIDE 9 MG/ML
20 INJECTION, SOLUTION INTRAVENOUS ONCE
Status: COMPLETED | OUTPATIENT
Start: 2025-04-24 | End: 2025-04-24

## 2025-04-24 RX ADMIN — SODIUM CHLORIDE 300 MG: 900 INJECTION, SOLUTION INTRAVENOUS at 08:54

## 2025-04-24 RX ADMIN — CETIRIZINE HYDROCHLORIDE 10 MG: 10 TABLET, FILM COATED ORAL at 08:20

## 2025-04-24 RX ADMIN — ACETAMINOPHEN 650 MG: 325 TABLET ORAL at 08:20

## 2025-04-24 RX ADMIN — SODIUM CHLORIDE 20 ML/HR: 9 INJECTION, SOLUTION INTRAVENOUS at 08:54

## 2025-04-24 NOTE — NURSING NOTE
About 1 hour into pt's Venofer infusion she c/o headache. /69, no other symptoms. Pt is congested and states headache is behind eyes and says she has been dealing with sinus issues. R/w Lillian SWAIN, will continue to monitor but if other symptoms present order received to give Pepcid 20 mg IV. Pt v/u.    Venofer and post observation period completed without futher complaint. Pt states headache no worse and still describes as sinus headache. Pt instructed to call with concerns before her next visit and ambulatory upon D/C.

## 2025-05-01 ENCOUNTER — INFUSION (OUTPATIENT)
Dept: ONCOLOGY | Facility: HOSPITAL | Age: 35
End: 2025-05-01
Payer: MEDICAID

## 2025-05-01 VITALS
DIASTOLIC BLOOD PRESSURE: 74 MMHG | OXYGEN SATURATION: 98 % | RESPIRATION RATE: 16 BRPM | BODY MASS INDEX: 30.5 KG/M2 | WEIGHT: 167.2 LBS | HEART RATE: 95 BPM | TEMPERATURE: 97.8 F | SYSTOLIC BLOOD PRESSURE: 112 MMHG

## 2025-05-01 DIAGNOSIS — T45.4X5A ADVERSE EFFECT OF PREPARATION OF IRON COMPOUND, INITIAL ENCOUNTER: Primary | ICD-10-CM

## 2025-05-01 DIAGNOSIS — D50.9 IRON DEFICIENCY ANEMIA, UNSPECIFIED IRON DEFICIENCY ANEMIA TYPE: ICD-10-CM

## 2025-05-01 PROCEDURE — 25010000002 IRON SUCROSE PER 1 MG: Performed by: INTERNAL MEDICINE

## 2025-05-01 PROCEDURE — 96365 THER/PROPH/DIAG IV INF INIT: CPT

## 2025-05-01 PROCEDURE — 25810000003 SODIUM CHLORIDE 0.9 % SOLUTION 250 ML FLEX CONT: Performed by: INTERNAL MEDICINE

## 2025-05-01 RX ORDER — SODIUM CHLORIDE 9 MG/ML
20 INJECTION, SOLUTION INTRAVENOUS ONCE
OUTPATIENT
Start: 2025-05-08

## 2025-05-01 RX ORDER — ACETAMINOPHEN 325 MG/1
650 TABLET ORAL ONCE
OUTPATIENT
Start: 2025-05-08

## 2025-05-01 RX ORDER — SODIUM CHLORIDE 9 MG/ML
1000 INJECTION, SOLUTION INTRAVENOUS ONCE
OUTPATIENT
Start: 2025-05-08 | End: 2025-05-08

## 2025-05-01 RX ORDER — ACETAMINOPHEN 325 MG/1
650 TABLET ORAL ONCE
Status: COMPLETED | OUTPATIENT
Start: 2025-05-01 | End: 2025-05-01

## 2025-05-01 RX ORDER — CETIRIZINE HYDROCHLORIDE 10 MG/1
10 TABLET ORAL DAILY
Start: 2025-05-08

## 2025-05-01 RX ORDER — CETIRIZINE HYDROCHLORIDE 10 MG/1
10 TABLET ORAL DAILY
Status: DISCONTINUED | OUTPATIENT
Start: 2025-05-01 | End: 2025-05-01 | Stop reason: HOSPADM

## 2025-05-01 RX ORDER — METHYLPREDNISOLONE SODIUM SUCCINATE 125 MG/2ML
125 INJECTION, POWDER, LYOPHILIZED, FOR SOLUTION INTRAMUSCULAR; INTRAVENOUS ONCE AS NEEDED
OUTPATIENT
Start: 2025-05-08

## 2025-05-01 RX ORDER — EPINEPHRINE 0.3 MG/.3ML
0.3 INJECTION SUBCUTANEOUS ONCE AS NEEDED
OUTPATIENT
Start: 2025-05-08

## 2025-05-01 RX ADMIN — CETIRIZINE HYDROCHLORIDE 10 MG: 10 TABLET ORAL at 08:03

## 2025-05-01 RX ADMIN — ACETAMINOPHEN 650 MG: 325 TABLET ORAL at 08:03

## 2025-05-01 RX ADMIN — IRON SUCROSE 300 MG: 20 INJECTION, SOLUTION INTRAVENOUS at 08:31

## 2025-05-08 ENCOUNTER — OFFICE VISIT (OUTPATIENT)
Age: 35
End: 2025-05-08
Payer: MEDICAID

## 2025-05-08 ENCOUNTER — INFUSION (OUTPATIENT)
Dept: ONCOLOGY | Facility: HOSPITAL | Age: 35
End: 2025-05-08
Payer: MEDICAID

## 2025-05-08 VITALS
DIASTOLIC BLOOD PRESSURE: 73 MMHG | OXYGEN SATURATION: 96 % | HEART RATE: 78 BPM | SYSTOLIC BLOOD PRESSURE: 115 MMHG | WEIGHT: 167 LBS | TEMPERATURE: 97.8 F | BODY MASS INDEX: 30.46 KG/M2 | RESPIRATION RATE: 16 BRPM

## 2025-05-08 VITALS
BODY MASS INDEX: 30.73 KG/M2 | OXYGEN SATURATION: 97 % | SYSTOLIC BLOOD PRESSURE: 112 MMHG | HEART RATE: 91 BPM | WEIGHT: 167 LBS | DIASTOLIC BLOOD PRESSURE: 75 MMHG | HEIGHT: 62 IN

## 2025-05-08 DIAGNOSIS — F41.1 GENERALIZED ANXIETY DISORDER: Primary | ICD-10-CM

## 2025-05-08 DIAGNOSIS — D50.9 IRON DEFICIENCY ANEMIA, UNSPECIFIED IRON DEFICIENCY ANEMIA TYPE: ICD-10-CM

## 2025-05-08 DIAGNOSIS — T45.4X5A ADVERSE EFFECT OF PREPARATION OF IRON COMPOUND, INITIAL ENCOUNTER: Primary | ICD-10-CM

## 2025-05-08 DIAGNOSIS — F33.41 MAJOR DEPRESSIVE DISORDER, RECURRENT EPISODE, IN PARTIAL REMISSION: ICD-10-CM

## 2025-05-08 PROCEDURE — 96365 THER/PROPH/DIAG IV INF INIT: CPT

## 2025-05-08 PROCEDURE — 25010000002 IRON SUCROSE PER 1 MG: Performed by: INTERNAL MEDICINE

## 2025-05-08 PROCEDURE — 25810000003 SODIUM CHLORIDE 0.9 % SOLUTION 250 ML FLEX CONT: Performed by: INTERNAL MEDICINE

## 2025-05-08 RX ORDER — ACETAMINOPHEN 325 MG/1
650 TABLET ORAL ONCE
Status: COMPLETED | OUTPATIENT
Start: 2025-05-08 | End: 2025-05-08

## 2025-05-08 RX ORDER — METHYLPREDNISOLONE SODIUM SUCCINATE 125 MG/2ML
125 INJECTION, POWDER, LYOPHILIZED, FOR SOLUTION INTRAMUSCULAR; INTRAVENOUS ONCE AS NEEDED
OUTPATIENT
Start: 2025-05-15

## 2025-05-08 RX ORDER — ACETAMINOPHEN 325 MG/1
650 TABLET ORAL ONCE
Status: CANCELLED | OUTPATIENT
Start: 2025-05-15

## 2025-05-08 RX ORDER — CETIRIZINE HYDROCHLORIDE 10 MG/1
10 TABLET ORAL ONCE
Status: COMPLETED | OUTPATIENT
Start: 2025-05-08 | End: 2025-05-08

## 2025-05-08 RX ORDER — SODIUM CHLORIDE 9 MG/ML
20 INJECTION, SOLUTION INTRAVENOUS ONCE
OUTPATIENT
Start: 2025-05-15

## 2025-05-08 RX ORDER — SODIUM CHLORIDE 9 MG/ML
1000 INJECTION, SOLUTION INTRAVENOUS ONCE
OUTPATIENT
Start: 2025-05-15 | End: 2025-05-15

## 2025-05-08 RX ORDER — VENLAFAXINE HYDROCHLORIDE 75 MG/1
75 CAPSULE, EXTENDED RELEASE ORAL DAILY
Qty: 30 CAPSULE | Refills: 1 | Status: SHIPPED | OUTPATIENT
Start: 2025-05-08

## 2025-05-08 RX ORDER — SODIUM CHLORIDE 9 MG/ML
20 INJECTION, SOLUTION INTRAVENOUS ONCE
Status: DISCONTINUED | OUTPATIENT
Start: 2025-05-08 | End: 2025-05-08 | Stop reason: HOSPADM

## 2025-05-08 RX ORDER — CETIRIZINE HYDROCHLORIDE 10 MG/1
10 TABLET ORAL DAILY
Status: CANCELLED
Start: 2025-05-15

## 2025-05-08 RX ORDER — EPINEPHRINE 0.3 MG/.3ML
0.3 INJECTION SUBCUTANEOUS ONCE AS NEEDED
OUTPATIENT
Start: 2025-05-15

## 2025-05-08 RX ADMIN — ACETAMINOPHEN 650 MG: 325 TABLET ORAL at 08:24

## 2025-05-08 RX ADMIN — CETIRIZINE HYDROCHLORIDE 10 MG: 10 TABLET, FILM COATED ORAL at 08:24

## 2025-05-08 RX ADMIN — SODIUM CHLORIDE 300 MG: 900 INJECTION, SOLUTION INTRAVENOUS at 08:55

## 2025-05-08 NOTE — PROGRESS NOTES
Subjective   Yuki Shields is a 34 y.o. female who presents today in follow up for management of generalized anxiety disorder and major depressive disorder.    Patient reports that she continues to do well overall and does endorse ongoing improvement in psychiatric symptoms associated with current dosage of venlafaxine XR.  More specifically, she reports an improved mood overall associated with improved self worth, improved ability to direct/sustain her attention and concentration, and resolution of psychomotor retardation.  Patient also denies any active suicidal ideation, intent or plan.  She does however report persistent anhedonia associated with decreased levels of energy/fatigue, excessive amounts of sleep, and increased irritability.  Patient's primary concern at this time is in regard to her persistent and significant fatigue stating that she has been unable to successfully complete all necessary tasks throughout the day in addition to napping multiple times throughout the day which is not characteristic for the patient.  It is of note that the patient is approximately 22 weeks pregnant at this time and was recently diagnosed as anemic with low levels of iron, both of which very likely have contributed to the patient's ongoing fatigue.  Patient has received multiple iron infusions since this diagnosis, but ultimately reports little is no improvement in her fatigue.  Otherwise, patient does report decreased levels of anxiety associated with improved ability to control/stop her anxiety and worry.  She also reports decreased catastrophizing/thinking of worst case scenarios and denies worrying excessively about most things on most days. Patient otherwise denies any auditory, visual, or tactile hallucinations and does not currently appear to be responding to internal stimuli.  Patient denies any generalized paranoia and displays no evidence of delusional thinking.    The following portions of the patient's  history were reviewed and updated as appropriate: allergies, current medications, past family history, past medical history, past social history, past surgical history and problem list.  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 GARZA AWARE OF THIS    Family hx-blood disorder     MOTHER HAS FACTOR 2 AND FACTOR 5    GERD (gastroesophageal reflux disease)     Heart murmur     History of blood transfusion     Labral tear of hip, degenerative     RIGHT    Low back pain     Lumbar herniated disc 06/2017    X 2  DISC   HISTORY OF EPIDURALS    Memory loss     Migraine     Pneumonia     PONV (postoperative nausea and vomiting)     Urinary tract infection        Social History:  Social History     Socioeconomic History    Marital status:     Number of children: 3   Tobacco Use    Smoking status: Never    Smokeless tobacco: Never   Vaping Use    Vaping status: Never Used   Substance and Sexual Activity    Alcohol use: Not Currently     Comment: OCCAS    Drug use: Never    Sexual activity: Yes     Partners: Male     Birth control/protection: Natural family planning/Rhythm       Family History:  Family History   Problem Relation Age of Onset    Ulcerative colitis Mother     Irritable bowel syndrome Mother     Ulcerative colitis Father     Diverticulitis Father     Malig Hyperthermia Neg Hx     Colon cancer Neg Hx     Colon polyps Neg Hx     Crohn's disease Neg Hx        Past Surgical History:  Past Surgical History:   Procedure Laterality Date    ABDOMINOPLASTY  2019    ADENOIDECTOMY      BLADDER REPAIR  2016    WITH MESH    BREAST AUGMENTATION BILATERAL MASTOPEXY Bilateral 07/31/2017    Procedure: BREAST AUGMENTATION, MASTOPEXY W/ IDEAL  IMPLANTS  NEW IMAGE;  Surgeon: Nirmal Mehta MD;  Location: [facility] MAIN OR;  Service:     BREAST SURGERY      CERVICAL/THORACIC FACET INJECTION      pt states cervial fusion in the past , PONV from these    CHOLECYSTECTOMY  2012    COLONOSCOPY N/A 2024    Procedure: COLONOSCOPY TO CECUM;  Surgeon: Pedro Burns MD;  [place] [facility] [place]  Service: Gastroenterology;  Laterality: N/A;  hemorrhoids    COSMETIC SURGERY      ENDOSCOPY N/A 2024    Procedure: ESOPHAGOGASTRODUODENOSCOPY (EGD) WITH BIOPSY;  Surgeon: Pedro Burns MD;  [place] [facility] [place];  Service: Gastroenterology;  Laterality: N/A;  mild gastritis    TONSILLECTOMY AND ADENOIDECTOMY         Problem List:  Patient Active Problem List   Diagnosis    Anemia    Coagulopathy    DDD (degenerative disc disease), lumbar    Factor II deficiency    Folic acid deficiency    Memory impairment    Obesity    Pain    Stress    Asthma     (spontaneous vaginal delivery)    Unwanted fertility    Chronic constipation    Generalized abdominal pain    Nausea    Heartburn    Epigastric pain    Essential hypertension    Prothrombin gene mutation    Generalized anxiety disorder    Major depressive disorder, recurrent episode, in partial remission    Adverse reaction to compound iron preparation    Iron deficiency anemia       Allergy:   Allergies   Allergen Reactions    Dilaudid [Hydromorphone Hcl] Nausea And Vomiting    Hydromorphone Dizziness     Category: Allergy;        Current Medications:   Current Outpatient Medications   Medication Sig Dispense Refill    venlafaxine XR (EFFEXOR-XR) 75 MG 24 hr capsule Take 1 capsule by mouth Daily. 30 capsule 1    albuterol sulfate  (90 Base) MCG/ACT inhaler Inhale 2 puffs Every 4 (Four) Hours As Needed for Wheezing. 6.7 g 1    Asmanex, 30 Metered Doses, 220 MCG/ACT inhaler       Enoxaparin Sodium (LOVENOX) 40 MG/0.4ML solution prefilled syringe syringe Inject 0.4 mL under the skin into the  "appropriate area as directed Daily. 12 mL 8    Loratadine (CLARITIN PO) Take  by mouth Daily.      metoclopramide (REGLAN) 5 MG tablet Take 1 tablet by mouth 4 (Four) Times a Day. (Patient not taking: Reported on 4/21/2025)      pantoprazole (PROTONIX) 40 MG EC tablet Take 1 tablet by mouth Daily. 90 tablet 3    promethazine (PHENERGAN) 25 MG tablet Take 1 tablet by mouth. (Patient not taking: Reported on 4/21/2025)       No current facility-administered medications for this visit.       Review of Symptoms:    Review of Systems   Constitutional:  Positive for activity change and fatigue.   HENT:  Negative for tinnitus.    Eyes:  Negative for visual disturbance.   Cardiovascular:  Negative for chest pain and palpitations.   Endocrine: Negative for cold intolerance and heat intolerance.   Skin:  Negative for rash.   Neurological:  Negative for seizures and confusion.   Psychiatric/Behavioral:  Positive for decreased concentration and depressed mood. Negative for hallucinations, self-injury, sleep disturbance and suicidal ideas. The patient is nervous/anxious.          Physical Exam:   Blood pressure 112/75, pulse 91, height 157.5 cm (62.01\"), weight 75.8 kg (167 lb), last menstrual period 11/27/2024, SpO2 97%, not currently breastfeeding.  Appearance: Appears documented age, appropriate hygiene and grooming.  Gait, Station, Strength: Normal gait, station and strength.  Physical Exam               Mental Status Exam:   Hygiene:   good  Cooperation:  Cooperative  Eye Contact:  Good  Psychomotor Behavior:  Appropriate  Affect:  Full range and Appropriate  Mood: euthymic and sad  Hopelessness: Denies  Speech:  Normal  Thought Process:  Goal directed and Linear  Thought Content:  Normal  Suicidal:  None  Homicidal:  None  Hallucinations:  None  Delusion:  None  Memory:  Intact  Orientation:  Person, Place, Time, and Situation  Reliability:  good  Insight:  Good  Judgement:  Good  Impulse Control:  Good      Lab Results: "   Routine Prenatal on 04/21/2025   Component Date Value Ref Range Status    Glucose, UA 04/21/2025 Negative  Negative mg/dL Final    Protein, POC 04/21/2025 Trace (A)  Negative mg/dL Final    Bile Acids Total 04/23/2025 8.6  0.0 - 10.0 umol/L Final    Glucose 04/23/2025 99  70 - 99 mg/dL Final    BUN 04/23/2025 10  6 - 20 mg/dL Final    Creatinine 04/23/2025 0.64  0.57 - 1.00 mg/dL Final    EGFR Result 04/23/2025 118  >59 mL/min/1.73 Final    BUN/Creatinine Ratio 04/23/2025 16  9 - 23 Final    Sodium 04/23/2025 135  134 - 144 mmol/L Final    Potassium 04/23/2025 4.4  3.5 - 5.2 mmol/L Final    Chloride 04/23/2025 101  96 - 106 mmol/L Final    Total CO2 04/23/2025 20  20 - 29 mmol/L Final    Calcium 04/23/2025 9.1  8.7 - 10.2 mg/dL Final    Total Protein 04/23/2025 6.4  6.0 - 8.5 g/dL Final    Albumin 04/23/2025 3.6 (L)  3.9 - 4.9 g/dL Final    Globulin 04/23/2025 2.8  1.5 - 4.5 g/dL Final    Total Bilirubin 04/23/2025 0.3  0.0 - 1.2 mg/dL Final    Alkaline Phosphatase 04/23/2025 191 (H)  44 - 121 IU/L Final    AST (SGOT) 04/23/2025 19  0 - 40 IU/L Final    ALT (SGPT) 04/23/2025 14  0 - 32 IU/L Final   Office Visit on 04/10/2025   Component Date Value Ref Range Status    Glucose 04/10/2025 83  65 - 99 mg/dL Final    BUN 04/10/2025 8  6 - 20 mg/dL Final    Creatinine 04/10/2025 0.63  0.57 - 1.00 mg/dL Final    Sodium 04/10/2025 138  136 - 145 mmol/L Final    Potassium 04/10/2025 4.1  3.5 - 5.2 mmol/L Final    Chloride 04/10/2025 105  98 - 107 mmol/L Final    CO2 04/10/2025 20.1 (L)  22.0 - 29.0 mmol/L Final    Calcium 04/10/2025 9.4  8.6 - 10.5 mg/dL Final    Total Protein 04/10/2025 6.4  6.0 - 8.5 g/dL Final    Albumin 04/10/2025 3.4 (L)  3.5 - 5.2 g/dL Final    ALT (SGPT) 04/10/2025 16  1 - 33 U/L Final    AST (SGOT) 04/10/2025 18  1 - 32 U/L Final    Alkaline Phosphatase 04/10/2025 163 (H)  39 - 117 U/L Final    Total Bilirubin 04/10/2025 0.2  0.0 - 1.2 mg/dL Final    Globulin 04/10/2025 3.0  gm/dL Final    A/G  Ratio 04/10/2025 1.1  g/dL Final    BUN/Creatinine Ratio 04/10/2025 12.7  7.0 - 25.0 Final    Anion Gap 04/10/2025 12.9  5.0 - 15.0 mmol/L Final    eGFR 04/10/2025 118.8  >60.0 mL/min/1.73 Final    Magnesium 04/10/2025 1.8  1.6 - 2.6 mg/dL Final   Lab on 04/10/2025   Component Date Value Ref Range Status    Iron 04/10/2025 48  37 - 145 mcg/dL Final    Iron Saturation (TSAT) 04/10/2025 10 (L)  20 - 50 % Final    Transferrin 04/10/2025 331  200 - 360 mg/dL Final    TIBC 04/10/2025 493  298 - 536 mcg/dL Final    Ferritin 04/10/2025 <8.00 (L)  13.00 - 150.00 ng/mL Final    WBC 04/10/2025 12.15 (H)  3.40 - 10.80 10*3/mm3 Final    RBC 04/10/2025 3.86  3.77 - 5.28 10*6/mm3 Final    Hemoglobin 04/10/2025 11.4 (L)  12.0 - 15.9 g/dL Final    Hematocrit 04/10/2025 34.1  34.0 - 46.6 % Final    MCV 04/10/2025 88.3  79.0 - 97.0 fL Final    MCH 04/10/2025 29.5  26.6 - 33.0 pg Final    MCHC 04/10/2025 33.4  31.5 - 35.7 g/dL Final    RDW 04/10/2025 15.1  12.3 - 15.4 % Final    RDW-SD 04/10/2025 49.2  37.0 - 54.0 fl Final    MPV 04/10/2025 9.3  6.0 - 12.0 fL Final    Platelets 04/10/2025 321  140 - 450 10*3/mm3 Final    Neutrophil % 04/10/2025 72.9  42.7 - 76.0 % Final    Lymphocyte % 04/10/2025 18.4 (L)  19.6 - 45.3 % Final    Monocyte % 04/10/2025 6.0  5.0 - 12.0 % Final    Eosinophil % 04/10/2025 1.6  0.3 - 6.2 % Final    Basophil % 04/10/2025 0.5  0.0 - 1.5 % Final    Immature Grans % 04/10/2025 0.6 (H)  0.0 - 0.5 % Final    Neutrophils, Absolute 04/10/2025 8.86 (H)  1.70 - 7.00 10*3/mm3 Final    Lymphocytes, Absolute 04/10/2025 2.24  0.70 - 3.10 10*3/mm3 Final    Monocytes, Absolute 04/10/2025 0.73  0.10 - 0.90 10*3/mm3 Final    Eosinophils, Absolute 04/10/2025 0.19  0.00 - 0.40 10*3/mm3 Final    Basophils, Absolute 04/10/2025 0.06  0.00 - 0.20 10*3/mm3 Final    Immature Grans, Absolute 04/10/2025 0.07 (H)  0.00 - 0.05 10*3/mm3 Final    nRBC 04/10/2025 0.0  0.0 - 0.2 /100 WBC Final     Results            PHQ-9 Total Score: 13     QIAN-7 Total Score: 2     Assessment & Plan    Diagnoses and all orders for this visit:    1. Generalized anxiety disorder (Primary)  -     venlafaxine XR (EFFEXOR-XR) 75 MG 24 hr capsule; Take 1 capsule by mouth Daily.  Dispense: 30 capsule; Refill: 1    2. Major depressive disorder, recurrent episode, in partial remission  -     venlafaxine XR (EFFEXOR-XR) 75 MG 24 hr capsule; Take 1 capsule by mouth Daily.  Dispense: 30 capsule; Refill: 1         Yuki Shields is a 34 y.o. female who presents today in follow up for management of generalized anxiety disorder and major depressive disorder.    As detailed above the patient appears to be doing fairly well overall and does endorse ongoing improvement in psychiatric symptoms associated with current use of venlafaxine XR.  She reports consistent compliance with this medication, denies any associated side effects and appears to be tolerating this medication well.  She continues to report a rather significant improvement in her anxious symptoms she does however endorse the perceived worsening of numerous depressive symptoms since last visit as evidenced by current PHQ-9 and QIAN-7 scores.  Her primary concern at this time is in regard to persistent, rather severe fatigue that has negatively impacted both her social and occupational functioning.  It is of note that the patient was recently diagnosed with anemia/iron-deficiency and is also approximately 22 weeks pregnant both of which very likely have contributed to the patient's worsening fatigue.  She has received multiple iron infusions over the past month or so but reports little to no improvement in her fatigue overall.  While there does appear to be multiple factors potentially contributing to the patient's current clinical picture I do feel as if she could benefit from further increasing daily dose of venlafaxine at this time given positive therapeutic benefit associated with this medication in the absence of any  significant side effects.  The safety profile of this medication and its use during pregnancy was once again discussed in detail with the patient at today's visit, who does voice understanding of the potential side effects and voices agreement in increasing her daily dose at this time.  Patient was encouraged to remain compliant with all aspects of her treatment plan, and to contact our office if she experiences any side effects/tolerability issues with increase daily dose of venlafaxine.  Otherwise she will be seen again here in the office in approximately 8 weeks for reassessment.  Patient voices understanding of this and is agreeable to today's plan.    Medications:  -Increase venlafaxine XR from 37.5 mg to 75 mg p.o. once daily for management of generalized anxiety disorder and major depressive disorder.    TREATMENT PLAN - SHORT AND LONG-TERM GOALS:   -Continue supportive psychotherapy efforts and medications as indicated. Treatment and medication options discussed during today's visit.   -Patient acknowledged and verbally consented to continue with current treatment plan and was educated on the importance of compliance with treatment and follow-up appointments.    SUMMARY/EDUCATION/DISCUSSION:  -Pt was given appropriate time to ask questions and concerns were addressed. A thorough discussion was had that included review of disease process, need for continued monitoring and additional treatment options including use of pharmacological and non-pharmacological approaches to care, decisions were made and agreed upon by patient and provider.   -Discussed medication options and treatment plan of prescribed medication as well as the risks, benefits, and side effects including potential falls, possible impaired driving and metabolic adversities among others; patient acknowledged and provided verbal consent.   -Patient has been educated regarding multimodal approach with healthy nutrition, healthy sleep, regular  physical activity, social activities, counseling, and medications.  -Please call the office at [phone] within normal business hours (Monday-Friday, 8:00 AM - 4:30 PM) with any worsening of symptoms or onset of intolerable side effects. Please ask to leave a message with office staff.  Please allow up to 24-48 hours for response to a patient call/question/refill request.  -Safety plan has been established and discussed in detail with the patient, who is agreeable to contact support system and/or call 911 or go to the nearest ER should he/she/they have any thoughts of harm to self or others.    MEDS ORDERED DURING VISIT:  New Medications Ordered This Visit   Medications    venlafaxine XR (EFFEXOR-XR) 75 MG 24 hr capsule     Sig: Take 1 capsule by mouth Daily.     Dispense:  30 capsule     Refill:  1       FOLLOW UP:  Return in about 8 weeks (around 7/3/2025) for Next scheduled follow up.      Chandana Lara DO    This document has been electronically signed by Chandana Lara DO  May 8, 2025 15:23 EDT    Part of this note may be an electronic transcription/translation of spoken language to printed text using the Dragon Dictation System. Some of the data in this electronic note has been brought forward from a previous encounter, any necessary changes have been made, it has been reviewed by this provider, and it is accurate.    Answers submitted by the patient for this visit:  Anxiety (Submitted on 5/7/2025)  Chief Complaint: Anxiety  Visit: follow-up  Frequency: occasionally  Severity: mild  Sleep quality: fair

## 2025-05-10 ENCOUNTER — HOSPITAL ENCOUNTER (EMERGENCY)
Facility: HOSPITAL | Age: 35
Discharge: HOME | End: 2025-05-10
Attending: STUDENT IN AN ORGANIZED HEALTH CARE EDUCATION/TRAINING PROGRAM | Admitting: OBSTETRICS & GYNECOLOGY
Payer: MEDICAID

## 2025-05-10 VITALS
TEMPERATURE: 98 F | BODY MASS INDEX: 30.02 KG/M2 | WEIGHT: 169.4 LBS | OXYGEN SATURATION: 99 % | RESPIRATION RATE: 18 BRPM | HEART RATE: 90 BPM | HEIGHT: 63 IN | DIASTOLIC BLOOD PRESSURE: 76 MMHG | SYSTOLIC BLOOD PRESSURE: 119 MMHG

## 2025-05-10 LAB
BILIRUB UR QL STRIP: NEGATIVE
CLARITY UR: CLEAR
COLOR UR: YELLOW
GLUCOSE UR STRIP-MCNC: NEGATIVE MG/DL
HGB UR QL STRIP.AUTO: NEGATIVE
KETONES UR QL STRIP: NEGATIVE
LEUKOCYTE ESTERASE UR QL STRIP.AUTO: NEGATIVE
NITRITE UR QL STRIP: NEGATIVE
PH UR STRIP.AUTO: 7 [PH] (ref 5–8)
PROT UR QL STRIP: NEGATIVE
SP GR UR STRIP: 1.01 (ref 1–1.03)
UROBILINOGEN UR QL STRIP: NORMAL

## 2025-05-10 PROCEDURE — 81003 URINALYSIS AUTO W/O SCOPE: CPT | Performed by: OBSTETRICS & GYNECOLOGY

## 2025-05-10 PROCEDURE — 87186 SC STD MICRODIL/AGAR DIL: CPT | Performed by: OBSTETRICS & GYNECOLOGY

## 2025-05-10 PROCEDURE — 87086 URINE CULTURE/COLONY COUNT: CPT | Performed by: OBSTETRICS & GYNECOLOGY

## 2025-05-10 PROCEDURE — 99283 EMERGENCY DEPT VISIT LOW MDM: CPT | Performed by: OBSTETRICS & GYNECOLOGY

## 2025-05-10 PROCEDURE — 87077 CULTURE AEROBIC IDENTIFY: CPT | Performed by: OBSTETRICS & GYNECOLOGY

## 2025-05-10 RX ORDER — ACETAMINOPHEN 500 MG
1000 TABLET ORAL ONCE
Status: COMPLETED | OUTPATIENT
Start: 2025-05-10 | End: 2025-05-10

## 2025-05-10 RX ORDER — ACETAMINOPHEN 500 MG
1000 TABLET ORAL EVERY 6 HOURS PRN
COMMUNITY

## 2025-05-10 RX ADMIN — ACETAMINOPHEN 1000 MG: 500 TABLET, FILM COATED ORAL at 20:31

## 2025-05-10 NOTE — OBED NOTES
"[place] [facility] [place] Name: Yuki Shields  YOB: 1990  MRN: [id number]  Admission Date: 5/10/2025  7:14 PM  Date of Service: 5/10/2025    Chief Complaint: Decreased Fetal Movement (Pt reports a change in fetal movement starting yesterday. Pt states\" The baby isnt moving like they normally do.\" Pt has also has a headache rating 4/10 and reports taking 1 Gram of Tylenol this am and had relief but the headache came back this afternoon; denies taking any other medication for relief. Denies LOF, VB, or contractions. )        Subjective     Yuki Shields is a 34 y.o. female  at 23w3d with Estimated Date of Delivery: 9/3/25 who presents with the chief complaint listed above.  She sees Valerie Taylor MD for her prenatal care. Her pregnancy has been complicated by: advance maternal age, depression with anxiety, prothrombin gene mutation on Lovenox, chronic hypertension, asthma, anemia.    She describes fetal movement as decreased.  She denies rupture of membranes.  She denies vaginal bleeding. She feels occasional \"Moore-Courtney\".          Objective   Patient Active Problem List    Diagnosis     Adverse reaction to compound iron preparation [T45.4X5A]     Iron deficiency anemia [D50.9]     Generalized anxiety disorder [F41.1]     Major depressive disorder, recurrent episode, in partial remission [F33.41]     Chronic constipation [K59.09]     Generalized abdominal pain [R10.84]     Nausea [R11.0]     Heartburn [R12]     Epigastric pain [R10.13]     Unwanted fertility [Z30.09]      (spontaneous vaginal delivery) [O80]     Anemia [D64.9]     Asthma [J45.909]     Stress [F43.9]     Folic acid deficiency [E53.8]     Memory impairment [R41.3]     Obesity [E66.9]     Coagulopathy [D68.9]     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]     DDD (degenerative disc disease), lumbar [M51.369]     Prothrombin gene mutation [D68.52]     Essential hypertension [I10]     Pain [R52]         OB History    Para " Term  AB Living   6 4 4 0 1 4   SAB IAB Ectopic Molar Multiple Live Births   1 0 0 0 0 4      # Outcome Date GA Lbr Miguel/2nd Weight Sex Type Anes PTL Lv   6 Current            5 SAB  6w0d          4 Term 23 37w0d 01:05 / 00:14 2530 g (5 lb 9.2 oz) F Vag-Spont EPI N MELANIA      Birth Comments: Gurinder ld 3      Name: Carey Shields      Apgar1: 8  Apgar5: 9   3 Term 04/29/15   3317 g (7 lb 5 oz) M Vag-Spont   MELANIA   2 Term 11   3147 g (6 lb 15 oz) F Vag-Spont   MELANIA   1 Term 07   3204 g (7 lb 1 oz) M Vag-Spont   MELANIA        Past Medical History:   Diagnosis Date    Anemia     Anxiety and depression     Asthma     As child    DDD (degenerative disc disease), lumbar     Deep vein thrombosis     Essential hypertension 2014    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 MEHTA AWARE OF THIS    Family hx-blood disorder     MOTHER HAS FACTOR 2 AND FACTOR 5    GERD (gastroesophageal reflux disease)     Heart murmur     History of blood transfusion     Labral tear of hip, degenerative     RIGHT    Low back pain     Lumbar herniated disc 06/2017    X 2  DISC   HISTORY OF EPIDURALS    Memory loss     Migraine     Pneumonia     PONV (postoperative nausea and vomiting)     Urinary tract infection        Past Surgical History:   Procedure Laterality Date    ABDOMINOPLASTY  2019    ADENOIDECTOMY      BLADDER REPAIR  2016    WITH MESH    BREAST AUGMENTATION BILATERAL MASTOPEXY Bilateral 2017    Procedure: BREAST AUGMENTATION, MASTOPEXY W/ IDEAL IMPLANTS  NEW IMAGE;  Surgeon: Nirmal Mehta MD;  Location: [facility] MAIN OR;  Service:     BREAST SURGERY      CERVICAL/THORACIC FACET INJECTION      pt states cervial fusion in the past , PONV from these    CHOLECYSTECTOMY      COLONOSCOPY N/A 2024    Procedure: COLONOSCOPY TO CECUM;  Surgeon: Pedro Burns MD;  [place] [facility] [place]  Service: Gastroenterology;  Laterality: N/A;  hemorrhoids    COSMETIC SURGERY      ENDOSCOPY  N/A 8/16/2024    Procedure: ESOPHAGOGASTRODUODENOSCOPY (EGD) WITH BIOPSY;  Surgeon: Pedro Burns MD;  Location: [facility] [place];  Service: Gastroenterology;  Laterality: N/A;  mild gastritis    TONSILLECTOMY AND ADENOIDECTOMY  1990       No current facility-administered medications on file prior to encounter.     Current Outpatient Medications on File Prior to Encounter   Medication Sig Dispense Refill    acetaminophen (TYLENOL) 500 MG tablet Take 2 tablets by mouth Every 6 (Six) Hours As Needed for Mild Pain.      albuterol sulfate  (90 Base) MCG/ACT inhaler Inhale 2 puffs Every 4 (Four) Hours As Needed for Wheezing. 6.7 g 1    Asmanex, 30 Metered Doses, 220 MCG/ACT inhaler       Enoxaparin Sodium (LOVENOX) 40 MG/0.4ML solution prefilled syringe syringe Inject 0.4 mL under the skin into the appropriate area as directed Daily. 12 mL 8    Loratadine (CLARITIN PO) Take  by mouth Daily.      pantoprazole (PROTONIX) 40 MG EC tablet Take 1 tablet by mouth Daily. 90 tablet 3    venlafaxine XR (EFFEXOR-XR) 75 MG 24 hr capsule Take 1 capsule by mouth Daily. 30 capsule 1    metoclopramide (REGLAN) 5 MG tablet Take 1 tablet by mouth 4 (Four) Times a Day. (Patient not taking: Reported on 4/21/2025)      promethazine (PHENERGAN) 25 MG tablet Take 1 tablet by mouth. (Patient not taking: Reported on 4/21/2025)         Allergies   Allergen Reactions    Dilaudid [Hydromorphone Hcl] Nausea And Vomiting    Hydromorphone Dizziness     Category: Allergy;       Family History   Problem Relation Age of Onset    Ulcerative colitis Mother     Irritable bowel syndrome Mother     Ulcerative colitis Father     Diverticulitis Father     Malig Hyperthermia Neg Hx     Colon cancer Neg Hx     Colon polyps Neg Hx     Crohn's disease Neg Hx        Social History     Socioeconomic History    Marital status:     Number of children: 3   Tobacco Use    Smoking status: Never    Smokeless tobacco: Never   Vaping Use    Vaping status:  "Never Used   Substance and Sexual Activity    Alcohol use: Not Currently     Comment: OCCAS    Drug use: Never    Sexual activity: Yes     Partners: Male     Birth control/protection: Natural family planning/Rhythm           Review of Systems   Constitutional: Negative.    Respiratory: Negative.     Cardiovascular: Negative.    Gastrointestinal: Negative.    Genitourinary: Negative.    Neurological:  Positive for headaches.          PHYSICAL EXAM:      VITAL SIGNS:  Vitals:    05/10/25 1930 05/10/25 1934 05/10/25 1940 05/10/25 1944   BP:       BP Location:       Patient Position:       Pulse: 90 94 90    Resp:       Temp:       TempSrc:       SpO2: 97% 98% 99%    Weight:       Height:    160 cm (63\")      /76    FHR:              160 bpm        PHYSICAL EXAM:    General: well developed; well nourished  no acute distress  mentation appropriate   Heart: Not performed.   Lungs  : breathing is unlabored     Abdomen: soft, non-tender; no masses       Cervix: was not checked.      Contractions: none        Extremities: peripheral pulses normal, no pedal edema, no clubbing or cyanosis      LABS AND TESTING ORDERED:  Uterine and fetal monitoring  Urinalysis      LAB RESULTS:    Recent Results (from the past 24 hours)   Urinalysis With Culture If Indicated - Urine, Clean Catch    Collection Time: 05/10/25  7:24 PM    Specimen: Urine, Clean Catch   Result Value Ref Range    Color, UA Yellow Yellow, Straw    Appearance, UA Clear Clear    pH, UA 7.0 5.0 - 8.0    Specific Gravity, UA 1.014 1.005 - 1.030    Glucose, UA Negative Negative    Ketones, UA Negative Negative    Bilirubin, UA Negative Negative    Blood, UA Negative Negative    Protein, UA Negative Negative    Leuk Esterase, UA Negative Negative    Nitrite, UA Negative Negative    Urobilinogen, UA 0.2 E.U./dL 0.2 - 1.0 E.U./dL       Lab Results   Component Value Date    ABO O 02/19/2025    RH Positive 02/19/2025       Lab Results   Component Value Date    STREPGPB " Negative 10/30/2023                 Assessment & Plan     ASSESSMENT/PLAN:  Yuki Shields is a 34 y.o. female  at 23w3d who presented with: decreased fetal movement        PLAN:     Fetal status reassuring    Patient requested bile acids due to history of cholestasis in previous pregnancy.  Bile acids drawn two weeks ago were normal.  Advised patient that it is unlikely her results will be different today at this early gestational age.    Patient offered combination of Tylenol/Benadryl/Anti-emetic for her headache or Fioricet. She prefers to take plain Tylenol. Tylenol 1000 mg was given in DAO.    Patient discharged home with labor precautions. She will keep her scheduled prenatal appointment in two weeks.    I have spent 20 minutes including face to face time with the patient, greater than 50% in discussion of the diagnosis (counseling) and/or coordination of care.     Opal Rodriguez MD  5/10/2025  20:15 EDT  OB Hospitalist  Phone:  [phone]

## 2025-05-12 LAB — BACTERIA SPEC AEROBE CULT: ABNORMAL

## 2025-05-13 ENCOUNTER — RESULTS FOLLOW-UP (OUTPATIENT)
Dept: EMERGENCY DEPT | Facility: HOSPITAL | Age: 35
End: 2025-05-13
Payer: MEDICAID

## 2025-05-13 DIAGNOSIS — O23.42 URINARY TRACT INFECTION IN MOTHER DURING SECOND TRIMESTER OF PREGNANCY: Primary | ICD-10-CM

## 2025-05-13 RX ORDER — CEPHALEXIN 500 MG/1
500 CAPSULE ORAL 4 TIMES DAILY
Qty: 28 CAPSULE | Refills: 0 | Status: SHIPPED | OUTPATIENT
Start: 2025-05-13 | End: 2025-05-20

## 2025-05-22 ENCOUNTER — OFFICE VISIT (OUTPATIENT)
Dept: ONCOLOGY | Facility: CLINIC | Age: 35
End: 2025-05-22
Payer: MEDICAID

## 2025-05-22 ENCOUNTER — ROUTINE PRENATAL (OUTPATIENT)
Dept: OBSTETRICS AND GYNECOLOGY | Facility: CLINIC | Age: 35
End: 2025-05-22
Payer: MEDICAID

## 2025-05-22 ENCOUNTER — LAB (OUTPATIENT)
Dept: LAB | Facility: HOSPITAL | Age: 35
End: 2025-05-22
Payer: MEDICAID

## 2025-05-22 VITALS
DIASTOLIC BLOOD PRESSURE: 78 MMHG | WEIGHT: 169.6 LBS | BODY MASS INDEX: 30.05 KG/M2 | OXYGEN SATURATION: 98 % | HEART RATE: 84 BPM | SYSTOLIC BLOOD PRESSURE: 120 MMHG | HEIGHT: 63 IN | TEMPERATURE: 98.3 F

## 2025-05-22 VITALS — SYSTOLIC BLOOD PRESSURE: 120 MMHG | DIASTOLIC BLOOD PRESSURE: 73 MMHG | BODY MASS INDEX: 29.94 KG/M2 | WEIGHT: 169 LBS

## 2025-05-22 DIAGNOSIS — Z87.59 HISTORY OF CHOLESTASIS DURING PREGNANCY: ICD-10-CM

## 2025-05-22 DIAGNOSIS — O09.522 MULTIGRAVIDA OF ADVANCED MATERNAL AGE IN SECOND TRIMESTER: ICD-10-CM

## 2025-05-22 DIAGNOSIS — D50.0 IRON DEFICIENCY ANEMIA DUE TO CHRONIC BLOOD LOSS: ICD-10-CM

## 2025-05-22 DIAGNOSIS — L29.9 PRURITUS OF PREGNANCY IN SECOND TRIMESTER: ICD-10-CM

## 2025-05-22 DIAGNOSIS — O99.119 THROMBOPHILIA AFFECTING PREGNANCY, ANTEPARTUM: ICD-10-CM

## 2025-05-22 DIAGNOSIS — R74.8 ELEVATED LIVER ENZYMES: ICD-10-CM

## 2025-05-22 DIAGNOSIS — Z3A.25 25 WEEKS GESTATION OF PREGNANCY: Primary | ICD-10-CM

## 2025-05-22 DIAGNOSIS — J45.909 ASTHMA DURING PREGNANCY: ICD-10-CM

## 2025-05-22 DIAGNOSIS — Z87.440 HISTORY OF UTI: ICD-10-CM

## 2025-05-22 DIAGNOSIS — Z87.59 HISTORY OF PRIOR PREGNANCY WITH IUGR NEWBORN: ICD-10-CM

## 2025-05-22 DIAGNOSIS — E53.8 VITAMIN B12 DEFICIENCY: ICD-10-CM

## 2025-05-22 DIAGNOSIS — Z34.82 PRENATAL CARE, SUBSEQUENT PREGNANCY IN SECOND TRIMESTER: ICD-10-CM

## 2025-05-22 DIAGNOSIS — D68.59 THROMBOPHILIA AFFECTING PREGNANCY, ANTEPARTUM: ICD-10-CM

## 2025-05-22 DIAGNOSIS — O99.712 PRURITUS OF PREGNANCY IN SECOND TRIMESTER: ICD-10-CM

## 2025-05-22 DIAGNOSIS — Z36.89 ENCOUNTER FOR ULTRASOUND TO ASSESS FETAL GROWTH: ICD-10-CM

## 2025-05-22 DIAGNOSIS — O99.519 ASTHMA DURING PREGNANCY: ICD-10-CM

## 2025-05-22 DIAGNOSIS — Z87.19 HISTORY OF CHOLESTASIS DURING PREGNANCY: ICD-10-CM

## 2025-05-22 DIAGNOSIS — D68.52 PROTHROMBIN GENE MUTATION: Primary | ICD-10-CM

## 2025-05-22 DIAGNOSIS — Z79.01 CHRONIC ANTICOAGULATION: ICD-10-CM

## 2025-05-22 LAB
ALBUMIN SERPL-MCNC: 3.7 G/DL (ref 3.5–5.2)
ALBUMIN/GLOB SERPL: 1.3 G/DL
ALP SERPL-CCNC: 174 U/L (ref 39–117)
ALT SERPL W P-5'-P-CCNC: 12 U/L (ref 1–33)
ANION GAP SERPL CALCULATED.3IONS-SCNC: 9.2 MMOL/L (ref 5–15)
AST SERPL-CCNC: 13 U/L (ref 1–32)
BASOPHILS # BLD AUTO: 0.06 10*3/MM3 (ref 0–0.2)
BASOPHILS NFR BLD AUTO: 0.5 % (ref 0–1.5)
BILIRUB SERPL-MCNC: 0.3 MG/DL (ref 0–1.2)
BUN SERPL-MCNC: 7 MG/DL (ref 6–20)
BUN/CREAT SERPL: 8 (ref 7–25)
CALCIUM SPEC-SCNC: 9.5 MG/DL (ref 8.6–10.5)
CHLORIDE SERPL-SCNC: 104 MMOL/L (ref 98–107)
CO2 SERPL-SCNC: 22.8 MMOL/L (ref 22–29)
CREAT SERPL-MCNC: 0.87 MG/DL (ref 0.57–1)
DEPRECATED RDW RBC AUTO: 50.1 FL (ref 37–54)
EGFRCR SERPLBLD CKD-EPI 2021: 89.2 ML/MIN/1.73
EOSINOPHIL # BLD AUTO: 0.08 10*3/MM3 (ref 0–0.4)
EOSINOPHIL NFR BLD AUTO: 0.7 % (ref 0.3–6.2)
ERYTHROCYTE [DISTWIDTH] IN BLOOD BY AUTOMATED COUNT: 14.6 % (ref 12.3–15.4)
ERYTHROCYTE [DISTWIDTH] IN BLOOD BY AUTOMATED COUNT: 14.7 % (ref 12.3–15.4)
FERRITIN SERPL-MCNC: 213 NG/ML (ref 13–150)
FOLATE SERPL-MCNC: 17.1 NG/ML (ref 4.78–24.2)
GLOBULIN UR ELPH-MCNC: 2.9 GM/DL
GLUCOSE 1H P 50 G GLC PO SERPL-MCNC: 105 MG/DL (ref 65–139)
GLUCOSE SERPL-MCNC: 88 MG/DL (ref 65–99)
GLUCOSE UR STRIP-MCNC: NEGATIVE MG/DL
HCT VFR BLD AUTO: 33.7 % (ref 34–46.6)
HCT VFR BLD AUTO: 33.9 % (ref 34–46.6)
HGB BLD-MCNC: 11.3 G/DL (ref 12–15.9)
HGB BLD-MCNC: 11.4 G/DL (ref 12–15.9)
IMM GRANULOCYTES # BLD AUTO: 0.16 10*3/MM3 (ref 0–0.05)
IMM GRANULOCYTES NFR BLD AUTO: 1.3 % (ref 0–0.5)
IRON 24H UR-MRATE: 97 MCG/DL (ref 37–145)
IRON SATN MFR SERPL: 24 % (ref 20–50)
LYMPHOCYTES # BLD AUTO: 1.63 10*3/MM3 (ref 0.7–3.1)
LYMPHOCYTES NFR BLD AUTO: 13.6 % (ref 19.6–45.3)
MCH RBC QN AUTO: 30.9 PG (ref 26.6–33)
MCH RBC QN AUTO: 31.1 PG (ref 26.6–33)
MCHC RBC AUTO-ENTMCNC: 33.3 G/DL (ref 31.5–35.7)
MCHC RBC AUTO-ENTMCNC: 33.8 G/DL (ref 31.5–35.7)
MCV RBC AUTO: 92.1 FL (ref 79–97)
MCV RBC AUTO: 92.6 FL (ref 79–97)
MONOCYTES # BLD AUTO: 0.58 10*3/MM3 (ref 0.1–0.9)
MONOCYTES NFR BLD AUTO: 4.8 % (ref 5–12)
NEUTROPHILS NFR BLD AUTO: 79.1 % (ref 42.7–76)
NEUTROPHILS NFR BLD AUTO: 9.5 10*3/MM3 (ref 1.7–7)
NRBC BLD AUTO-RTO: 0 /100 WBC (ref 0–0.2)
PLATELET # BLD AUTO: 267 10*3/MM3 (ref 140–450)
PLATELET # BLD AUTO: 274 10*3/MM3 (ref 140–450)
PMV BLD AUTO: 9 FL (ref 6–12)
POTASSIUM SERPL-SCNC: 4.2 MMOL/L (ref 3.5–5.2)
PROT SERPL-MCNC: 6.6 G/DL (ref 6–8.5)
PROT UR STRIP-MCNC: ABNORMAL MG/DL
RBC # BLD AUTO: 3.66 10*6/MM3 (ref 3.77–5.28)
RBC # BLD AUTO: 3.66 10*6/MM3 (ref 3.77–5.28)
SODIUM SERPL-SCNC: 136 MMOL/L (ref 136–145)
TIBC SERPL-MCNC: 399 MCG/DL (ref 298–536)
TRANSFERRIN SERPL-MCNC: 268 MG/DL (ref 200–360)
VIT B12 BLD-MCNC: 449 PG/ML (ref 211–946)
WBC # BLD AUTO: 10.96 10*3/MM3 (ref 3.4–10.8)
WBC NRBC COR # BLD AUTO: 12.01 10*3/MM3 (ref 3.4–10.8)

## 2025-05-22 PROCEDURE — 82746 ASSAY OF FOLIC ACID SERUM: CPT | Performed by: INTERNAL MEDICINE

## 2025-05-22 PROCEDURE — 80053 COMPREHEN METABOLIC PANEL: CPT

## 2025-05-22 PROCEDURE — 82728 ASSAY OF FERRITIN: CPT

## 2025-05-22 PROCEDURE — 82607 VITAMIN B-12: CPT | Performed by: INTERNAL MEDICINE

## 2025-05-22 PROCEDURE — 36415 COLL VENOUS BLD VENIPUNCTURE: CPT

## 2025-05-22 PROCEDURE — 83540 ASSAY OF IRON: CPT

## 2025-05-22 PROCEDURE — 85025 COMPLETE CBC W/AUTO DIFF WBC: CPT

## 2025-05-22 PROCEDURE — 84466 ASSAY OF TRANSFERRIN: CPT

## 2025-05-22 NOTE — PROGRESS NOTES
Chief Complaint   Patient presents with    Routine Prenatal Visit      Yuki Shields is a 34 y.o.  at 25w1d who presents of routine prenatal visit. She reports having itching in her hands for several weeks now. She has been having mild Terry Courtney contractions. Denies vaginal bleeding or LOF. She reports feeling active fetal movement.     /73   Wt 76.7 kg (169 lb)   LMP 2024   BMI 29.94 kg/m²    Gen: well appearing, NAD   Abd: gravid, nontender  See OB Flowsheet    ASSESSMENT:   IUP at 25w1d   Prenatal care in second trimester  History of cholestasis x 3 in prior pregnancies- baseline LFTs normal  Asthma affecting pregnancy  Thrombophilia affecting pregnancy- Prothrombin gene mutation heterozygote - on Lovenox 40 mg daily   AMA- low risk NIPT  History of IUGR in prior pregnancy  History of postpartum hemorrhage   Pruritus of pregnancy in second trimester   History of UTI     PLAN:  Problem list reviewed and updated.   Reviewed expectations of this stage of pregnancy.   Second trimester precautions reviewed including  labor precautions, anticipated fetal movements.   Discussed ultrasound results that demonstrated completed and normal fetal anatomy survey with normal appearing renal arteries seen today. Normal fetal growth was noted with  g (EFW 55%ile, AC 48%ile), normal CHAITANYA 13.8 cm, posterior placenta, cephalic presentation,  bpm. Will continue serial growth ultrasounds every 4 weeks given AMA status and thrombophilia affecting pregnancy,.   Collected 1h GTT, CBC (will be performed at her hematologist office), T. Pallidum Antibody today.   Also will collect CMP (will be performed at her hematologist office) and bile acids given pruritus of the hands and history of cholestasis in prior pregnancies.   Collected urine culture for test of cure given history of UTI.   Return in about 4 weeks (around 2025) for Prenatal visit, growth ultrasound.    Patient Active Problem  List    Diagnosis Date Noted    Adverse reaction to compound iron preparation 2025    Iron deficiency anemia 2025    Generalized anxiety disorder 2024    Major depressive disorder, recurrent episode, in partial remission 2024    Chronic constipation 2024    Generalized abdominal pain 2024    Nausea 2024    Heartburn 2024    Epigastric pain 2024    Unwanted fertility 2024     (spontaneous vaginal delivery) 2023    Anemia 09/15/2023    Asthma 09/15/2023    Stress 2021    Folic acid deficiency 2020    Memory impairment 2020    Obesity 2020    Coagulopathy 2019    Factor II deficiency 2017    DDD (degenerative disc disease), lumbar 2017    Prothrombin gene mutation 2016    Essential hypertension 2014    Pain 2012       Orders Placed This Encounter   Procedures    Urine Culture - Urine, Urine, Clean Catch     Release to patient:   Routine Release [[id number]]    US Ob Follow Up Transabdominal Approach     Standing Status:   Future     Expected Date:   2025     Expiration Date:   2026     Reason for Exam::   fetal growth assessment, AMA, thromobphilia affecting pregnancy     Release to patient:   Routine Release [[id number]]    Bile Acids, Total     Release to patient:   Routine Release [[id number]]     [facility] Has the patient fasted?:   No    POC Urinalysis Dipstick     Release to patient:   Routine Release [[id number]]     Valerie Taylor MD

## 2025-05-22 NOTE — PROGRESS NOTES
"Subjective     CHIEF COMPLAINT:      Chief Complaint   Patient presents with    Follow-up     HISTORY OF PRESENT ILLNESS:     Yuki Shields is a 34 y.o. female patient who returns today for follow up on her prothrombin gene mutation.  She is on anticoagulation with Lovenox 40 mg SQ daily.  She is tolerating it but reports that she is now running out of places for the injection as she progresses through the pregnancy.  She is not having problem with chest pain or shortness of breath.  No significant leg swelling.    ROS:  Pertinent ROS is in the HPI.     Past medical, surgical, social and family history were reviewed.     MEDICATIONS:    Current Outpatient Medications:     acetaminophen (TYLENOL) 500 MG tablet, Take 2 tablets by mouth Every 6 (Six) Hours As Needed for Mild Pain., Disp: , Rfl:     albuterol sulfate  (90 Base) MCG/ACT inhaler, Inhale 2 puffs Every 4 (Four) Hours As Needed for Wheezing., Disp: 6.7 g, Rfl: 1    Asmanex, 30 Metered Doses, 220 MCG/ACT inhaler, , Disp: , Rfl:     Enoxaparin Sodium (LOVENOX) 40 MG/0.4ML solution prefilled syringe syringe, Inject 0.4 mL under the skin into the appropriate area as directed Daily., Disp: 12 mL, Rfl: 8    Loratadine (CLARITIN PO), Take  by mouth Daily., Disp: , Rfl:     pantoprazole (PROTONIX) 40 MG EC tablet, Take 1 tablet by mouth Daily., Disp: 90 tablet, Rfl: 3    promethazine (PHENERGAN) 25 MG tablet, Take 1 tablet by mouth., Disp: , Rfl:     venlafaxine XR (EFFEXOR-XR) 75 MG 24 hr capsule, Take 1 capsule by mouth Daily., Disp: 30 capsule, Rfl: 1  Objective     VITAL SIGNS:     Vitals:    05/22/25 1619   BP: 120/78   Pulse: 84   Temp: 98.3 °F (36.8 °C)   TempSrc: Oral   SpO2: 98%   Weight: 76.9 kg (169 lb 9.6 oz)   Height: 160 cm (62.99\")   PainSc: 0-No pain     Body mass index is 30.05 kg/m².     Wt Readings from Last 5 Encounters:   05/22/25 76.9 kg (169 lb 9.6 oz)   05/22/25 76.7 kg (169 lb)   05/10/25 76.8 kg (169 lb 6.4 oz)   05/08/25 75.8 kg " (167 lb)   05/08/25 75.8 kg (167 lb)     PHYSICAL EXAMINATION:   GENERAL: The patient appears in good general condition, not in acute distress.   SKIN: No Ecchymosis.  EYES: No jaundice. No Pallor.  CHEST: Normal respiratory effort. Normal breathing sounds bilaterally. No added sounds.  CVS: Normal S1 and S2. No Murmur.  ABDOMEN: Gravid.  EXTREMITIES: No edema.  No calf tenderness.    DIAGNOSTIC DATA:     Results from last 7 days   Lab Units 05/22/25  1551   WBC 10*3/mm3 12.01*   NEUTROS ABS 10*3/mm3 9.50*   HEMOGLOBIN g/dL 11.3*   HEMATOCRIT % 33.9*   PLATELETS 10*3/mm3 267     Results from last 7 days   Lab Units 05/22/25  1551   SODIUM mmol/L 136   POTASSIUM mmol/L 4.2   CHLORIDE mmol/L 104   CO2 mmol/L 22.8   BUN mg/dL 7   CREATININE mg/dL 0.87   CALCIUM mg/dL 9.5   ALBUMIN g/dL 3.7   BILIRUBIN mg/dL 0.3   ALK PHOS U/L 174*   ALT (SGPT) U/L 12   AST (SGOT) U/L 13   GLUCOSE mg/dL 88         Lab 05/22/25  1551   IRON 97   IRON SATURATION (TSAT) 24   TIBC 399   TRANSFERRIN 268   FERRITIN 213.00*   FOLATE 17.10   VITAMIN B 12 449      Assessment & Plan    *Prothrombin gene mutation.  Patient was diagnosed when she was a teenager after her aunt and mother were both diagnosed with VTE.  The patient's mother has prothrombin gene and factor V Leiden mutations.  Patient's sister has factor V Leiden mutation and had miscarriages.  The patient herself has no history of venous thromboembolism.  She was previously placed on prophylactic anticoagulation with Lovenox 40 mg during her previous pregnancies.  Patient became pregnant in 2023.  Patient was started on Lovenox 40 mg SQ daily at 6 weeks gestation.  On 10/19/2023, she was switched to heparin 5000 SQ every 12 hours.  She delivered on 11/6/2023.  She completed the 6 weeks of Lovenox 40 mg SQ daily after delivery.    With her prothrombin gene mutation, she is at increased risk for development of DVT/PE in the future.  She was advised to take aspirin 81 mg when she travels  (the day of and the day after she travels).  She was advised to maintain adequate hydration.  1/9/2025: Patient 6 weeks pregnant. GA 6 weeks.  Due to the prothrombin gene mutation and due to the family history of thrombosis and recurrent miscarriages, prophylactic anticoagulation with Lovenox was recommended.   She was started on Lovenox 40 mg SQ Q 24 hours.   5/22/2025: Patient is 25w +1d.  She is tolerating the Lovenox.  She has some bruising over the abdomen but not in other areas of her body.  No symptoms or signs of thrombosis.    *Iron deficiency anemia.  Patient developed severe anemia after she had bleeding after abdominoplasty surgery in 2019.  Reports that her hemoglobin decreased to 3.0 and required PRBC transfusions.  She was in the ICU at that time.  Patient reports weakness in the legs.  6/12/2023: Hemoglobin 12.1.  Hemoglobin decreased to 11.8 on 7/25/2023.  She was started on ferrous sulfate 325 mg once daily.  9/5/2023: Hemoglobin 12.3.  Transferrin saturation decreased to 13%.  Ferritin is 37.2.   She was continued on the ferrous sulfate daily along with the prenatal vitamin.  11/1/2023: Hemoglobin 12.6.  Iron stores remained low with a transferrin saturation at 6%.  She continued on ferrous sulfate until she delivered.  1/4/2024: Hemoglobin 12.7.  Ferritin 153.  Transferrin saturation 18%.  She was restarted on ferrous sulfate 325 mg once weekly.  1/9/2025: Hemoglobin 13.2.  Ferritin decreased to 28.6. Transferrin saturation decreased to 16%.  Patient continued with the prenatal vitamin daily and did not start the ferrous sulfate.  2/20/2025: Hemoglobin 12.4.  Ferritin 22.2.  Transferrin saturation decreased to 9%.  4/10/2025: Hemoglobin decreased to 11.4.  Ferritin decreased to <8.  Transferrin saturation 10%.  She has not been able to tolerate oral iron with development of nausea every time she took it.  S/p IV Venofer 300 mg weekly x 3 between 4/24/2025 and 5/8/2025.  5/22/2025: Hemoglobin  11.3.  Ferritin improved to 213.  Transferrin saturation improved to 24%.  She noticed some improvement in the fatigue after receiving the IV iron.     *Vitamin B12 deficiency   Vitamin B12 is low normal at 304.   She was started on vitamin B12 1000 mcg daily.  11/1/2023: Hemoglobin improved to 12.6.  Vitamin B12 improved to 920.  She stopped taking the vitamin B12.  1/9/2025: Vitamin B12 adequate at 699.   5/22/2025: Vitamin B12 449.     *Elevated liver enzymes.  Patient reports developing this with her previous pregnancies.  Patient reports developing itching.  Her OB started her on Ursodiol and Atarax.  11/1/2023: ALT 86 and AST 87.   Bilirubin 0.4. ALK PHOS 297.  1/4/2024: ALT increased to 111.  AST decreased to 79.  Bilirubin 0.4.  Alk phos increased to 265.  Repeat labs on 2/7/2024 revealed improvement with ALT at 20, AST at 17, alkaline phosphatase at 122 and bilirubin 0.3.  1/9/2025: ALT 14. AST 20. ALK PHOS 125. Bilirubin 0.3.  2/20/2025: ALT 21.  AST 14.  Alkaline phosphatase 141 (attributed to pregnancy).  Bilirubin 0.2.  4/10/2025: ALT 16.  AST 18.  Alkaline phosphatase 163.  Bilirubin 0.2.  5/22/2025: ALT 12.  AST 13.  Alkaline phosphatase 174 (attributed to pregnancy) bilirubin 0.3.     PLAN:     1.  Continue Lovenox 40 mg SQ daily.  2.  Continue prenatal multivitamin daily.  She does not need additional vitamin supplementation at this point.  3.  Follow-up in 6 weeks.  We will obtain CBC CMP ferritin iron panel and vitamin B12 levels.      Magan Guido MD  05/22/25

## 2025-05-23 LAB — TREPONEMA PALLIDUM IGG+IGM AB [PRESENCE] IN SERUM OR PLASMA BY IMMUNOASSAY: NON REACTIVE

## 2025-05-24 LAB
BACTERIA UR CULT: NO GROWTH
BACTERIA UR CULT: NORMAL
BILE AC SERPL-SCNC: 7 UMOL/L (ref 0–10)

## 2025-06-04 ENCOUNTER — TELEPHONE (OUTPATIENT)
Dept: FAMILY MEDICINE CLINIC | Facility: CLINIC | Age: 35
End: 2025-06-04

## 2025-06-04 NOTE — TELEPHONE ENCOUNTER
"  Caller: Yuki Shields \"Ali\"    Relationship: Self    Best call back number: [phone]    What was the call regarding: PATIENT STATES THAT SHE NEEDS RA TO SIGN WHERE IT SAYS SCREENS SIGNATURE ON THE PAPERWORK THAT SHE DROPPED OFF. SHE RECEIVED A CALL FROM THE COMPANY THAT SAID THIS. EMPLOYER ALSO SAID THAT THIS NEEDS TO BE DONE.   "

## 2025-06-09 RX ORDER — MOMETASONE FUROATE 220 UG/1
INHALANT RESPIRATORY (INHALATION)
Qty: 1 EACH | Refills: 1 | Status: SHIPPED | OUTPATIENT
Start: 2025-06-09

## 2025-06-12 ENCOUNTER — TELEPHONE (OUTPATIENT)
Dept: OBSTETRICS AND GYNECOLOGY | Facility: CLINIC | Age: 35
End: 2025-06-12
Payer: MEDICAID

## 2025-06-12 NOTE — TELEPHONE ENCOUNTER
"Hub staff attempted to follow warm transfer process and was unsuccessful     Caller: Yuki Shields \"Ali\"    Relationship to patient: Self    Best call back number: [phone]    Patient is needing: PATIENT CALLING TO RESCHEDULE HER ULTRASOUND AND OB FOLLOW UP APPOINTMENT THAT IS CURRENTLY SCHEDULED FOR JUNE 18TH WITH DR. HIGGINS.  PATIENT STATES SHE CAN DO ANY OTHER DAY OF THE WEEK OTHER THAN THE 18TH AND IS AVAILABLE TO GO TO ANY LOCATION AND IS AVAILABLE MORNING OR AFTERNOON.  PATIENT DOES STATE THAT IF THERE ARE NO OTHER APPOINTMENTS AVAILABLE NEXT WEEK SHE DOES WANT TO KEEP WHAT SHE DOES HAVE SCHEDULED.        "

## 2025-06-18 ENCOUNTER — ROUTINE PRENATAL (OUTPATIENT)
Dept: OBSTETRICS AND GYNECOLOGY | Facility: CLINIC | Age: 35
End: 2025-06-18
Payer: MEDICAID

## 2025-06-18 VITALS — WEIGHT: 169 LBS | BODY MASS INDEX: 29.94 KG/M2 | DIASTOLIC BLOOD PRESSURE: 74 MMHG | SYSTOLIC BLOOD PRESSURE: 130 MMHG

## 2025-06-18 DIAGNOSIS — O09.523 MULTIGRAVIDA OF ADVANCED MATERNAL AGE IN THIRD TRIMESTER: ICD-10-CM

## 2025-06-18 DIAGNOSIS — Z87.59 HISTORY OF POSTPARTUM HEMORRHAGE: ICD-10-CM

## 2025-06-18 DIAGNOSIS — O99.119 THROMBOPHILIA AFFECTING PREGNANCY, ANTEPARTUM: ICD-10-CM

## 2025-06-18 DIAGNOSIS — Z3A.29 29 WEEKS GESTATION OF PREGNANCY: Primary | ICD-10-CM

## 2025-06-18 DIAGNOSIS — D68.59 THROMBOPHILIA AFFECTING PREGNANCY, ANTEPARTUM: ICD-10-CM

## 2025-06-18 DIAGNOSIS — Z86.2 HISTORY OF ANEMIA: ICD-10-CM

## 2025-06-18 DIAGNOSIS — Z87.59 HISTORY OF PRIOR PREGNANCY WITH IUGR NEWBORN: ICD-10-CM

## 2025-06-18 DIAGNOSIS — Z87.59 HISTORY OF CHOLESTASIS DURING PREGNANCY: ICD-10-CM

## 2025-06-18 DIAGNOSIS — Z34.83 PRENATAL CARE, SUBSEQUENT PREGNANCY IN THIRD TRIMESTER: ICD-10-CM

## 2025-06-18 DIAGNOSIS — O99.713 PREGNANCY PRURITUS, THIRD TRIMESTER: ICD-10-CM

## 2025-06-18 DIAGNOSIS — Z36.89 ENCOUNTER FOR ULTRASOUND TO ASSESS FETAL GROWTH: ICD-10-CM

## 2025-06-18 DIAGNOSIS — L29.9 PREGNANCY PRURITUS, THIRD TRIMESTER: ICD-10-CM

## 2025-06-18 DIAGNOSIS — Z87.19 HISTORY OF CHOLESTASIS DURING PREGNANCY: ICD-10-CM

## 2025-06-18 LAB
GLUCOSE UR STRIP-MCNC: NEGATIVE MG/DL
PROT UR STRIP-MCNC: ABNORMAL MG/DL

## 2025-06-18 NOTE — PROGRESS NOTES
Chief Complaint   Patient presents with    Routine Prenatal Visit      Yuki Shields is a 34 y.o.  at 29w0d who presents for routine prenatal visit. She reports feeling very tired and has been having insomnia. She also reports feeling round ligament pain and feeling pelvic heaviness. Denies vaginal bleeding, cramping, contractions, LOF. She reports active fetal movement.   She reports having itching on her arms, legs and abdomen. She is concerned for cholestasis of pregnancy which she has had in multiple other pregnancies. Denies rash.     /74   Wt 76.7 kg (169 lb)   LMP 2024   BMI 29.94 kg/m²    Gen: well appearing, NAD   Abd: gravid, nontender  See OB Flowsheet    ASSESSMENT:   IUP at 29w0d   Prenatal care in third trimester   History of cholestasis x 3 in prior pregnancies- baseline LFTs normal  Asthma affecting pregnancy  Thrombophilia affecting pregnancy- Prothrombin gene mutation heterozygote - on Lovenox 40 mg daily SQ  AMA- low risk NIPT  History of IUGR in prior pregnancy  History of postpartum hemorrhage   Pruritus of pregnancy in third trimester   History of UTI - K. Pneumoniae - test of cure negative   Fetal growth assessment ultrasound   History of anemia     PLAN:  Problem list reviewed and updated.   Reviewed expectations of this stage of pregnancy. We discussed that tiredness and musculoskeletal discomfort is common at this stage of pregnancy. Advised use of belly band to help with pelvic heaviness and round ligament pain.   Third trimester precautions reviewed including labor signs, monitoring fetal movements.   Discussed fetal growth ultrasound that demonstrated normal fetal growth with EFW 1324 g (38%ile, AC 33%ile), normal CHAITANYA 14.1 cm,  bpm, posterior placenta, cephalic presentation. Will continue serial growth ultrasounds every 4 weeks given AMA status and thrombophilia affecting pregnancy.  Will obtain CMP and bile acids today to rule out cholestasis of pregnancy  given pruritus of pregnancy.  Will also collect CBC, ferritin, iron profile given history of anemia and fatigue that she is experiencing to rule out anemia.   Return in about 2 weeks (around 2025) for Prenatal visit.    Patient Active Problem List    Diagnosis Date Noted    Adverse reaction to compound iron preparation 2025    Iron deficiency anemia 2025    Generalized anxiety disorder 2024    Major depressive disorder, recurrent episode, in partial remission 2024    Chronic constipation 2024    Generalized abdominal pain 2024    Nausea 2024    Heartburn 2024    Epigastric pain 2024    Unwanted fertility 2024     (spontaneous vaginal delivery) 2023    Anemia 09/15/2023    Asthma 09/15/2023    Stress 2021    Folic acid deficiency 2020    Memory impairment 2020    Obesity 2020    Coagulopathy 2019    Factor II deficiency 2017    DDD (degenerative disc disease), lumbar 2017    Prothrombin gene mutation 2016    Essential hypertension 2014    Pain 2012       Orders Placed This Encounter   Procedures    CBC (No Diff)     Release to patient:   Routine Release [[id number]]    Iron Profile w/o Ferritin     Release to patient:   Routine Release [[id number]]    Ferritin     Release to patient:   Routine Release [[id number]]    Comprehensive Metabolic Panel     Release to patient:   Routine Release [[id number]]    Bile Acids, Total     Release to patient:   Routine Release [[id number]]    POC Urinalysis Dipstick     Release to patient:   Routine Release [[id number]]     Valerie Taylor MD

## 2025-06-19 LAB
ALBUMIN SERPL-MCNC: 3.9 G/DL (ref 3.9–4.9)
ALP SERPL-CCNC: 192 IU/L (ref 44–121)
ALT SERPL-CCNC: 8 IU/L (ref 0–32)
AST SERPL-CCNC: 13 IU/L (ref 0–40)
BILIRUB SERPL-MCNC: 0.2 MG/DL (ref 0–1.2)
BUN SERPL-MCNC: 8 MG/DL (ref 6–20)
BUN/CREAT SERPL: 12 (ref 9–23)
CALCIUM SERPL-MCNC: 9.2 MG/DL (ref 8.7–10.2)
CHLORIDE SERPL-SCNC: 101 MMOL/L (ref 96–106)
CO2 SERPL-SCNC: 18 MMOL/L (ref 20–29)
CREAT SERPL-MCNC: 0.66 MG/DL (ref 0.57–1)
EGFRCR SERPLBLD CKD-EPI 2021: 117 ML/MIN/1.73
ERYTHROCYTE [DISTWIDTH] IN BLOOD BY AUTOMATED COUNT: 14.3 % (ref 11.7–15.4)
FERRITIN SERPL-MCNC: 64 NG/ML (ref 15–150)
GLOBULIN SER CALC-MCNC: 2.6 G/DL (ref 1.5–4.5)
GLUCOSE SERPL-MCNC: 81 MG/DL (ref 70–99)
HCT VFR BLD AUTO: 40.5 % (ref 34–46.6)
HGB BLD-MCNC: 12.8 G/DL (ref 11.1–15.9)
IRON SATN MFR SERPL: 19 % (ref 15–55)
IRON SERPL-MCNC: 79 UG/DL (ref 27–159)
MCH RBC QN AUTO: 30.6 PG (ref 26.6–33)
MCHC RBC AUTO-ENTMCNC: 31.6 G/DL (ref 31.5–35.7)
MCV RBC AUTO: 97 FL (ref 79–97)
PLATELET # BLD AUTO: 268 X10E3/UL (ref 150–450)
POTASSIUM SERPL-SCNC: 4.2 MMOL/L (ref 3.5–5.2)
PROT SERPL-MCNC: 6.5 G/DL (ref 6–8.5)
RBC # BLD AUTO: 4.18 X10E6/UL (ref 3.77–5.28)
SODIUM SERPL-SCNC: 135 MMOL/L (ref 134–144)
TIBC SERPL-MCNC: 426 UG/DL (ref 250–450)
UIBC SERPL-MCNC: 347 UG/DL (ref 131–425)
WBC # BLD AUTO: 12.2 X10E3/UL (ref 3.4–10.8)

## 2025-06-20 LAB — BILE AC SERPL-SCNC: 4.2 UMOL/L (ref 0–10)

## 2025-07-01 ENCOUNTER — TELEPHONE (OUTPATIENT)
Age: 35
End: 2025-07-01
Payer: MEDICAID

## 2025-07-01 ENCOUNTER — ROUTINE PRENATAL (OUTPATIENT)
Dept: OBSTETRICS AND GYNECOLOGY | Facility: CLINIC | Age: 35
End: 2025-07-01
Payer: MEDICAID

## 2025-07-01 VITALS — SYSTOLIC BLOOD PRESSURE: 116 MMHG | WEIGHT: 168 LBS | DIASTOLIC BLOOD PRESSURE: 62 MMHG | BODY MASS INDEX: 29.77 KG/M2

## 2025-07-01 DIAGNOSIS — Z87.59 HISTORY OF CHOLESTASIS DURING PREGNANCY: ICD-10-CM

## 2025-07-01 DIAGNOSIS — M54.50 LOW BACK PAIN DURING PREGNANCY, THIRD TRIMESTER: ICD-10-CM

## 2025-07-01 DIAGNOSIS — O09.523 MULTIGRAVIDA OF ADVANCED MATERNAL AGE IN THIRD TRIMESTER: ICD-10-CM

## 2025-07-01 DIAGNOSIS — L29.9 PRURITUS OF PREGNANCY IN THIRD TRIMESTER: ICD-10-CM

## 2025-07-01 DIAGNOSIS — O09.293 HISTORY OF IUGR (INTRAUTERINE GROWTH RETARDATION) AND STILLBIRTH, CURRENTLY PREGNANT, THIRD TRIMESTER: ICD-10-CM

## 2025-07-01 DIAGNOSIS — Z3A.30 30 WEEKS GESTATION OF PREGNANCY: Primary | ICD-10-CM

## 2025-07-01 DIAGNOSIS — O26.893 LOW BACK PAIN DURING PREGNANCY, THIRD TRIMESTER: ICD-10-CM

## 2025-07-01 DIAGNOSIS — O99.713 PRURITUS OF PREGNANCY IN THIRD TRIMESTER: ICD-10-CM

## 2025-07-01 DIAGNOSIS — O99.513 ASTHMA AFFECTING PREGNANCY IN THIRD TRIMESTER: ICD-10-CM

## 2025-07-01 DIAGNOSIS — Z87.19 HISTORY OF CHOLESTASIS DURING PREGNANCY: ICD-10-CM

## 2025-07-01 DIAGNOSIS — J45.909 ASTHMA AFFECTING PREGNANCY IN THIRD TRIMESTER: ICD-10-CM

## 2025-07-01 LAB
GLUCOSE UR STRIP-MCNC: NEGATIVE MG/DL
PROT UR STRIP-MCNC: ABNORMAL MG/DL

## 2025-07-01 NOTE — PROGRESS NOTES
Chief Complaint   Patient presents with    Routine Prenatal Visit     OB follow up     Yuki Shields is a 34 y.o.  30w6d being seen today for her obstetrical visit.  Patient reports low back and hip pain for one week. She has maternity support belt, but is not wearing routinely. She treats pain with tylenol on occasion with improvement in pain. Fetal movement: normal. Hx cholestasis in pregnancy, she has ongoing itching, but this is not worsening.     Review of Systems  Cramping/contractions: denies  Vaginal bleeding: denies  Fetal movement: normal    /62   Wt 76.2 kg (168 lb)   LMP 2024   BMI 29.77 kg/m²   Prenatal Assessment  Fetal Heart Rate: 150  Movement: Present       Assessment/Plan    Diagnoses and all orders for this visit:    1. 30 weeks gestation of pregnancy (Primary)  -     POC Urinalysis Dipstick    2. Multigravida of advanced maternal age in third trimester    3. Asthma affecting pregnancy in third trimester    4. History of cholestasis during pregnancy  -     Comprehensive Metabolic Panel  -     Bile Acids, Total    5. History of IUGR (intrauterine growth retardation) and stillbirth, currently pregnant, third trimester    6. Pruritus of pregnancy in third trimester  -     Comprehensive Metabolic Panel  -     Bile Acids, Total    7. Low back pain during pregnancy, third trimester       Reviewed fetal kick counts  Reviewed S&S PTL  Discussed proper hydration  Asthma: well controlled at this time  Hx Cholestasis: Continues to have pruritus. Repeat CMP and bile acids today.   Low back pain: discussed can be normal at this stage of pregnancy. Recommend wearing maternity support belt daily, tylenol PRN pain, Discussed PT if no improvement or with worsening symptoms   AMA: planning repeat growth u/s in 2 weeks  Reviewed this stage of pregnancy  Problem list updated     Follow up in 2 week(s) with Dr. Taylor    I spent 35 minutes caring for Yuki on this date of service. This time  includes time spent by me in the following activities: preparing for the visit, reviewing tests, performing a medically appropriate examination and/or evaluation, counseling and educating the patient/family/caregiver, referring and communicating with other health care professionals, documenting information in the medical record, independently interpreting results and communicating that information with the patient/family/caregiver, care coordination, ordering medications, ordering test(s), obtaining a separately obtained history, and reviewing a separately obtained history    MICHAEL Martines  7/1/2025  12:14 EDT

## 2025-07-01 NOTE — TELEPHONE ENCOUNTER
Patient wanted to notify you that they are doing good and she went ahead and canceled the apt that was scheduled for 07/02/2025.

## 2025-07-01 NOTE — TELEPHONE ENCOUNTER
Noted.  Thank you!  I do recommend that the patient reschedules a follow-up appointment at a later date.

## 2025-07-02 LAB
ALBUMIN SERPL-MCNC: 3.8 G/DL (ref 3.9–4.9)
ALP SERPL-CCNC: 180 IU/L (ref 44–121)
ALT SERPL-CCNC: 10 IU/L (ref 0–32)
AST SERPL-CCNC: 16 IU/L (ref 0–40)
BILIRUB SERPL-MCNC: 0.3 MG/DL (ref 0–1.2)
BUN SERPL-MCNC: 6 MG/DL (ref 6–20)
BUN/CREAT SERPL: 9 (ref 9–23)
CALCIUM SERPL-MCNC: 9.1 MG/DL (ref 8.7–10.2)
CHLORIDE SERPL-SCNC: 101 MMOL/L (ref 96–106)
CO2 SERPL-SCNC: 19 MMOL/L (ref 20–29)
CREAT SERPL-MCNC: 0.67 MG/DL (ref 0.57–1)
EGFRCR SERPLBLD CKD-EPI 2021: 117 ML/MIN/1.73
GLOBULIN SER CALC-MCNC: 2.8 G/DL (ref 1.5–4.5)
GLUCOSE SERPL-MCNC: 93 MG/DL (ref 70–99)
POTASSIUM SERPL-SCNC: 4.4 MMOL/L (ref 3.5–5.2)
PROT SERPL-MCNC: 6.6 G/DL (ref 6–8.5)
SODIUM SERPL-SCNC: 136 MMOL/L (ref 134–144)

## 2025-07-03 LAB — BILE AC SERPL-SCNC: 5.4 UMOL/L (ref 0–10)

## 2025-07-14 ENCOUNTER — LAB (OUTPATIENT)
Dept: LAB | Facility: HOSPITAL | Age: 35
End: 2025-07-14
Payer: MEDICAID

## 2025-07-14 ENCOUNTER — ROUTINE PRENATAL (OUTPATIENT)
Dept: OBSTETRICS AND GYNECOLOGY | Facility: CLINIC | Age: 35
End: 2025-07-14
Payer: MEDICAID

## 2025-07-14 ENCOUNTER — TELEPHONE (OUTPATIENT)
Dept: ONCOLOGY | Facility: CLINIC | Age: 35
End: 2025-07-14
Payer: MEDICAID

## 2025-07-14 ENCOUNTER — OFFICE VISIT (OUTPATIENT)
Dept: ONCOLOGY | Facility: CLINIC | Age: 35
End: 2025-07-14
Payer: MEDICAID

## 2025-07-14 VITALS — DIASTOLIC BLOOD PRESSURE: 73 MMHG | BODY MASS INDEX: 30.12 KG/M2 | SYSTOLIC BLOOD PRESSURE: 111 MMHG | WEIGHT: 170 LBS

## 2025-07-14 VITALS
WEIGHT: 171.2 LBS | OXYGEN SATURATION: 96 % | TEMPERATURE: 98.5 F | HEART RATE: 86 BPM | BODY MASS INDEX: 30.33 KG/M2 | RESPIRATION RATE: 17 BRPM | DIASTOLIC BLOOD PRESSURE: 70 MMHG | HEIGHT: 63 IN | SYSTOLIC BLOOD PRESSURE: 118 MMHG

## 2025-07-14 DIAGNOSIS — Z87.59 HISTORY OF POSTPARTUM HEMORRHAGE: ICD-10-CM

## 2025-07-14 DIAGNOSIS — D68.59 THROMBOPHILIA AFFECTING PREGNANCY, ANTEPARTUM: ICD-10-CM

## 2025-07-14 DIAGNOSIS — Z3A.32 32 WEEKS GESTATION OF PREGNANCY: Primary | ICD-10-CM

## 2025-07-14 DIAGNOSIS — F41.1 GENERALIZED ANXIETY DISORDER: ICD-10-CM

## 2025-07-14 DIAGNOSIS — D68.52 PROTHROMBIN GENE MUTATION: ICD-10-CM

## 2025-07-14 DIAGNOSIS — F33.41 MAJOR DEPRESSIVE DISORDER, RECURRENT EPISODE, IN PARTIAL REMISSION: ICD-10-CM

## 2025-07-14 DIAGNOSIS — Z34.83 PRENATAL CARE, SUBSEQUENT PREGNANCY IN THIRD TRIMESTER: ICD-10-CM

## 2025-07-14 DIAGNOSIS — O09.523 MULTIGRAVIDA OF ADVANCED MATERNAL AGE IN THIRD TRIMESTER: ICD-10-CM

## 2025-07-14 DIAGNOSIS — Z87.59 HISTORY OF PRIOR PREGNANCY WITH IUGR NEWBORN: ICD-10-CM

## 2025-07-14 DIAGNOSIS — O99.519 ASTHMA DURING PREGNANCY: ICD-10-CM

## 2025-07-14 DIAGNOSIS — Z79.01 CHRONIC ANTICOAGULATION: ICD-10-CM

## 2025-07-14 DIAGNOSIS — D68.52 PROTHROMBIN GENE MUTATION: Primary | ICD-10-CM

## 2025-07-14 DIAGNOSIS — Z87.59 HISTORY OF CHOLESTASIS DURING PREGNANCY: ICD-10-CM

## 2025-07-14 DIAGNOSIS — O99.119 THROMBOPHILIA AFFECTING PREGNANCY, ANTEPARTUM: ICD-10-CM

## 2025-07-14 DIAGNOSIS — D50.0 IRON DEFICIENCY ANEMIA DUE TO CHRONIC BLOOD LOSS: ICD-10-CM

## 2025-07-14 DIAGNOSIS — Z36.89 ENCOUNTER FOR ULTRASOUND TO ASSESS FETAL GROWTH: ICD-10-CM

## 2025-07-14 DIAGNOSIS — E53.8 VITAMIN B12 DEFICIENCY: ICD-10-CM

## 2025-07-14 DIAGNOSIS — Z87.19 HISTORY OF CHOLESTASIS DURING PREGNANCY: ICD-10-CM

## 2025-07-14 DIAGNOSIS — D50.9 IRON DEFICIENCY ANEMIA, UNSPECIFIED IRON DEFICIENCY ANEMIA TYPE: ICD-10-CM

## 2025-07-14 DIAGNOSIS — J45.909 ASTHMA DURING PREGNANCY: ICD-10-CM

## 2025-07-14 LAB
ALBUMIN SERPL-MCNC: 3.8 G/DL (ref 3.5–5.2)
ALBUMIN/GLOB SERPL: 1.3 G/DL
ALP SERPL-CCNC: 205 U/L (ref 39–117)
ALT SERPL W P-5'-P-CCNC: 17 U/L (ref 1–33)
ANION GAP SERPL CALCULATED.3IONS-SCNC: 14.4 MMOL/L (ref 5–15)
AST SERPL-CCNC: 19 U/L (ref 1–32)
BASOPHILS # BLD AUTO: 0.05 10*3/MM3 (ref 0–0.2)
BASOPHILS NFR BLD AUTO: 0.4 % (ref 0–1.5)
BILIRUB SERPL-MCNC: 0.2 MG/DL (ref 0–1.2)
BUN SERPL-MCNC: 6.4 MG/DL (ref 6–20)
BUN/CREAT SERPL: 10.3 (ref 7–25)
CALCIUM SPEC-SCNC: 9.6 MG/DL (ref 8.6–10.5)
CHLORIDE SERPL-SCNC: 101 MMOL/L (ref 98–107)
CO2 SERPL-SCNC: 19.6 MMOL/L (ref 22–29)
CREAT SERPL-MCNC: 0.62 MG/DL (ref 0.57–1)
DEPRECATED RDW RBC AUTO: 41.8 FL (ref 37–54)
EGFRCR SERPLBLD CKD-EPI 2021: 118.5 ML/MIN/1.73
EOSINOPHIL # BLD AUTO: 0.05 10*3/MM3 (ref 0–0.4)
EOSINOPHIL NFR BLD AUTO: 0.4 % (ref 0.3–6.2)
ERYTHROCYTE [DISTWIDTH] IN BLOOD BY AUTOMATED COUNT: 12.7 % (ref 12.3–15.4)
FERRITIN SERPL-MCNC: 21.8 NG/ML (ref 13–150)
GLOBULIN UR ELPH-MCNC: 3 GM/DL
GLUCOSE SERPL-MCNC: 139 MG/DL (ref 65–99)
GLUCOSE UR STRIP-MCNC: NEGATIVE MG/DL
HCT VFR BLD AUTO: 36.9 % (ref 34–46.6)
HGB BLD-MCNC: 12.5 G/DL (ref 12–15.9)
IMM GRANULOCYTES # BLD AUTO: 0.13 10*3/MM3 (ref 0–0.05)
IMM GRANULOCYTES NFR BLD AUTO: 1.1 % (ref 0–0.5)
IRON 24H UR-MRATE: 75 MCG/DL (ref 37–145)
IRON SATN MFR SERPL: 14 % (ref 20–50)
LYMPHOCYTES # BLD AUTO: 1.75 10*3/MM3 (ref 0.7–3.1)
LYMPHOCYTES NFR BLD AUTO: 15.2 % (ref 19.6–45.3)
MCH RBC QN AUTO: 30.5 PG (ref 26.6–33)
MCHC RBC AUTO-ENTMCNC: 33.9 G/DL (ref 31.5–35.7)
MCV RBC AUTO: 90 FL (ref 79–97)
MONOCYTES # BLD AUTO: 0.42 10*3/MM3 (ref 0.1–0.9)
MONOCYTES NFR BLD AUTO: 3.6 % (ref 5–12)
NEUTROPHILS NFR BLD AUTO: 79.3 % (ref 42.7–76)
NEUTROPHILS NFR BLD AUTO: 9.15 10*3/MM3 (ref 1.7–7)
NRBC BLD AUTO-RTO: 0 /100 WBC (ref 0–0.2)
PLATELET # BLD AUTO: 244 10*3/MM3 (ref 140–450)
PMV BLD AUTO: 9.2 FL (ref 6–12)
POTASSIUM SERPL-SCNC: 4 MMOL/L (ref 3.5–5.2)
PROT SERPL-MCNC: 6.8 G/DL (ref 6–8.5)
PROT UR STRIP-MCNC: ABNORMAL MG/DL
RBC # BLD AUTO: 4.1 10*6/MM3 (ref 3.77–5.28)
SODIUM SERPL-SCNC: 135 MMOL/L (ref 136–145)
TIBC SERPL-MCNC: 526 MCG/DL (ref 298–536)
TRANSFERRIN SERPL-MCNC: 353 MG/DL (ref 200–360)
VIT B12 BLD-MCNC: 283 PG/ML (ref 211–946)
WBC NRBC COR # BLD AUTO: 11.55 10*3/MM3 (ref 3.4–10.8)

## 2025-07-14 PROCEDURE — 84466 ASSAY OF TRANSFERRIN: CPT

## 2025-07-14 PROCEDURE — 82607 VITAMIN B-12: CPT | Performed by: INTERNAL MEDICINE

## 2025-07-14 PROCEDURE — 83540 ASSAY OF IRON: CPT

## 2025-07-14 PROCEDURE — 80053 COMPREHEN METABOLIC PANEL: CPT

## 2025-07-14 PROCEDURE — 3074F SYST BP LT 130 MM HG: CPT | Performed by: NURSE PRACTITIONER

## 2025-07-14 PROCEDURE — 82728 ASSAY OF FERRITIN: CPT

## 2025-07-14 PROCEDURE — 3078F DIAST BP <80 MM HG: CPT | Performed by: NURSE PRACTITIONER

## 2025-07-14 PROCEDURE — 1126F AMNT PAIN NOTED NONE PRSNT: CPT | Performed by: NURSE PRACTITIONER

## 2025-07-14 PROCEDURE — 36415 COLL VENOUS BLD VENIPUNCTURE: CPT

## 2025-07-14 PROCEDURE — 99214 OFFICE O/P EST MOD 30 MIN: CPT | Performed by: NURSE PRACTITIONER

## 2025-07-14 PROCEDURE — 85025 COMPLETE CBC W/AUTO DIFF WBC: CPT

## 2025-07-14 RX ORDER — VENLAFAXINE HYDROCHLORIDE 75 MG/1
75 CAPSULE, EXTENDED RELEASE ORAL DAILY
Qty: 30 CAPSULE | Refills: 1 | Status: SHIPPED | OUTPATIENT
Start: 2025-07-14

## 2025-07-14 NOTE — TELEPHONE ENCOUNTER
Rx Refill Note  Requested Prescriptions     Pending Prescriptions Disp Refills    venlafaxine XR (EFFEXOR-XR) 75 MG 24 hr capsule [Pharmacy Med Name: VENLAFAXINE HCL ER 75 MG CAP] 30 capsule 1     Sig: TAKE 1 CAPSULE BY MOUTH DAILY      Last office visit with prescribing clinician: 5/8/2025   Last telemedicine visit with prescribing clinician: 1/22/2025   Next office visit with prescribing clinician: Visit date not found     Phil Sparrow MA  07/14/25, 13:35 EDT

## 2025-07-14 NOTE — PROGRESS NOTES
"Subjective     CHIEF COMPLAINT:      Chief Complaint   Patient presents with    Follow-up     HISTORY OF PRESENT ILLNESS:     Yuki Shields is a 35 y.o. female patient who returns today for follow up on her prothrombin gene mutation.  She is on anticoagulation with Lovenox 40 mg SQ daily.  She continues to tolerate this and denies any bleeding.  She denies any unilateral edema or increased shortness of breath above for dates expected.  She is currently 32 weeks 5 days gestation.    ROS:  Pertinent ROS is in the HPI.     Past medical, surgical, social and family history were reviewed.     MEDICATIONS:    Current Outpatient Medications:     acetaminophen (TYLENOL) 500 MG tablet, Take 2 tablets by mouth Every 6 (Six) Hours As Needed for Mild Pain., Disp: , Rfl:     albuterol sulfate  (90 Base) MCG/ACT inhaler, Inhale 2 puffs Every 4 (Four) Hours As Needed for Wheezing., Disp: 6.7 g, Rfl: 1    Enoxaparin Sodium (LOVENOX) 40 MG/0.4ML solution prefilled syringe syringe, Inject 0.4 mL under the skin into the appropriate area as directed Daily., Disp: 12 mL, Rfl: 8    Loratadine (CLARITIN PO), Take  by mouth Daily., Disp: , Rfl:     Mometasone Furoate (Asmanex, 30 Metered Doses,) 220 MCG/ACT inhaler, INHALE ONE PUFF BY MOUTH ONCE NIGHTLY, Disp: 1 each, Rfl: 1    pantoprazole (PROTONIX) 40 MG EC tablet, Take 1 tablet by mouth Daily., Disp: 90 tablet, Rfl: 3    promethazine (PHENERGAN) 25 MG tablet, Take 1 tablet by mouth., Disp: , Rfl:     venlafaxine XR (EFFEXOR-XR) 75 MG 24 hr capsule, Take 1 capsule by mouth Daily., Disp: 30 capsule, Rfl: 1  Objective     VITAL SIGNS:     Vitals:    07/14/25 1320   BP: 118/70   Pulse: 86   Resp: 17   Temp: 98.5 °F (36.9 °C)   TempSrc: Oral   SpO2: 96%   Weight: 77.7 kg (171 lb 3.2 oz)   Height: 160 cm (62.99\")   PainSc: 0-No pain     Body mass index is 30.33 kg/m².     Wt Readings from Last 5 Encounters:   07/14/25 77.7 kg (171 lb 3.2 oz)   07/01/25 76.2 kg (168 lb)   06/18/25 " 76.7 kg (169 lb)   05/22/25 76.9 kg (169 lb 9.6 oz)   05/22/25 76.7 kg (169 lb)     PHYSICAL EXAMINATION:   GENERAL: The patient appears in good general condition, not in acute distress.   SKIN: No Ecchymosis.  EYES: No jaundice. No Pallor.  CHEST: Normal respiratory effort.  No distress.  ABDOMEN: Gravid.  EXTREMITIES: No edema.      DIAGNOSTIC DATA:     Results from last 7 days   Lab Units 07/14/25  1318   WBC 10*3/mm3 11.55*   NEUTROS ABS 10*3/mm3 9.15*   HEMOGLOBIN g/dL 12.5   HEMATOCRIT % 36.9   PLATELETS 10*3/mm3 244       Results from last 7 days   Lab Units 07/14/25  1318   SODIUM mmol/L 135*   POTASSIUM mmol/L 4.0   CHLORIDE mmol/L 101   CO2 mmol/L 19.6*   BUN mg/dL 6.4   CREATININE mg/dL 0.62   CALCIUM mg/dL 9.6   ALBUMIN g/dL 3.8   BILIRUBIN mg/dL 0.2   ALK PHOS U/L 205*   ALT (SGPT) U/L 17   AST (SGOT) U/L 19   GLUCOSE mg/dL 139*           Lab 07/14/25  1318   IRON 75   IRON SATURATION (TSAT) 14*   TIBC 526   TRANSFERRIN 353   FERRITIN 21.80        Assessment & Plan    *Prothrombin gene mutation.  Patient was diagnosed when she was a teenager after her aunt and mother were both diagnosed with VTE.  The patient's mother has prothrombin gene and factor V Leiden mutations.  Patient's sister has factor V Leiden mutation and had miscarriages.  The patient herself has no history of venous thromboembolism.  She was previously placed on prophylactic anticoagulation with Lovenox 40 mg during her previous pregnancies.  Patient became pregnant in 2023.  Patient was started on Lovenox 40 mg SQ daily at 6 weeks gestation.  On 10/19/2023, she was switched to heparin 5000 SQ every 12 hours.  She delivered on 11/6/2023.  She completed the 6 weeks of Lovenox 40 mg SQ daily after delivery.    With her prothrombin gene mutation, she is at increased risk for development of DVT/PE in the future.  She was advised to take aspirin 81 mg when she travels (the day of and the day after she travels).  She was advised to maintain  adequate hydration.  1/9/2025: Patient 6 weeks pregnant. GA 6 weeks.  Due to the prothrombin gene mutation and due to the family history of thrombosis and recurrent miscarriages, prophylactic anticoagulation with Lovenox was recommended.   She was started on Lovenox 40 mg SQ Q 24 hours.   5/22/2025: Patient is 25w +1d.  7/14/2025: Patient currently 32 weeks 5 days gestation continue Lovenox 40 mg subcu every 24 hours with no reported signs of bleeding.    *Iron deficiency anemia.  Patient developed severe anemia after she had bleeding after abdominoplasty surgery in 2019.  Reports that her hemoglobin decreased to 3.0 and required PRBC transfusions.  She was in the ICU at that time.  Patient reports weakness in the legs.  6/12/2023: Hemoglobin 12.1.  Hemoglobin decreased to 11.8 on 7/25/2023.  She was started on ferrous sulfate 325 mg once daily.  9/5/2023: Hemoglobin 12.3.  Transferrin saturation decreased to 13%.  Ferritin is 37.2.   She was continued on the ferrous sulfate daily along with the prenatal vitamin.  11/1/2023: Hemoglobin 12.6.  Iron stores remained low with a transferrin saturation at 6%.  She continued on ferrous sulfate until she delivered.  1/4/2024: Hemoglobin 12.7.  Ferritin 153.  Transferrin saturation 18%.  She was restarted on ferrous sulfate 325 mg once weekly.  1/9/2025: Hemoglobin 13.2.  Ferritin decreased to 28.6. Transferrin saturation decreased to 16%.  Patient continued with the prenatal vitamin daily and did not start the ferrous sulfate.  2/20/2025: Hemoglobin 12.4.  Ferritin 22.2.  Transferrin saturation decreased to 9%.  4/10/2025: Hemoglobin decreased to 11.4.  Ferritin decreased to <8.  Transferrin saturation 10%.  She has not been able to tolerate oral iron with development of nausea every time she took it.  S/p IV Venofer 300 mg weekly x 3 between 4/24/2025 and 5/8/2025.  5/22/2025: Hemoglobin 11.3.  Ferritin improved to 213.  Transferrin saturation improved to 24%.  7/14/2025:  Hemoglobin remains normal at 12.5 however ferritin has declined to 21 and iron saturation of 14% with TIBC 526 suggest Venofer 300 mg x 2 doses     *Vitamin B12 deficiency   Vitamin B12 is low normal at 304.   She was started on vitamin B12 1000 mcg daily.  11/1/2023: Hemoglobin improved to 12.6.  Vitamin B12 improved to 920.  She stopped taking the vitamin B12.  1/9/2025: Vitamin B12 adequate at 699.   5/22/2025: Vitamin B12 449.  7/14/2025: Vitamin B12 283, asked patient to resume oral B12 500 mcg daily     *Elevated liver enzymes.  Patient reports developing this with her previous pregnancies.  Patient reports developing itching.  Her OB started her on Ursodiol and Atarax.  11/1/2023: ALT 86 and AST 87.   Bilirubin 0.4. ALK PHOS 297.  1/4/2024: ALT increased to 111.  AST decreased to 79.  Bilirubin 0.4.  Alk phos increased to 265.  Repeat labs on 2/7/2024 revealed improvement with ALT at 20, AST at 17, alkaline phosphatase at 122 and bilirubin 0.3.  1/9/2025: ALT 14. AST 20. ALK PHOS 125. Bilirubin 0.3.  2/20/2025: ALT 21.  AST 14.  Alkaline phosphatase 141 (attributed to pregnancy).  Bilirubin 0.2.  4/10/2025: ALT 16.  AST 18.  Alkaline phosphatase 163.  Bilirubin 0.2.  5/22/2025: ALT 12.  AST 13.  Alkaline phosphatase 174 (attributed to pregnancy) bilirubin 0.3.  7/14/2025: ALT 17.  AST 19.  Phosphatase 205.  Bilirubin 0.2.     PLAN:     1.  Continue Lovenox 40 mg SQ daily.  2.  Continue prenatal multivitamin daily.    3.  Resume vitamin B12 at 500 mcg daily  4.  Proceed with Venofer 300 mg x 2 doses  5.  Follow-up in 3 weeks.  We will obtain CBC CMP ferritin iron panel and vitamin B12 levels.      MICHAEL Quintero  07/14/25

## 2025-07-14 NOTE — PROGRESS NOTES
Chief Complaint   Patient presents with    Routine Prenatal Visit     Pt stated she has had a lot of heartburn lately and said it gets worse each time.      uYki Shields is a 35 y.o.  at 32w5d who presents for routine prenatal visit. She reports that she has been experiencing worsening heartburn mainly in the middle of the night, sweating on the front side of her body but her back is freezing, and having some [place] contractions. She also thinks she lost some of her mucus plug 3-4 days ago. Denies vaginal bleeding or LOF. She reports active fetal movement.     /73   Wt 77.1 kg (170 lb)   LMP 2024   BMI 30.12 kg/m²    Gen: well appearing, NAD   Abd: gravid, nontender   See OB Flowsheet    ASSESSMENT:   IUP at 32w5d   Prenatal care in third trimester   History of cholestasis x 3 in prior pregnancies- baseline LFTs normal  Asthma affecting pregnancy- managed on Asmanex nightly as well as albuterol inahler as needed   Thrombophilia affecting pregnancy- Prothrombin gene mutation heterozygote - on Lovenox 40 mg daily SQ  AMA- low risk NIPT  History of IUGR in prior pregnancy  History of postpartum hemorrhage   History of UTI - K. Pneumoniae - test of cure negative   Fetal growth assessment ultrasound   Heartburn     PLAN:  Problem list reviewed and updated.   Reviewed expectations of this stage of pregnancy.   Third trimester precautions reviewed including labor signs, monitoring fetal movements.   Discussed ultrasound results that demonstrated normal fetal growth with EFW  1926 g ( EFW:37.5%ile, AC:21.5%ile), normal CHAITANYA 15.9 cm, posterior placenta, cephalic presentation,  bpm. Will continue serial growth ultrasounds every 4 weeks given AMA status and thrombophilia affecting pregnancy.   Patient had labs performed today with hematology and have added bile acids given history of cholestasis affecting 3 prior pregnancies.   Will plan to switch from Lovenox to heparin at 35-36 weeks.  Continue on Lovenox 40 mg SQ daily at this time.   Discussed that she may increase Protonix 40 mg daily to twice a day to help with heartburn symptoms.   I discussed that I'm not quite clear on her temperature instability but she may feel colder if she is anemic.    Return in about 2 weeks (around 2025) for Prenatal visit.    Patient Active Problem List    Diagnosis Date Noted    Adverse reaction to compound iron preparation 2025    Iron deficiency anemia 2025    Generalized anxiety disorder 2024    Major depressive disorder, recurrent episode, in partial remission 2024    Chronic constipation 2024    Generalized abdominal pain 2024    Nausea 2024    Heartburn 2024    Epigastric pain 2024    Unwanted fertility 2024     (spontaneous vaginal delivery) 2023    Anemia 09/15/2023    Asthma 09/15/2023    Stress 2021    Folic acid deficiency 2020    Memory impairment 2020    Obesity 2020    Coagulopathy 2019    Factor II deficiency 2017    DDD (degenerative disc disease), lumbar 2017    Prothrombin gene mutation 2016    Essential hypertension 2014    Pain 2012       Orders Placed This Encounter   Procedures    Bile Acids, Total     Release to patient:   Routine Release [[id number]]    POC Urinalysis Dipstick     Release to patient:   Routine Release [[id number]]     Valerie Taylor MD

## 2025-07-14 NOTE — TELEPHONE ENCOUNTER
Provider: Lata  Caller: patient  Relationship to Patient: self  Call Back Phone Number: [phone]   Reason for Call: Pt asking if she can come in about 30 minutes earlier today due to appt's running together.

## 2025-07-15 ENCOUNTER — TELEPHONE (OUTPATIENT)
Dept: ONCOLOGY | Facility: CLINIC | Age: 35
End: 2025-07-15
Payer: MEDICAID

## 2025-07-15 RX ORDER — MULTIVITAMIN WITH IRON
500 TABLET ORAL DAILY
Qty: 30 TABLET | Refills: 2 | Status: SHIPPED | OUTPATIENT
Start: 2025-07-15

## 2025-07-16 LAB — BILE AC SERPL-SCNC: 7.1 UMOL/L (ref 0–10)

## 2025-07-22 ENCOUNTER — INFUSION (OUTPATIENT)
Dept: ONCOLOGY | Facility: HOSPITAL | Age: 35
End: 2025-07-22
Payer: MEDICAID

## 2025-07-22 VITALS
BODY MASS INDEX: 30.48 KG/M2 | OXYGEN SATURATION: 99 % | DIASTOLIC BLOOD PRESSURE: 70 MMHG | SYSTOLIC BLOOD PRESSURE: 119 MMHG | TEMPERATURE: 97.8 F | WEIGHT: 172 LBS | RESPIRATION RATE: 16 BRPM | HEART RATE: 98 BPM

## 2025-07-22 DIAGNOSIS — D50.9 IRON DEFICIENCY ANEMIA, UNSPECIFIED IRON DEFICIENCY ANEMIA TYPE: ICD-10-CM

## 2025-07-22 DIAGNOSIS — T45.4X5A ADVERSE EFFECT OF PREPARATION OF IRON COMPOUND, INITIAL ENCOUNTER: Primary | ICD-10-CM

## 2025-07-22 PROCEDURE — 25010000002 IRON SUCROSE PER 1 MG: Performed by: INTERNAL MEDICINE

## 2025-07-22 PROCEDURE — 96365 THER/PROPH/DIAG IV INF INIT: CPT

## 2025-07-22 PROCEDURE — 25810000003 SODIUM CHLORIDE 0.9 % SOLUTION 250 ML FLEX CONT: Performed by: INTERNAL MEDICINE

## 2025-07-22 RX ORDER — ACETAMINOPHEN 325 MG/1
650 TABLET ORAL ONCE
Status: COMPLETED | OUTPATIENT
Start: 2025-07-22 | End: 2025-07-22

## 2025-07-22 RX ORDER — ACETAMINOPHEN 325 MG/1
650 TABLET ORAL ONCE
Status: DISCONTINUED | OUTPATIENT
Start: 2025-07-22 | End: 2025-07-22 | Stop reason: HOSPADM

## 2025-07-22 RX ORDER — CETIRIZINE HYDROCHLORIDE 10 MG/1
10 TABLET ORAL ONCE
Status: COMPLETED | OUTPATIENT
Start: 2025-07-22 | End: 2025-07-22

## 2025-07-22 RX ADMIN — SODIUM CHLORIDE 300 MG: 9 INJECTION, SOLUTION INTRAVENOUS at 08:40

## 2025-07-22 RX ADMIN — CETIRIZINE HYDROCHLORIDE 10 MG: 10 TABLET, FILM COATED ORAL at 08:13

## 2025-07-22 RX ADMIN — ACETAMINOPHEN 650 MG: 325 TABLET ORAL at 08:13

## 2025-07-28 ENCOUNTER — ROUTINE PRENATAL (OUTPATIENT)
Dept: OBSTETRICS AND GYNECOLOGY | Facility: CLINIC | Age: 35
End: 2025-07-28
Payer: MEDICAID

## 2025-07-28 ENCOUNTER — HOSPITAL ENCOUNTER (EMERGENCY)
Facility: HOSPITAL | Age: 35
Discharge: HOME | End: 2025-07-28
Attending: STUDENT IN AN ORGANIZED HEALTH CARE EDUCATION/TRAINING PROGRAM | Admitting: OBSTETRICS & GYNECOLOGY
Payer: MEDICAID

## 2025-07-28 VITALS — SYSTOLIC BLOOD PRESSURE: 117 MMHG | BODY MASS INDEX: 30.48 KG/M2 | WEIGHT: 172 LBS | DIASTOLIC BLOOD PRESSURE: 68 MMHG

## 2025-07-28 VITALS
SYSTOLIC BLOOD PRESSURE: 125 MMHG | RESPIRATION RATE: 16 BRPM | HEART RATE: 96 BPM | TEMPERATURE: 98.2 F | DIASTOLIC BLOOD PRESSURE: 76 MMHG

## 2025-07-28 DIAGNOSIS — J45.909 ASTHMA DURING PREGNANCY: ICD-10-CM

## 2025-07-28 DIAGNOSIS — Z87.59 HISTORY OF POSTPARTUM HEMORRHAGE: ICD-10-CM

## 2025-07-28 DIAGNOSIS — Z3A.34 34 WEEKS GESTATION OF PREGNANCY: Primary | ICD-10-CM

## 2025-07-28 DIAGNOSIS — Z34.83 PRENATAL CARE, SUBSEQUENT PREGNANCY IN THIRD TRIMESTER: ICD-10-CM

## 2025-07-28 DIAGNOSIS — D68.59 THROMBOPHILIA AFFECTING PREGNANCY, ANTEPARTUM: ICD-10-CM

## 2025-07-28 DIAGNOSIS — Z87.59 HISTORY OF CHOLESTASIS DURING PREGNANCY: ICD-10-CM

## 2025-07-28 DIAGNOSIS — O99.019 IRON DEFICIENCY ANEMIA DURING PREGNANCY: ICD-10-CM

## 2025-07-28 DIAGNOSIS — Z87.59 HISTORY OF PRIOR PREGNANCY WITH IUGR NEWBORN: ICD-10-CM

## 2025-07-28 DIAGNOSIS — D50.9 IRON DEFICIENCY ANEMIA DURING PREGNANCY: ICD-10-CM

## 2025-07-28 DIAGNOSIS — O09.523 MULTIGRAVIDA OF ADVANCED MATERNAL AGE IN THIRD TRIMESTER: ICD-10-CM

## 2025-07-28 DIAGNOSIS — O99.119 THROMBOPHILIA AFFECTING PREGNANCY, ANTEPARTUM: ICD-10-CM

## 2025-07-28 DIAGNOSIS — Z87.19 HISTORY OF CHOLESTASIS DURING PREGNANCY: ICD-10-CM

## 2025-07-28 DIAGNOSIS — O99.519 ASTHMA DURING PREGNANCY: ICD-10-CM

## 2025-07-28 DIAGNOSIS — Z34.83 ENCOUNTER FOR SUPERVISION OF OTHER NORMAL PREGNANCY IN THIRD TRIMESTER: ICD-10-CM

## 2025-07-28 LAB
A1 MICROGLOB PLACENTAL VAG QL: NEGATIVE
BACTERIA UR QL AUTO: ABNORMAL /HPF
BILIRUB UR QL STRIP: NEGATIVE
CLARITY UR: ABNORMAL
COLOR UR: ABNORMAL
GLUCOSE UR STRIP-MCNC: ABNORMAL MG/DL
GLUCOSE UR STRIP-MCNC: NEGATIVE MG/DL
HGB UR QL STRIP.AUTO: NEGATIVE
HYALINE CASTS UR QL AUTO: ABNORMAL /LPF
KETONES UR QL STRIP: ABNORMAL
LEUKOCYTE ESTERASE UR QL STRIP.AUTO: ABNORMAL
NITRITE UR QL STRIP: NEGATIVE
PH UR STRIP.AUTO: 6.5 [PH] (ref 5–8)
PROT UR QL STRIP: ABNORMAL
PROT UR STRIP-MCNC: ABNORMAL MG/DL
RBC # UR STRIP: ABNORMAL /HPF
REF LAB TEST METHOD: ABNORMAL
SP GR UR STRIP: 1.02 (ref 1–1.03)
SQUAMOUS #/AREA URNS HPF: ABNORMAL /HPF
UROBILINOGEN UR QL STRIP: ABNORMAL
WBC # UR STRIP: ABNORMAL /HPF

## 2025-07-28 PROCEDURE — 59025 FETAL NON-STRESS TEST: CPT

## 2025-07-28 PROCEDURE — 81001 URINALYSIS AUTO W/SCOPE: CPT | Performed by: OBSTETRICS & GYNECOLOGY

## 2025-07-28 PROCEDURE — 84112 EVAL AMNIOTIC FLUID PROTEIN: CPT | Performed by: OBSTETRICS & GYNECOLOGY

## 2025-07-28 PROCEDURE — 99284 EMERGENCY DEPT VISIT MOD MDM: CPT | Performed by: OBSTETRICS & GYNECOLOGY

## 2025-07-28 PROCEDURE — 87086 URINE CULTURE/COLONY COUNT: CPT | Performed by: OBSTETRICS & GYNECOLOGY

## 2025-07-28 NOTE — OBED NOTES
[facility]  Yuki Shields  : 1990  MRN: [id number]  CSN: [id number]    OB ED Provider Note    Subjective   No chief complaint on file.    Yuki Shields is a 35 y.o. year old  with an Estimated Date of Delivery: 9/3/25 currently at 34w5d presenting with intermittent back pain overnight that she feels were CTX. It has largely improved but has had a small amount of clear fluid per vagina. She denies VB. FM is present.    Prenatal care has been with Dr. Taylor.  It has been complicated by factor II deficiency (on lovenox), asthma, history of cholestasis x 3.    OB History    Para Term  AB Living   6 4 4 0 1 4   SAB IAB Ectopic Molar Multiple Live Births   1 0 0 0 0 4      # Outcome Date GA Lbr Miguel/2nd Weight Sex Type Anes PTL Lv   6 Current            5 SAB  6w0d          4 Term 23 37w0d 01:05 / 00:14 2530 g (5 lb 9.2 oz) F Vag-Spont EPI N MELANIA      Birth Comments: Jairoa ld 3      Name: Carey Shields      Apgar1: 8  Apgar5: 9   3 Term 04/29/15   3317 g (7 lb 5 oz) M Vag-Spont   MELANIA   2 Term 11   3147 g (6 lb 15 oz) F Vag-Spont   MELANIA   1 Term 07   3204 g (7 lb 1 oz) M Vag-Spont   MELANIA     Past Medical History:   Diagnosis Date    Essential hypertension 2014    Lumbar herniated disc 06/2017    X 2  DISC   HISTORY OF EPIDURALS    Anemia     Anxiety and depression     Asthma     As child    DDD (degenerative disc disease), lumbar     Deep vein thrombosis     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 GARZA AWARE OF THIS    Family hx-blood disorder     MOTHER HAS FACTOR 2 AND FACTOR 5    GERD (gastroesophageal reflux disease)     Heart murmur     History of blood transfusion     Labral tear of hip, degenerative     RIGHT    Low back pain     Memory loss     Migraine     Pneumonia     PONV (postoperative nausea and vomiting)     Urinary tract infection      Past Surgical History:   Procedure Laterality Date    TONSILLECTOMY AND ADENOIDECTOMY       CHOLECYSTECTOMY  2012    BLADDER REPAIR  2016    WITH MESH    BREAST AUGMENTATION BILATERAL MASTOPEXY Bilateral 07/31/2017    Procedure: BREAST AUGMENTATION, MASTOPEXY W/ IDEAL IMPLANTS  NEW IMAGE;  Surgeon: Nirmal Mehta MD;  Location: [facility] MAIN OR;  Service:     ABDOMINOPLASTY  2019    ENDOSCOPY N/A 8/16/2024    Procedure: ESOPHAGOGASTRODUODENOSCOPY (EGD) WITH BIOPSY;  Surgeon: Pedro Burns MD;  Location: [facility];  Service: Gastroenterology;  Laterality: N/A;  mild gastritis    COLONOSCOPY N/A 8/16/2024    Procedure: COLONOSCOPY TO CECUM;  Surgeon: Pedro Burns MD;  Location: [facility];  Service: Gastroenterology;  Laterality: N/A;  hemorrhoids    ADENOIDECTOMY      BREAST SURGERY      CERVICAL/THORACIC FACET INJECTION      pt states cervial fusion in the past , PONV from these    COSMETIC SURGERY       No current facility-administered medications for this encounter.    Allergies   Allergen Reactions    Dilaudid [Hydromorphone Hcl] Nausea And Vomiting    Hydromorphone Dizziness     Category: Allergy;    Adhesive Tape Rash     Social History    Tobacco Use      Smoking status: Never      Smokeless tobacco: Never    Review of Systems   Gastrointestinal:  Positive for abdominal pain.   Genitourinary:  Positive for pelvic pain and vaginal discharge.   Musculoskeletal:  Positive for back pain.   All other systems reviewed and are negative.        Objective   LMP 11/27/2024   General: well developed; well nourished  no acute distress  mentation appropriate   Abdomen: soft, 4/4 symphyseal tenderness; no masses  gravid    FHT's: reactive and category 1      Cervix: Was checked by RN, 1/50/-2. SSE by me shows no pooling and negative cough test   Presentation: cephalic   Contractions: none   Chest: Unlabored respirations    CV:  RRR   Ext:   No C/C/E   Back: SI tenderness is present bilateral        Prenatal Labs  Lab Results   Component Value Date    HGB 12.5 07/14/2025    RUBELLAABIGG 1.21  02/19/2025    HEPBSAG Negative 02/19/2025    ABORH O Positive 12/17/2019    ABORH O POS 12/17/2019    ABORH O POS 12/17/2019    ABSCRN Negative 02/19/2025    ZHZ5TFT9 Non Reactive 02/19/2025    HEPCVIRUSABY Non Reactive 03/28/2023     05/22/2025    STREPGPB Negative 10/30/2023    URINECX Final report 05/22/2025    CHLAMNAA Negative 04/10/2023    NGONORRHON Negative 04/10/2023       Current Labs Reviewed   UA:    Lab Results   Component Value Date    SQUAMEPIUA 7-12 (A) 02/13/2025    SPECGRAVUR 1.014 05/10/2025    KETONESU Negative 05/10/2025    BLOODU Negative 05/10/2025    LEUKOCYTESUR Negative 05/10/2025    NITRITEU Negative 05/10/2025    RBCUA 0-2 02/13/2025    WBCUA 3-5 (A) 02/13/2025    BACTERIA None Seen 02/13/2025     ROM+ negative     Assessment   IUP at 34w5d  No evidence for PPROM  Back and pelvic pain- consistent with symphyseal dysfunction and sacroiliitis from 3rd trimester relaxin release. No evidence for PTL     Plan   D/C home with PTL precautions. Encouraged patient to obtain a trochanter belt to provide some pelvic girdle support. Return for worsening symptoms, acute changes.     Francois Sinha MD  7/28/2025  11:15 EDT

## 2025-07-28 NOTE — PROGRESS NOTES
Chief Complaint   Patient presents with    Routine Prenatal Visit      Yuki Shields is a 35 y.o.  at 34w5d who presents for routine prenatal visit. She reports that she had to go to the OB ED today for intermittent back pain that felt like contractions followed by a small amount of clear fluid from the vagina. She was evaluated for  labor with rupture of membranes and noted to be 1/50/-2 and no evidence of leakage of fluid. She reports feeling okay today, just very tired.  Denies vaginal bleeding. She reports active fetal movement.     /68   Wt 78 kg (172 lb)   LMP 2024   BMI 30.48 kg/m²    Gen: well appearing, NAD   Abd: gravid, nontender   See OB Flowsheet    ASSESSMENT:   IUP at 34w5d   Prenatal care in third trimester   Supervision of normal pregnancy   Iron deficiency anemia - receiving IV iron infusions and followed by hematology   History of cholestasis x 3 in prior pregnancies  Asthma affecting pregnancy- managed on Asmanex nightly as well as albuterol inahler as needed   Thrombophilia affecting pregnancy- Prothrombin gene mutation heterozygote - on Lovenox 40 mg daily SQ- will plan to change to heparin at 36 weeks   AMA- low risk NIPT  History of IUGR in prior pregnancy  History of postpartum hemorrhage   History of UTI - K. Pneumoniae with negative MIREILLE     PLAN:  Problem list reviewed and updated.   Reviewed expectations of this stage of pregnancy.   Third trimester precautions reviewed including labor signs, monitoring fetal movements.   Continue serial growth ultrasounds every 4 weeks given thrombophilia affecting pregnancy and AMA. Next growth scan due at 36 weeks.  Will plan to transition from Lovenox to heparin at 36 weeks.   Patient would like retesting for cholestasis today and have ordered bile acids and CMP today.   Patient evaluated at OB ED today and ruled out for  labor with PPROM.   Return in about 1 week (around 2025) for Prenatal visit.    Patient  Active Problem List    Diagnosis Date Noted    Adverse reaction to compound iron preparation 2025    Iron deficiency anemia 2025    Generalized anxiety disorder 2024    Major depressive disorder, recurrent episode, in partial remission 2024    Chronic constipation 2024    Generalized abdominal pain 2024    Nausea 2024    Heartburn 2024    Epigastric pain 2024    Unwanted fertility 2024     (spontaneous vaginal delivery) 2023    Anemia 09/15/2023    Asthma 09/15/2023    Stress 2021    Folic acid deficiency 2020    Memory impairment 2020    Obesity 2020    Coagulopathy 2019    Factor II deficiency 2017    DDD (degenerative disc disease), lumbar 2017    Prothrombin gene mutation 2016    Essential hypertension 2014    Pain 2012       Orders Placed This Encounter   Procedures    Bile Acids, Total     Release to patient:   Routine Release [[id number]]    Comprehensive Metabolic Panel     Release to patient:   Routine Release [[id number]]    POC Urinalysis Dipstick     Release to patient:   Routine Release [[id number]]     Valerie Taylor MD

## 2025-07-29 ENCOUNTER — INFUSION (OUTPATIENT)
Dept: ONCOLOGY | Facility: HOSPITAL | Age: 35
End: 2025-07-29
Payer: MEDICAID

## 2025-07-29 VITALS
OXYGEN SATURATION: 97 % | HEART RATE: 85 BPM | BODY MASS INDEX: 30.37 KG/M2 | TEMPERATURE: 97.7 F | SYSTOLIC BLOOD PRESSURE: 123 MMHG | WEIGHT: 171.4 LBS | RESPIRATION RATE: 16 BRPM | DIASTOLIC BLOOD PRESSURE: 79 MMHG

## 2025-07-29 DIAGNOSIS — T45.4X5A ADVERSE EFFECT OF PREPARATION OF IRON COMPOUND, INITIAL ENCOUNTER: Primary | ICD-10-CM

## 2025-07-29 DIAGNOSIS — D50.9 IRON DEFICIENCY ANEMIA, UNSPECIFIED IRON DEFICIENCY ANEMIA TYPE: ICD-10-CM

## 2025-07-29 LAB
ALBUMIN SERPL-MCNC: 3.8 G/DL (ref 3.9–4.9)
ALP SERPL-CCNC: 260 IU/L (ref 44–121)
ALT SERPL-CCNC: 19 IU/L (ref 0–32)
AST SERPL-CCNC: 24 IU/L (ref 0–40)
BACTERIA SPEC AEROBE CULT: NORMAL
BILIRUB SERPL-MCNC: 0.4 MG/DL (ref 0–1.2)
BUN SERPL-MCNC: 9 MG/DL (ref 6–20)
BUN/CREAT SERPL: 13 (ref 9–23)
CALCIUM SERPL-MCNC: 9.5 MG/DL (ref 8.7–10.2)
CHLORIDE SERPL-SCNC: 103 MMOL/L (ref 96–106)
CO2 SERPL-SCNC: 19 MMOL/L (ref 20–29)
CREAT SERPL-MCNC: 0.67 MG/DL (ref 0.57–1)
EGFRCR SERPLBLD CKD-EPI 2021: 116 ML/MIN/1.73
GLOBULIN SER CALC-MCNC: 2.8 G/DL (ref 1.5–4.5)
GLUCOSE SERPL-MCNC: 84 MG/DL (ref 70–99)
POTASSIUM SERPL-SCNC: 4 MMOL/L (ref 3.5–5.2)
PROT SERPL-MCNC: 6.6 G/DL (ref 6–8.5)
SODIUM SERPL-SCNC: 136 MMOL/L (ref 134–144)

## 2025-07-29 PROCEDURE — 25810000003 SODIUM CHLORIDE 0.9 % SOLUTION 250 ML FLEX CONT: Performed by: INTERNAL MEDICINE

## 2025-07-29 PROCEDURE — 96365 THER/PROPH/DIAG IV INF INIT: CPT

## 2025-07-29 PROCEDURE — 25010000002 IRON SUCROSE PER 1 MG: Performed by: INTERNAL MEDICINE

## 2025-07-29 RX ORDER — CETIRIZINE HYDROCHLORIDE 10 MG/1
10 TABLET ORAL ONCE
Status: COMPLETED | OUTPATIENT
Start: 2025-07-29 | End: 2025-07-29

## 2025-07-29 RX ORDER — DIPHENHYDRAMINE HYDROCHLORIDE 50 MG/ML
50 INJECTION, SOLUTION INTRAMUSCULAR; INTRAVENOUS AS NEEDED
Status: CANCELLED | OUTPATIENT
Start: 2025-07-29

## 2025-07-29 RX ORDER — HYDROCORTISONE SODIUM SUCCINATE 100 MG/2ML
100 INJECTION INTRAMUSCULAR; INTRAVENOUS AS NEEDED
Status: CANCELLED | OUTPATIENT
Start: 2025-07-29

## 2025-07-29 RX ORDER — ACETAMINOPHEN 325 MG/1
650 TABLET ORAL ONCE
Status: COMPLETED | OUTPATIENT
Start: 2025-07-29 | End: 2025-07-29

## 2025-07-29 RX ORDER — FAMOTIDINE 10 MG/ML
20 INJECTION, SOLUTION INTRAVENOUS AS NEEDED
Status: CANCELLED | OUTPATIENT
Start: 2025-07-29

## 2025-07-29 RX ORDER — SODIUM CHLORIDE 9 MG/ML
20 INJECTION, SOLUTION INTRAVENOUS ONCE
Status: CANCELLED | OUTPATIENT
Start: 2025-07-29

## 2025-07-29 RX ADMIN — SODIUM CHLORIDE 300 MG: 9 INJECTION, SOLUTION INTRAVENOUS at 11:30

## 2025-07-29 RX ADMIN — ACETAMINOPHEN 650 MG: 325 TABLET ORAL at 11:15

## 2025-07-29 RX ADMIN — CETIRIZINE HYDROCHLORIDE 10 MG: 10 TABLET, FILM COATED ORAL at 11:15

## 2025-07-30 LAB — BILE AC SERPL-SCNC: 4.8 UMOL/L (ref 0–10)

## 2025-08-05 ENCOUNTER — LAB (OUTPATIENT)
Dept: LAB | Facility: HOSPITAL | Age: 35
End: 2025-08-05
Payer: MEDICAID

## 2025-08-05 ENCOUNTER — OFFICE VISIT (OUTPATIENT)
Dept: ONCOLOGY | Facility: CLINIC | Age: 35
End: 2025-08-05
Payer: MEDICAID

## 2025-08-05 VITALS
HEART RATE: 87 BPM | BODY MASS INDEX: 29.23 KG/M2 | HEIGHT: 64 IN | OXYGEN SATURATION: 96 % | RESPIRATION RATE: 17 BRPM | SYSTOLIC BLOOD PRESSURE: 122 MMHG | TEMPERATURE: 98.4 F | WEIGHT: 171.2 LBS | DIASTOLIC BLOOD PRESSURE: 81 MMHG

## 2025-08-05 DIAGNOSIS — D50.0 IRON DEFICIENCY ANEMIA DUE TO CHRONIC BLOOD LOSS: ICD-10-CM

## 2025-08-05 DIAGNOSIS — Z79.01 CHRONIC ANTICOAGULATION: ICD-10-CM

## 2025-08-05 DIAGNOSIS — T45.4X5A ADVERSE EFFECT OF PREPARATION OF IRON COMPOUND, INITIAL ENCOUNTER: ICD-10-CM

## 2025-08-05 DIAGNOSIS — E53.8 VITAMIN B12 DEFICIENCY: ICD-10-CM

## 2025-08-05 DIAGNOSIS — D68.52 PROTHROMBIN GENE MUTATION: Primary | ICD-10-CM

## 2025-08-05 DIAGNOSIS — R74.8 ELEVATED LIVER ENZYMES: ICD-10-CM

## 2025-08-05 LAB
ALBUMIN SERPL-MCNC: 3.7 G/DL (ref 3.5–5.2)
ALBUMIN/GLOB SERPL: 1.2 G/DL
ALP SERPL-CCNC: 235 U/L (ref 39–117)
ALT SERPL W P-5'-P-CCNC: 14 U/L (ref 1–33)
ANION GAP SERPL CALCULATED.3IONS-SCNC: 12.3 MMOL/L (ref 5–15)
AST SERPL-CCNC: 15 U/L (ref 1–32)
BASOPHILS # BLD AUTO: 0.07 10*3/MM3 (ref 0–0.2)
BASOPHILS NFR BLD AUTO: 0.6 % (ref 0–1.5)
BILIRUB SERPL-MCNC: 0.4 MG/DL (ref 0–1.2)
BUN SERPL-MCNC: 6.8 MG/DL (ref 6–20)
BUN/CREAT SERPL: 11.3 (ref 7–25)
CALCIUM SPEC-SCNC: 9.6 MG/DL (ref 8.6–10.5)
CHLORIDE SERPL-SCNC: 100 MMOL/L (ref 98–107)
CO2 SERPL-SCNC: 19.7 MMOL/L (ref 22–29)
CREAT SERPL-MCNC: 0.6 MG/DL (ref 0.57–1)
DEPRECATED RDW RBC AUTO: 43.1 FL (ref 37–54)
EGFRCR SERPLBLD CKD-EPI 2021: 119.5 ML/MIN/1.73
EOSINOPHIL # BLD AUTO: 0.07 10*3/MM3 (ref 0–0.4)
EOSINOPHIL NFR BLD AUTO: 0.6 % (ref 0.3–6.2)
ERYTHROCYTE [DISTWIDTH] IN BLOOD BY AUTOMATED COUNT: 13.3 % (ref 12.3–15.4)
FERRITIN SERPL-MCNC: 348 NG/ML (ref 13–150)
GLOBULIN UR ELPH-MCNC: 3 GM/DL
GLUCOSE SERPL-MCNC: 86 MG/DL (ref 65–99)
HCT VFR BLD AUTO: 36.9 % (ref 34–46.6)
HGB BLD-MCNC: 12.5 G/DL (ref 12–15.9)
IMM GRANULOCYTES # BLD AUTO: 0.15 10*3/MM3 (ref 0–0.05)
IMM GRANULOCYTES NFR BLD AUTO: 1.4 % (ref 0–0.5)
IRON 24H UR-MRATE: 101 MCG/DL (ref 37–145)
IRON SATN MFR SERPL: 21 % (ref 20–50)
LYMPHOCYTES # BLD AUTO: 1.83 10*3/MM3 (ref 0.7–3.1)
LYMPHOCYTES NFR BLD AUTO: 16.7 % (ref 19.6–45.3)
MCH RBC QN AUTO: 30.3 PG (ref 26.6–33)
MCHC RBC AUTO-ENTMCNC: 33.9 G/DL (ref 31.5–35.7)
MCV RBC AUTO: 89.6 FL (ref 79–97)
MONOCYTES # BLD AUTO: 0.63 10*3/MM3 (ref 0.1–0.9)
MONOCYTES NFR BLD AUTO: 5.7 % (ref 5–12)
NEUTROPHILS NFR BLD AUTO: 75 % (ref 42.7–76)
NEUTROPHILS NFR BLD AUTO: 8.24 10*3/MM3 (ref 1.7–7)
NRBC BLD AUTO-RTO: 0 /100 WBC (ref 0–0.2)
PLATELET # BLD AUTO: 233 10*3/MM3 (ref 140–450)
PMV BLD AUTO: 9.4 FL (ref 6–12)
POTASSIUM SERPL-SCNC: 4.2 MMOL/L (ref 3.5–5.2)
PROT SERPL-MCNC: 6.7 G/DL (ref 6–8.5)
RBC # BLD AUTO: 4.12 10*6/MM3 (ref 3.77–5.28)
SODIUM SERPL-SCNC: 132 MMOL/L (ref 136–145)
TIBC SERPL-MCNC: 487 MCG/DL (ref 298–536)
TRANSFERRIN SERPL-MCNC: 327 MG/DL (ref 200–360)
VIT B12 BLD-MCNC: 506 PG/ML (ref 211–946)
WBC NRBC COR # BLD AUTO: 10.99 10*3/MM3 (ref 3.4–10.8)

## 2025-08-05 PROCEDURE — 80053 COMPREHEN METABOLIC PANEL: CPT

## 2025-08-05 PROCEDURE — 85025 COMPLETE CBC W/AUTO DIFF WBC: CPT

## 2025-08-05 PROCEDURE — 82607 VITAMIN B-12: CPT | Performed by: INTERNAL MEDICINE

## 2025-08-05 PROCEDURE — 84466 ASSAY OF TRANSFERRIN: CPT

## 2025-08-05 PROCEDURE — 82728 ASSAY OF FERRITIN: CPT

## 2025-08-05 PROCEDURE — 36415 COLL VENOUS BLD VENIPUNCTURE: CPT

## 2025-08-05 PROCEDURE — 83540 ASSAY OF IRON: CPT

## 2025-08-07 ENCOUNTER — ROUTINE PRENATAL (OUTPATIENT)
Dept: OBSTETRICS AND GYNECOLOGY | Facility: CLINIC | Age: 35
End: 2025-08-07
Payer: MEDICAID

## 2025-08-07 ENCOUNTER — TELEPHONE (OUTPATIENT)
Dept: OBSTETRICS AND GYNECOLOGY | Facility: CLINIC | Age: 35
End: 2025-08-07

## 2025-08-07 VITALS — DIASTOLIC BLOOD PRESSURE: 76 MMHG | SYSTOLIC BLOOD PRESSURE: 115 MMHG | WEIGHT: 171 LBS | BODY MASS INDEX: 29.35 KG/M2

## 2025-08-07 DIAGNOSIS — L29.9 PRURITUS GRAVIDARUM, THIRD TRIMESTER: ICD-10-CM

## 2025-08-07 DIAGNOSIS — J45.909 ASTHMA DURING PREGNANCY: ICD-10-CM

## 2025-08-07 DIAGNOSIS — Z36.89 ENCOUNTER FOR ULTRASOUND TO ASSESS FETAL GROWTH: ICD-10-CM

## 2025-08-07 DIAGNOSIS — Z87.59 HISTORY OF PRIOR PREGNANCY WITH IUGR NEWBORN: ICD-10-CM

## 2025-08-07 DIAGNOSIS — D68.59 THROMBOPHILIA AFFECTING PREGNANCY, ANTEPARTUM: Primary | ICD-10-CM

## 2025-08-07 DIAGNOSIS — O99.519 ASTHMA DURING PREGNANCY: ICD-10-CM

## 2025-08-07 DIAGNOSIS — Z87.59 HISTORY OF POSTPARTUM HEMORRHAGE: ICD-10-CM

## 2025-08-07 DIAGNOSIS — D50.9 IRON DEFICIENCY ANEMIA DURING PREGNANCY: ICD-10-CM

## 2025-08-07 DIAGNOSIS — Z36.85 ANTENATAL SCREENING FOR STREPTOCOCCUS B: ICD-10-CM

## 2025-08-07 DIAGNOSIS — O99.019 IRON DEFICIENCY ANEMIA DURING PREGNANCY: ICD-10-CM

## 2025-08-07 DIAGNOSIS — O99.119 THROMBOPHILIA AFFECTING PREGNANCY, ANTEPARTUM: ICD-10-CM

## 2025-08-07 DIAGNOSIS — Z87.59 HISTORY OF CHOLESTASIS DURING PREGNANCY: ICD-10-CM

## 2025-08-07 DIAGNOSIS — O99.713 PRURITUS GRAVIDARUM, THIRD TRIMESTER: ICD-10-CM

## 2025-08-07 DIAGNOSIS — Z87.19 HISTORY OF CHOLESTASIS DURING PREGNANCY: ICD-10-CM

## 2025-08-07 DIAGNOSIS — D68.59 THROMBOPHILIA AFFECTING PREGNANCY, ANTEPARTUM: ICD-10-CM

## 2025-08-07 DIAGNOSIS — O09.523 MULTIGRAVIDA OF ADVANCED MATERNAL AGE IN THIRD TRIMESTER: ICD-10-CM

## 2025-08-07 DIAGNOSIS — O99.119 THROMBOPHILIA AFFECTING PREGNANCY, ANTEPARTUM: Primary | ICD-10-CM

## 2025-08-07 DIAGNOSIS — Z3A.36 36 WEEKS GESTATION OF PREGNANCY: Primary | ICD-10-CM

## 2025-08-07 DIAGNOSIS — Z34.83 ENCOUNTER FOR SUPERVISION OF OTHER NORMAL PREGNANCY IN THIRD TRIMESTER: ICD-10-CM

## 2025-08-07 DIAGNOSIS — Z34.83 PRENATAL CARE, SUBSEQUENT PREGNANCY IN THIRD TRIMESTER: ICD-10-CM

## 2025-08-07 LAB
GLUCOSE UR STRIP-MCNC: NEGATIVE MG/DL
PROT UR STRIP-MCNC: ABNORMAL MG/DL

## 2025-08-07 RX ORDER — HEPARIN SODIUM 10000 [USP'U]/ML
10000 INJECTION, SOLUTION INTRAVENOUS; SUBCUTANEOUS EVERY 12 HOURS
Qty: 60 ML | Refills: 0 | Status: SHIPPED | OUTPATIENT
Start: 2025-08-07 | End: 2025-08-07

## 2025-08-07 RX ORDER — PEN NEEDLE, DIABETIC 32 GX 1/4"
1 NEEDLE, DISPOSABLE MISCELLANEOUS EVERY 12 HOURS
Qty: 90 EACH | Refills: 0 | Status: SHIPPED | OUTPATIENT
Start: 2025-08-07

## 2025-08-07 RX ORDER — HEPARIN SODIUM 5000 [USP'U]/ML
10000 INJECTION, SOLUTION INTRAVENOUS; SUBCUTANEOUS EVERY 12 HOURS SCHEDULED
Qty: 120 ML | Refills: 0 | Status: SHIPPED | OUTPATIENT
Start: 2025-08-07 | End: 2025-08-07

## 2025-08-07 RX ORDER — HEPARIN SODIUM 10000 [USP'U]/ML
10000 INJECTION, SOLUTION INTRAVENOUS; SUBCUTANEOUS EVERY 12 HOURS
Qty: 60 ML | Refills: 0 | Status: SHIPPED | OUTPATIENT
Start: 2025-08-07 | End: 2025-09-06

## 2025-08-08 LAB
ALBUMIN SERPL-MCNC: 3.9 G/DL (ref 3.9–4.9)
ALP SERPL-CCNC: 241 IU/L (ref 44–121)
ALT SERPL-CCNC: 14 IU/L (ref 0–32)
AST SERPL-CCNC: 18 IU/L (ref 0–40)
BILIRUB DIRECT SERPL-MCNC: 0.16 MG/DL (ref 0–0.4)
BILIRUB SERPL-MCNC: 0.4 MG/DL (ref 0–1.2)
PROT SERPL-MCNC: 6.7 G/DL (ref 6–8.5)

## 2025-08-09 LAB — BILE AC SERPL-SCNC: 8.8 UMOL/L (ref 0–10)

## 2025-08-11 LAB — B-HEM STREP SPEC QL CULT: NEGATIVE

## 2025-08-13 ENCOUNTER — ROUTINE PRENATAL (OUTPATIENT)
Dept: OBSTETRICS AND GYNECOLOGY | Facility: CLINIC | Age: 35
End: 2025-08-13
Payer: MEDICAID

## 2025-08-13 VITALS — SYSTOLIC BLOOD PRESSURE: 127 MMHG | BODY MASS INDEX: 29.52 KG/M2 | DIASTOLIC BLOOD PRESSURE: 83 MMHG | WEIGHT: 172 LBS

## 2025-08-13 DIAGNOSIS — D50.9 IRON DEFICIENCY ANEMIA DURING PREGNANCY: ICD-10-CM

## 2025-08-13 DIAGNOSIS — O99.019 IRON DEFICIENCY ANEMIA DURING PREGNANCY: ICD-10-CM

## 2025-08-13 DIAGNOSIS — D68.59 THROMBOPHILIA AFFECTING PREGNANCY, ANTEPARTUM: ICD-10-CM

## 2025-08-13 DIAGNOSIS — Z87.59 HISTORY OF POSTPARTUM HEMORRHAGE: ICD-10-CM

## 2025-08-13 DIAGNOSIS — Z87.19 HISTORY OF CHOLESTASIS DURING PREGNANCY: ICD-10-CM

## 2025-08-13 DIAGNOSIS — J45.909 ASTHMA DURING PREGNANCY: ICD-10-CM

## 2025-08-13 DIAGNOSIS — Z34.83 ENCOUNTER FOR SUPERVISION OF OTHER NORMAL PREGNANCY IN THIRD TRIMESTER: ICD-10-CM

## 2025-08-13 DIAGNOSIS — O47.9 UTERINE CONTRACTIONS: ICD-10-CM

## 2025-08-13 DIAGNOSIS — O99.119 THROMBOPHILIA AFFECTING PREGNANCY, ANTEPARTUM: ICD-10-CM

## 2025-08-13 DIAGNOSIS — O09.523 MULTIGRAVIDA OF ADVANCED MATERNAL AGE IN THIRD TRIMESTER: ICD-10-CM

## 2025-08-13 DIAGNOSIS — Z87.59 HISTORY OF PRIOR PREGNANCY WITH IUGR NEWBORN: ICD-10-CM

## 2025-08-13 DIAGNOSIS — Z3A.37 37 WEEKS GESTATION OF PREGNANCY: Primary | ICD-10-CM

## 2025-08-13 DIAGNOSIS — Z3A.39 39 WEEKS GESTATION OF PREGNANCY: ICD-10-CM

## 2025-08-13 DIAGNOSIS — O99.519 ASTHMA DURING PREGNANCY: ICD-10-CM

## 2025-08-13 DIAGNOSIS — L29.9 PRURITUS: ICD-10-CM

## 2025-08-13 DIAGNOSIS — Z34.83 PRENATAL CARE, SUBSEQUENT PREGNANCY IN THIRD TRIMESTER: ICD-10-CM

## 2025-08-13 DIAGNOSIS — Z87.59 HISTORY OF CHOLESTASIS DURING PREGNANCY: ICD-10-CM

## 2025-08-13 LAB
GLUCOSE UR STRIP-MCNC: ABNORMAL MG/DL
PROT UR STRIP-MCNC: ABNORMAL MG/DL

## 2025-08-14 LAB
ALBUMIN SERPL-MCNC: 3.9 G/DL (ref 3.9–4.9)
ALP SERPL-CCNC: 250 IU/L (ref 44–121)
ALT SERPL-CCNC: 18 IU/L (ref 0–32)
AST SERPL-CCNC: 19 IU/L (ref 0–40)
BILIRUB DIRECT SERPL-MCNC: 0.18 MG/DL (ref 0–0.4)
BILIRUB SERPL-MCNC: 0.4 MG/DL (ref 0–1.2)
PROT SERPL-MCNC: 6.8 G/DL (ref 6–8.5)

## 2025-08-15 LAB — BILE AC SERPL-SCNC: 4.9 UMOL/L (ref 0–10)

## 2025-08-16 ENCOUNTER — HOSPITAL ENCOUNTER (EMERGENCY)
Facility: HOSPITAL | Age: 35
Discharge: HOME | End: 2025-08-16
Attending: STUDENT IN AN ORGANIZED HEALTH CARE EDUCATION/TRAINING PROGRAM | Admitting: OBSTETRICS & GYNECOLOGY
Payer: MEDICAID

## 2025-08-16 VITALS
SYSTOLIC BLOOD PRESSURE: 106 MMHG | HEIGHT: 63 IN | BODY MASS INDEX: 30.47 KG/M2 | HEART RATE: 98 BPM | DIASTOLIC BLOOD PRESSURE: 61 MMHG | RESPIRATION RATE: 18 BRPM | OXYGEN SATURATION: 98 %

## 2025-08-16 LAB
ALBUMIN SERPL-MCNC: 3.5 G/DL (ref 3.5–5.2)
ALBUMIN/GLOB SERPL: 1 G/DL
ALP SERPL-CCNC: 257 U/L (ref 39–117)
ALT SERPL W P-5'-P-CCNC: 14 U/L (ref 1–33)
ANION GAP SERPL CALCULATED.3IONS-SCNC: 11.5 MMOL/L (ref 5–15)
AST SERPL-CCNC: 17 U/L (ref 1–32)
BACTERIA UR QL AUTO: ABNORMAL /HPF
BASOPHILS # BLD AUTO: 0.05 10*3/MM3 (ref 0–0.2)
BASOPHILS NFR BLD AUTO: 0.3 % (ref 0–1.5)
BILIRUB SERPL-MCNC: 0.5 MG/DL (ref 0–1.2)
BILIRUB UR QL STRIP: NEGATIVE
BUN SERPL-MCNC: 7 MG/DL (ref 6–20)
BUN/CREAT SERPL: 9.1 (ref 7–25)
CALCIUM SPEC-SCNC: 9.6 MG/DL (ref 8.6–10.5)
CHLORIDE SERPL-SCNC: 105 MMOL/L (ref 98–107)
CLARITY UR: CLEAR
CO2 SERPL-SCNC: 18.5 MMOL/L (ref 22–29)
COLOR UR: YELLOW
CREAT SERPL-MCNC: 0.77 MG/DL (ref 0.57–1)
CREAT UR-MCNC: 99 MG/DL
DEPRECATED RDW RBC AUTO: 41.8 FL (ref 37–54)
EGFRCR SERPLBLD CKD-EPI 2021: 102.7 ML/MIN/1.73
EOSINOPHIL # BLD AUTO: 0.06 10*3/MM3 (ref 0–0.4)
EOSINOPHIL NFR BLD AUTO: 0.4 % (ref 0.3–6.2)
ERYTHROCYTE [DISTWIDTH] IN BLOOD BY AUTOMATED COUNT: 13 % (ref 12.3–15.4)
GLOBULIN UR ELPH-MCNC: 3.6 GM/DL
GLUCOSE SERPL-MCNC: 84 MG/DL (ref 65–99)
GLUCOSE UR STRIP-MCNC: ABNORMAL MG/DL
HCT VFR BLD AUTO: 34.9 % (ref 34–46.6)
HGB BLD-MCNC: 12.2 G/DL (ref 12–15.9)
HGB UR QL STRIP.AUTO: NEGATIVE
HYALINE CASTS UR QL AUTO: ABNORMAL /LPF
IMM GRANULOCYTES # BLD AUTO: 0.15 10*3/MM3 (ref 0–0.05)
IMM GRANULOCYTES NFR BLD AUTO: 0.9 % (ref 0–0.5)
KETONES UR QL STRIP: NEGATIVE
LEUKOCYTE ESTERASE UR QL STRIP.AUTO: ABNORMAL
LYMPHOCYTES # BLD AUTO: 1.57 10*3/MM3 (ref 0.7–3.1)
LYMPHOCYTES NFR BLD AUTO: 9.8 % (ref 19.6–45.3)
MCH RBC QN AUTO: 30.9 PG (ref 26.6–33)
MCHC RBC AUTO-ENTMCNC: 35 G/DL (ref 31.5–35.7)
MCV RBC AUTO: 88.4 FL (ref 79–97)
MONOCYTES # BLD AUTO: 0.87 10*3/MM3 (ref 0.1–0.9)
MONOCYTES NFR BLD AUTO: 5.5 % (ref 5–12)
NEUTROPHILS NFR BLD AUTO: 13.24 10*3/MM3 (ref 1.7–7)
NEUTROPHILS NFR BLD AUTO: 83.1 % (ref 42.7–76)
NITRITE UR QL STRIP: NEGATIVE
NRBC BLD AUTO-RTO: 0 /100 WBC (ref 0–0.2)
PH UR STRIP.AUTO: 7 [PH] (ref 5–8)
PLATELET # BLD AUTO: 245 10*3/MM3 (ref 140–450)
PMV BLD AUTO: 9.4 FL (ref 6–12)
POTASSIUM SERPL-SCNC: 4.2 MMOL/L (ref 3.5–5.2)
PROT ?TM UR-MCNC: 24.1 MG/DL
PROT SERPL-MCNC: 7.1 G/DL (ref 6–8.5)
PROT UR QL STRIP: ABNORMAL
PROT/CREAT UR: 243.4 MG/G CREA (ref 0–200)
RBC # BLD AUTO: 3.95 10*6/MM3 (ref 3.77–5.28)
RBC # UR STRIP: ABNORMAL /HPF
REF LAB TEST METHOD: ABNORMAL
SODIUM SERPL-SCNC: 135 MMOL/L (ref 136–145)
SP GR UR STRIP: 1.02 (ref 1–1.03)
SQUAMOUS #/AREA URNS HPF: ABNORMAL /HPF
UROBILINOGEN UR QL STRIP: ABNORMAL
WBC # UR STRIP: ABNORMAL /HPF
WBC NRBC COR # BLD AUTO: 15.94 10*3/MM3 (ref 3.4–10.8)

## 2025-08-16 PROCEDURE — 87086 URINE CULTURE/COLONY COUNT: CPT | Performed by: OBSTETRICS & GYNECOLOGY

## 2025-08-16 PROCEDURE — 99284 EMERGENCY DEPT VISIT MOD MDM: CPT | Performed by: OBSTETRICS & GYNECOLOGY

## 2025-08-16 PROCEDURE — 59025 FETAL NON-STRESS TEST: CPT

## 2025-08-16 PROCEDURE — 85025 COMPLETE CBC W/AUTO DIFF WBC: CPT | Performed by: OBSTETRICS & GYNECOLOGY

## 2025-08-16 PROCEDURE — 25810000003 LACTATED RINGERS SOLUTION: Performed by: OBSTETRICS & GYNECOLOGY

## 2025-08-16 PROCEDURE — 84156 ASSAY OF PROTEIN URINE: CPT | Performed by: OBSTETRICS & GYNECOLOGY

## 2025-08-16 PROCEDURE — 80053 COMPREHEN METABOLIC PANEL: CPT | Performed by: OBSTETRICS & GYNECOLOGY

## 2025-08-16 PROCEDURE — 82570 ASSAY OF URINE CREATININE: CPT | Performed by: OBSTETRICS & GYNECOLOGY

## 2025-08-16 PROCEDURE — 81001 URINALYSIS AUTO W/SCOPE: CPT | Performed by: OBSTETRICS & GYNECOLOGY

## 2025-08-16 RX ORDER — SODIUM CHLORIDE 9 MG/ML
40 INJECTION, SOLUTION INTRAVENOUS AS NEEDED
Status: DISCONTINUED | OUTPATIENT
Start: 2025-08-16 | End: 2025-08-16 | Stop reason: HOSPADM

## 2025-08-16 RX ORDER — PRENATAL VIT NO.126/IRON/FOLIC 28MG-0.8MG
TABLET ORAL DAILY
COMMUNITY

## 2025-08-16 RX ORDER — SODIUM CHLORIDE 0.9 % (FLUSH) 0.9 %
10 SYRINGE (ML) INJECTION AS NEEDED
Status: DISCONTINUED | OUTPATIENT
Start: 2025-08-16 | End: 2025-08-16 | Stop reason: HOSPADM

## 2025-08-16 RX ORDER — SODIUM CHLORIDE 0.9 % (FLUSH) 0.9 %
10 SYRINGE (ML) INJECTION EVERY 12 HOURS SCHEDULED
Status: DISCONTINUED | OUTPATIENT
Start: 2025-08-16 | End: 2025-08-16 | Stop reason: HOSPADM

## 2025-08-16 RX ADMIN — SODIUM CHLORIDE, POTASSIUM CHLORIDE, SODIUM LACTATE AND CALCIUM CHLORIDE 1000 ML: 600; 310; 30; 20 INJECTION, SOLUTION INTRAVENOUS at 16:55

## 2025-08-17 RX ORDER — SODIUM CHLORIDE 0.9 % (FLUSH) 0.9 %
10 SYRINGE (ML) INJECTION AS NEEDED
OUTPATIENT
Start: 2025-08-17

## 2025-08-17 RX ORDER — BUTORPHANOL TARTRATE 1 MG/ML
2 INJECTION, SOLUTION INTRAMUSCULAR; INTRAVENOUS
OUTPATIENT
Start: 2025-08-17

## 2025-08-17 RX ORDER — LIDOCAINE HYDROCHLORIDE 10 MG/ML
0.5 INJECTION, SOLUTION INFILTRATION; PERINEURAL ONCE AS NEEDED
OUTPATIENT
Start: 2025-08-17

## 2025-08-17 RX ORDER — SODIUM CHLORIDE 0.9 % (FLUSH) 0.9 %
10 SYRINGE (ML) INJECTION EVERY 12 HOURS SCHEDULED
OUTPATIENT
Start: 2025-08-17

## 2025-08-17 RX ORDER — METHYLERGONOVINE MALEATE 0.2 MG/ML
200 INJECTION INTRAVENOUS ONCE AS NEEDED
OUTPATIENT
Start: 2025-08-17

## 2025-08-17 RX ORDER — ONDANSETRON 2 MG/ML
4 INJECTION INTRAMUSCULAR; INTRAVENOUS EVERY 6 HOURS PRN
OUTPATIENT
Start: 2025-08-17

## 2025-08-17 RX ORDER — ONDANSETRON 4 MG/1
4 TABLET, ORALLY DISINTEGRATING ORAL EVERY 6 HOURS PRN
OUTPATIENT
Start: 2025-08-17

## 2025-08-17 RX ORDER — IBUPROFEN 200 MG
600 TABLET ORAL EVERY 6 HOURS PRN
OUTPATIENT
Start: 2025-08-17

## 2025-08-17 RX ORDER — ACETAMINOPHEN 325 MG/1
650 TABLET ORAL EVERY 4 HOURS PRN
OUTPATIENT
Start: 2025-08-17

## 2025-08-17 RX ORDER — TERBUTALINE SULFATE 1 MG/ML
0.25 INJECTION SUBCUTANEOUS AS NEEDED
OUTPATIENT
Start: 2025-08-17

## 2025-08-17 RX ORDER — MISOPROSTOL 100 UG/1
800 TABLET ORAL ONCE AS NEEDED
OUTPATIENT
Start: 2025-08-17

## 2025-08-17 RX ORDER — CARBOPROST TROMETHAMINE 250 UG/ML
250 INJECTION, SOLUTION INTRAMUSCULAR
OUTPATIENT
Start: 2025-08-17

## 2025-08-17 RX ORDER — FAMOTIDINE 10 MG/ML
20 INJECTION, SOLUTION INTRAVENOUS 2 TIMES DAILY PRN
OUTPATIENT
Start: 2025-08-17

## 2025-08-17 RX ORDER — SODIUM CHLORIDE, SODIUM LACTATE, POTASSIUM CHLORIDE, CALCIUM CHLORIDE 600; 310; 30; 20 MG/100ML; MG/100ML; MG/100ML; MG/100ML
125 INJECTION, SOLUTION INTRAVENOUS CONTINUOUS
OUTPATIENT
Start: 2025-08-17 | End: 2025-08-18

## 2025-08-17 RX ORDER — OXYTOCIN/0.9 % SODIUM CHLORIDE 30/500 ML
999 PLASTIC BAG, INJECTION (ML) INTRAVENOUS ONCE
OUTPATIENT
Start: 2025-08-17

## 2025-08-17 RX ORDER — HYDROCODONE BITARTRATE AND ACETAMINOPHEN 5; 325 MG/1; MG/1
1 TABLET ORAL EVERY 4 HOURS PRN
Refills: 0 | OUTPATIENT
Start: 2025-08-17 | End: 2025-08-22

## 2025-08-17 RX ORDER — MAGNESIUM CARB/ALUMINUM HYDROX 105-160MG
30 TABLET,CHEWABLE ORAL ONCE AS NEEDED
OUTPATIENT
Start: 2025-08-17

## 2025-08-17 RX ORDER — SODIUM CHLORIDE 9 MG/ML
40 INJECTION, SOLUTION INTRAVENOUS AS NEEDED
OUTPATIENT
Start: 2025-08-17

## 2025-08-17 RX ORDER — OXYTOCIN/0.9 % SODIUM CHLORIDE 30/500 ML
250 PLASTIC BAG, INJECTION (ML) INTRAVENOUS CONTINUOUS
OUTPATIENT
Start: 2025-08-18 | End: 2025-08-18

## 2025-08-17 RX ORDER — OXYTOCIN/0.9 % SODIUM CHLORIDE 30/500 ML
2-20 PLASTIC BAG, INJECTION (ML) INTRAVENOUS
OUTPATIENT
Start: 2025-08-17

## 2025-08-17 RX ORDER — FAMOTIDINE 10 MG
20 TABLET ORAL 2 TIMES DAILY PRN
OUTPATIENT
Start: 2025-08-17

## 2025-08-18 LAB — BACTERIA SPEC AEROBE CULT: NORMAL

## 2025-08-19 ENCOUNTER — ROUTINE PRENATAL (OUTPATIENT)
Dept: OBSTETRICS AND GYNECOLOGY | Facility: CLINIC | Age: 35
End: 2025-08-19
Payer: MEDICAID

## 2025-08-19 VITALS — SYSTOLIC BLOOD PRESSURE: 114 MMHG | BODY MASS INDEX: 30.11 KG/M2 | WEIGHT: 170 LBS | DIASTOLIC BLOOD PRESSURE: 78 MMHG

## 2025-08-19 DIAGNOSIS — O99.019 IRON DEFICIENCY ANEMIA DURING PREGNANCY: ICD-10-CM

## 2025-08-19 DIAGNOSIS — Z87.19 HISTORY OF CHOLESTASIS DURING PREGNANCY: ICD-10-CM

## 2025-08-19 DIAGNOSIS — O99.519 ASTHMA DURING PREGNANCY: ICD-10-CM

## 2025-08-19 DIAGNOSIS — D50.9 IRON DEFICIENCY ANEMIA DURING PREGNANCY: ICD-10-CM

## 2025-08-19 DIAGNOSIS — O99.119 THROMBOPHILIA AFFECTING PREGNANCY, ANTEPARTUM: ICD-10-CM

## 2025-08-19 DIAGNOSIS — D68.59 THROMBOPHILIA AFFECTING PREGNANCY, ANTEPARTUM: ICD-10-CM

## 2025-08-19 DIAGNOSIS — Z87.59 HISTORY OF POSTPARTUM HEMORRHAGE: ICD-10-CM

## 2025-08-19 DIAGNOSIS — J45.909 ASTHMA DURING PREGNANCY: ICD-10-CM

## 2025-08-19 DIAGNOSIS — Z87.59 HISTORY OF CHOLESTASIS DURING PREGNANCY: ICD-10-CM

## 2025-08-19 DIAGNOSIS — Z3A.37 37 WEEKS GESTATION OF PREGNANCY: ICD-10-CM

## 2025-08-19 DIAGNOSIS — Z34.93 THIRD TRIMESTER PREGNANCY: Primary | ICD-10-CM

## 2025-08-19 PROCEDURE — 99213 OFFICE O/P EST LOW 20 MIN: CPT

## 2025-08-25 ENCOUNTER — ROUTINE PRENATAL (OUTPATIENT)
Dept: OBSTETRICS AND GYNECOLOGY | Facility: CLINIC | Age: 35
End: 2025-08-25
Payer: MEDICAID

## 2025-08-25 ENCOUNTER — TELEPHONE (OUTPATIENT)
Dept: GASTROENTEROLOGY | Facility: CLINIC | Age: 35
End: 2025-08-25
Payer: MEDICAID

## 2025-08-25 VITALS — DIASTOLIC BLOOD PRESSURE: 84 MMHG | BODY MASS INDEX: 30.65 KG/M2 | SYSTOLIC BLOOD PRESSURE: 125 MMHG | WEIGHT: 173 LBS

## 2025-08-25 DIAGNOSIS — O99.119 THROMBOPHILIA AFFECTING PREGNANCY, ANTEPARTUM: ICD-10-CM

## 2025-08-25 DIAGNOSIS — Z87.59 HISTORY OF POSTPARTUM HEMORRHAGE: ICD-10-CM

## 2025-08-25 DIAGNOSIS — Z87.59 HISTORY OF CHOLESTASIS DURING PREGNANCY: ICD-10-CM

## 2025-08-25 DIAGNOSIS — Z03.71 NO LEAKAGE OF AMNIOTIC FLUID INTO VAGINA: ICD-10-CM

## 2025-08-25 DIAGNOSIS — Z34.83 ENCOUNTER FOR SUPERVISION OF OTHER NORMAL PREGNANCY IN THIRD TRIMESTER: ICD-10-CM

## 2025-08-25 DIAGNOSIS — O09.523 MULTIGRAVIDA OF ADVANCED MATERNAL AGE IN THIRD TRIMESTER: ICD-10-CM

## 2025-08-25 DIAGNOSIS — D68.59 THROMBOPHILIA AFFECTING PREGNANCY, ANTEPARTUM: ICD-10-CM

## 2025-08-25 DIAGNOSIS — O99.019 IRON DEFICIENCY ANEMIA DURING PREGNANCY: ICD-10-CM

## 2025-08-25 DIAGNOSIS — O99.519 ASTHMA DURING PREGNANCY: ICD-10-CM

## 2025-08-25 DIAGNOSIS — Z3A.38 38 WEEKS GESTATION OF PREGNANCY: Primary | ICD-10-CM

## 2025-08-25 DIAGNOSIS — Z87.19 HISTORY OF CHOLESTASIS DURING PREGNANCY: ICD-10-CM

## 2025-08-25 DIAGNOSIS — Z87.59 HISTORY OF PRIOR PREGNANCY WITH IUGR NEWBORN: ICD-10-CM

## 2025-08-25 DIAGNOSIS — Z34.83 PRENATAL CARE, SUBSEQUENT PREGNANCY IN THIRD TRIMESTER: ICD-10-CM

## 2025-08-25 DIAGNOSIS — D50.9 IRON DEFICIENCY ANEMIA DURING PREGNANCY: ICD-10-CM

## 2025-08-25 DIAGNOSIS — J45.909 ASTHMA DURING PREGNANCY: ICD-10-CM

## 2025-08-25 LAB
GLUCOSE UR STRIP-MCNC: NEGATIVE MG/DL
PROT UR STRIP-MCNC: ABNORMAL MG/DL

## 2025-08-25 RX ORDER — MOMETASONE FUROATE 220 UG/1
INHALANT RESPIRATORY (INHALATION)
Qty: 1 EACH | Refills: 1 | Status: SHIPPED | OUTPATIENT
Start: 2025-08-25

## 2025-08-27 ENCOUNTER — HOSPITAL ENCOUNTER (INPATIENT)
Facility: HOSPITAL | Age: 35
LOS: 2 days | Discharge: HOME | End: 2025-08-29
Attending: STUDENT IN AN ORGANIZED HEALTH CARE EDUCATION/TRAINING PROGRAM | Admitting: STUDENT IN AN ORGANIZED HEALTH CARE EDUCATION/TRAINING PROGRAM
Payer: MEDICAID

## 2025-08-27 ENCOUNTER — HOSPITAL ENCOUNTER (OUTPATIENT)
Dept: LABOR AND DELIVERY | Facility: HOSPITAL | Age: 35
Discharge: HOME | End: 2025-08-27
Payer: MEDICAID

## 2025-08-27 DIAGNOSIS — Z3A.39 39 WEEKS GESTATION OF PREGNANCY: ICD-10-CM

## 2025-08-27 DIAGNOSIS — D68.59 THROMBOPHILIA AFFECTING PREGNANCY, ANTEPARTUM: ICD-10-CM

## 2025-08-27 DIAGNOSIS — O99.119 THROMBOPHILIA AFFECTING PREGNANCY, ANTEPARTUM: ICD-10-CM

## 2025-08-27 LAB
ABO GROUP BLD: NORMAL
ALBUMIN SERPL-MCNC: 3.6 G/DL (ref 3.5–5.2)
ALBUMIN/GLOB SERPL: 1 G/DL
ALP SERPL-CCNC: 305 U/L (ref 39–117)
ALT SERPL W P-5'-P-CCNC: 24 U/L (ref 1–33)
ANION GAP SERPL CALCULATED.3IONS-SCNC: 12.4 MMOL/L (ref 5–15)
APTT PPP: 32.8 SECONDS (ref 22.7–35.4)
AST SERPL-CCNC: 21 U/L (ref 1–32)
BASOPHILS # BLD AUTO: 0.09 10*3/MM3 (ref 0–0.2)
BASOPHILS NFR BLD AUTO: 0.7 % (ref 0–1.5)
BILIRUB SERPL-MCNC: 0.3 MG/DL (ref 0–1.2)
BLD GP AB SCN SERPL QL: NEGATIVE
BUN SERPL-MCNC: 10 MG/DL (ref 6–20)
BUN/CREAT SERPL: 15.4 (ref 7–25)
CALCIUM SPEC-SCNC: 9.7 MG/DL (ref 8.6–10.5)
CHLORIDE SERPL-SCNC: 105 MMOL/L (ref 98–107)
CO2 SERPL-SCNC: 18.6 MMOL/L (ref 22–29)
CREAT SERPL-MCNC: 0.65 MG/DL (ref 0.57–1)
DEPRECATED RDW RBC AUTO: 41.4 FL (ref 37–54)
EGFRCR SERPLBLD CKD-EPI 2021: 117.2 ML/MIN/1.73
EOSINOPHIL # BLD AUTO: 0.08 10*3/MM3 (ref 0–0.4)
EOSINOPHIL NFR BLD AUTO: 0.6 % (ref 0.3–6.2)
ERYTHROCYTE [DISTWIDTH] IN BLOOD BY AUTOMATED COUNT: 12.8 % (ref 12.3–15.4)
FIBRINOGEN PPP-MCNC: 584 MG/DL (ref 219–464)
GLOBULIN UR ELPH-MCNC: 3.7 GM/DL
GLUCOSE SERPL-MCNC: 86 MG/DL (ref 65–99)
HCT VFR BLD AUTO: 36.1 % (ref 34–46.6)
HGB BLD-MCNC: 12.6 G/DL (ref 12–15.9)
IMM GRANULOCYTES # BLD AUTO: 0.31 10*3/MM3 (ref 0–0.05)
IMM GRANULOCYTES NFR BLD AUTO: 2.4 % (ref 0–0.5)
INR PPP: 1 (ref 0.9–1.1)
LYMPHOCYTES # BLD AUTO: 2.22 10*3/MM3 (ref 0.7–3.1)
LYMPHOCYTES NFR BLD AUTO: 17 % (ref 19.6–45.3)
MCH RBC QN AUTO: 31.1 PG (ref 26.6–33)
MCHC RBC AUTO-ENTMCNC: 34.9 G/DL (ref 31.5–35.7)
MCV RBC AUTO: 89.1 FL (ref 79–97)
MONOCYTES # BLD AUTO: 0.64 10*3/MM3 (ref 0.1–0.9)
MONOCYTES NFR BLD AUTO: 4.9 % (ref 5–12)
NEUTROPHILS NFR BLD AUTO: 74.4 % (ref 42.7–76)
NEUTROPHILS NFR BLD AUTO: 9.71 10*3/MM3 (ref 1.7–7)
NRBC BLD AUTO-RTO: 0 /100 WBC (ref 0–0.2)
PLATELET # BLD AUTO: 246 10*3/MM3 (ref 140–450)
PMV BLD AUTO: 9.6 FL (ref 6–12)
POTASSIUM SERPL-SCNC: 4.1 MMOL/L (ref 3.5–5.2)
PROT SERPL-MCNC: 7.3 G/DL (ref 6–8.5)
PROTHROMBIN TIME: 13.1 SECONDS (ref 11.7–14.2)
RBC # BLD AUTO: 4.05 10*6/MM3 (ref 3.77–5.28)
RH BLD: POSITIVE
SODIUM SERPL-SCNC: 136 MMOL/L (ref 136–145)
T&S EXPIRATION DATE: NORMAL
TREPONEMA PALLIDUM IGG+IGM AB [PRESENCE] IN SERUM OR PLASMA BY IMMUNOASSAY: NORMAL
WBC NRBC COR # BLD AUTO: 13.05 10*3/MM3 (ref 3.4–10.8)

## 2025-08-27 PROCEDURE — 85384 FIBRINOGEN ACTIVITY: CPT | Performed by: STUDENT IN AN ORGANIZED HEALTH CARE EDUCATION/TRAINING PROGRAM

## 2025-08-27 PROCEDURE — 25010000002 KETOROLAC TROMETHAMINE PER 15 MG: Performed by: STUDENT IN AN ORGANIZED HEALTH CARE EDUCATION/TRAINING PROGRAM

## 2025-08-27 PROCEDURE — 80053 COMPREHEN METABOLIC PANEL: CPT | Performed by: STUDENT IN AN ORGANIZED HEALTH CARE EDUCATION/TRAINING PROGRAM

## 2025-08-27 PROCEDURE — 86900 BLOOD TYPING SEROLOGIC ABO: CPT | Performed by: STUDENT IN AN ORGANIZED HEALTH CARE EDUCATION/TRAINING PROGRAM

## 2025-08-27 PROCEDURE — 86780 TREPONEMA PALLIDUM: CPT | Performed by: STUDENT IN AN ORGANIZED HEALTH CARE EDUCATION/TRAINING PROGRAM

## 2025-08-27 PROCEDURE — 86850 RBC ANTIBODY SCREEN: CPT | Performed by: STUDENT IN AN ORGANIZED HEALTH CARE EDUCATION/TRAINING PROGRAM

## 2025-08-27 PROCEDURE — 85610 PROTHROMBIN TIME: CPT | Performed by: STUDENT IN AN ORGANIZED HEALTH CARE EDUCATION/TRAINING PROGRAM

## 2025-08-27 PROCEDURE — 85730 THROMBOPLASTIN TIME PARTIAL: CPT | Performed by: STUDENT IN AN ORGANIZED HEALTH CARE EDUCATION/TRAINING PROGRAM

## 2025-08-27 PROCEDURE — 25810000003 LACTATED RINGERS PER 1000 ML: Performed by: STUDENT IN AN ORGANIZED HEALTH CARE EDUCATION/TRAINING PROGRAM

## 2025-08-27 PROCEDURE — 25010000002 FAMOTIDINE 10 MG/ML SOLUTION: Performed by: STUDENT IN AN ORGANIZED HEALTH CARE EDUCATION/TRAINING PROGRAM

## 2025-08-27 PROCEDURE — 85025 COMPLETE CBC W/AUTO DIFF WBC: CPT | Performed by: STUDENT IN AN ORGANIZED HEALTH CARE EDUCATION/TRAINING PROGRAM

## 2025-08-27 PROCEDURE — 86901 BLOOD TYPING SEROLOGIC RH(D): CPT | Performed by: STUDENT IN AN ORGANIZED HEALTH CARE EDUCATION/TRAINING PROGRAM

## 2025-08-27 RX ORDER — FAMOTIDINE 10 MG/ML
20 INJECTION, SOLUTION INTRAVENOUS 2 TIMES DAILY PRN
Status: DISCONTINUED | OUTPATIENT
Start: 2025-08-27 | End: 2025-08-27 | Stop reason: HOSPADM

## 2025-08-27 RX ORDER — SODIUM CHLORIDE 0.9 % (FLUSH) 0.9 %
1-10 SYRINGE (ML) INJECTION AS NEEDED
Status: DISCONTINUED | OUTPATIENT
Start: 2025-08-27 | End: 2025-08-29 | Stop reason: HOSPADM

## 2025-08-27 RX ORDER — ONDANSETRON 4 MG/1
4 TABLET, ORALLY DISINTEGRATING ORAL EVERY 8 HOURS PRN
Status: DISCONTINUED | OUTPATIENT
Start: 2025-08-27 | End: 2025-08-29 | Stop reason: HOSPADM

## 2025-08-27 RX ORDER — MAGNESIUM CARB/ALUMINUM HYDROX 105-160MG
30 TABLET,CHEWABLE ORAL ONCE AS NEEDED
Status: DISCONTINUED | OUTPATIENT
Start: 2025-08-27 | End: 2025-08-27 | Stop reason: HOSPADM

## 2025-08-27 RX ORDER — FENTANYL/ROPIVACAINE/NS/PF 2MCG/ML-.2
8 PLASTIC BAG, INJECTION (ML) INJECTION CONTINUOUS
Refills: 0 | Status: DISCONTINUED | OUTPATIENT
Start: 2025-08-27 | End: 2025-08-27

## 2025-08-27 RX ORDER — EPHEDRINE SULFATE 50 MG/ML
5 INJECTION, SOLUTION INTRAVENOUS
Status: DISCONTINUED | OUTPATIENT
Start: 2025-08-27 | End: 2025-08-27 | Stop reason: HOSPADM

## 2025-08-27 RX ORDER — ACETAMINOPHEN 325 MG/1
650 TABLET ORAL EVERY 6 HOURS PRN
Status: DISCONTINUED | OUTPATIENT
Start: 2025-08-27 | End: 2025-08-29 | Stop reason: HOSPADM

## 2025-08-27 RX ORDER — TRAMADOL HYDROCHLORIDE 50 MG/1
50 TABLET ORAL EVERY 6 HOURS PRN
Status: DISCONTINUED | OUTPATIENT
Start: 2025-08-27 | End: 2025-08-29 | Stop reason: HOSPADM

## 2025-08-27 RX ORDER — BISACODYL 10 MG
10 SUPPOSITORY, RECTAL RECTAL DAILY PRN
Status: DISCONTINUED | OUTPATIENT
Start: 2025-08-28 | End: 2025-08-29 | Stop reason: HOSPADM

## 2025-08-27 RX ORDER — IBUPROFEN 600 MG/1
600 TABLET, FILM COATED ORAL EVERY 6 HOURS PRN
Status: DISCONTINUED | OUTPATIENT
Start: 2025-08-27 | End: 2025-08-27 | Stop reason: HOSPADM

## 2025-08-27 RX ORDER — HYDROCORTISONE 25 MG/G
1 CREAM TOPICAL AS NEEDED
Status: DISCONTINUED | OUTPATIENT
Start: 2025-08-27 | End: 2025-08-29 | Stop reason: HOSPADM

## 2025-08-27 RX ORDER — PRENATAL VIT/IRON FUM/FOLIC AC 27MG-0.8MG
1 TABLET ORAL DAILY
Status: DISCONTINUED | OUTPATIENT
Start: 2025-08-28 | End: 2025-08-29 | Stop reason: HOSPADM

## 2025-08-27 RX ORDER — OXYTOCIN/0.9 % SODIUM CHLORIDE 30/500 ML
999 PLASTIC BAG, INJECTION (ML) INTRAVENOUS ONCE
Status: DISCONTINUED | OUTPATIENT
Start: 2025-08-27 | End: 2025-08-27 | Stop reason: HOSPADM

## 2025-08-27 RX ORDER — SODIUM CHLORIDE 0.9 % (FLUSH) 0.9 %
10 SYRINGE (ML) INJECTION EVERY 12 HOURS SCHEDULED
Status: DISCONTINUED | OUTPATIENT
Start: 2025-08-27 | End: 2025-08-27 | Stop reason: HOSPADM

## 2025-08-27 RX ORDER — TRANEXAMIC ACID 10 MG/ML
1000 INJECTION, SOLUTION INTRAVENOUS ONCE AS NEEDED
Status: DISCONTINUED | OUTPATIENT
Start: 2025-08-27 | End: 2025-08-29 | Stop reason: HOSPADM

## 2025-08-27 RX ORDER — LIDOCAINE HYDROCHLORIDE 10 MG/ML
0.5 INJECTION, SOLUTION INFILTRATION; PERINEURAL ONCE AS NEEDED
Status: DISCONTINUED | OUTPATIENT
Start: 2025-08-27 | End: 2025-08-27 | Stop reason: HOSPADM

## 2025-08-27 RX ORDER — ENOXAPARIN SODIUM 100 MG/ML
40 INJECTION SUBCUTANEOUS NIGHTLY
Status: DISCONTINUED | OUTPATIENT
Start: 2025-08-28 | End: 2025-08-29 | Stop reason: HOSPADM

## 2025-08-27 RX ORDER — ONDANSETRON 2 MG/ML
4 INJECTION INTRAMUSCULAR; INTRAVENOUS EVERY 6 HOURS PRN
Status: DISCONTINUED | OUTPATIENT
Start: 2025-08-27 | End: 2025-08-27 | Stop reason: HOSPADM

## 2025-08-27 RX ORDER — METHYLERGONOVINE MALEATE 0.2 MG/ML
200 INJECTION INTRAVENOUS ONCE AS NEEDED
Status: DISCONTINUED | OUTPATIENT
Start: 2025-08-27 | End: 2025-08-27 | Stop reason: HOSPADM

## 2025-08-27 RX ORDER — LANOLIN ALCOHOL/MO/W.PET/CERES
50 CREAM (GRAM) TOPICAL DAILY
COMMUNITY

## 2025-08-27 RX ORDER — BUTORPHANOL TARTRATE 2 MG/ML
2 INJECTION, SOLUTION INTRAMUSCULAR; INTRAVENOUS
Status: DISCONTINUED | OUTPATIENT
Start: 2025-08-27 | End: 2025-08-27 | Stop reason: HOSPADM

## 2025-08-27 RX ORDER — ONDANSETRON 2 MG/ML
4 INJECTION INTRAMUSCULAR; INTRAVENOUS ONCE AS NEEDED
Status: DISCONTINUED | OUTPATIENT
Start: 2025-08-27 | End: 2025-08-27 | Stop reason: HOSPADM

## 2025-08-27 RX ORDER — FAMOTIDINE 10 MG/ML
20 INJECTION, SOLUTION INTRAVENOUS ONCE AS NEEDED
Status: DISCONTINUED | OUTPATIENT
Start: 2025-08-27 | End: 2025-08-27 | Stop reason: HOSPADM

## 2025-08-27 RX ORDER — VENLAFAXINE HYDROCHLORIDE 75 MG/1
75 CAPSULE, EXTENDED RELEASE ORAL
Status: DISCONTINUED | OUTPATIENT
Start: 2025-08-28 | End: 2025-08-29 | Stop reason: HOSPADM

## 2025-08-27 RX ORDER — IBUPROFEN 600 MG/1
600 TABLET, FILM COATED ORAL EVERY 6 HOURS PRN
Status: DISCONTINUED | OUTPATIENT
Start: 2025-08-27 | End: 2025-08-29 | Stop reason: HOSPADM

## 2025-08-27 RX ORDER — SODIUM CHLORIDE, SODIUM LACTATE, POTASSIUM CHLORIDE, CALCIUM CHLORIDE 600; 310; 30; 20 MG/100ML; MG/100ML; MG/100ML; MG/100ML
125 INJECTION, SOLUTION INTRAVENOUS CONTINUOUS
Status: DISCONTINUED | OUTPATIENT
Start: 2025-08-27 | End: 2025-08-27

## 2025-08-27 RX ORDER — DOCUSATE SODIUM 100 MG/1
100 CAPSULE, LIQUID FILLED ORAL 2 TIMES DAILY
Status: DISCONTINUED | OUTPATIENT
Start: 2025-08-27 | End: 2025-08-29 | Stop reason: HOSPADM

## 2025-08-27 RX ORDER — OXYTOCIN/0.9 % SODIUM CHLORIDE 30/500 ML
2-20 PLASTIC BAG, INJECTION (ML) INTRAVENOUS
Status: DISCONTINUED | OUTPATIENT
Start: 2025-08-27 | End: 2025-08-27 | Stop reason: HOSPADM

## 2025-08-27 RX ORDER — ACETAMINOPHEN 325 MG/1
650 TABLET ORAL EVERY 4 HOURS PRN
Status: DISCONTINUED | OUTPATIENT
Start: 2025-08-27 | End: 2025-08-27 | Stop reason: HOSPADM

## 2025-08-27 RX ORDER — TERBUTALINE SULFATE 1 MG/ML
0.25 INJECTION SUBCUTANEOUS AS NEEDED
Status: DISCONTINUED | OUTPATIENT
Start: 2025-08-27 | End: 2025-08-27 | Stop reason: HOSPADM

## 2025-08-27 RX ORDER — OXYTOCIN/0.9 % SODIUM CHLORIDE 30/500 ML
125 PLASTIC BAG, INJECTION (ML) INTRAVENOUS ONCE AS NEEDED
Status: COMPLETED | OUTPATIENT
Start: 2025-08-27 | End: 2025-08-27

## 2025-08-27 RX ORDER — DIPHENHYDRAMINE HCL 25 MG
25 CAPSULE ORAL NIGHTLY PRN
Status: DISCONTINUED | OUTPATIENT
Start: 2025-08-27 | End: 2025-08-29 | Stop reason: HOSPADM

## 2025-08-27 RX ORDER — CARBOPROST TROMETHAMINE 250 UG/ML
250 INJECTION, SOLUTION INTRAMUSCULAR
Status: DISCONTINUED | OUTPATIENT
Start: 2025-08-27 | End: 2025-08-27 | Stop reason: HOSPADM

## 2025-08-27 RX ORDER — SODIUM CHLORIDE 9 MG/ML
40 INJECTION, SOLUTION INTRAVENOUS AS NEEDED
Status: DISCONTINUED | OUTPATIENT
Start: 2025-08-27 | End: 2025-08-27 | Stop reason: HOSPADM

## 2025-08-27 RX ORDER — FAMOTIDINE 20 MG/1
20 TABLET, FILM COATED ORAL 2 TIMES DAILY PRN
Status: DISCONTINUED | OUTPATIENT
Start: 2025-08-27 | End: 2025-08-27 | Stop reason: HOSPADM

## 2025-08-27 RX ORDER — KETOROLAC TROMETHAMINE 30 MG/ML
30 INJECTION, SOLUTION INTRAMUSCULAR; INTRAVENOUS ONCE
Status: COMPLETED | OUTPATIENT
Start: 2025-08-27 | End: 2025-08-27

## 2025-08-27 RX ORDER — OXYTOCIN/0.9 % SODIUM CHLORIDE 30/500 ML
125 PLASTIC BAG, INJECTION (ML) INTRAVENOUS ONCE AS NEEDED
Status: DISCONTINUED | OUTPATIENT
Start: 2025-08-27 | End: 2025-08-29 | Stop reason: HOSPADM

## 2025-08-27 RX ORDER — ONDANSETRON 4 MG/1
4 TABLET, ORALLY DISINTEGRATING ORAL EVERY 6 HOURS PRN
Status: DISCONTINUED | OUTPATIENT
Start: 2025-08-27 | End: 2025-08-27 | Stop reason: HOSPADM

## 2025-08-27 RX ORDER — ONDANSETRON 2 MG/ML
4 INJECTION INTRAMUSCULAR; INTRAVENOUS EVERY 6 HOURS PRN
Status: DISCONTINUED | OUTPATIENT
Start: 2025-08-27 | End: 2025-08-29 | Stop reason: HOSPADM

## 2025-08-27 RX ORDER — HYDROCODONE BITARTRATE AND ACETAMINOPHEN 5; 325 MG/1; MG/1
1 TABLET ORAL EVERY 4 HOURS PRN
Status: DISCONTINUED | OUTPATIENT
Start: 2025-08-27 | End: 2025-08-27 | Stop reason: HOSPADM

## 2025-08-27 RX ORDER — MISOPROSTOL 200 UG/1
800 TABLET ORAL ONCE AS NEEDED
Status: DISCONTINUED | OUTPATIENT
Start: 2025-08-27 | End: 2025-08-27 | Stop reason: HOSPADM

## 2025-08-27 RX ORDER — OXYTOCIN/0.9 % SODIUM CHLORIDE 30/500 ML
250 PLASTIC BAG, INJECTION (ML) INTRAVENOUS CONTINUOUS
Status: ACTIVE | OUTPATIENT
Start: 2025-08-27 | End: 2025-08-27

## 2025-08-27 RX ORDER — SODIUM CHLORIDE 0.9 % (FLUSH) 0.9 %
10 SYRINGE (ML) INJECTION AS NEEDED
Status: DISCONTINUED | OUTPATIENT
Start: 2025-08-27 | End: 2025-08-27 | Stop reason: HOSPADM

## 2025-08-27 RX ORDER — CITRIC ACID/SODIUM CITRATE 334-500MG
30 SOLUTION, ORAL ORAL ONCE
Status: DISCONTINUED | OUTPATIENT
Start: 2025-08-27 | End: 2025-08-27 | Stop reason: HOSPADM

## 2025-08-27 RX ADMIN — Medication 2 MILLI-UNITS/MIN: at 09:43

## 2025-08-27 RX ADMIN — ACETAMINOPHEN 650 MG: 325 TABLET ORAL at 20:46

## 2025-08-27 RX ADMIN — ACETAMINOPHEN 650 MG: 325 TABLET ORAL at 13:09

## 2025-08-27 RX ADMIN — KETOROLAC TROMETHAMINE 30 MG: 30 INJECTION INTRAMUSCULAR; INTRAVENOUS at 19:52

## 2025-08-27 RX ADMIN — SODIUM CHLORIDE, POTASSIUM CHLORIDE, SODIUM LACTATE AND CALCIUM CHLORIDE 125 ML/HR: 600; 310; 30; 20 INJECTION, SOLUTION INTRAVENOUS at 17:49

## 2025-08-27 RX ADMIN — SODIUM CHLORIDE, POTASSIUM CHLORIDE, SODIUM LACTATE AND CALCIUM CHLORIDE 125 ML/HR: 600; 310; 30; 20 INJECTION, SOLUTION INTRAVENOUS at 09:33

## 2025-08-27 RX ADMIN — Medication 125 ML/HR: at 19:57

## 2025-08-27 RX ADMIN — FAMOTIDINE 20 MG: 10 INJECTION INTRAVENOUS at 11:47

## 2025-08-28 LAB
BASOPHILS # BLD AUTO: 0.08 10*3/MM3 (ref 0–0.2)
BASOPHILS NFR BLD AUTO: 0.5 % (ref 0–1.5)
DEPRECATED RDW RBC AUTO: 43.2 FL (ref 37–54)
EOSINOPHIL # BLD AUTO: 0.04 10*3/MM3 (ref 0–0.4)
EOSINOPHIL NFR BLD AUTO: 0.2 % (ref 0.3–6.2)
ERYTHROCYTE [DISTWIDTH] IN BLOOD BY AUTOMATED COUNT: 13.2 % (ref 12.3–15.4)
HCT VFR BLD AUTO: 31.3 % (ref 34–46.6)
HGB BLD-MCNC: 10.8 G/DL (ref 12–15.9)
IMM GRANULOCYTES # BLD AUTO: 0.24 10*3/MM3 (ref 0–0.05)
IMM GRANULOCYTES NFR BLD AUTO: 1.5 % (ref 0–0.5)
LYMPHOCYTES # BLD AUTO: 1.72 10*3/MM3 (ref 0.7–3.1)
LYMPHOCYTES NFR BLD AUTO: 10.5 % (ref 19.6–45.3)
MCH RBC QN AUTO: 30.9 PG (ref 26.6–33)
MCHC RBC AUTO-ENTMCNC: 34.5 G/DL (ref 31.5–35.7)
MCV RBC AUTO: 89.7 FL (ref 79–97)
MONOCYTES # BLD AUTO: 0.81 10*3/MM3 (ref 0.1–0.9)
MONOCYTES NFR BLD AUTO: 4.9 % (ref 5–12)
NEUTROPHILS NFR BLD AUTO: 13.52 10*3/MM3 (ref 1.7–7)
NEUTROPHILS NFR BLD AUTO: 82.4 % (ref 42.7–76)
NRBC BLD AUTO-RTO: 0 /100 WBC (ref 0–0.2)
PLATELET # BLD AUTO: 202 10*3/MM3 (ref 140–450)
PMV BLD AUTO: 9.7 FL (ref 6–12)
RBC # BLD AUTO: 3.49 10*6/MM3 (ref 3.77–5.28)
WBC NRBC COR # BLD AUTO: 16.41 10*3/MM3 (ref 3.4–10.8)

## 2025-08-28 PROCEDURE — 85025 COMPLETE CBC W/AUTO DIFF WBC: CPT | Performed by: STUDENT IN AN ORGANIZED HEALTH CARE EDUCATION/TRAINING PROGRAM

## 2025-08-28 PROCEDURE — 25010000002 ENOXAPARIN PER 10 MG: Performed by: STUDENT IN AN ORGANIZED HEALTH CARE EDUCATION/TRAINING PROGRAM

## 2025-08-28 PROCEDURE — 63710000001 ONDANSETRON ODT 4 MG TABLET DISPERSIBLE: Performed by: STUDENT IN AN ORGANIZED HEALTH CARE EDUCATION/TRAINING PROGRAM

## 2025-08-28 RX ADMIN — ACETAMINOPHEN 650 MG: 325 TABLET ORAL at 08:59

## 2025-08-28 RX ADMIN — DOCUSATE SODIUM 100 MG: 100 CAPSULE, LIQUID FILLED ORAL at 21:34

## 2025-08-28 RX ADMIN — VENLAFAXINE HYDROCHLORIDE 75 MG: 75 CAPSULE, EXTENDED RELEASE ORAL at 11:39

## 2025-08-28 RX ADMIN — IBUPROFEN 600 MG: 600 TABLET ORAL at 21:34

## 2025-08-28 RX ADMIN — DOCUSATE SODIUM 100 MG: 100 CAPSULE, LIQUID FILLED ORAL at 08:59

## 2025-08-28 RX ADMIN — ONDANSETRON 4 MG: 4 TABLET, ORALLY DISINTEGRATING ORAL at 15:07

## 2025-08-28 RX ADMIN — ENOXAPARIN SODIUM 40 MG: 100 INJECTION SUBCUTANEOUS at 21:34

## 2025-08-28 RX ADMIN — TRAMADOL HYDROCHLORIDE 50 MG: 50 TABLET, COATED ORAL at 23:15

## 2025-08-28 RX ADMIN — IBUPROFEN 600 MG: 600 TABLET ORAL at 11:39

## 2025-08-28 RX ADMIN — IBUPROFEN 600 MG: 600 TABLET ORAL at 04:04

## 2025-08-28 RX ADMIN — TRAMADOL HYDROCHLORIDE 50 MG: 50 TABLET, COATED ORAL at 15:07

## 2025-08-28 RX ADMIN — PRENATAL VITAMINS-IRON FUMARATE 27 MG IRON-FOLIC ACID 0.8 MG TABLET 1 TABLET: at 08:59

## 2025-08-29 VITALS
DIASTOLIC BLOOD PRESSURE: 71 MMHG | WEIGHT: 176 LBS | RESPIRATION RATE: 16 BRPM | SYSTOLIC BLOOD PRESSURE: 113 MMHG | BODY MASS INDEX: 31.18 KG/M2 | TEMPERATURE: 97.7 F | HEART RATE: 70 BPM | HEIGHT: 63 IN

## 2025-08-29 LAB
ALBUMIN SERPL-MCNC: 2.8 G/DL (ref 3.5–5.2)
ALBUMIN/GLOB SERPL: 0.9 G/DL
ALP SERPL-CCNC: 212 U/L (ref 39–117)
ALT SERPL W P-5'-P-CCNC: 23 U/L (ref 1–33)
ANION GAP SERPL CALCULATED.3IONS-SCNC: 10.6 MMOL/L (ref 5–15)
AST SERPL-CCNC: 25 U/L (ref 1–32)
BASOPHILS # BLD AUTO: 0.07 10*3/MM3 (ref 0–0.2)
BASOPHILS NFR BLD AUTO: 0.6 % (ref 0–1.5)
BILIRUB SERPL-MCNC: 0.2 MG/DL (ref 0–1.2)
BUN SERPL-MCNC: 9 MG/DL (ref 6–20)
BUN/CREAT SERPL: 12.3 (ref 7–25)
CALCIUM SPEC-SCNC: 8.9 MG/DL (ref 8.6–10.5)
CHLORIDE SERPL-SCNC: 102 MMOL/L (ref 98–107)
CO2 SERPL-SCNC: 23.4 MMOL/L (ref 22–29)
CREAT SERPL-MCNC: 0.73 MG/DL (ref 0.57–1)
DEPRECATED RDW RBC AUTO: 45.8 FL (ref 37–54)
EGFRCR SERPLBLD CKD-EPI 2021: 109.5 ML/MIN/1.73
EOSINOPHIL # BLD AUTO: 0.12 10*3/MM3 (ref 0–0.4)
EOSINOPHIL NFR BLD AUTO: 1.1 % (ref 0.3–6.2)
ERYTHROCYTE [DISTWIDTH] IN BLOOD BY AUTOMATED COUNT: 13.5 % (ref 12.3–15.4)
GLOBULIN UR ELPH-MCNC: 3 GM/DL
GLUCOSE SERPL-MCNC: 95 MG/DL (ref 65–99)
HCT VFR BLD AUTO: 31.9 % (ref 34–46.6)
HGB BLD-MCNC: 10.6 G/DL (ref 12–15.9)
IMM GRANULOCYTES # BLD AUTO: 0.32 10*3/MM3 (ref 0–0.05)
IMM GRANULOCYTES NFR BLD AUTO: 2.9 % (ref 0–0.5)
LYMPHOCYTES # BLD AUTO: 2 10*3/MM3 (ref 0.7–3.1)
LYMPHOCYTES NFR BLD AUTO: 18.4 % (ref 19.6–45.3)
MCH RBC QN AUTO: 30.9 PG (ref 26.6–33)
MCHC RBC AUTO-ENTMCNC: 33.2 G/DL (ref 31.5–35.7)
MCV RBC AUTO: 93 FL (ref 79–97)
MONOCYTES # BLD AUTO: 0.51 10*3/MM3 (ref 0.1–0.9)
MONOCYTES NFR BLD AUTO: 4.7 % (ref 5–12)
NEUTROPHILS NFR BLD AUTO: 7.83 10*3/MM3 (ref 1.7–7)
NEUTROPHILS NFR BLD AUTO: 72.3 % (ref 42.7–76)
NRBC BLD AUTO-RTO: 0 /100 WBC (ref 0–0.2)
PLATELET # BLD AUTO: 204 10*3/MM3 (ref 140–450)
PMV BLD AUTO: 9 FL (ref 6–12)
POTASSIUM SERPL-SCNC: 3.6 MMOL/L (ref 3.5–5.2)
PROT SERPL-MCNC: 5.8 G/DL (ref 6–8.5)
RBC # BLD AUTO: 3.43 10*6/MM3 (ref 3.77–5.28)
SODIUM SERPL-SCNC: 136 MMOL/L (ref 136–145)
WBC NRBC COR # BLD AUTO: 10.85 10*3/MM3 (ref 3.4–10.8)

## 2025-08-29 PROCEDURE — 80053 COMPREHEN METABOLIC PANEL: CPT | Performed by: NURSE PRACTITIONER

## 2025-08-29 PROCEDURE — 85025 COMPLETE CBC W/AUTO DIFF WBC: CPT | Performed by: NURSE PRACTITIONER

## 2025-08-29 RX ORDER — ENOXAPARIN SODIUM 100 MG/ML
40 INJECTION SUBCUTANEOUS NIGHTLY
Qty: 12 ML | Refills: 1 | Status: SHIPPED | OUTPATIENT
Start: 2025-08-29 | End: 2025-10-10

## 2025-08-29 RX ORDER — IBUPROFEN 600 MG/1
600 TABLET, FILM COATED ORAL EVERY 6 HOURS PRN
Qty: 90 TABLET | Refills: 1 | Status: SHIPPED | OUTPATIENT
Start: 2025-08-29

## 2025-08-29 RX ORDER — TRAMADOL HYDROCHLORIDE 50 MG/1
50 TABLET ORAL EVERY 12 HOURS PRN
Qty: 9 TABLET | Refills: 0 | Status: SHIPPED | OUTPATIENT
Start: 2025-08-29

## 2025-08-29 RX ORDER — PSEUDOEPHEDRINE HCL 30 MG
100 TABLET ORAL 2 TIMES DAILY
Qty: 30 CAPSULE | Refills: 0 | Status: SHIPPED | OUTPATIENT
Start: 2025-08-29

## 2025-08-29 RX ADMIN — IBUPROFEN 600 MG: 600 TABLET ORAL at 08:19

## 2025-08-29 RX ADMIN — TRAMADOL HYDROCHLORIDE 50 MG: 50 TABLET, COATED ORAL at 05:13

## 2025-08-29 RX ADMIN — PRENATAL VITAMINS-IRON FUMARATE 27 MG IRON-FOLIC ACID 0.8 MG TABLET 1 TABLET: at 08:20

## 2025-08-29 RX ADMIN — IBUPROFEN 600 MG: 600 TABLET ORAL at 13:51

## 2025-08-29 RX ADMIN — DOCUSATE SODIUM 100 MG: 100 CAPSULE, LIQUID FILLED ORAL at 08:20

## 2025-08-29 RX ADMIN — Medication: at 06:46

## 2025-08-29 RX ADMIN — VENLAFAXINE HYDROCHLORIDE 75 MG: 75 CAPSULE, EXTENDED RELEASE ORAL at 08:19

## (undated) DEVICE — STRIP CLS WND SUTURESTRIP/PLS 0.5X5IN TP1105

## (undated) DEVICE — DECANT BG O JET

## (undated) DEVICE — MARKR SKIN W/RULR AND LBL

## (undated) DEVICE — BLCK/BITE BLOX W/DENTL/RIM W/STRAP 54F

## (undated) DEVICE — SUT MNCRYL PLS ANTIB UD 4/0 PS2 18IN

## (undated) DEVICE — NDL HYPO PRECISIONGLIDE REG 25G 1 1/2

## (undated) DEVICE — PK BRST CHST

## (undated) DEVICE — EXTRCT STPL SKIN STRL BX/12

## (undated) DEVICE — TOTAL TRAY, 16FR 10ML SIL FOLEY, URN: Brand: MEDLINE

## (undated) DEVICE — Device

## (undated) DEVICE — MSK ENDO PORT O2 POM ELITE CURAPLEX A/

## (undated) DEVICE — KT ORCA ORCAPOD DISP STRL

## (undated) DEVICE — SUT VIC 4/0 KS 27IN J662H

## (undated) DEVICE — SUT SILK 2/0 FS BLK 18IN 685G

## (undated) DEVICE — SUT MNCRYL 2/0 CT1 36IN UD MCP945H

## (undated) DEVICE — GLV SURG SENSICARE MICRO PF LF 7.5 STRL

## (undated) DEVICE — SYR LUERLOK 50ML

## (undated) DEVICE — FLEX ADVANTAGE 1500CC: Brand: FLEX ADVANTAGE

## (undated) DEVICE — GOWN ISOL W/THUMB UNIV BLU BX/15

## (undated) DEVICE — SUT MNCRYL 3/0 PS2 18IN MCP497G

## (undated) DEVICE — ELECTRD NDL EDGE/STD 2.84IN

## (undated) DEVICE — SUT MNCRYL 5/0 P3 18 IN Y493G

## (undated) DEVICE — SYR CONTRL LUERLOK 10CC

## (undated) DEVICE — BANDAGE,GAUZE,BULKEE II,4.5"X4.1YD,STRL: Brand: MEDLINE

## (undated) DEVICE — ADAPT CLN SCPE ENDO PORPOISE BX/50 DISP

## (undated) DEVICE — PROXIMATE RH ROTATING HEAD SKIN STAPLERS (35 WIDE) CONTAINS 35 STAINLESS STEEL STAPLES: Brand: PROXIMATE

## (undated) DEVICE — GOWN PROC ENDOARMOR GI LVL3 HY/SHLD UNIV

## (undated) DEVICE — SINGLE-USE BIOPSY FORCEPS: Brand: RADIAL JAW 4

## (undated) DEVICE — VIAL FORMLN CAP 10PCT 20ML

## (undated) DEVICE — IRRIGATOR BULB ASEPTO 60CC STRL

## (undated) DEVICE — SKIN PREP TRAY W/CHG: Brand: MEDLINE INDUSTRIES, INC.

## (undated) DEVICE — NDL SPINE 22G 31/2IN BLK